# Patient Record
Sex: FEMALE | Race: WHITE | Employment: OTHER | URBAN - NONMETROPOLITAN AREA
[De-identification: names, ages, dates, MRNs, and addresses within clinical notes are randomized per-mention and may not be internally consistent; named-entity substitution may affect disease eponyms.]

---

## 2013-04-12 LAB — LEFT VENTRICULAR EJECTION FRACTION, EXTERNAL: 58

## 2017-01-03 ENCOUNTER — ANTI-COAG VISIT (OUTPATIENT)
Dept: FAMILY MEDICINE CLINIC | Age: 67
End: 2017-01-03

## 2017-01-03 ENCOUNTER — NURSE ONLY (OUTPATIENT)
Dept: FAMILY MEDICINE CLINIC | Age: 67
End: 2017-01-03

## 2017-01-03 DIAGNOSIS — I48.20 CHRONIC ATRIAL FIBRILLATION (HCC): ICD-10-CM

## 2017-01-03 LAB
INTERNATIONAL NORMALIZATION RATIO, POC: 2.6
PROTHROMBIN TIME, POC: NORMAL

## 2017-01-03 PROCEDURE — 85610 PROTHROMBIN TIME: CPT | Performed by: FAMILY MEDICINE

## 2017-01-10 ENCOUNTER — NURSE ONLY (OUTPATIENT)
Dept: FAMILY MEDICINE CLINIC | Age: 67
End: 2017-01-10

## 2017-01-10 ENCOUNTER — ANTI-COAG VISIT (OUTPATIENT)
Dept: FAMILY MEDICINE CLINIC | Age: 67
End: 2017-01-10

## 2017-01-10 DIAGNOSIS — I48.20 CHRONIC ATRIAL FIBRILLATION (HCC): ICD-10-CM

## 2017-01-10 LAB
INTERNATIONAL NORMALIZATION RATIO, POC: 2.7
PROTHROMBIN TIME, POC: NORMAL

## 2017-01-10 PROCEDURE — 85610 PROTHROMBIN TIME: CPT | Performed by: FAMILY MEDICINE

## 2017-01-18 ENCOUNTER — NURSE ONLY (OUTPATIENT)
Dept: FAMILY MEDICINE CLINIC | Age: 67
End: 2017-01-18

## 2017-01-18 DIAGNOSIS — N18.4 CHRONIC KIDNEY DISEASE, STAGE IV (SEVERE) (HCC): Primary | ICD-10-CM

## 2017-01-24 ENCOUNTER — NURSE ONLY (OUTPATIENT)
Dept: FAMILY MEDICINE CLINIC | Age: 67
End: 2017-01-24

## 2017-01-24 ENCOUNTER — ANTI-COAG VISIT (OUTPATIENT)
Dept: FAMILY MEDICINE CLINIC | Age: 67
End: 2017-01-24

## 2017-01-24 DIAGNOSIS — I48.20 CHRONIC ATRIAL FIBRILLATION (HCC): ICD-10-CM

## 2017-01-24 LAB
INTERNATIONAL NORMALIZATION RATIO, POC: 3.2
PROTHROMBIN TIME, POC: NORMAL

## 2017-01-24 PROCEDURE — 85610 PROTHROMBIN TIME: CPT | Performed by: FAMILY MEDICINE

## 2017-02-01 RX ORDER — LEVOTHYROXINE SODIUM 0.1 MG/1
TABLET ORAL
Qty: 90 TABLET | Refills: 3 | Status: SHIPPED | OUTPATIENT
Start: 2017-02-01 | End: 2018-01-30 | Stop reason: SDUPTHER

## 2017-02-01 RX ORDER — SYRGE-NDL,INS 0.3 ML HALF MARK 31 GX5/16"
SYRINGE, EMPTY DISPOSABLE MISCELLANEOUS
Qty: 150 EACH | Refills: 3 | Status: SHIPPED | OUTPATIENT
Start: 2017-02-01 | End: 2017-11-26 | Stop reason: SDUPTHER

## 2017-02-07 ENCOUNTER — ANTI-COAG VISIT (OUTPATIENT)
Dept: FAMILY MEDICINE CLINIC | Age: 67
End: 2017-02-07

## 2017-02-07 ENCOUNTER — NURSE ONLY (OUTPATIENT)
Dept: FAMILY MEDICINE CLINIC | Age: 67
End: 2017-02-07

## 2017-02-07 DIAGNOSIS — I48.20 CHRONIC ATRIAL FIBRILLATION (HCC): ICD-10-CM

## 2017-02-07 LAB
INTERNATIONAL NORMALIZATION RATIO, POC: 4
PROTHROMBIN TIME, POC: NORMAL

## 2017-02-07 PROCEDURE — 85610 PROTHROMBIN TIME: CPT | Performed by: FAMILY MEDICINE

## 2017-02-14 ENCOUNTER — ANTI-COAG VISIT (OUTPATIENT)
Dept: FAMILY MEDICINE CLINIC | Age: 67
End: 2017-02-14

## 2017-02-14 ENCOUNTER — NURSE ONLY (OUTPATIENT)
Dept: FAMILY MEDICINE CLINIC | Age: 67
End: 2017-02-14

## 2017-02-14 DIAGNOSIS — I48.20 CHRONIC ATRIAL FIBRILLATION (HCC): ICD-10-CM

## 2017-02-14 LAB
INTERNATIONAL NORMALIZATION RATIO, POC: 2.5
PROTHROMBIN TIME, POC: NORMAL

## 2017-02-14 PROCEDURE — 85610 PROTHROMBIN TIME: CPT | Performed by: FAMILY MEDICINE

## 2017-02-16 DIAGNOSIS — B02.29 POST HERPETIC NEURALGIA: ICD-10-CM

## 2017-02-21 ENCOUNTER — NURSE ONLY (OUTPATIENT)
Dept: FAMILY MEDICINE CLINIC | Age: 67
End: 2017-02-21

## 2017-02-21 ENCOUNTER — ANTI-COAG VISIT (OUTPATIENT)
Dept: FAMILY MEDICINE CLINIC | Age: 67
End: 2017-02-21

## 2017-02-21 DIAGNOSIS — I48.20 CHRONIC ATRIAL FIBRILLATION (HCC): ICD-10-CM

## 2017-02-21 LAB
INTERNATIONAL NORMALIZATION RATIO, POC: 3.1
PROTHROMBIN TIME, POC: NORMAL

## 2017-02-21 PROCEDURE — 85610 PROTHROMBIN TIME: CPT | Performed by: FAMILY MEDICINE

## 2017-02-28 ENCOUNTER — NURSE ONLY (OUTPATIENT)
Dept: FAMILY MEDICINE CLINIC | Age: 67
End: 2017-02-28

## 2017-02-28 ENCOUNTER — ANTI-COAG VISIT (OUTPATIENT)
Dept: FAMILY MEDICINE CLINIC | Age: 67
End: 2017-02-28

## 2017-02-28 DIAGNOSIS — I48.20 CHRONIC ATRIAL FIBRILLATION (HCC): ICD-10-CM

## 2017-02-28 LAB
INTERNATIONAL NORMALIZATION RATIO, POC: 2.6
PROTHROMBIN TIME, POC: NORMAL

## 2017-02-28 PROCEDURE — 85610 PROTHROMBIN TIME: CPT | Performed by: FAMILY MEDICINE

## 2017-03-13 ENCOUNTER — ANTI-COAG VISIT (OUTPATIENT)
Dept: FAMILY MEDICINE CLINIC | Age: 67
End: 2017-03-13

## 2017-03-13 ENCOUNTER — NURSE ONLY (OUTPATIENT)
Dept: FAMILY MEDICINE CLINIC | Age: 67
End: 2017-03-13

## 2017-03-13 DIAGNOSIS — I48.20 CHRONIC ATRIAL FIBRILLATION (HCC): ICD-10-CM

## 2017-03-13 LAB
INTERNATIONAL NORMALIZATION RATIO, POC: 2.5
PROTHROMBIN TIME, POC: NORMAL

## 2017-03-13 PROCEDURE — 85610 PROTHROMBIN TIME: CPT | Performed by: FAMILY MEDICINE

## 2017-03-19 DIAGNOSIS — I87.2 VENOUS INSUFFICIENCY: ICD-10-CM

## 2017-03-20 ENCOUNTER — HOSPITAL ENCOUNTER (OUTPATIENT)
Dept: CT IMAGING | Age: 67
Discharge: HOME | End: 2017-03-20
Attending: INTERNAL MEDICINE | Admitting: INTERNAL MEDICINE

## 2017-03-20 DIAGNOSIS — Z85.72 HISTORY OF NON-HODGKIN'S LYMPHOMA: ICD-10-CM

## 2017-03-20 DIAGNOSIS — Z85.72 PERSONAL HISTORY OF LYMPHOMA: ICD-10-CM

## 2017-03-20 RX ORDER — FUROSEMIDE 20 MG/1
TABLET ORAL
Qty: 180 TABLET | Refills: 3 | Status: SHIPPED | OUTPATIENT
Start: 2017-03-20 | End: 2018-03-12 | Stop reason: SDUPTHER

## 2017-03-23 RX ORDER — PEN NEEDLE, DIABETIC 32GX 5/32"
NEEDLE, DISPOSABLE MISCELLANEOUS
Qty: 200 PACKAGE | Refills: 11 | Status: SHIPPED | OUTPATIENT
Start: 2017-03-23 | End: 2018-04-20 | Stop reason: SDUPTHER

## 2017-03-24 RX ORDER — DILTIAZEM HYDROCHLORIDE 240 MG/1
240 CAPSULE, EXTENDED RELEASE ORAL DAILY
Qty: 90 CAPSULE | Refills: 3 | Status: SHIPPED | OUTPATIENT
Start: 2017-03-24 | End: 2018-03-27 | Stop reason: SDUPTHER

## 2017-03-24 RX ORDER — PENTOXIFYLLINE 400 MG/1
TABLET, EXTENDED RELEASE ORAL
Qty: 90 TABLET | Refills: 3 | Status: SHIPPED | OUTPATIENT
Start: 2017-03-24 | End: 2018-03-20 | Stop reason: SDUPTHER

## 2017-03-24 RX ORDER — FENOFIBRATE 160 MG/1
TABLET ORAL
Qty: 90 TABLET | Refills: 3 | Status: SHIPPED | OUTPATIENT
Start: 2017-03-24 | End: 2017-04-10 | Stop reason: ALTCHOICE

## 2017-03-28 RX ORDER — DILTIAZEM HYDROCHLORIDE 240 MG/1
CAPSULE, EXTENDED RELEASE ORAL
Qty: 30 CAPSULE | Refills: 0 | OUTPATIENT
Start: 2017-03-28

## 2017-03-29 DIAGNOSIS — B02.29 POST HERPETIC NEURALGIA: ICD-10-CM

## 2017-04-10 ENCOUNTER — OFFICE VISIT (OUTPATIENT)
Dept: FAMILY MEDICINE CLINIC | Age: 67
End: 2017-04-10

## 2017-04-10 ENCOUNTER — ANTI-COAG VISIT (OUTPATIENT)
Dept: FAMILY MEDICINE CLINIC | Age: 67
End: 2017-04-10

## 2017-04-10 VITALS
HEART RATE: 74 BPM | HEIGHT: 62 IN | OXYGEN SATURATION: 98 % | WEIGHT: 226.8 LBS | SYSTOLIC BLOOD PRESSURE: 116 MMHG | BODY MASS INDEX: 41.73 KG/M2 | DIASTOLIC BLOOD PRESSURE: 66 MMHG

## 2017-04-10 DIAGNOSIS — Z86.19 HISTORY OF HERPES ZOSTER: ICD-10-CM

## 2017-04-10 DIAGNOSIS — E11.21 DIABETIC NEPHROPATHY ASSOCIATED WITH TYPE 2 DIABETES MELLITUS (HCC): Primary | ICD-10-CM

## 2017-04-10 DIAGNOSIS — N18.4 CKD STAGE 4 DUE TO TYPE 2 DIABETES MELLITUS (HCC): ICD-10-CM

## 2017-04-10 DIAGNOSIS — E78.2 MIXED HYPERLIPIDEMIA: ICD-10-CM

## 2017-04-10 DIAGNOSIS — N18.4 CHRONIC KIDNEY DISEASE (CKD), STAGE IV (SEVERE) (HCC): ICD-10-CM

## 2017-04-10 DIAGNOSIS — R07.9 CHEST PAIN, UNSPECIFIED TYPE: ICD-10-CM

## 2017-04-10 DIAGNOSIS — E11.22 CKD STAGE 4 DUE TO TYPE 2 DIABETES MELLITUS (HCC): ICD-10-CM

## 2017-04-10 DIAGNOSIS — Z23 NEED FOR 23-POLYVALENT PNEUMOCOCCAL POLYSACCHARIDE VACCINE: ICD-10-CM

## 2017-04-10 DIAGNOSIS — Z23 NEED FOR TDAP VACCINATION: ICD-10-CM

## 2017-04-10 DIAGNOSIS — Z12.11 COLON CANCER SCREENING: ICD-10-CM

## 2017-04-10 DIAGNOSIS — E03.8 HYPOTHYROIDISM DUE TO HASHIMOTO'S THYROIDITIS: ICD-10-CM

## 2017-04-10 DIAGNOSIS — I10 BENIGN ESSENTIAL HTN: ICD-10-CM

## 2017-04-10 DIAGNOSIS — E06.3 HYPOTHYROIDISM DUE TO HASHIMOTO'S THYROIDITIS: ICD-10-CM

## 2017-04-10 DIAGNOSIS — E13.51 PERIPHERAL VASCULAR DISEASE DUE TO SECONDARY DIABETES MELLITUS (HCC): ICD-10-CM

## 2017-04-10 DIAGNOSIS — E13.21 NEPHROPATHY DUE TO SECONDARY DIABETES MELLITUS (HCC): ICD-10-CM

## 2017-04-10 DIAGNOSIS — I48.20 CHRONIC ATRIAL FIBRILLATION (HCC): ICD-10-CM

## 2017-04-10 LAB
INTERNATIONAL NORMALIZATION RATIO, POC: 2.7
PROTHROMBIN TIME, POC: NORMAL

## 2017-04-10 PROCEDURE — 93000 ELECTROCARDIOGRAM COMPLETE: CPT | Performed by: FAMILY MEDICINE

## 2017-04-10 PROCEDURE — 1036F TOBACCO NON-USER: CPT | Performed by: FAMILY MEDICINE

## 2017-04-10 PROCEDURE — G8417 CALC BMI ABV UP PARAM F/U: HCPCS | Performed by: FAMILY MEDICINE

## 2017-04-10 PROCEDURE — 99215 OFFICE O/P EST HI 40 MIN: CPT | Performed by: FAMILY MEDICINE

## 2017-04-10 PROCEDURE — 3014F SCREEN MAMMO DOC REV: CPT | Performed by: FAMILY MEDICINE

## 2017-04-10 PROCEDURE — 3017F COLORECTAL CA SCREEN DOC REV: CPT | Performed by: FAMILY MEDICINE

## 2017-04-10 PROCEDURE — 1123F ACP DISCUSS/DSCN MKR DOCD: CPT | Performed by: FAMILY MEDICINE

## 2017-04-10 PROCEDURE — 4040F PNEUMOC VAC/ADMIN/RCVD: CPT | Performed by: FAMILY MEDICINE

## 2017-04-10 PROCEDURE — G0009 ADMIN PNEUMOCOCCAL VACCINE: HCPCS | Performed by: FAMILY MEDICINE

## 2017-04-10 PROCEDURE — 1090F PRES/ABSN URINE INCON ASSESS: CPT | Performed by: FAMILY MEDICINE

## 2017-04-10 PROCEDURE — 90732 PPSV23 VACC 2 YRS+ SUBQ/IM: CPT | Performed by: FAMILY MEDICINE

## 2017-04-10 PROCEDURE — G8427 DOCREV CUR MEDS BY ELIG CLIN: HCPCS | Performed by: FAMILY MEDICINE

## 2017-04-10 PROCEDURE — 36415 COLL VENOUS BLD VENIPUNCTURE: CPT | Performed by: FAMILY MEDICINE

## 2017-04-10 PROCEDURE — 85610 PROTHROMBIN TIME: CPT | Performed by: FAMILY MEDICINE

## 2017-04-10 PROCEDURE — G8399 PT W/DXA RESULTS DOCUMENT: HCPCS | Performed by: FAMILY MEDICINE

## 2017-04-10 PROCEDURE — 3044F HG A1C LEVEL LT 7.0%: CPT | Performed by: FAMILY MEDICINE

## 2017-04-10 RX ORDER — ATORVASTATIN CALCIUM 40 MG/1
TABLET, FILM COATED ORAL
Qty: 90 TABLET | Refills: 3 | Status: SHIPPED | OUTPATIENT
Start: 2017-04-10 | End: 2018-04-03 | Stop reason: SDUPTHER

## 2017-04-10 ASSESSMENT — PATIENT HEALTH QUESTIONNAIRE - PHQ9
SUM OF ALL RESPONSES TO PHQ QUESTIONS 1-9: 0
2. FEELING DOWN, DEPRESSED OR HOPELESS: 0
1. LITTLE INTEREST OR PLEASURE IN DOING THINGS: 0
SUM OF ALL RESPONSES TO PHQ9 QUESTIONS 1 & 2: 0

## 2017-04-11 LAB
A/G RATIO: 1.6 (ref 1.1–2.2)
ALBUMIN SERPL-MCNC: 4.1 G/DL (ref 3.4–5)
ALP BLD-CCNC: 46 U/L (ref 40–129)
ALT SERPL-CCNC: 11 U/L (ref 10–40)
ANION GAP SERPL CALCULATED.3IONS-SCNC: 18 MMOL/L (ref 3–16)
AST SERPL-CCNC: 22 U/L (ref 15–37)
BILIRUB SERPL-MCNC: 0.6 MG/DL (ref 0–1)
BUN BLDV-MCNC: 42 MG/DL (ref 7–20)
CALCIUM SERPL-MCNC: 9.1 MG/DL (ref 8.3–10.6)
CHLORIDE BLD-SCNC: 99 MMOL/L (ref 99–110)
CHOLESTEROL, TOTAL: 116 MG/DL (ref 0–199)
CO2: 26 MMOL/L (ref 21–32)
CREAT SERPL-MCNC: 2 MG/DL (ref 0.6–1.2)
ESTIMATED AVERAGE GLUCOSE: 162.8 MG/DL
GFR AFRICAN AMERICAN: 30
GFR NON-AFRICAN AMERICAN: 25
GLOBULIN: 2.6 G/DL
GLUCOSE BLD-MCNC: 70 MG/DL (ref 70–99)
HBA1C MFR BLD: 7.3 %
HDLC SERPL-MCNC: 37 MG/DL (ref 40–60)
LDL CHOLESTEROL CALCULATED: 55 MG/DL
POTASSIUM SERPL-SCNC: 4.3 MMOL/L (ref 3.5–5.1)
SODIUM BLD-SCNC: 143 MMOL/L (ref 136–145)
TOTAL PROTEIN: 6.7 G/DL (ref 6.4–8.2)
TRIGL SERPL-MCNC: 122 MG/DL (ref 0–150)
TSH REFLEX: 1.83 UIU/ML (ref 0.27–4.2)
VLDLC SERPL CALC-MCNC: 24 MG/DL

## 2017-04-14 LAB
6-ACETYLMORPHINE: NOT DETECTED
7-AMINOCLONAZEPAM: NOT DETECTED
ALPHA-OH-ALPRAZOLAM: NOT DETECTED
ALPRAZOLAM: NOT DETECTED
AMPHETAMINE: NOT DETECTED
BARBITURATES: NOT DETECTED
BENZOYLECGONINE: NOT DETECTED
BUPRENORPHINE: NOT DETECTED
CARISOPRODOL: NOT DETECTED
CLONAZEPAM: NOT DETECTED
CODEINE: NOT DETECTED
CREATININE URINE: 22.2 MG/DL (ref 20–400)
DIAZEPAM: NOT DETECTED
DRUGS EXPECTED: NORMAL
EER PAIN MGT DRUG PANEL, HIGH RES/EMIT U: NORMAL
ETHYL GLUCURONIDE: NOT DETECTED
FENTANYL: NOT DETECTED
HYDROCODONE: NOT DETECTED
HYDROMORPHONE: NOT DETECTED
LORAZEPAM: NOT DETECTED
MARIJUANA METABOLITE: NOT DETECTED
MDA: NOT DETECTED
MDEA: NOT DETECTED
MDMA URINE: NOT DETECTED
MEPERIDINE: NOT DETECTED
METHADONE: NOT DETECTED
METHAMPHETAMINE: NOT DETECTED
METHYLPHENIDATE: NOT DETECTED
MIDAZOLAM: NOT DETECTED
MORPHINE: NOT DETECTED
NORBUPRENORPHINE, FREE: NOT DETECTED
NORDIAZEPAM: NOT DETECTED
NORFENTANYL: NOT DETECTED
NORHYDROCODONE, URINE: NOT DETECTED
NOROXYCODONE: NOT DETECTED
NOROXYMORPHONE, URINE: NOT DETECTED
OXAZEPAM: NOT DETECTED
OXYCODONE: NOT DETECTED
OXYMORPHONE: NOT DETECTED
PAIN MANAGEMENT DRUG PANEL: NORMAL
PAIN MANAGEMENT DRUG PANEL: NORMAL
PCP: NOT DETECTED
PHENTERMINE: NOT DETECTED
PROPOXYPHENE: NOT DETECTED
TAPENTADOL, URINE: NOT DETECTED
TAPENTADOL-O-SULFATE, URINE: NOT DETECTED
TEMAZEPAM: NOT DETECTED
TRAMADOL: NOT DETECTED
ZOLPIDEM: NOT DETECTED

## 2017-04-27 ENCOUNTER — OFFICE VISIT (OUTPATIENT)
Dept: ORTHOPEDIC SURGERY | Age: 67
End: 2017-04-27

## 2017-04-27 VITALS
HEIGHT: 62 IN | HEART RATE: 82 BPM | BODY MASS INDEX: 41.75 KG/M2 | SYSTOLIC BLOOD PRESSURE: 132 MMHG | WEIGHT: 226.85 LBS | DIASTOLIC BLOOD PRESSURE: 80 MMHG

## 2017-04-27 DIAGNOSIS — M79.642 LEFT HAND PAIN: Primary | ICD-10-CM

## 2017-04-27 DIAGNOSIS — M65.312 TRIGGER FINGER OF LEFT THUMB: ICD-10-CM

## 2017-04-27 DIAGNOSIS — M65.332 TRIGGER FINGER, LEFT MIDDLE FINGER: ICD-10-CM

## 2017-04-27 PROCEDURE — 1090F PRES/ABSN URINE INCON ASSESS: CPT | Performed by: ORTHOPAEDIC SURGERY

## 2017-04-27 PROCEDURE — 4040F PNEUMOC VAC/ADMIN/RCVD: CPT | Performed by: ORTHOPAEDIC SURGERY

## 2017-04-27 PROCEDURE — 1123F ACP DISCUSS/DSCN MKR DOCD: CPT | Performed by: ORTHOPAEDIC SURGERY

## 2017-04-27 PROCEDURE — 3017F COLORECTAL CA SCREEN DOC REV: CPT | Performed by: ORTHOPAEDIC SURGERY

## 2017-04-27 PROCEDURE — G8427 DOCREV CUR MEDS BY ELIG CLIN: HCPCS | Performed by: ORTHOPAEDIC SURGERY

## 2017-04-27 PROCEDURE — G8399 PT W/DXA RESULTS DOCUMENT: HCPCS | Performed by: ORTHOPAEDIC SURGERY

## 2017-04-27 PROCEDURE — 1036F TOBACCO NON-USER: CPT | Performed by: ORTHOPAEDIC SURGERY

## 2017-04-27 PROCEDURE — 3014F SCREEN MAMMO DOC REV: CPT | Performed by: ORTHOPAEDIC SURGERY

## 2017-04-27 PROCEDURE — 20550 NJX 1 TENDON SHEATH/LIGAMENT: CPT | Performed by: ORTHOPAEDIC SURGERY

## 2017-04-27 PROCEDURE — G8417 CALC BMI ABV UP PARAM F/U: HCPCS | Performed by: ORTHOPAEDIC SURGERY

## 2017-04-27 PROCEDURE — 99203 OFFICE O/P NEW LOW 30 MIN: CPT | Performed by: ORTHOPAEDIC SURGERY

## 2017-05-03 RX ORDER — OMEPRAZOLE 40 MG/1
CAPSULE, DELAYED RELEASE ORAL
Qty: 90 CAPSULE | Refills: 3 | Status: SHIPPED | OUTPATIENT
Start: 2017-05-03 | End: 2018-04-24 | Stop reason: SDUPTHER

## 2017-05-09 ENCOUNTER — ANTI-COAG VISIT (OUTPATIENT)
Dept: FAMILY MEDICINE CLINIC | Age: 67
End: 2017-05-09

## 2017-05-09 ENCOUNTER — NURSE ONLY (OUTPATIENT)
Dept: FAMILY MEDICINE CLINIC | Age: 67
End: 2017-05-09

## 2017-05-09 DIAGNOSIS — I48.20 CHRONIC ATRIAL FIBRILLATION (HCC): Primary | ICD-10-CM

## 2017-05-09 LAB
INTERNATIONAL NORMALIZATION RATIO, POC: 1.2
PROTHROMBIN TIME, POC: NORMAL

## 2017-05-09 PROCEDURE — 85610 PROTHROMBIN TIME: CPT | Performed by: FAMILY MEDICINE

## 2017-05-10 ENCOUNTER — NURSE ONLY (OUTPATIENT)
Dept: FAMILY MEDICINE CLINIC | Age: 67
End: 2017-05-10

## 2017-05-10 ENCOUNTER — ANTI-COAG VISIT (OUTPATIENT)
Dept: FAMILY MEDICINE CLINIC | Age: 67
End: 2017-05-10

## 2017-05-10 DIAGNOSIS — I48.20 CHRONIC ATRIAL FIBRILLATION (HCC): ICD-10-CM

## 2017-05-10 LAB
INTERNATIONAL NORMALIZATION RATIO, POC: 1.2
PROTHROMBIN TIME, POC: NORMAL

## 2017-05-10 PROCEDURE — 85610 PROTHROMBIN TIME: CPT | Performed by: FAMILY MEDICINE

## 2017-05-12 ENCOUNTER — NURSE ONLY (OUTPATIENT)
Dept: FAMILY MEDICINE CLINIC | Age: 67
End: 2017-05-12

## 2017-05-12 DIAGNOSIS — I48.20 CHRONIC ATRIAL FIBRILLATION (HCC): ICD-10-CM

## 2017-05-12 LAB
INTERNATIONAL NORMALIZATION RATIO, POC: 2.1
PROTHROMBIN TIME, POC: NORMAL

## 2017-05-12 PROCEDURE — 85610 PROTHROMBIN TIME: CPT | Performed by: FAMILY MEDICINE

## 2017-05-19 ENCOUNTER — NURSE ONLY (OUTPATIENT)
Dept: FAMILY MEDICINE CLINIC | Age: 67
End: 2017-05-19

## 2017-05-19 DIAGNOSIS — I48.20 CHRONIC ATRIAL FIBRILLATION (HCC): ICD-10-CM

## 2017-05-19 DIAGNOSIS — E11.9 INSULIN-REQUIRING OR DEPENDENT TYPE II DIABETES MELLITUS (HCC): ICD-10-CM

## 2017-05-19 DIAGNOSIS — Z79.4 INSULIN-REQUIRING OR DEPENDENT TYPE II DIABETES MELLITUS (HCC): ICD-10-CM

## 2017-05-19 LAB
INTERNATIONAL NORMALIZATION RATIO, POC: 1.3
PROTHROMBIN TIME, POC: NORMAL

## 2017-05-19 PROCEDURE — 85610 PROTHROMBIN TIME: CPT | Performed by: FAMILY MEDICINE

## 2017-05-22 ENCOUNTER — ANTI-COAG VISIT (OUTPATIENT)
Dept: FAMILY MEDICINE CLINIC | Age: 67
End: 2017-05-22

## 2017-05-22 ENCOUNTER — NURSE ONLY (OUTPATIENT)
Dept: FAMILY MEDICINE CLINIC | Age: 67
End: 2017-05-22

## 2017-05-22 DIAGNOSIS — I48.20 CHRONIC ATRIAL FIBRILLATION (HCC): ICD-10-CM

## 2017-05-22 LAB
INTERNATIONAL NORMALIZATION RATIO, POC: 1.7
PROTHROMBIN TIME, POC: NORMAL

## 2017-05-22 PROCEDURE — 85610 PROTHROMBIN TIME: CPT | Performed by: FAMILY MEDICINE

## 2017-05-25 ENCOUNTER — OFFICE VISIT (OUTPATIENT)
Dept: ORTHOPEDIC SURGERY | Age: 67
End: 2017-05-25

## 2017-05-25 VITALS — WEIGHT: 226.85 LBS | BODY MASS INDEX: 41.75 KG/M2 | HEIGHT: 62 IN

## 2017-05-25 DIAGNOSIS — M65.332 TRIGGER FINGER, LEFT MIDDLE FINGER: ICD-10-CM

## 2017-05-25 DIAGNOSIS — M65.312 TRIGGER FINGER OF LEFT THUMB: Primary | ICD-10-CM

## 2017-05-25 PROCEDURE — 1036F TOBACCO NON-USER: CPT | Performed by: ORTHOPAEDIC SURGERY

## 2017-05-25 PROCEDURE — 4040F PNEUMOC VAC/ADMIN/RCVD: CPT | Performed by: ORTHOPAEDIC SURGERY

## 2017-05-25 PROCEDURE — 1123F ACP DISCUSS/DSCN MKR DOCD: CPT | Performed by: ORTHOPAEDIC SURGERY

## 2017-05-25 PROCEDURE — 3017F COLORECTAL CA SCREEN DOC REV: CPT | Performed by: ORTHOPAEDIC SURGERY

## 2017-05-25 PROCEDURE — G8417 CALC BMI ABV UP PARAM F/U: HCPCS | Performed by: ORTHOPAEDIC SURGERY

## 2017-05-25 PROCEDURE — G8399 PT W/DXA RESULTS DOCUMENT: HCPCS | Performed by: ORTHOPAEDIC SURGERY

## 2017-05-25 PROCEDURE — 99212 OFFICE O/P EST SF 10 MIN: CPT | Performed by: ORTHOPAEDIC SURGERY

## 2017-05-25 PROCEDURE — 3014F SCREEN MAMMO DOC REV: CPT | Performed by: ORTHOPAEDIC SURGERY

## 2017-05-25 PROCEDURE — G8427 DOCREV CUR MEDS BY ELIG CLIN: HCPCS | Performed by: ORTHOPAEDIC SURGERY

## 2017-05-25 PROCEDURE — 1090F PRES/ABSN URINE INCON ASSESS: CPT | Performed by: ORTHOPAEDIC SURGERY

## 2017-05-26 ENCOUNTER — ANTI-COAG VISIT (OUTPATIENT)
Dept: FAMILY MEDICINE CLINIC | Age: 67
End: 2017-05-26

## 2017-05-26 ENCOUNTER — NURSE ONLY (OUTPATIENT)
Dept: FAMILY MEDICINE CLINIC | Age: 67
End: 2017-05-26

## 2017-05-26 DIAGNOSIS — I48.20 CHRONIC ATRIAL FIBRILLATION (HCC): ICD-10-CM

## 2017-05-26 LAB
INTERNATIONAL NORMALIZATION RATIO, POC: 2
PROTHROMBIN TIME, POC: NORMAL

## 2017-05-26 PROCEDURE — 85610 PROTHROMBIN TIME: CPT | Performed by: FAMILY MEDICINE

## 2017-06-01 ENCOUNTER — NURSE ONLY (OUTPATIENT)
Dept: FAMILY MEDICINE CLINIC | Age: 67
End: 2017-06-01

## 2017-06-01 DIAGNOSIS — I48.20 CHRONIC ATRIAL FIBRILLATION (HCC): ICD-10-CM

## 2017-06-01 LAB
INTERNATIONAL NORMALIZATION RATIO, POC: 1.5
PROTHROMBIN TIME, POC: NORMAL

## 2017-06-01 PROCEDURE — 85610 PROTHROMBIN TIME: CPT | Performed by: NURSE PRACTITIONER

## 2017-06-02 ENCOUNTER — ANTI-COAG VISIT (OUTPATIENT)
Dept: FAMILY MEDICINE CLINIC | Age: 67
End: 2017-06-02

## 2017-06-02 ENCOUNTER — NURSE ONLY (OUTPATIENT)
Dept: FAMILY MEDICINE CLINIC | Age: 67
End: 2017-06-02

## 2017-06-02 DIAGNOSIS — I48.20 CHRONIC ATRIAL FIBRILLATION (HCC): ICD-10-CM

## 2017-06-02 LAB
INTERNATIONAL NORMALIZATION RATIO, POC: 1.7
PROTHROMBIN TIME, POC: NORMAL

## 2017-06-02 PROCEDURE — 85610 PROTHROMBIN TIME: CPT | Performed by: FAMILY MEDICINE

## 2017-06-05 ENCOUNTER — NURSE ONLY (OUTPATIENT)
Dept: FAMILY MEDICINE CLINIC | Age: 67
End: 2017-06-05

## 2017-06-05 DIAGNOSIS — I48.20 CHRONIC ATRIAL FIBRILLATION (HCC): ICD-10-CM

## 2017-06-05 LAB
INTERNATIONAL NORMALIZATION RATIO, POC: 2.1
PROTHROMBIN TIME, POC: NORMAL

## 2017-06-05 PROCEDURE — 85610 PROTHROMBIN TIME: CPT | Performed by: FAMILY MEDICINE

## 2017-06-07 RX ORDER — ALENDRONATE SODIUM 35 MG/1
TABLET ORAL
Qty: 12 TABLET | Refills: 3 | Status: SHIPPED | OUTPATIENT
Start: 2017-06-07 | End: 2017-10-26

## 2017-06-08 ENCOUNTER — NURSE ONLY (OUTPATIENT)
Dept: FAMILY MEDICINE CLINIC | Age: 67
End: 2017-06-08

## 2017-06-08 DIAGNOSIS — I48.20 CHRONIC ATRIAL FIBRILLATION (HCC): ICD-10-CM

## 2017-06-08 LAB
INTERNATIONAL NORMALIZATION RATIO, POC: 1.6
PROTHROMBIN TIME, POC: NORMAL

## 2017-06-08 PROCEDURE — 85610 PROTHROMBIN TIME: CPT | Performed by: FAMILY MEDICINE

## 2017-06-12 ENCOUNTER — NURSE ONLY (OUTPATIENT)
Dept: FAMILY MEDICINE CLINIC | Age: 67
End: 2017-06-12

## 2017-06-12 ENCOUNTER — ANTI-COAG VISIT (OUTPATIENT)
Dept: FAMILY MEDICINE CLINIC | Age: 67
End: 2017-06-12

## 2017-06-12 DIAGNOSIS — I48.20 CHRONIC ATRIAL FIBRILLATION (HCC): ICD-10-CM

## 2017-06-12 LAB
INTERNATIONAL NORMALIZATION RATIO, POC: 1.9
PROTHROMBIN TIME, POC: NORMAL

## 2017-06-12 PROCEDURE — 85610 PROTHROMBIN TIME: CPT | Performed by: FAMILY MEDICINE

## 2017-06-13 ENCOUNTER — TELEPHONE (OUTPATIENT)
Dept: FAMILY MEDICINE CLINIC | Age: 67
End: 2017-06-13

## 2017-06-13 RX ORDER — WARFARIN SODIUM 5 MG/1
5 TABLET ORAL DAILY
Qty: 30 TABLET | Refills: 3 | Status: SHIPPED | OUTPATIENT
Start: 2017-06-13 | End: 2017-10-07 | Stop reason: SDUPTHER

## 2017-06-19 ENCOUNTER — NURSE ONLY (OUTPATIENT)
Dept: FAMILY MEDICINE CLINIC | Age: 67
End: 2017-06-19

## 2017-06-19 ENCOUNTER — ANTI-COAG VISIT (OUTPATIENT)
Dept: FAMILY MEDICINE CLINIC | Age: 67
End: 2017-06-19

## 2017-06-19 DIAGNOSIS — I48.20 CHRONIC ATRIAL FIBRILLATION (HCC): ICD-10-CM

## 2017-06-19 LAB
INTERNATIONAL NORMALIZATION RATIO, POC: 1.9
PROTHROMBIN TIME, POC: NORMAL

## 2017-06-19 PROCEDURE — 85610 PROTHROMBIN TIME: CPT | Performed by: FAMILY MEDICINE

## 2017-06-26 RX ORDER — LANCETS
EACH MISCELLANEOUS
Qty: 100 EACH | Refills: 5 | Status: SHIPPED | OUTPATIENT
Start: 2017-06-26 | End: 2017-07-03 | Stop reason: SDUPTHER

## 2017-06-28 ENCOUNTER — ANTI-COAG VISIT (OUTPATIENT)
Dept: FAMILY MEDICINE CLINIC | Age: 67
End: 2017-06-28

## 2017-06-28 ENCOUNTER — NURSE ONLY (OUTPATIENT)
Dept: FAMILY MEDICINE CLINIC | Age: 67
End: 2017-06-28

## 2017-06-28 DIAGNOSIS — I48.20 CHRONIC ATRIAL FIBRILLATION (HCC): ICD-10-CM

## 2017-06-28 LAB
INTERNATIONAL NORMALIZATION RATIO, POC: 2
PROTHROMBIN TIME, POC: NORMAL

## 2017-06-28 PROCEDURE — 85610 PROTHROMBIN TIME: CPT | Performed by: FAMILY MEDICINE

## 2017-07-03 RX ORDER — LANCETS
EACH MISCELLANEOUS
Qty: 100 EACH | Refills: 5 | Status: SHIPPED | OUTPATIENT
Start: 2017-07-03 | End: 2017-09-15 | Stop reason: SDUPTHER

## 2017-07-12 ENCOUNTER — NURSE ONLY (OUTPATIENT)
Dept: FAMILY MEDICINE CLINIC | Age: 67
End: 2017-07-12

## 2017-07-12 DIAGNOSIS — I48.20 CHRONIC ATRIAL FIBRILLATION (HCC): ICD-10-CM

## 2017-07-12 LAB
INTERNATIONAL NORMALIZATION RATIO, POC: 1.8
PROTHROMBIN TIME, POC: NORMAL

## 2017-07-12 PROCEDURE — 85610 PROTHROMBIN TIME: CPT | Performed by: FAMILY MEDICINE

## 2017-07-19 ENCOUNTER — ANTI-COAG VISIT (OUTPATIENT)
Dept: FAMILY MEDICINE CLINIC | Age: 67
End: 2017-07-19

## 2017-07-19 ENCOUNTER — NURSE ONLY (OUTPATIENT)
Dept: FAMILY MEDICINE CLINIC | Age: 67
End: 2017-07-19

## 2017-07-19 DIAGNOSIS — I48.20 CHRONIC ATRIAL FIBRILLATION (HCC): ICD-10-CM

## 2017-07-19 LAB
INTERNATIONAL NORMALIZATION RATIO, POC: 2.1
PROTHROMBIN TIME, POC: NORMAL

## 2017-07-19 PROCEDURE — 85610 PROTHROMBIN TIME: CPT | Performed by: FAMILY MEDICINE

## 2017-07-21 ENCOUNTER — OFFICE VISIT (OUTPATIENT)
Dept: FAMILY MEDICINE CLINIC | Age: 67
End: 2017-07-21

## 2017-07-21 VITALS
HEIGHT: 61 IN | OXYGEN SATURATION: 97 % | HEART RATE: 72 BPM | DIASTOLIC BLOOD PRESSURE: 80 MMHG | BODY MASS INDEX: 43.8 KG/M2 | WEIGHT: 232 LBS | SYSTOLIC BLOOD PRESSURE: 138 MMHG

## 2017-07-21 DIAGNOSIS — I87.2 VENOUS INSUFFICIENCY: Primary | ICD-10-CM

## 2017-07-21 DIAGNOSIS — I48.20 CHRONIC ATRIAL FIBRILLATION (HCC): ICD-10-CM

## 2017-07-21 PROCEDURE — G8427 DOCREV CUR MEDS BY ELIG CLIN: HCPCS | Performed by: NURSE PRACTITIONER

## 2017-07-21 PROCEDURE — 99214 OFFICE O/P EST MOD 30 MIN: CPT | Performed by: NURSE PRACTITIONER

## 2017-07-21 PROCEDURE — 1090F PRES/ABSN URINE INCON ASSESS: CPT | Performed by: NURSE PRACTITIONER

## 2017-07-21 PROCEDURE — G8417 CALC BMI ABV UP PARAM F/U: HCPCS | Performed by: NURSE PRACTITIONER

## 2017-07-21 PROCEDURE — 1036F TOBACCO NON-USER: CPT | Performed by: NURSE PRACTITIONER

## 2017-07-21 PROCEDURE — 3014F SCREEN MAMMO DOC REV: CPT | Performed by: NURSE PRACTITIONER

## 2017-07-21 PROCEDURE — 3017F COLORECTAL CA SCREEN DOC REV: CPT | Performed by: NURSE PRACTITIONER

## 2017-07-21 PROCEDURE — 1123F ACP DISCUSS/DSCN MKR DOCD: CPT | Performed by: NURSE PRACTITIONER

## 2017-07-21 PROCEDURE — 4040F PNEUMOC VAC/ADMIN/RCVD: CPT | Performed by: NURSE PRACTITIONER

## 2017-07-21 PROCEDURE — G8399 PT W/DXA RESULTS DOCUMENT: HCPCS | Performed by: NURSE PRACTITIONER

## 2017-07-21 RX ORDER — SULFAMETHOXAZOLE AND TRIMETHOPRIM 400; 80 MG/1; MG/1
1 TABLET ORAL DAILY
Qty: 20 TABLET | Refills: 0 | Status: SHIPPED | OUTPATIENT
Start: 2017-07-21 | End: 2017-07-31

## 2017-07-21 ASSESSMENT — ENCOUNTER SYMPTOMS
COUGH: 0
SHORTNESS OF BREATH: 0
GASTROINTESTINAL NEGATIVE: 1

## 2017-08-02 ENCOUNTER — NURSE ONLY (OUTPATIENT)
Dept: FAMILY MEDICINE CLINIC | Age: 67
End: 2017-08-02

## 2017-08-02 ENCOUNTER — ANTI-COAG VISIT (OUTPATIENT)
Dept: FAMILY MEDICINE CLINIC | Age: 67
End: 2017-08-02

## 2017-08-02 DIAGNOSIS — I48.20 CHRONIC ATRIAL FIBRILLATION (HCC): ICD-10-CM

## 2017-08-02 LAB
INTERNATIONAL NORMALIZATION RATIO, POC: 1.9
PROTHROMBIN TIME, POC: NORMAL

## 2017-08-02 PROCEDURE — 85610 PROTHROMBIN TIME: CPT | Performed by: FAMILY MEDICINE

## 2017-08-09 ENCOUNTER — NURSE ONLY (OUTPATIENT)
Dept: FAMILY MEDICINE CLINIC | Age: 67
End: 2017-08-09

## 2017-08-09 DIAGNOSIS — I48.20 CHRONIC ATRIAL FIBRILLATION (HCC): ICD-10-CM

## 2017-08-09 LAB
INTERNATIONAL NORMALIZATION RATIO, POC: 2.2
PROTHROMBIN TIME, POC: NORMAL

## 2017-08-09 PROCEDURE — 85610 PROTHROMBIN TIME: CPT | Performed by: FAMILY MEDICINE

## 2017-08-10 ENCOUNTER — TELEPHONE (OUTPATIENT)
Dept: FAMILY MEDICINE CLINIC | Age: 67
End: 2017-08-10

## 2017-08-16 ENCOUNTER — NURSE ONLY (OUTPATIENT)
Dept: FAMILY MEDICINE CLINIC | Age: 67
End: 2017-08-16

## 2017-08-16 ENCOUNTER — ANTI-COAG VISIT (OUTPATIENT)
Dept: FAMILY MEDICINE CLINIC | Age: 67
End: 2017-08-16

## 2017-08-16 DIAGNOSIS — I48.20 CHRONIC ATRIAL FIBRILLATION (HCC): ICD-10-CM

## 2017-08-16 LAB
INTERNATIONAL NORMALIZATION RATIO, POC: 2.3
PROTHROMBIN TIME, POC: NORMAL

## 2017-08-16 PROCEDURE — 85610 PROTHROMBIN TIME: CPT | Performed by: FAMILY MEDICINE

## 2017-08-30 ENCOUNTER — NURSE ONLY (OUTPATIENT)
Dept: FAMILY MEDICINE CLINIC | Age: 67
End: 2017-08-30

## 2017-08-30 ENCOUNTER — TELEPHONE (OUTPATIENT)
Dept: FAMILY MEDICINE CLINIC | Age: 67
End: 2017-08-30

## 2017-08-30 ENCOUNTER — ANTI-COAG VISIT (OUTPATIENT)
Dept: FAMILY MEDICINE CLINIC | Age: 67
End: 2017-08-30

## 2017-08-30 DIAGNOSIS — I48.20 CHRONIC ATRIAL FIBRILLATION (HCC): ICD-10-CM

## 2017-08-30 LAB
INTERNATIONAL NORMALIZATION RATIO, POC: 2.1
PROTHROMBIN TIME, POC: NORMAL

## 2017-08-30 PROCEDURE — 85610 PROTHROMBIN TIME: CPT | Performed by: FAMILY MEDICINE

## 2017-09-08 RX ORDER — LANCETS
EACH MISCELLANEOUS
Qty: 100 EACH | Refills: 5 | Status: SHIPPED | OUTPATIENT
Start: 2017-09-08 | End: 2017-09-15 | Stop reason: SDUPTHER

## 2017-09-15 RX ORDER — LANCETS
EACH MISCELLANEOUS
Qty: 100 EACH | Refills: 11 | Status: SHIPPED | OUTPATIENT
Start: 2017-09-15 | End: 2018-06-11 | Stop reason: SDUPTHER

## 2017-09-19 ENCOUNTER — NURSE ONLY (OUTPATIENT)
Dept: FAMILY MEDICINE CLINIC | Age: 67
End: 2017-09-19

## 2017-09-19 ENCOUNTER — ANTI-COAG VISIT (OUTPATIENT)
Dept: FAMILY MEDICINE CLINIC | Age: 67
End: 2017-09-19

## 2017-09-19 DIAGNOSIS — I48.20 CHRONIC ATRIAL FIBRILLATION (HCC): ICD-10-CM

## 2017-09-19 DIAGNOSIS — Z23 NEED FOR INFLUENZA VACCINATION: Primary | ICD-10-CM

## 2017-09-19 LAB
INTERNATIONAL NORMALIZATION RATIO, POC: 2.2
PROTHROMBIN TIME, POC: NORMAL

## 2017-09-19 PROCEDURE — 85610 PROTHROMBIN TIME: CPT | Performed by: FAMILY MEDICINE

## 2017-09-19 PROCEDURE — 90688 IIV4 VACCINE SPLT 0.5 ML IM: CPT | Performed by: FAMILY MEDICINE

## 2017-09-19 PROCEDURE — G0008 ADMIN INFLUENZA VIRUS VAC: HCPCS | Performed by: FAMILY MEDICINE

## 2017-10-09 RX ORDER — WARFARIN SODIUM 5 MG/1
5 TABLET ORAL DAILY
Qty: 30 TABLET | Refills: 1 | Status: SHIPPED | OUTPATIENT
Start: 2017-10-09 | End: 2018-01-31 | Stop reason: SDUPTHER

## 2017-10-12 ENCOUNTER — OFFICE VISIT (OUTPATIENT)
Dept: ORTHOPEDIC SURGERY | Age: 67
End: 2017-10-12

## 2017-10-12 VITALS — BODY MASS INDEX: 43.79 KG/M2 | HEIGHT: 61 IN | WEIGHT: 231.92 LBS

## 2017-10-12 DIAGNOSIS — M65.312 TRIGGER FINGER OF LEFT THUMB: ICD-10-CM

## 2017-10-12 DIAGNOSIS — M65.332 TRIGGER FINGER, LEFT MIDDLE FINGER: Primary | ICD-10-CM

## 2017-10-12 PROCEDURE — 3014F SCREEN MAMMO DOC REV: CPT | Performed by: ORTHOPAEDIC SURGERY

## 2017-10-12 PROCEDURE — 1036F TOBACCO NON-USER: CPT | Performed by: ORTHOPAEDIC SURGERY

## 2017-10-12 PROCEDURE — G8484 FLU IMMUNIZE NO ADMIN: HCPCS | Performed by: ORTHOPAEDIC SURGERY

## 2017-10-12 PROCEDURE — 1090F PRES/ABSN URINE INCON ASSESS: CPT | Performed by: ORTHOPAEDIC SURGERY

## 2017-10-12 PROCEDURE — 4040F PNEUMOC VAC/ADMIN/RCVD: CPT | Performed by: ORTHOPAEDIC SURGERY

## 2017-10-12 PROCEDURE — G8417 CALC BMI ABV UP PARAM F/U: HCPCS | Performed by: ORTHOPAEDIC SURGERY

## 2017-10-12 PROCEDURE — 1123F ACP DISCUSS/DSCN MKR DOCD: CPT | Performed by: ORTHOPAEDIC SURGERY

## 2017-10-12 PROCEDURE — G8427 DOCREV CUR MEDS BY ELIG CLIN: HCPCS | Performed by: ORTHOPAEDIC SURGERY

## 2017-10-12 PROCEDURE — 3017F COLORECTAL CA SCREEN DOC REV: CPT | Performed by: ORTHOPAEDIC SURGERY

## 2017-10-12 PROCEDURE — 99214 OFFICE O/P EST MOD 30 MIN: CPT | Performed by: ORTHOPAEDIC SURGERY

## 2017-10-12 PROCEDURE — G8399 PT W/DXA RESULTS DOCUMENT: HCPCS | Performed by: ORTHOPAEDIC SURGERY

## 2017-10-12 NOTE — PROGRESS NOTES
other concomitant medical issues I think she would be best served with a straight local anesthetic and left long finger trigger release surgery and early intervention with postop hand therapy to improve her range of motion. As the patient has been anticoagulated I have made particular attention to let her know she does not have to be reversed from her anticoagulation. We discussed the risks, benefits, limitations, alternatives, and postop recovery following the proposed procedure. We will begin the process of scheduling surgery if there are no other preoperative medical contraindications. Please note that this transcription was created using voice recognition software. Any errors are unintentional and may be due to voice recognition transcription.

## 2017-10-16 DIAGNOSIS — M65.332 TRIGGER FINGER, LEFT MIDDLE FINGER: Primary | ICD-10-CM

## 2017-10-16 NOTE — ADDENDUM NOTE
Encounter addended by: Jennifer Nelson on: 10/16/2017  8:49 AM<BR>    Actions taken: Letter status changed

## 2017-10-16 NOTE — LETTER
[facility]/  [place]  Surgery Scheduling Form      Patient Name:  Falguni Cassidy  Patient :  1950   Patient SS#:      Patient Phone:  [phone] (home) [phone] (work)   Patient Address:  [address] [place] [address]    PCP:  Carlos Rivers MD    Date:       17             OR Time:    1:45PM              Time Required:     30 MIN    Insurance:  Payor: MEDICARE / Plan: MEDICARE PART A AND B / Product Type: *No Product type* /     Diagnosis:       LEFT LONG TRIGGER FINGER  M65.332    Procedure:      LEFT LONG [place] RELEASE [address]      Surgeon:  Mary Dumont M.D. Allergies:    No Known Allergies    Latex:          NO    Anesthesia:    LOCAL      Classification:                           Outpatient    Equipment/Rep Nofified:              Comments/Special Request:             10/16/2017 8:01 AM                                             [facility]      IN ACCORDANCE WITH OUR FORMULARY SYSTEM, A GENERIC EQUIVALENT DRUG MAY BE DISPENSED AND  ADMINISTERED UNLESS \"D. A. W. \" Jijasona [address]5     Patient Name: Luis Haq  : 1950       Surgical Procedure:     LEFT LONG [place] RELEASE       Date of Surgery:            17                         Admit as Inpatient                  XX  Outpatient     MRSA positive or history:     Height:        5'1    Weight    231LB   Allergies: No Known Allergies                                         Pre-Surgery Testing Orders:    Pre-surgical Anesthesia Order's per Anesthesiologist     Additional Testing:    U/A     URINE C/S    CBC w DIFF     Type and Screen     PT-INR     PTT  Transferrin      Albumin  BMP     Other:                    CXR (medical reason)      Preop Antibiotic Prophylaxsis /  NO ABX PER DR. Land Fairly        Cefazolin IVPB per weight base protocol If allergic to PCN

## 2017-10-16 NOTE — LETTER
[facility]  Surgery Precert & Billing Form:    DEMOGRAPHICS:                                                                                                       Patient Name:  Alexandra Tomlinson  Patient :  1950   Patient SS#:      Patient Phone:  [phone] (home) [phone] (work) Alt. Patient Phone:    Patient Address:  [address] [place] [address]    PCP:  Pato Dumont MD  Insurance: MEDICARE    DIAGNOSIS & PROCEDURE:                                                                                      Diagnosis: LEFT LONG TRIGGER FINGER M65.332  Operation: left    SURGERY  INFORMATION  Date of Surgery:   17  Location:    [address] [place] [address]  Type:    Outpatient  23 hour hold:  No  Surgeon:           Sadia Rashid  10/16/17     BILLING INFORMATION:                                                                                                Physician Procedure                                            CPT Codes                      [place], or Fellow Procedure                                      CPT Codes                      Precert information:

## 2017-10-19 ENCOUNTER — TELEPHONE (OUTPATIENT)
Dept: ORTHOPEDIC SURGERY | Age: 67
End: 2017-10-19

## 2017-10-19 ENCOUNTER — NURSE ONLY (OUTPATIENT)
Dept: FAMILY MEDICINE CLINIC | Age: 67
End: 2017-10-19

## 2017-10-19 DIAGNOSIS — I48.20 CHRONIC ATRIAL FIBRILLATION (HCC): ICD-10-CM

## 2017-10-19 LAB
INTERNATIONAL NORMALIZATION RATIO, POC: 2.2
PROTHROMBIN TIME, POC: NORMAL

## 2017-10-19 PROCEDURE — 85610 PROTHROMBIN TIME: CPT | Performed by: FAMILY MEDICINE

## 2017-10-31 ENCOUNTER — OFFICE VISIT (OUTPATIENT)
Dept: FAMILY MEDICINE CLINIC | Age: 67
End: 2017-10-31

## 2017-10-31 VITALS
HEIGHT: 63 IN | BODY MASS INDEX: 39.94 KG/M2 | DIASTOLIC BLOOD PRESSURE: 74 MMHG | HEART RATE: 65 BPM | SYSTOLIC BLOOD PRESSURE: 122 MMHG | OXYGEN SATURATION: 95 % | WEIGHT: 225.4 LBS

## 2017-10-31 DIAGNOSIS — E11.51 PERIPHERAL VASCULAR DISEASE IN DIABETES MELLITUS (HCC): ICD-10-CM

## 2017-10-31 DIAGNOSIS — E66.9 OBESITY (BMI 30-39.9): ICD-10-CM

## 2017-10-31 DIAGNOSIS — Z79.4 INSULIN-REQUIRING OR DEPENDENT TYPE II DIABETES MELLITUS (HCC): ICD-10-CM

## 2017-10-31 DIAGNOSIS — E11.22 CKD STAGE 4 DUE TO TYPE 2 DIABETES MELLITUS (HCC): ICD-10-CM

## 2017-10-31 DIAGNOSIS — I10 BENIGN ESSENTIAL HTN: Primary | ICD-10-CM

## 2017-10-31 DIAGNOSIS — E66.01 MORBID OBESITY DUE TO EXCESS CALORIES (HCC): ICD-10-CM

## 2017-10-31 DIAGNOSIS — E06.3 HYPOTHYROIDISM DUE TO HASHIMOTO'S THYROIDITIS: ICD-10-CM

## 2017-10-31 DIAGNOSIS — I48.20 CHRONIC ATRIAL FIBRILLATION (HCC): ICD-10-CM

## 2017-10-31 DIAGNOSIS — Z12.11 COLON CANCER SCREENING: ICD-10-CM

## 2017-10-31 DIAGNOSIS — Z11.59 NEED FOR HEPATITIS C SCREENING TEST: ICD-10-CM

## 2017-10-31 DIAGNOSIS — E11.9 INSULIN-REQUIRING OR DEPENDENT TYPE II DIABETES MELLITUS (HCC): ICD-10-CM

## 2017-10-31 DIAGNOSIS — E66.01 MORBID OBESITY WITH BMI OF 40.0-44.9, ADULT (HCC): ICD-10-CM

## 2017-10-31 DIAGNOSIS — E11.41 DIABETIC MONONEUROPATHY ASSOCIATED WITH TYPE 2 DIABETES MELLITUS (HCC): ICD-10-CM

## 2017-10-31 DIAGNOSIS — E03.8 HYPOTHYROIDISM DUE TO HASHIMOTO'S THYROIDITIS: ICD-10-CM

## 2017-10-31 DIAGNOSIS — N18.4 CKD STAGE 4 DUE TO TYPE 2 DIABETES MELLITUS (HCC): ICD-10-CM

## 2017-10-31 LAB — HEPATITIS C ANTIBODY INTERPRETATION: NORMAL

## 2017-10-31 PROCEDURE — 3017F COLORECTAL CA SCREEN DOC REV: CPT | Performed by: FAMILY MEDICINE

## 2017-10-31 PROCEDURE — G8399 PT W/DXA RESULTS DOCUMENT: HCPCS | Performed by: FAMILY MEDICINE

## 2017-10-31 PROCEDURE — G8417 CALC BMI ABV UP PARAM F/U: HCPCS | Performed by: FAMILY MEDICINE

## 2017-10-31 PROCEDURE — 1036F TOBACCO NON-USER: CPT | Performed by: FAMILY MEDICINE

## 2017-10-31 PROCEDURE — 3045F PR MOST RECENT HEMOGLOBIN A1C LEVEL 7.0-9.0%: CPT | Performed by: FAMILY MEDICINE

## 2017-10-31 PROCEDURE — 4040F PNEUMOC VAC/ADMIN/RCVD: CPT | Performed by: FAMILY MEDICINE

## 2017-10-31 PROCEDURE — 1090F PRES/ABSN URINE INCON ASSESS: CPT | Performed by: FAMILY MEDICINE

## 2017-10-31 PROCEDURE — G8484 FLU IMMUNIZE NO ADMIN: HCPCS | Performed by: FAMILY MEDICINE

## 2017-10-31 PROCEDURE — 36415 COLL VENOUS BLD VENIPUNCTURE: CPT | Performed by: FAMILY MEDICINE

## 2017-10-31 PROCEDURE — 3014F SCREEN MAMMO DOC REV: CPT | Performed by: FAMILY MEDICINE

## 2017-10-31 PROCEDURE — 1123F ACP DISCUSS/DSCN MKR DOCD: CPT | Performed by: FAMILY MEDICINE

## 2017-10-31 PROCEDURE — G8427 DOCREV CUR MEDS BY ELIG CLIN: HCPCS | Performed by: FAMILY MEDICINE

## 2017-10-31 PROCEDURE — 99214 OFFICE O/P EST MOD 30 MIN: CPT | Performed by: FAMILY MEDICINE

## 2017-10-31 NOTE — H&P
HISTORY & PHYSICAL FOR LOCAL CASES    History of present illness:  Left long trigger finger    All allergies & meds reviewed  RELEVANT EXAMS:  Airway:  normal    Pulmonary: normal    Cardiovascular: normal, history of A. fib    Abdominal: normal    Specific to procedure: Painful triggering of left long finger    I have reviewed with the patient and/or family the risks, benefits and alternatives to the procedure.   ASA Class:  3    The patient condition is acceptable for planned local anesthesia:

## 2017-10-31 NOTE — OP NOTE
[facility]  Procedure Note  [facility]      Julius Mata  11/1/2017    PREOPERATIVE DIAGNOSIS(ES)   Left Middle Finger trigger digit     POSTOPERATIVE DIAGNOSIS(ES)   Same    PROCEDURE(S)     Left Middle Finger trigger release    [facility]    ANESTHESIA Local    COMPLICATIONS None    INDICATIONS FOR PROCEDURE The patient has clinical evidence of triggering of the affected digit(s) and has failed appropriate conservative management. The patient understands the relevant risks, benefits, limitations, and healing process of the procedure. PROCEDURE The patient was brought to the operating room and anesthetized with local anesthetic. The identified and marked extremity was then prepped and draped in a standard fashion. A well-padded tourniquet was applied to the upper arm. The arm was exsanguinated and the tourniquet elevated to 250 mmHg. A standard transverse incision was made at the A-1 pulley level of the affected digit(s). Dissection was carried down carefully through the skin and subcutaneous tissue. Care was taken to protect the digital neurovascular bundles on both sides of the identified A-1 pulley(s). In each affected digit the pulley was incised longitudinally under direct visualization. Complete proximal and distal release was completed. The flexor tendon structures were withdrawn with a retractor and demonstrated full passive excursion. The tourniquet was released and the wound(s) irrigated with sterile saline. Skin was closed with Nylon suture. A soft, bulky dressing was applied and the patient transported to the recovery area in stable condition with good perfusion to all fingertips and no active triggering.         Kiesha Garcia 134

## 2017-10-31 NOTE — PATIENT INSTRUCTIONS
Last Hgb A1c was 7.3. Patient should call the office immediately with new or ongoing signs or symptoms or worsening, or proceed to the emergency room. If you are on medications which could impair your senses, you are at risk of weakness, falls, dizziness, or drowsiness. You should be careful during activities which could place you at risk of harm, such as climbing, using stairs, operating machinery, or driving vehicles. If you feel you cannot safely do these activities, you should request others to help you, or avoid the activities altogether. If you are drowsy for any other reason, you should use the same precautions as listed above.

## 2017-10-31 NOTE — LETTER
October 31, 2017     [address] [address]      Dear Segundo Paulino:    Thank you for enrolling in [address] Please follow the instructions below to securely access your online medical record. Py[employer]os allows you to send messages to your doctor, view your test results, renew your prescriptions, schedule appointments, and more. How Do I Sign Up? 1. In your Internet browser, go to [email or web address]  2. Click on the Sign Up Now link in the Sign In box. You will see the New Member Sign Up page. 3. Enter your Py[employer] Access Code exactly as it appears below. You will not need to use this code after youve completed the sign-up process. If you do not sign up before the expiration date, you must request a new code. Py[employer] Access Code: Activation Code not generated for patient  Enter your Social Security Number ([id number]) and Date of Birth (mm/dd/yyyy) as indicated and click Submit. You will be taken to the next sign-up page. 4. Create a Pyreos ID. This will be your [employer]os login ID and cannot be changed, so think of one that is secure and easy to remember. 5. Create a Py[employer]os password. You can change your password at any time. 6. Enter your Password Reset Question and Answer. This can be used at a later time if you forget your password. 7. Enter your e-mail address. You will receive e-mail notification when new information is available in [address] 8. Click Sign Up. You can now view your medical record. Additional Information  If you have questions, please contact the physician practice where you receive care. Remember, Py[employer]os is NOT to be used for urgent needs. For medical emergencies, dial 911. For questions regarding your [employer] account call [phone]. If you have a clinical question, please call your doctor's office.

## 2017-10-31 NOTE — PROGRESS NOTES
Yes India Resendez MD   Misc. Devices (ROLLATOR) MISC 1 Units by Does not apply route continuous Yes Denice Narvaez NP   warfarin (COUMADIN) 3 MG tablet Take 3 mg by mouth daily Yes Historical Provider, MD       ALLERGIES:  No Known Allergies    Chief Complaint   Patient presents with    Diabetes     Checks BS 2 x day.  Hypertension     Medication complaint. BP good at home. Watching salt in diet.  Hyperlipidemia     Trying to watch diet. Walking for exercise.  Enrollment for Anticoagulation     Next INR due 11/17/17. Has her blood sugar readings from home and they look pretty good. HPI  Severo Marine continues to see the kidney doctor. She does not need to see the oncologist for a while. Has had recent loss of friend. She discusses why it is so important to watch one sugar. Is here for high blood pressure. Watching salt intake. Blood pressure checked at home - yes. Numbers good if checked? Yes. Denies chest pain. Denies shortness of breath. Taking medications as prescribed. Is here for diabetes. BGs consistently in an acceptable range. No increased thirst or increased urination. No dizziness, shakiness, or cold sweats. Is here for cholesterol. Tolerating medication. Trying to watch low-fat diet. Weight stable. No change in exercise. Here for hypothyroidism. No tremor. No palpitations. No hair loss. No change in skin texture. HX: (> 3 status chronic/inact. Prob) or  Wt Readings from Last 3 Encounters:   10/31/17 225 lb 6.4 oz (102.2 kg)   10/26/17 225 lb (102.1 kg)   10/12/17 231 lb 14.8 oz (105.2 kg)       Occupation Retired              HPI:  (>4 )  LOCATION:  QUALITY:SEVERITY:  DURATION:  TIMING:  CONTEXT:  MOD. FACTORS:  ASSOC. S/S:    Pertinent items are noted in HPI.  (+/- 2-9 systems)  ROS  All other systems reviewed and are negative except as noted above  Additional review of systems may be scanned into the media section of this medical record. on 08/30/2017   Component Date Value    INR 08/30/2017 2.1    Nurse Only on 08/16/2017   Component Date Value    INR 08/16/2017 2.3    Nurse Only on 08/09/2017   Component Date Value    INR 08/09/2017 2.2    Nurse Only on 08/02/2017   Component Date Value    INR 08/02/2017 1.9    Nurse Only on 07/19/2017   Component Date Value    INR 07/19/2017 2.1    Nurse Only on 07/12/2017   Component Date Value    INR 07/12/2017 1.8    Nurse Only on 06/28/2017   Component Date Value    INR 06/28/2017 2.0    Nurse Only on 06/19/2017   Component Date Value    INR 06/19/2017 1.9    There may be more visits with results that are not included. Physical Exam   Constitutional: She is oriented to person, place, and time. She appears well-developed and well-nourished. No distress. HENT:   Head: Normocephalic and atraumatic. Right Ear: External ear normal.   Left Ear: External ear normal.   Nose: Nose normal.   Eyes: Conjunctivae and EOM are normal. Pupils are equal, round, and reactive to light. Right eye exhibits no discharge. Left eye exhibits no discharge. No scleral icterus. Neck: Normal range of motion. Neck supple. No JVD present. No tracheal deviation present. No thyromegaly present. Cardiovascular: Normal rate, regular rhythm, normal heart sounds and intact distal pulses. Exam reveals no gallop and no friction rub. No murmur heard. Pulses:       Dorsalis pedis pulses are 1+ on the right side, and 1+ on the left side. Posterior tibial pulses are 1+ on the right side, and 1+ on the left side. Pulmonary/Chest: Effort normal and breath sounds normal. No stridor. No respiratory distress. She has no wheezes. She has no rales. She exhibits no tenderness. Abdominal: Soft. Bowel sounds are normal. She exhibits no distension and no mass. There is no tenderness. There is no guarding. Musculoskeletal: Normal range of motion. She exhibits edema (2+ bilateral lower extremity, no erythema or warmth).  She exhibits no tenderness. Right elbow: She exhibits normal range of motion. Left elbow: She exhibits normal range of motion. Right knee: She exhibits normal range of motion. Left knee: She exhibits normal range of motion. Right ankle: She exhibits normal range of motion. Left ankle: She exhibits normal range of motion. Lymphadenopathy:        Head (right side): No submental, no submandibular, no tonsillar, no preauricular, no posterior auricular and no occipital adenopathy present. Head (left side): No submental, no submandibular, no tonsillar, no preauricular, no posterior auricular and no occipital adenopathy present. She has no cervical adenopathy. Right: No supraclavicular adenopathy present. Left: No supraclavicular adenopathy present. Neurological: She is alert and oriented to person, place, and time. A sensory deficit (Bilateral lower extremity numbness) is present. She exhibits normal muscle tone. Coordination normal.   Skin: Skin is warm and dry. No rash noted. She is not diaphoretic. No erythema. No pallor. Psychiatric: She has a normal mood and affect. Her behavior is normal. Judgment and thought content normal.   Nursing note and vitals reviewed. 2+ bilateral  LE edema;  ASSESSMENT:    Assessment/Plan:  Willie Miller was seen today for diabetes, hypertension, hyperlipidemia and enrollment for anticoagulation. Diagnoses and all orders for this visit:    Benign essential HTN    Hypothyroidism due to Hashimoto's thyroiditis    Insulin-requiring or dependent type II diabetes mellitus ([facility]  -     HEMOGLOBIN A1C    Need for hepatitis C screening test  -     HEPATITIS C ANTIBODY    Colon cancer screening  -     POCT Fecal Immunochemical Test (FIT)    Chronic atrial fibrillation (HCC)    Peripheral vascular disease in diabetes mellitus ([facility]    CKD stage 4 due to type 2 diabetes mellitus (HCC)    Obesity (BMI 30-39. 9)    Morbid obesity due complete. I agree with the Chief Complaint, ROS, and Past Histories independently gathered by the clinical support staff and the remaining scribed note accurately describes my personal service to the patient.     10/31/2017    8:29 AM

## 2017-11-01 ENCOUNTER — HOSPITAL ENCOUNTER (OUTPATIENT)
Dept: SURGERY | Age: 67
Discharge: HOME | End: 2017-11-01
Attending: ORTHOPAEDIC SURGERY | Admitting: ORTHOPAEDIC SURGERY

## 2017-11-01 VITALS
BODY MASS INDEX: 39.87 KG/M2 | HEART RATE: 73 BPM | OXYGEN SATURATION: 98 % | TEMPERATURE: 96.9 F | WEIGHT: 225 LBS | RESPIRATION RATE: 16 BRPM | SYSTOLIC BLOOD PRESSURE: 104 MMHG | HEIGHT: 63 IN | DIASTOLIC BLOOD PRESSURE: 66 MMHG

## 2017-11-01 LAB
ESTIMATED AVERAGE GLUCOSE: 157.1 MG/DL
HBA1C MFR BLD: 7.1 %

## 2017-11-01 RX ORDER — HYDROCODONE BITARTRATE AND ACETAMINOPHEN 5; 325 MG/1; MG/1
1 TABLET ORAL EVERY 6 HOURS PRN
Qty: 10 TABLET | Refills: 0 | Status: SHIPPED | OUTPATIENT
Start: 2017-11-01 | End: 2017-11-08

## 2017-11-01 ASSESSMENT — PAIN - FUNCTIONAL ASSESSMENT: PAIN_FUNCTIONAL_ASSESSMENT: 0-10

## 2017-11-13 ENCOUNTER — OFFICE VISIT (OUTPATIENT)
Dept: ORTHOPEDIC SURGERY | Age: 67
End: 2017-11-13

## 2017-11-13 DIAGNOSIS — M65.332 TRIGGER FINGER, LEFT MIDDLE FINGER: Primary | ICD-10-CM

## 2017-11-13 PROCEDURE — 99024 POSTOP FOLLOW-UP VISIT: CPT | Performed by: ORTHOPAEDIC SURGERY

## 2017-11-13 NOTE — PROGRESS NOTES
The patient is doing overall quite well after surgery. The wound is healing without signs of infection or dehiscence. There is satisfactory range of motion of the fingers without triggering. Doing well after left long trigger finger release. We will plan for suture removal, apply a simple bandage, and discuss appropriate wound care. Activities may be advanced depending upon comfort. Follow-up and therapy will be depending upon range of symptoms over the next several weeks.

## 2017-11-14 DIAGNOSIS — Z12.11 SCREEN FOR COLON CANCER: Primary | ICD-10-CM

## 2017-11-20 ENCOUNTER — NURSE ONLY (OUTPATIENT)
Dept: FAMILY MEDICINE CLINIC | Age: 67
End: 2017-11-20

## 2017-11-20 DIAGNOSIS — I48.20 CHRONIC ATRIAL FIBRILLATION (HCC): ICD-10-CM

## 2017-11-20 LAB
INTERNATIONAL NORMALIZATION RATIO, POC: 2
PROTHROMBIN TIME, POC: NORMAL

## 2017-11-20 PROCEDURE — 85610 PROTHROMBIN TIME: CPT | Performed by: FAMILY MEDICINE

## 2017-11-28 ENCOUNTER — TELEPHONE (OUTPATIENT)
Dept: FAMILY MEDICINE CLINIC | Age: 67
End: 2017-11-28

## 2017-12-19 ENCOUNTER — NURSE ONLY (OUTPATIENT)
Dept: FAMILY MEDICINE CLINIC | Age: 67
End: 2017-12-19

## 2017-12-19 DIAGNOSIS — I48.20 CHRONIC ATRIAL FIBRILLATION (HCC): ICD-10-CM

## 2017-12-19 LAB
INTERNATIONAL NORMALIZATION RATIO, POC: 1.9
PROTHROMBIN TIME, POC: NORMAL

## 2017-12-19 PROCEDURE — 85610 PROTHROMBIN TIME: CPT | Performed by: FAMILY MEDICINE

## 2017-12-19 RX ORDER — WARFARIN SODIUM 1 MG/1
TABLET ORAL
Qty: 30 TABLET | Refills: 0 | Status: SHIPPED | OUTPATIENT
Start: 2017-12-19 | End: 2018-05-01 | Stop reason: ALTCHOICE

## 2017-12-26 ENCOUNTER — NURSE ONLY (OUTPATIENT)
Dept: FAMILY MEDICINE CLINIC | Age: 67
End: 2017-12-26

## 2017-12-26 DIAGNOSIS — I48.20 CHRONIC ATRIAL FIBRILLATION (HCC): ICD-10-CM

## 2017-12-26 LAB
INTERNATIONAL NORMALIZATION RATIO, POC: 1.6
PROTHROMBIN TIME, POC: NORMAL

## 2017-12-26 PROCEDURE — 85610 PROTHROMBIN TIME: CPT | Performed by: FAMILY MEDICINE

## 2017-12-26 NOTE — PATIENT INSTRUCTIONS
Will recheck INR in one week. Patient is to take 10 mg of coumadin tonight and then continue her current coumadin regimen.

## 2018-01-02 ENCOUNTER — NURSE ONLY (OUTPATIENT)
Dept: FAMILY MEDICINE CLINIC | Age: 68
End: 2018-01-02

## 2018-01-02 ENCOUNTER — ANTI-COAG VISIT (OUTPATIENT)
Dept: FAMILY MEDICINE CLINIC | Age: 68
End: 2018-01-02

## 2018-01-02 DIAGNOSIS — E11.9 INSULIN-REQUIRING OR DEPENDENT TYPE II DIABETES MELLITUS (HCC): ICD-10-CM

## 2018-01-02 DIAGNOSIS — E83.42 HYPOMAGNESEMIA: ICD-10-CM

## 2018-01-02 DIAGNOSIS — E78.5 HYPERLIPIDEMIA, UNSPECIFIED HYPERLIPIDEMIA TYPE: ICD-10-CM

## 2018-01-02 DIAGNOSIS — Z79.4 INSULIN-REQUIRING OR DEPENDENT TYPE II DIABETES MELLITUS (HCC): ICD-10-CM

## 2018-01-02 DIAGNOSIS — E03.9 ACQUIRED HYPOTHYROIDISM: ICD-10-CM

## 2018-01-02 DIAGNOSIS — I48.20 CHRONIC ATRIAL FIBRILLATION (HCC): ICD-10-CM

## 2018-01-02 DIAGNOSIS — I10 BENIGN ESSENTIAL HTN: Primary | ICD-10-CM

## 2018-01-02 LAB
INTERNATIONAL NORMALIZATION RATIO, POC: 2.3
PROTHROMBIN TIME, POC: NORMAL

## 2018-01-02 PROCEDURE — 85610 PROTHROMBIN TIME: CPT | Performed by: FAMILY MEDICINE

## 2018-01-09 ENCOUNTER — NURSE ONLY (OUTPATIENT)
Dept: FAMILY MEDICINE CLINIC | Age: 68
End: 2018-01-09

## 2018-01-09 ENCOUNTER — ANTI-COAG VISIT (OUTPATIENT)
Dept: FAMILY MEDICINE CLINIC | Age: 68
End: 2018-01-09

## 2018-01-09 DIAGNOSIS — I48.20 CHRONIC ATRIAL FIBRILLATION (HCC): ICD-10-CM

## 2018-01-09 LAB
INTERNATIONAL NORMALIZATION RATIO, POC: 1.9
PROTHROMBIN TIME, POC: NORMAL

## 2018-01-09 PROCEDURE — 85610 PROTHROMBIN TIME: CPT | Performed by: FAMILY MEDICINE

## 2018-01-17 ENCOUNTER — NURSE ONLY (OUTPATIENT)
Dept: FAMILY MEDICINE CLINIC | Age: 68
End: 2018-01-17

## 2018-01-23 ENCOUNTER — NURSE ONLY (OUTPATIENT)
Dept: FAMILY MEDICINE CLINIC | Age: 68
End: 2018-01-23

## 2018-01-23 ENCOUNTER — ANTI-COAG VISIT (OUTPATIENT)
Dept: FAMILY MEDICINE CLINIC | Age: 68
End: 2018-01-23

## 2018-01-23 DIAGNOSIS — I48.20 CHRONIC ATRIAL FIBRILLATION (HCC): ICD-10-CM

## 2018-01-23 LAB
INTERNATIONAL NORMALIZATION RATIO, POC: 2
PROTHROMBIN TIME, POC: NORMAL

## 2018-01-23 PROCEDURE — 85610 PROTHROMBIN TIME: CPT | Performed by: FAMILY MEDICINE

## 2018-01-30 RX ORDER — LEVOTHYROXINE SODIUM 0.1 MG/1
TABLET ORAL
Qty: 90 TABLET | Refills: 3 | Status: SHIPPED | OUTPATIENT
Start: 2018-01-30 | End: 2019-01-25 | Stop reason: SDUPTHER

## 2018-01-30 NOTE — TELEPHONE ENCOUNTER
Tika Gipson is requesting refill(s)   Last OV 10-31-17 (pertaining to medication)  LR 2-1-17 (per medication requested)  Next office visit scheduled or attempted Yes   If no, reason:  5-1-18

## 2018-01-31 RX ORDER — WARFARIN SODIUM 5 MG/1
5 TABLET ORAL DAILY
Qty: 30 TABLET | Refills: 11 | Status: SHIPPED | OUTPATIENT
Start: 2018-01-31 | End: 2018-11-16 | Stop reason: SDUPTHER

## 2018-02-13 DIAGNOSIS — B02.29 POST HERPETIC NEURALGIA: ICD-10-CM

## 2018-02-13 DIAGNOSIS — Z86.19 HISTORY OF HERPES ZOSTER: ICD-10-CM

## 2018-02-13 NOTE — TELEPHONE ENCOUNTER
Controlled Substance Contract DAYSI  UDS    OARRS  4/10/17      4/10/17   4/10/17 consistent    Last ov 10/31/17  Next ov 5/1/18

## 2018-02-23 ENCOUNTER — NURSE ONLY (OUTPATIENT)
Dept: FAMILY MEDICINE CLINIC | Age: 68
End: 2018-02-23

## 2018-02-23 ENCOUNTER — ANTI-COAG VISIT (OUTPATIENT)
Dept: FAMILY MEDICINE CLINIC | Age: 68
End: 2018-02-23

## 2018-02-23 DIAGNOSIS — I48.20 CHRONIC ATRIAL FIBRILLATION (HCC): ICD-10-CM

## 2018-02-23 LAB
INTERNATIONAL NORMALIZATION RATIO, POC: 2
PROTHROMBIN TIME, POC: NORMAL

## 2018-02-23 PROCEDURE — 85610 PROTHROMBIN TIME: CPT | Performed by: FAMILY MEDICINE

## 2018-03-12 DIAGNOSIS — I87.2 VENOUS INSUFFICIENCY: ICD-10-CM

## 2018-03-12 RX ORDER — FUROSEMIDE 20 MG/1
TABLET ORAL
Qty: 180 TABLET | Refills: 3 | Status: SHIPPED | OUTPATIENT
Start: 2018-03-12 | End: 2019-03-10 | Stop reason: SDUPTHER

## 2018-03-14 NOTE — TELEPHONE ENCOUNTER
Zaid Andres with [facility] [address] called and stated that we have to send them a new RX with the Diagnosis code on it for the One Touch Ultra

## 2018-03-20 ENCOUNTER — ANTI-COAG VISIT (OUTPATIENT)
Dept: FAMILY MEDICINE CLINIC | Age: 68
End: 2018-03-20

## 2018-03-20 ENCOUNTER — NURSE ONLY (OUTPATIENT)
Dept: FAMILY MEDICINE CLINIC | Age: 68
End: 2018-03-20

## 2018-03-20 DIAGNOSIS — I48.20 CHRONIC ATRIAL FIBRILLATION (HCC): ICD-10-CM

## 2018-03-20 LAB
INTERNATIONAL NORMALIZATION RATIO, POC: 2.1
PROTHROMBIN TIME, POC: NORMAL

## 2018-03-20 PROCEDURE — 85610 PROTHROMBIN TIME: CPT | Performed by: FAMILY MEDICINE

## 2018-03-20 RX ORDER — PENTOXIFYLLINE 400 MG/1
TABLET, EXTENDED RELEASE ORAL
Qty: 90 TABLET | Refills: 3 | Status: SHIPPED | OUTPATIENT
Start: 2018-03-20 | End: 2018-12-21 | Stop reason: SDUPTHER

## 2018-03-27 RX ORDER — DILTIAZEM HYDROCHLORIDE 240 MG/1
240 CAPSULE, COATED, EXTENDED RELEASE ORAL DAILY
Qty: 90 CAPSULE | Refills: 3 | Status: SHIPPED | OUTPATIENT
Start: 2018-03-27 | End: 2019-03-15 | Stop reason: SDUPTHER

## 2018-04-03 RX ORDER — ATORVASTATIN CALCIUM 40 MG/1
TABLET, FILM COATED ORAL
Qty: 90 TABLET | Refills: 0 | Status: SHIPPED | OUTPATIENT
Start: 2018-04-03 | End: 2018-07-03 | Stop reason: SDUPTHER

## 2018-04-11 ENCOUNTER — HOSPITAL ENCOUNTER (OUTPATIENT)
Dept: OTHER | Age: 68
Discharge: HOME | End: 2018-04-11
Attending: FAMILY MEDICINE | Admitting: FAMILY MEDICINE

## 2018-04-11 ENCOUNTER — TELEPHONE (OUTPATIENT)
Dept: FAMILY MEDICINE CLINIC | Age: 68
End: 2018-04-11

## 2018-04-11 DIAGNOSIS — M79.674 PAIN IN TOE OF RIGHT FOOT: Primary | ICD-10-CM

## 2018-04-11 DIAGNOSIS — M79.674 PAIN IN TOE OF RIGHT FOOT: ICD-10-CM

## 2018-04-20 RX ORDER — PEN NEEDLE, DIABETIC 32GX 5/32"
NEEDLE, DISPOSABLE MISCELLANEOUS
Qty: 200 EACH | Refills: 3 | Status: SHIPPED | OUTPATIENT
Start: 2018-04-20 | End: 2019-05-13 | Stop reason: SDUPTHER

## 2018-04-23 ENCOUNTER — NURSE ONLY (OUTPATIENT)
Dept: FAMILY MEDICINE CLINIC | Age: 68
End: 2018-04-23

## 2018-04-23 ENCOUNTER — ANTI-COAG VISIT (OUTPATIENT)
Dept: FAMILY MEDICINE CLINIC | Age: 68
End: 2018-04-23

## 2018-04-23 DIAGNOSIS — I48.20 CHRONIC ATRIAL FIBRILLATION (HCC): ICD-10-CM

## 2018-04-23 LAB
INTERNATIONAL NORMALIZATION RATIO, POC: 1.9
PROTHROMBIN TIME, POC: NORMAL

## 2018-04-23 PROCEDURE — 85610 PROTHROMBIN TIME: CPT | Performed by: FAMILY MEDICINE

## 2018-04-24 RX ORDER — OMEPRAZOLE 40 MG/1
CAPSULE, DELAYED RELEASE ORAL
Qty: 90 CAPSULE | Refills: 3 | Status: SHIPPED | OUTPATIENT
Start: 2018-04-24 | End: 2019-04-15 | Stop reason: SDUPTHER

## 2018-05-01 ENCOUNTER — ANTI-COAG VISIT (OUTPATIENT)
Dept: FAMILY MEDICINE CLINIC | Age: 68
End: 2018-05-01

## 2018-05-01 ENCOUNTER — OFFICE VISIT (OUTPATIENT)
Dept: FAMILY MEDICINE CLINIC | Age: 68
End: 2018-05-01

## 2018-05-01 VITALS
BODY MASS INDEX: 41.33 KG/M2 | SYSTOLIC BLOOD PRESSURE: 144 MMHG | OXYGEN SATURATION: 97 % | DIASTOLIC BLOOD PRESSURE: 88 MMHG | HEART RATE: 75 BPM | WEIGHT: 229.6 LBS

## 2018-05-01 DIAGNOSIS — E11.22 CKD STAGE 4 DUE TO TYPE 2 DIABETES MELLITUS (HCC): ICD-10-CM

## 2018-05-01 DIAGNOSIS — I48.20 CHRONIC ATRIAL FIBRILLATION (HCC): ICD-10-CM

## 2018-05-01 DIAGNOSIS — E11.51 PERIPHERAL VASCULAR DISEASE IN DIABETES MELLITUS (HCC): ICD-10-CM

## 2018-05-01 DIAGNOSIS — Z23 NEED FOR SHINGLES VACCINE: ICD-10-CM

## 2018-05-01 DIAGNOSIS — E66.01 MORBID OBESITY DUE TO EXCESS CALORIES (HCC): ICD-10-CM

## 2018-05-01 DIAGNOSIS — E03.8 HYPOTHYROIDISM DUE TO HASHIMOTO'S THYROIDITIS: ICD-10-CM

## 2018-05-01 DIAGNOSIS — Z79.4 INSULIN-REQUIRING OR DEPENDENT TYPE II DIABETES MELLITUS (HCC): ICD-10-CM

## 2018-05-01 DIAGNOSIS — E11.41 DIABETIC MONONEUROPATHY ASSOCIATED WITH TYPE 2 DIABETES MELLITUS (HCC): ICD-10-CM

## 2018-05-01 DIAGNOSIS — Z79.01 LONG-TERM (CURRENT) USE OF ANTICOAGULANTS: ICD-10-CM

## 2018-05-01 DIAGNOSIS — E78.5 HYPERLIPIDEMIA, UNSPECIFIED HYPERLIPIDEMIA TYPE: ICD-10-CM

## 2018-05-01 DIAGNOSIS — N18.4 CKD STAGE 4 DUE TO TYPE 2 DIABETES MELLITUS (HCC): ICD-10-CM

## 2018-05-01 DIAGNOSIS — E11.9 INSULIN-REQUIRING OR DEPENDENT TYPE II DIABETES MELLITUS (HCC): ICD-10-CM

## 2018-05-01 DIAGNOSIS — I10 BENIGN ESSENTIAL HTN: Primary | ICD-10-CM

## 2018-05-01 DIAGNOSIS — Z12.11 COLON CANCER SCREENING: ICD-10-CM

## 2018-05-01 DIAGNOSIS — E06.3 HYPOTHYROIDISM DUE TO HASHIMOTO'S THYROIDITIS: ICD-10-CM

## 2018-05-01 DIAGNOSIS — E03.9 ACQUIRED HYPOTHYROIDISM: ICD-10-CM

## 2018-05-01 DIAGNOSIS — E83.42 HYPOMAGNESEMIA: ICD-10-CM

## 2018-05-01 DIAGNOSIS — K21.9 GASTROESOPHAGEAL REFLUX DISEASE WITHOUT ESOPHAGITIS: ICD-10-CM

## 2018-05-01 DIAGNOSIS — H93.13 TINNITUS OF BOTH EARS: ICD-10-CM

## 2018-05-01 LAB
A/G RATIO: 1.6 (ref 1.1–2.2)
ALBUMIN SERPL-MCNC: 4.2 G/DL (ref 3.4–5)
ALP BLD-CCNC: 83 U/L (ref 40–129)
ALT SERPL-CCNC: 17 U/L (ref 10–40)
ANION GAP SERPL CALCULATED.3IONS-SCNC: 14 MMOL/L (ref 3–16)
AST SERPL-CCNC: 23 U/L (ref 15–37)
BASOPHILS ABSOLUTE: 0 K/UL (ref 0–0.2)
BASOPHILS RELATIVE PERCENT: 0.7 %
BILIRUB SERPL-MCNC: 0.3 MG/DL (ref 0–1)
BUN BLDV-MCNC: 35 MG/DL (ref 7–20)
CALCIUM SERPL-MCNC: 9.3 MG/DL (ref 8.3–10.6)
CHLORIDE BLD-SCNC: 100 MMOL/L (ref 99–110)
CHOLESTEROL, FASTING: 152 MG/DL (ref 0–199)
CO2: 28 MMOL/L (ref 21–32)
CREAT SERPL-MCNC: 1.3 MG/DL (ref 0.6–1.2)
CREATININE URINE: 34 MG/DL (ref 28–259)
EOSINOPHILS ABSOLUTE: 0.1 K/UL (ref 0–0.6)
EOSINOPHILS RELATIVE PERCENT: 0.9 %
GFR AFRICAN AMERICAN: 49
GFR NON-AFRICAN AMERICAN: 41
GLOBULIN: 2.6 G/DL
GLUCOSE BLD-MCNC: 94 MG/DL (ref 70–99)
HCT VFR BLD CALC: 48.5 % (ref 36–48)
HDLC SERPL-MCNC: 46 MG/DL (ref 40–60)
HEMOGLOBIN: 16.1 G/DL (ref 12–16)
INTERNATIONAL NORMALIZATION RATIO, POC: 2.3
LDL CHOLESTEROL CALCULATED: 83 MG/DL
LYMPHOCYTES ABSOLUTE: 2.6 K/UL (ref 1–5.1)
LYMPHOCYTES RELATIVE PERCENT: 37.6 %
MAGNESIUM: 2.3 MG/DL (ref 1.8–2.4)
MCH RBC QN AUTO: 30.1 PG (ref 26–34)
MCHC RBC AUTO-ENTMCNC: 33.1 G/DL (ref 31–36)
MCV RBC AUTO: 91 FL (ref 80–100)
MICROALBUMIN UR-MCNC: <1.2 MG/DL
MICROALBUMIN/CREAT UR-RTO: NORMAL MG/G (ref 0–30)
MONOCYTES ABSOLUTE: 0.7 K/UL (ref 0–1.3)
MONOCYTES RELATIVE PERCENT: 10.4 %
NEUTROPHILS ABSOLUTE: 3.5 K/UL (ref 1.7–7.7)
NEUTROPHILS RELATIVE PERCENT: 50.4 %
PDW BLD-RTO: 15.5 % (ref 12.4–15.4)
PLATELET # BLD: 195 K/UL (ref 135–450)
PMV BLD AUTO: 9 FL (ref 5–10.5)
POTASSIUM SERPL-SCNC: 4.2 MMOL/L (ref 3.5–5.1)
PROTHROMBIN TIME, POC: NORMAL
RBC # BLD: 5.33 M/UL (ref 4–5.2)
SODIUM BLD-SCNC: 142 MMOL/L (ref 136–145)
T4 FREE: 1.7 NG/DL (ref 0.9–1.8)
TOTAL PROTEIN: 6.8 G/DL (ref 6.4–8.2)
TRIGLYCERIDE, FASTING: 116 MG/DL (ref 0–150)
TSH SERPL DL<=0.05 MIU/L-ACNC: 1.37 UIU/ML (ref 0.27–4.2)
VLDLC SERPL CALC-MCNC: 23 MG/DL
WBC # BLD: 6.9 K/UL (ref 4–11)

## 2018-05-01 PROCEDURE — 1036F TOBACCO NON-USER: CPT | Performed by: FAMILY MEDICINE

## 2018-05-01 PROCEDURE — G8417 CALC BMI ABV UP PARAM F/U: HCPCS | Performed by: FAMILY MEDICINE

## 2018-05-01 PROCEDURE — 1123F ACP DISCUSS/DSCN MKR DOCD: CPT | Performed by: FAMILY MEDICINE

## 2018-05-01 PROCEDURE — 99214 OFFICE O/P EST MOD 30 MIN: CPT | Performed by: FAMILY MEDICINE

## 2018-05-01 PROCEDURE — 1090F PRES/ABSN URINE INCON ASSESS: CPT | Performed by: FAMILY MEDICINE

## 2018-05-01 PROCEDURE — 3046F HEMOGLOBIN A1C LEVEL >9.0%: CPT | Performed by: FAMILY MEDICINE

## 2018-05-01 PROCEDURE — 4040F PNEUMOC VAC/ADMIN/RCVD: CPT | Performed by: FAMILY MEDICINE

## 2018-05-01 PROCEDURE — G8427 DOCREV CUR MEDS BY ELIG CLIN: HCPCS | Performed by: FAMILY MEDICINE

## 2018-05-01 PROCEDURE — 3017F COLORECTAL CA SCREEN DOC REV: CPT | Performed by: FAMILY MEDICINE

## 2018-05-01 PROCEDURE — 85610 PROTHROMBIN TIME: CPT | Performed by: FAMILY MEDICINE

## 2018-05-01 PROCEDURE — G8399 PT W/DXA RESULTS DOCUMENT: HCPCS | Performed by: FAMILY MEDICINE

## 2018-05-01 PROCEDURE — 36415 COLL VENOUS BLD VENIPUNCTURE: CPT | Performed by: FAMILY MEDICINE

## 2018-05-01 PROCEDURE — 2022F DILAT RTA XM EVC RTNOPTHY: CPT | Performed by: FAMILY MEDICINE

## 2018-05-01 ASSESSMENT — PATIENT HEALTH QUESTIONNAIRE - PHQ9
SUM OF ALL RESPONSES TO PHQ9 QUESTIONS 1 & 2: 0
SUM OF ALL RESPONSES TO PHQ QUESTIONS 1-9: 0
2. FEELING DOWN, DEPRESSED OR HOPELESS: 0
1. LITTLE INTEREST OR PLEASURE IN DOING THINGS: 0

## 2018-05-02 ENCOUNTER — TELEPHONE (OUTPATIENT)
Dept: FAMILY MEDICINE CLINIC | Age: 68
End: 2018-05-02

## 2018-05-02 DIAGNOSIS — Z12.11 SCREENING FOR COLON CANCER: Primary | ICD-10-CM

## 2018-05-02 DIAGNOSIS — Z12.11 ENCOUNTER FOR SCREENING COLONOSCOPY: Primary | ICD-10-CM

## 2018-05-02 LAB
ESTIMATED AVERAGE GLUCOSE: 145.6 MG/DL
HBA1C MFR BLD: 6.7 %

## 2018-05-04 LAB
6-ACETYLMORPHINE: NOT DETECTED
7-AMINOCLONAZEPAM: NOT DETECTED
ALPHA-OH-ALPRAZOLAM: NOT DETECTED
ALPRAZOLAM: NOT DETECTED
AMPHETAMINE: NOT DETECTED
BARBITURATES: NOT DETECTED
BENZOYLECGONINE: NOT DETECTED
BUPRENORPHINE: NOT DETECTED
CARISOPRODOL: NOT DETECTED
CLONAZEPAM: NOT DETECTED
CODEINE: NOT DETECTED
CREATININE URINE: 35.6 MG/DL (ref 20–400)
DIAZEPAM: NOT DETECTED
DRUGS EXPECTED: NORMAL
EER PAIN MGT DRUG PANEL, HIGH RES/EMIT U: NORMAL
ETHYL GLUCURONIDE: NOT DETECTED
FENTANYL: NOT DETECTED
HYDROCODONE: NOT DETECTED
HYDROMORPHONE: NOT DETECTED
LORAZEPAM: NOT DETECTED
MARIJUANA METABOLITE: NOT DETECTED
MDA: NOT DETECTED
MDEA: NOT DETECTED
MDMA URINE: NOT DETECTED
MEPERIDINE: NOT DETECTED
METHADONE: NOT DETECTED
METHAMPHETAMINE: NOT DETECTED
METHYLPHENIDATE: NOT DETECTED
MIDAZOLAM: NOT DETECTED
MORPHINE: NOT DETECTED
NORBUPRENORPHINE, FREE: NOT DETECTED
NORDIAZEPAM: NOT DETECTED
NORFENTANYL: NOT DETECTED
NORHYDROCODONE, URINE: NOT DETECTED
NOROXYCODONE: NOT DETECTED
NOROXYMORPHONE, URINE: NOT DETECTED
OXAZEPAM: NOT DETECTED
OXYCODONE: NOT DETECTED
OXYMORPHONE: NOT DETECTED
PAIN MANAGEMENT DRUG PANEL: NORMAL
PAIN MANAGEMENT DRUG PANEL: NORMAL
PCP: NOT DETECTED
PHENTERMINE: NOT DETECTED
PROPOXYPHENE: NOT DETECTED
TAPENTADOL, URINE: NOT DETECTED
TAPENTADOL-O-SULFATE, URINE: NOT DETECTED
TEMAZEPAM: NOT DETECTED
TRAMADOL: NOT DETECTED
ZOLPIDEM: NOT DETECTED

## 2018-05-09 ENCOUNTER — INITIAL CONSULT (OUTPATIENT)
Dept: GASTROENTEROLOGY | Age: 68
End: 2018-05-09

## 2018-05-09 VITALS
HEIGHT: 63 IN | BODY MASS INDEX: 40.36 KG/M2 | WEIGHT: 227.8 LBS | SYSTOLIC BLOOD PRESSURE: 100 MMHG | DIASTOLIC BLOOD PRESSURE: 78 MMHG

## 2018-05-09 DIAGNOSIS — Z86.010 HISTORY OF COLON POLYPS: ICD-10-CM

## 2018-05-09 PROBLEM — Z86.0100 HISTORY OF COLON POLYPS: Status: ACTIVE | Noted: 2018-05-09

## 2018-05-09 PROCEDURE — 99203 OFFICE O/P NEW LOW 30 MIN: CPT | Performed by: INTERNAL MEDICINE

## 2018-05-10 ENCOUNTER — TELEPHONE (OUTPATIENT)
Dept: GASTROENTEROLOGY | Age: 68
End: 2018-05-10

## 2018-05-22 ENCOUNTER — TELEPHONE (OUTPATIENT)
Dept: GASTROENTEROLOGY | Age: 68
End: 2018-05-22

## 2018-05-23 ENCOUNTER — HOSPITAL ENCOUNTER (OUTPATIENT)
Dept: SURGERY | Age: 68
Discharge: HOME | End: 2018-05-23
Attending: INTERNAL MEDICINE | Admitting: INTERNAL MEDICINE

## 2018-05-23 ENCOUNTER — TELEPHONE (OUTPATIENT)
Dept: FAMILY MEDICINE CLINIC | Age: 68
End: 2018-05-23

## 2018-05-23 VITALS
DIASTOLIC BLOOD PRESSURE: 74 MMHG | HEART RATE: 71 BPM | TEMPERATURE: 97 F | RESPIRATION RATE: 11 BRPM | SYSTOLIC BLOOD PRESSURE: 157 MMHG | OXYGEN SATURATION: 97 %

## 2018-05-23 DIAGNOSIS — E11.51 TYPE 2 DIABETES MELLITUS WITH DIABETIC PERIPHERAL ANGIOPATHY WITHOUT GANGRENE (HCC): ICD-10-CM

## 2018-05-23 DIAGNOSIS — K63.5 POLYP OF SIGMOID COLON, UNSPECIFIED TYPE: ICD-10-CM

## 2018-05-23 DIAGNOSIS — K63.5 POLYP OF ASCENDING COLON, UNSPECIFIED TYPE: ICD-10-CM

## 2018-05-23 LAB
GLUCOSE BLD-MCNC: 79 MG/DL (ref 70–99)
GLUCOSE BLD-MCNC: 85 MG/DL (ref 70–99)
PERFORMED ON: NORMAL
PERFORMED ON: NORMAL

## 2018-05-23 PROCEDURE — 45385 COLONOSCOPY W/LESION REMOVAL: CPT | Performed by: INTERNAL MEDICINE

## 2018-05-23 RX ORDER — HYDROMORPHONE HCL 110MG/55ML
0.25 PATIENT CONTROLLED ANALGESIA SYRINGE INTRAVENOUS EVERY 5 MIN PRN
Status: DISCONTINUED | OUTPATIENT
Start: 2018-05-23 | End: 2018-05-24 | Stop reason: HOSPADM

## 2018-05-23 RX ORDER — PROMETHAZINE HYDROCHLORIDE 25 MG/ML
6.25 INJECTION, SOLUTION INTRAMUSCULAR; INTRAVENOUS
Status: ACTIVE | OUTPATIENT
Start: 2018-05-23 | End: 2018-05-23

## 2018-05-23 RX ORDER — MORPHINE SULFATE 10 MG/ML
2 INJECTION, SOLUTION INTRAMUSCULAR; INTRAVENOUS EVERY 5 MIN PRN
Status: DISCONTINUED | OUTPATIENT
Start: 2018-05-23 | End: 2018-05-24 | Stop reason: HOSPADM

## 2018-05-23 RX ORDER — HYDROMORPHONE HCL 110MG/55ML
0.5 PATIENT CONTROLLED ANALGESIA SYRINGE INTRAVENOUS EVERY 5 MIN PRN
Status: DISCONTINUED | OUTPATIENT
Start: 2018-05-23 | End: 2018-05-24 | Stop reason: HOSPADM

## 2018-05-23 RX ORDER — MEPERIDINE HYDROCHLORIDE 25 MG/ML
12.5 INJECTION INTRAMUSCULAR; INTRAVENOUS; SUBCUTANEOUS EVERY 5 MIN PRN
Status: DISCONTINUED | OUTPATIENT
Start: 2018-05-23 | End: 2018-05-24 | Stop reason: HOSPADM

## 2018-05-23 RX ORDER — HYDRALAZINE HYDROCHLORIDE 20 MG/ML
5 INJECTION INTRAMUSCULAR; INTRAVENOUS EVERY 10 MIN PRN
Status: DISCONTINUED | OUTPATIENT
Start: 2018-05-23 | End: 2018-05-24 | Stop reason: HOSPADM

## 2018-05-23 RX ORDER — ONDANSETRON 2 MG/ML
4 INJECTION INTRAMUSCULAR; INTRAVENOUS
Status: ACTIVE | OUTPATIENT
Start: 2018-05-23 | End: 2018-05-23

## 2018-05-23 RX ORDER — LABETALOL HYDROCHLORIDE 5 MG/ML
5 INJECTION, SOLUTION INTRAVENOUS EVERY 10 MIN PRN
Status: DISCONTINUED | OUTPATIENT
Start: 2018-05-23 | End: 2018-05-24 | Stop reason: HOSPADM

## 2018-05-23 RX ORDER — SODIUM CHLORIDE 9 MG/ML
INJECTION, SOLUTION INTRAVENOUS CONTINUOUS
Status: DISCONTINUED | OUTPATIENT
Start: 2018-05-23 | End: 2018-05-24 | Stop reason: HOSPADM

## 2018-05-23 RX ORDER — DIPHENHYDRAMINE HYDROCHLORIDE 50 MG/ML
12.5 INJECTION INTRAMUSCULAR; INTRAVENOUS
Status: ACTIVE | OUTPATIENT
Start: 2018-05-23 | End: 2018-05-23

## 2018-05-23 RX ORDER — OXYCODONE HYDROCHLORIDE AND ACETAMINOPHEN 5; 325 MG/1; MG/1
1 TABLET ORAL PRN
Status: ACTIVE | OUTPATIENT
Start: 2018-05-23 | End: 2018-05-23

## 2018-05-23 RX ORDER — OXYCODONE HYDROCHLORIDE AND ACETAMINOPHEN 5; 325 MG/1; MG/1
2 TABLET ORAL PRN
Status: ACTIVE | OUTPATIENT
Start: 2018-05-23 | End: 2018-05-23

## 2018-05-23 RX ORDER — MORPHINE SULFATE 4 MG/ML
1 INJECTION, SOLUTION INTRAMUSCULAR; INTRAVENOUS EVERY 5 MIN PRN
Status: DISCONTINUED | OUTPATIENT
Start: 2018-05-23 | End: 2018-05-24 | Stop reason: HOSPADM

## 2018-05-23 ASSESSMENT — PAIN - FUNCTIONAL ASSESSMENT: PAIN_FUNCTIONAL_ASSESSMENT: 0-10

## 2018-05-29 ENCOUNTER — NURSE ONLY (OUTPATIENT)
Dept: FAMILY MEDICINE CLINIC | Age: 68
End: 2018-05-29

## 2018-05-29 DIAGNOSIS — I48.20 CHRONIC ATRIAL FIBRILLATION (HCC): ICD-10-CM

## 2018-05-29 LAB
INTERNATIONAL NORMALIZATION RATIO, POC: 1.1
PROTHROMBIN TIME, POC: NORMAL

## 2018-05-29 PROCEDURE — 85610 PROTHROMBIN TIME: CPT | Performed by: FAMILY MEDICINE

## 2018-05-30 ENCOUNTER — NURSE ONLY (OUTPATIENT)
Dept: FAMILY MEDICINE CLINIC | Age: 68
End: 2018-05-30

## 2018-05-30 DIAGNOSIS — I48.20 CHRONIC ATRIAL FIBRILLATION (HCC): ICD-10-CM

## 2018-05-30 LAB
INTERNATIONAL NORMALIZATION RATIO, POC: 1.3
PROTHROMBIN TIME, POC: NORMAL

## 2018-05-30 PROCEDURE — 85610 PROTHROMBIN TIME: CPT | Performed by: FAMILY MEDICINE

## 2018-06-01 ENCOUNTER — NURSE ONLY (OUTPATIENT)
Dept: FAMILY MEDICINE CLINIC | Age: 68
End: 2018-06-01

## 2018-06-01 DIAGNOSIS — I48.20 CHRONIC ATRIAL FIBRILLATION (HCC): ICD-10-CM

## 2018-06-01 LAB
INTERNATIONAL NORMALIZATION RATIO, POC: 2.3
PROTHROMBIN TIME, POC: NORMAL

## 2018-06-01 PROCEDURE — 85610 PROTHROMBIN TIME: CPT | Performed by: FAMILY MEDICINE

## 2018-06-11 ENCOUNTER — NURSE ONLY (OUTPATIENT)
Dept: FAMILY MEDICINE CLINIC | Age: 68
End: 2018-06-11

## 2018-06-11 DIAGNOSIS — I48.20 CHRONIC ATRIAL FIBRILLATION (HCC): ICD-10-CM

## 2018-06-11 LAB
INTERNATIONAL NORMALIZATION RATIO, POC: 2
PROTHROMBIN TIME, POC: NORMAL

## 2018-06-11 PROCEDURE — 85610 PROTHROMBIN TIME: CPT | Performed by: FAMILY MEDICINE

## 2018-06-11 RX ORDER — LANCETS
EACH MISCELLANEOUS
Qty: 100 EACH | Refills: 5 | Status: SHIPPED | OUTPATIENT
Start: 2018-06-11 | End: 2018-06-13 | Stop reason: SDUPTHER

## 2018-06-11 RX ORDER — INSULIN DETEMIR 100 [IU]/ML
INJECTION, SOLUTION SUBCUTANEOUS
Qty: 30 PEN | Refills: 7 | Status: SHIPPED | OUTPATIENT
Start: 2018-06-11 | End: 2019-06-14 | Stop reason: SDUPTHER

## 2018-06-12 ENCOUNTER — TELEPHONE (OUTPATIENT)
Dept: FAMILY MEDICINE CLINIC | Age: 68
End: 2018-06-12

## 2018-06-12 DIAGNOSIS — N18.4 CKD STAGE 4 DUE TO TYPE 2 DIABETES MELLITUS (HCC): Primary | ICD-10-CM

## 2018-06-12 DIAGNOSIS — E11.22 CKD STAGE 4 DUE TO TYPE 2 DIABETES MELLITUS (HCC): Primary | ICD-10-CM

## 2018-06-13 RX ORDER — LANCETS
1 EACH MISCELLANEOUS DAILY
Qty: 100 EACH | Refills: 5 | Status: SHIPPED | OUTPATIENT
Start: 2018-06-13 | End: 2018-08-01 | Stop reason: SDUPTHER

## 2018-06-13 RX ORDER — LANCETS
1 EACH MISCELLANEOUS DAILY
Qty: 100 EACH | Refills: 5 | Status: SHIPPED | OUTPATIENT
Start: 2018-06-13 | End: 2018-06-13 | Stop reason: SDUPTHER

## 2018-06-15 RX ORDER — MAGNESIUM OXIDE 400 MG/1
TABLET ORAL
Qty: 270 TABLET | Refills: 2 | Status: SHIPPED | OUTPATIENT
Start: 2018-06-15 | End: 2019-11-27 | Stop reason: SDUPTHER

## 2018-06-18 ENCOUNTER — NURSE ONLY (OUTPATIENT)
Dept: FAMILY MEDICINE CLINIC | Age: 68
End: 2018-06-18

## 2018-06-18 ENCOUNTER — ANTI-COAG VISIT (OUTPATIENT)
Dept: FAMILY MEDICINE CLINIC | Age: 68
End: 2018-06-18

## 2018-06-18 DIAGNOSIS — I48.20 CHRONIC ATRIAL FIBRILLATION (HCC): ICD-10-CM

## 2018-06-18 LAB
INTERNATIONAL NORMALIZATION RATIO, POC: 2
PROTHROMBIN TIME, POC: NORMAL

## 2018-06-18 PROCEDURE — 85610 PROTHROMBIN TIME: CPT | Performed by: FAMILY MEDICINE

## 2018-06-25 ENCOUNTER — ANTI-COAG VISIT (OUTPATIENT)
Dept: FAMILY MEDICINE CLINIC | Age: 68
End: 2018-06-25

## 2018-06-25 ENCOUNTER — NURSE ONLY (OUTPATIENT)
Dept: FAMILY MEDICINE CLINIC | Age: 68
End: 2018-06-25

## 2018-06-25 DIAGNOSIS — I48.20 CHRONIC ATRIAL FIBRILLATION (HCC): ICD-10-CM

## 2018-06-25 LAB
INTERNATIONAL NORMALIZATION RATIO, POC: 1.6
PROTHROMBIN TIME, POC: NORMAL

## 2018-06-25 PROCEDURE — 85610 PROTHROMBIN TIME: CPT | Performed by: FAMILY MEDICINE

## 2018-07-03 ENCOUNTER — NURSE ONLY (OUTPATIENT)
Dept: FAMILY MEDICINE CLINIC | Age: 68
End: 2018-07-03

## 2018-07-03 DIAGNOSIS — I48.20 CHRONIC ATRIAL FIBRILLATION (HCC): ICD-10-CM

## 2018-07-03 LAB
INTERNATIONAL NORMALIZATION RATIO, POC: 2.2
PROTHROMBIN TIME, POC: NORMAL

## 2018-07-03 PROCEDURE — 85610 PROTHROMBIN TIME: CPT | Performed by: FAMILY MEDICINE

## 2018-07-03 RX ORDER — ATORVASTATIN CALCIUM 40 MG/1
TABLET, FILM COATED ORAL
Qty: 90 TABLET | Refills: 0 | Status: SHIPPED | OUTPATIENT
Start: 2018-07-03 | End: 2018-10-04 | Stop reason: SDUPTHER

## 2018-07-10 ENCOUNTER — NURSE ONLY (OUTPATIENT)
Dept: FAMILY MEDICINE CLINIC | Age: 68
End: 2018-07-10

## 2018-07-10 ENCOUNTER — ANTI-COAG VISIT (OUTPATIENT)
Dept: FAMILY MEDICINE CLINIC | Age: 68
End: 2018-07-10

## 2018-07-10 DIAGNOSIS — I48.20 CHRONIC ATRIAL FIBRILLATION (HCC): ICD-10-CM

## 2018-07-10 LAB
INTERNATIONAL NORMALIZATION RATIO, POC: 2
PROTHROMBIN TIME, POC: NORMAL

## 2018-07-10 PROCEDURE — 85610 PROTHROMBIN TIME: CPT | Performed by: FAMILY MEDICINE

## 2018-07-17 ENCOUNTER — NURSE ONLY (OUTPATIENT)
Dept: FAMILY MEDICINE CLINIC | Age: 68
End: 2018-07-17

## 2018-07-17 ENCOUNTER — ANTI-COAG VISIT (OUTPATIENT)
Dept: FAMILY MEDICINE CLINIC | Age: 68
End: 2018-07-17

## 2018-07-17 DIAGNOSIS — I48.20 CHRONIC ATRIAL FIBRILLATION (HCC): ICD-10-CM

## 2018-07-17 LAB
INTERNATIONAL NORMALIZATION RATIO, POC: 2.2
PROTHROMBIN TIME, POC: NORMAL

## 2018-07-17 PROCEDURE — 85610 PROTHROMBIN TIME: CPT | Performed by: FAMILY MEDICINE

## 2018-07-24 ENCOUNTER — ANTI-COAG VISIT (OUTPATIENT)
Dept: FAMILY MEDICINE CLINIC | Age: 68
End: 2018-07-24

## 2018-07-24 ENCOUNTER — NURSE ONLY (OUTPATIENT)
Dept: FAMILY MEDICINE CLINIC | Age: 68
End: 2018-07-24

## 2018-07-24 DIAGNOSIS — I48.20 CHRONIC ATRIAL FIBRILLATION (HCC): ICD-10-CM

## 2018-07-24 LAB
INTERNATIONAL NORMALIZATION RATIO, POC: 2
PROTHROMBIN TIME, POC: NORMAL

## 2018-07-24 PROCEDURE — 85610 PROTHROMBIN TIME: CPT | Performed by: FAMILY MEDICINE

## 2018-08-01 ENCOUNTER — TELEPHONE (OUTPATIENT)
Dept: FAMILY MEDICINE CLINIC | Age: 68
End: 2018-08-01

## 2018-08-01 RX ORDER — LANCETS
1 EACH MISCELLANEOUS 4 TIMES DAILY
Qty: 100 EACH | Refills: 5 | Status: SHIPPED | OUTPATIENT
Start: 2018-08-01 | End: 2019-05-13 | Stop reason: SDUPTHER

## 2018-08-07 ENCOUNTER — ANTI-COAG VISIT (OUTPATIENT)
Dept: FAMILY MEDICINE CLINIC | Age: 68
End: 2018-08-07

## 2018-08-07 ENCOUNTER — NURSE ONLY (OUTPATIENT)
Dept: FAMILY MEDICINE CLINIC | Age: 68
End: 2018-08-07

## 2018-08-07 DIAGNOSIS — I48.20 CHRONIC ATRIAL FIBRILLATION (HCC): ICD-10-CM

## 2018-08-07 LAB
INTERNATIONAL NORMALIZATION RATIO, POC: 1.8
PROTHROMBIN TIME, POC: ABNORMAL

## 2018-08-07 PROCEDURE — 85610 PROTHROMBIN TIME: CPT | Performed by: FAMILY MEDICINE

## 2018-08-16 ENCOUNTER — NURSE ONLY (OUTPATIENT)
Dept: FAMILY MEDICINE CLINIC | Age: 68
End: 2018-08-16

## 2018-08-16 DIAGNOSIS — I48.20 CHRONIC ATRIAL FIBRILLATION (HCC): ICD-10-CM

## 2018-08-16 LAB
INTERNATIONAL NORMALIZATION RATIO, POC: 2.3
PROTHROMBIN TIME, POC: NORMAL

## 2018-08-16 PROCEDURE — 85610 PROTHROMBIN TIME: CPT | Performed by: FAMILY MEDICINE

## 2018-08-20 ENCOUNTER — TELEPHONE (OUTPATIENT)
Dept: FAMILY MEDICINE CLINIC | Age: 68
End: 2018-08-20

## 2018-08-20 DIAGNOSIS — Z12.39 SCREENING FOR BREAST CANCER: Primary | ICD-10-CM

## 2018-08-20 NOTE — TELEPHONE ENCOUNTER
Pt was wanting to see if she could get an order for a mammo said that it has been awhile since she has had one done

## 2018-08-23 ENCOUNTER — HOSPITAL ENCOUNTER (OUTPATIENT)
Dept: MAMMOGRAPHY | Age: 68
Discharge: HOME | End: 2018-08-23
Payer: MEDICARE

## 2018-08-23 DIAGNOSIS — Z12.31 SCREENING MAMMOGRAM, ENCOUNTER FOR: ICD-10-CM

## 2018-08-23 PROCEDURE — 77067 SCR MAMMO BI INCL CAD: CPT

## 2018-08-24 ENCOUNTER — ANTI-COAG VISIT (OUTPATIENT)
Dept: FAMILY MEDICINE CLINIC | Age: 68
End: 2018-08-24

## 2018-08-24 ENCOUNTER — NURSE ONLY (OUTPATIENT)
Dept: FAMILY MEDICINE CLINIC | Age: 68
End: 2018-08-24

## 2018-08-24 DIAGNOSIS — I48.20 CHRONIC ATRIAL FIBRILLATION (HCC): ICD-10-CM

## 2018-08-24 LAB
INTERNATIONAL NORMALIZATION RATIO, POC: 2.2
PROTHROMBIN TIME, POC: NORMAL

## 2018-08-24 PROCEDURE — 85610 PROTHROMBIN TIME: CPT | Performed by: FAMILY MEDICINE

## 2018-09-11 ENCOUNTER — NURSE ONLY (OUTPATIENT)
Dept: FAMILY MEDICINE CLINIC | Age: 68
End: 2018-09-11

## 2018-09-11 DIAGNOSIS — I48.20 CHRONIC ATRIAL FIBRILLATION (HCC): ICD-10-CM

## 2018-09-11 DIAGNOSIS — Z23 NEED FOR INFLUENZA VACCINATION: Primary | ICD-10-CM

## 2018-09-11 LAB
INTERNATIONAL NORMALIZATION RATIO, POC: 2.3
PROTHROMBIN TIME, POC: NORMAL

## 2018-09-11 PROCEDURE — 85610 PROTHROMBIN TIME: CPT | Performed by: FAMILY MEDICINE

## 2018-09-11 PROCEDURE — 90688 IIV4 VACCINE SPLT 0.5 ML IM: CPT | Performed by: FAMILY MEDICINE

## 2018-09-11 PROCEDURE — G0008 ADMIN INFLUENZA VIRUS VAC: HCPCS | Performed by: FAMILY MEDICINE

## 2018-09-14 RX ORDER — HUMAN INSULIN 100 [IU]/ML
INJECTION, SOLUTION SUBCUTANEOUS
Qty: 30 ML | Refills: 1 | Status: SHIPPED | OUTPATIENT
Start: 2018-09-14 | End: 2019-03-10 | Stop reason: SDUPTHER

## 2018-09-18 ENCOUNTER — HOSPITAL ENCOUNTER (OUTPATIENT)
Dept: CT IMAGING | Age: 68
Discharge: HOME | End: 2018-09-18
Payer: MEDICARE

## 2018-09-18 DIAGNOSIS — C85.90 NON-HODGKIN'S LYMPHOMA, UNSPECIFIED BODY REGION, UNSPECIFIED NON-HODGKIN LYMPHOMA TYPE (HCC): ICD-10-CM

## 2018-09-18 PROCEDURE — 71250 CT THORAX DX C-: CPT

## 2018-09-18 PROCEDURE — 74176 CT ABD & PELVIS W/O CONTRAST: CPT

## 2018-09-21 DIAGNOSIS — B02.29 POST HERPETIC NEURALGIA: ICD-10-CM

## 2018-09-24 ENCOUNTER — NURSE ONLY (OUTPATIENT)
Dept: FAMILY MEDICINE CLINIC | Age: 68
End: 2018-09-24
Payer: MEDICARE

## 2018-09-24 DIAGNOSIS — I48.20 CHRONIC ATRIAL FIBRILLATION (HCC): ICD-10-CM

## 2018-09-24 LAB
INTERNATIONAL NORMALIZATION RATIO, POC: 2.1
PROTHROMBIN TIME, POC: NORMAL

## 2018-09-24 PROCEDURE — 85610 PROTHROMBIN TIME: CPT | Performed by: FAMILY MEDICINE

## 2018-09-24 NOTE — TELEPHONE ENCOUNTER
Controlled Substances Monitoring:     RX Monitoring 9/24/2018   Attestation The Prescription Monitoring Report for this patient was reviewed today. Documentation No signs of potential drug abuse or diversion identified. Chronic Pain Treatment objectives documented - patient is progressing appropriately. ;Functional status reviewed - continues with improved or maintaining ADL's.;Reviewed the patient's functional status and documentation. Medication Contracts Existing medication contract.

## 2018-10-04 RX ORDER — ATORVASTATIN CALCIUM 40 MG/1
TABLET, FILM COATED ORAL
Qty: 90 TABLET | Refills: 0 | Status: SHIPPED | OUTPATIENT
Start: 2018-10-04 | End: 2019-01-11 | Stop reason: SDUPTHER

## 2018-10-08 ENCOUNTER — NURSE ONLY (OUTPATIENT)
Dept: FAMILY MEDICINE CLINIC | Age: 68
End: 2018-10-08
Payer: MEDICARE

## 2018-10-08 ENCOUNTER — TELEPHONE (OUTPATIENT)
Dept: FAMILY MEDICINE CLINIC | Age: 68
End: 2018-10-08

## 2018-10-08 ENCOUNTER — ANTI-COAG VISIT (OUTPATIENT)
Dept: FAMILY MEDICINE CLINIC | Age: 68
End: 2018-10-08

## 2018-10-08 DIAGNOSIS — I48.20 CHRONIC ATRIAL FIBRILLATION (HCC): ICD-10-CM

## 2018-10-08 LAB
INTERNATIONAL NORMALIZATION RATIO, POC: 2.8
PROTHROMBIN TIME, POC: NORMAL

## 2018-10-08 PROCEDURE — 85610 PROTHROMBIN TIME: CPT | Performed by: FAMILY MEDICINE

## 2018-10-09 DIAGNOSIS — I10 BENIGN ESSENTIAL HTN: ICD-10-CM

## 2018-10-09 DIAGNOSIS — E03.8 HYPOTHYROIDISM DUE TO HASHIMOTO'S THYROIDITIS: ICD-10-CM

## 2018-10-09 DIAGNOSIS — I87.2 VENOUS INSUFFICIENCY: ICD-10-CM

## 2018-10-09 DIAGNOSIS — E11.22 CKD STAGE 4 DUE TO TYPE 2 DIABETES MELLITUS (HCC): ICD-10-CM

## 2018-10-09 DIAGNOSIS — N18.4 CKD STAGE 4 DUE TO TYPE 2 DIABETES MELLITUS (HCC): ICD-10-CM

## 2018-10-09 DIAGNOSIS — E11.9 INSULIN-REQUIRING OR DEPENDENT TYPE II DIABETES MELLITUS (HCC): Primary | ICD-10-CM

## 2018-10-09 DIAGNOSIS — Z79.4 INSULIN-REQUIRING OR DEPENDENT TYPE II DIABETES MELLITUS (HCC): Primary | ICD-10-CM

## 2018-10-09 DIAGNOSIS — E78.00 ELEVATED CHOLESTEROL: ICD-10-CM

## 2018-10-09 DIAGNOSIS — E06.3 HYPOTHYROIDISM DUE TO HASHIMOTO'S THYROIDITIS: ICD-10-CM

## 2018-10-09 NOTE — TELEPHONE ENCOUNTER
Pt is aware she will be due for labs prior to visit and will be in one week prior to next appt to have drawn

## 2018-10-19 ENCOUNTER — TELEPHONE (OUTPATIENT)
Dept: FAMILY MEDICINE CLINIC | Age: 68
End: 2018-10-19

## 2018-10-22 ENCOUNTER — ANTI-COAG VISIT (OUTPATIENT)
Dept: FAMILY MEDICINE CLINIC | Age: 68
End: 2018-10-22

## 2018-10-22 ENCOUNTER — NURSE ONLY (OUTPATIENT)
Dept: FAMILY MEDICINE CLINIC | Age: 68
End: 2018-10-22
Payer: MEDICARE

## 2018-10-22 DIAGNOSIS — I48.20 CHRONIC ATRIAL FIBRILLATION (HCC): ICD-10-CM

## 2018-10-22 LAB
INTERNATIONAL NORMALIZATION RATIO, POC: 2.1
PROTHROMBIN TIME, POC: NORMAL

## 2018-10-22 PROCEDURE — 85610 PROTHROMBIN TIME: CPT | Performed by: FAMILY MEDICINE

## 2018-10-26 ENCOUNTER — NURSE ONLY (OUTPATIENT)
Dept: FAMILY MEDICINE CLINIC | Age: 68
End: 2018-10-26
Payer: MEDICARE

## 2018-10-26 DIAGNOSIS — I87.2 VENOUS INSUFFICIENCY: ICD-10-CM

## 2018-10-26 DIAGNOSIS — E03.8 HYPOTHYROIDISM DUE TO HASHIMOTO'S THYROIDITIS: ICD-10-CM

## 2018-10-26 DIAGNOSIS — E78.00 ELEVATED CHOLESTEROL: ICD-10-CM

## 2018-10-26 DIAGNOSIS — I10 BENIGN ESSENTIAL HTN: ICD-10-CM

## 2018-10-26 DIAGNOSIS — E11.9 INSULIN-REQUIRING OR DEPENDENT TYPE II DIABETES MELLITUS (HCC): ICD-10-CM

## 2018-10-26 DIAGNOSIS — Z79.4 INSULIN-REQUIRING OR DEPENDENT TYPE II DIABETES MELLITUS (HCC): ICD-10-CM

## 2018-10-26 DIAGNOSIS — E06.3 HYPOTHYROIDISM DUE TO HASHIMOTO'S THYROIDITIS: ICD-10-CM

## 2018-10-26 DIAGNOSIS — N18.4 CKD STAGE 4 DUE TO TYPE 2 DIABETES MELLITUS (HCC): ICD-10-CM

## 2018-10-26 DIAGNOSIS — E11.22 CKD STAGE 4 DUE TO TYPE 2 DIABETES MELLITUS (HCC): ICD-10-CM

## 2018-10-26 LAB
A/G RATIO: 1.7 (ref 1.1–2.2)
ALBUMIN SERPL-MCNC: 4.3 G/DL (ref 3.4–5)
ALP BLD-CCNC: 79 U/L (ref 40–129)
ALT SERPL-CCNC: 20 U/L (ref 10–40)
ANION GAP SERPL CALCULATED.3IONS-SCNC: 13 MMOL/L (ref 3–16)
AST SERPL-CCNC: 28 U/L (ref 15–37)
BASOPHILS ABSOLUTE: 0.1 K/UL (ref 0–0.2)
BASOPHILS RELATIVE PERCENT: 0.7 %
BILIRUB SERPL-MCNC: 0.4 MG/DL (ref 0–1)
BUN BLDV-MCNC: 30 MG/DL (ref 7–20)
CALCIUM SERPL-MCNC: 9.3 MG/DL (ref 8.3–10.6)
CHLORIDE BLD-SCNC: 100 MMOL/L (ref 99–110)
CHOLESTEROL, TOTAL: 164 MG/DL (ref 0–199)
CO2: 30 MMOL/L (ref 21–32)
CREAT SERPL-MCNC: 1.3 MG/DL (ref 0.6–1.2)
EOSINOPHILS ABSOLUTE: 0.1 K/UL (ref 0–0.6)
EOSINOPHILS RELATIVE PERCENT: 1.2 %
FOLATE: 11.82 NG/ML (ref 4.78–24.2)
GFR AFRICAN AMERICAN: 49
GFR NON-AFRICAN AMERICAN: 41
GLOBULIN: 2.6 G/DL
GLUCOSE BLD-MCNC: 114 MG/DL (ref 70–99)
HCT VFR BLD CALC: 51.5 % (ref 36–48)
HDLC SERPL-MCNC: 39 MG/DL (ref 40–60)
HEMOGLOBIN: 16.6 G/DL (ref 12–16)
LDL CHOLESTEROL CALCULATED: 95 MG/DL
LYMPHOCYTES ABSOLUTE: 2.7 K/UL (ref 1–5.1)
LYMPHOCYTES RELATIVE PERCENT: 37.8 %
MAGNESIUM: 2.3 MG/DL (ref 1.8–2.4)
MCH RBC QN AUTO: 29 PG (ref 26–34)
MCHC RBC AUTO-ENTMCNC: 32.2 G/DL (ref 31–36)
MCV RBC AUTO: 90 FL (ref 80–100)
MONOCYTES ABSOLUTE: 0.8 K/UL (ref 0–1.3)
MONOCYTES RELATIVE PERCENT: 11.1 %
NEUTROPHILS ABSOLUTE: 3.6 K/UL (ref 1.7–7.7)
NEUTROPHILS RELATIVE PERCENT: 49.2 %
PDW BLD-RTO: 16 % (ref 12.4–15.4)
PLATELET # BLD: 198 K/UL (ref 135–450)
PMV BLD AUTO: 8.5 FL (ref 5–10.5)
POTASSIUM SERPL-SCNC: 4.8 MMOL/L (ref 3.5–5.1)
RBC # BLD: 5.73 M/UL (ref 4–5.2)
SODIUM BLD-SCNC: 143 MMOL/L (ref 136–145)
T4 FREE: 1.6 NG/DL (ref 0.9–1.8)
TOTAL PROTEIN: 6.9 G/DL (ref 6.4–8.2)
TRIGL SERPL-MCNC: 151 MG/DL (ref 0–150)
TSH SERPL DL<=0.05 MIU/L-ACNC: 1.82 UIU/ML (ref 0.27–4.2)
VITAMIN B-12: 548 PG/ML (ref 211–911)
VLDLC SERPL CALC-MCNC: 30 MG/DL
WBC # BLD: 7.2 K/UL (ref 4–11)

## 2018-10-26 PROCEDURE — 36415 COLL VENOUS BLD VENIPUNCTURE: CPT | Performed by: FAMILY MEDICINE

## 2018-10-27 LAB
ESTIMATED AVERAGE GLUCOSE: 154.2 MG/DL
HBA1C MFR BLD: 7 %

## 2018-11-02 ENCOUNTER — OFFICE VISIT (OUTPATIENT)
Dept: FAMILY MEDICINE CLINIC | Age: 68
End: 2018-11-02
Payer: MEDICARE

## 2018-11-02 VITALS
HEART RATE: 88 BPM | SYSTOLIC BLOOD PRESSURE: 119 MMHG | WEIGHT: 226 LBS | BODY MASS INDEX: 40.04 KG/M2 | OXYGEN SATURATION: 96 % | DIASTOLIC BLOOD PRESSURE: 74 MMHG | HEIGHT: 63 IN

## 2018-11-02 DIAGNOSIS — I48.20 CHRONIC ATRIAL FIBRILLATION (HCC): ICD-10-CM

## 2018-11-02 DIAGNOSIS — E06.3 HYPOTHYROIDISM DUE TO HASHIMOTO'S THYROIDITIS: ICD-10-CM

## 2018-11-02 DIAGNOSIS — R71.8 ELEVATED RED BLOOD CELL COUNT: ICD-10-CM

## 2018-11-02 DIAGNOSIS — E03.8 HYPOTHYROIDISM DUE TO HASHIMOTO'S THYROIDITIS: ICD-10-CM

## 2018-11-02 DIAGNOSIS — R25.2 LEG CRAMPS: ICD-10-CM

## 2018-11-02 DIAGNOSIS — I10 BENIGN ESSENTIAL HTN: ICD-10-CM

## 2018-11-02 DIAGNOSIS — K21.9 GASTROESOPHAGEAL REFLUX DISEASE WITHOUT ESOPHAGITIS: ICD-10-CM

## 2018-11-02 DIAGNOSIS — E11.41 DIABETIC MONONEUROPATHY ASSOCIATED WITH TYPE 2 DIABETES MELLITUS (HCC): ICD-10-CM

## 2018-11-02 DIAGNOSIS — Z79.4 INSULIN-REQUIRING OR DEPENDENT TYPE II DIABETES MELLITUS (HCC): Primary | ICD-10-CM

## 2018-11-02 DIAGNOSIS — E11.9 INSULIN-REQUIRING OR DEPENDENT TYPE II DIABETES MELLITUS (HCC): Primary | ICD-10-CM

## 2018-11-02 DIAGNOSIS — E11.22 CKD STAGE 4 DUE TO TYPE 2 DIABETES MELLITUS (HCC): ICD-10-CM

## 2018-11-02 DIAGNOSIS — N18.4 CKD STAGE 4 DUE TO TYPE 2 DIABETES MELLITUS (HCC): ICD-10-CM

## 2018-11-02 DIAGNOSIS — Z85.72 PERSONAL HISTORY OF LYMPHOMA: ICD-10-CM

## 2018-11-02 PROCEDURE — 3045F PR MOST RECENT HEMOGLOBIN A1C LEVEL 7.0-9.0%: CPT | Performed by: FAMILY MEDICINE

## 2018-11-02 PROCEDURE — 1101F PT FALLS ASSESS-DOCD LE1/YR: CPT | Performed by: FAMILY MEDICINE

## 2018-11-02 PROCEDURE — G8482 FLU IMMUNIZE ORDER/ADMIN: HCPCS | Performed by: FAMILY MEDICINE

## 2018-11-02 PROCEDURE — 3017F COLORECTAL CA SCREEN DOC REV: CPT | Performed by: FAMILY MEDICINE

## 2018-11-02 PROCEDURE — 4040F PNEUMOC VAC/ADMIN/RCVD: CPT | Performed by: FAMILY MEDICINE

## 2018-11-02 PROCEDURE — G8417 CALC BMI ABV UP PARAM F/U: HCPCS | Performed by: FAMILY MEDICINE

## 2018-11-02 PROCEDURE — 1123F ACP DISCUSS/DSCN MKR DOCD: CPT | Performed by: FAMILY MEDICINE

## 2018-11-02 PROCEDURE — 2022F DILAT RTA XM EVC RTNOPTHY: CPT | Performed by: FAMILY MEDICINE

## 2018-11-02 PROCEDURE — 1090F PRES/ABSN URINE INCON ASSESS: CPT | Performed by: FAMILY MEDICINE

## 2018-11-02 PROCEDURE — G8427 DOCREV CUR MEDS BY ELIG CLIN: HCPCS | Performed by: FAMILY MEDICINE

## 2018-11-02 PROCEDURE — G8399 PT W/DXA RESULTS DOCUMENT: HCPCS | Performed by: FAMILY MEDICINE

## 2018-11-02 PROCEDURE — 99214 OFFICE O/P EST MOD 30 MIN: CPT | Performed by: FAMILY MEDICINE

## 2018-11-02 PROCEDURE — 1036F TOBACCO NON-USER: CPT | Performed by: FAMILY MEDICINE

## 2018-11-02 NOTE — PROGRESS NOTES
Diagnosis Date    Atrial fibrillation ([facility]     Cancer (HCC)     lymphoma    CKD (chronic kidney disease)     Diabetes mellitus ([facility]     DVT (deep venous thrombosis) (HCC)     Hyperlipidemia     Hypertension     Hypotension      Family History   Problem Relation Age of Onset    Cancer Father     Cancer Sister         lung    Cancer Brother         lymphoma    Heart Disease Mother      Social History     Social History    Marital status: Single     Spouse name: N/A    Number of children: N/A    Years of education: N/A     Occupational History    Not on file. Social History Main Topics    Smoking status: Former Smoker     Packs/day: 1.00     Years: 35.00     Quit date: 2001    Smokeless tobacco: Never Used    Alcohol use No    Drug use: No    Sexual activity: Not Currently     Other Topics Concern    Not on file     Social History Narrative    No narrative on file       Prior to Visit Medications    Medication Sig Taking? Authorizing Provider   atorvastatin (LIPITOR) 40 MG tablet TAKE 1 TABLET BY MOUTH EVERY DAY Yes DEMI Lozoya CNP   LYRICA 100 MG capsule TAKE 1 CAPSULE BY MOUTH 3 TIMES DAILY Yes DEMI Lozoya CNP   NOVOLIN R 100 UNIT/ML injection USE AS DIRECTED PER SLIDING SCALE UP TO 26 UNITS DAILY  Patient taking differently: 5 units before breakfast and dinner Yes Dara Donahue MD   ONE TOUCH ULTRASOFT LANCETS MISC 1 each by Does not apply route 4 times daily Yes Dara Donahue MD   magnesium oxide (MAG-OX) 400 MG tablet TAKE 1 TABLET BY MOUTH 3 TIMES DAILY.  Yes DEMI Pan CNP   blood glucose test strips (ONE TOUCH ULTRA TEST) strip USE TO TEST BLOOD SUGAR 4 TIMES A DAY Yes Dara Donahue MD   LEVEMIR FLEXTOUCH 100 UNIT/ML injection pen INJECT 60 UNITS AT BEDTIME OR AS DIRECTED Yes DEMI Pan CNP   omeprazole (PRILOSEC) 40 MG delayed release capsule TAKE 1 CAPSULE BY MOUTH DAILY Yes sounds. Pulmonary/Chest: Effort normal and breath sounds normal. No respiratory distress. Abdominal: Soft. There is no hepatosplenomegaly. There is no tenderness. Musculoskeletal: She exhibits no edema. Skin: Skin is warm and dry. No rash noted. She is not diaphoretic. No erythema. No pallor. Turgor normal   Psychiatric: She has a normal mood and affect. Her behavior is normal. Judgment and thought content normal.   Nursing note and vitals reviewed.       Lab Review   Nurse Only on 10/26/2018   Component Date Value    Sodium 10/26/2018 143     Potassium 10/26/2018 4.8     Chloride 10/26/2018 100     CO2 10/26/2018 30     Anion Gap 10/26/2018 13     Glucose 10/26/2018 114*    BUN 10/26/2018 30*    CREATININE 10/26/2018 1.3*    GFR Non- 10/26/2018 41*    GFR  10/26/2018 49*    Calcium 10/26/2018 9.3     Total Protein 10/26/2018 6.9     Alb 10/26/2018 4.3     Albumin/Globulin Ratio 10/26/2018 1.7     Total Bilirubin 10/26/2018 0.4     Alkaline Phosphatase 10/26/2018 79     ALT 10/26/2018 20     AST 10/26/2018 28     Globulin 10/26/2018 2.6     WBC 10/26/2018 7.2     RBC 10/26/2018 5.73*    Hemoglobin 10/26/2018 16.6*    Hematocrit 10/26/2018 51.5*    MCV 10/26/2018 90.0     MCH 10/26/2018 29.0     MCHC 10/26/2018 32.2     RDW 10/26/2018 16.0*    Platelets 27/25/8107 198     MPV 10/26/2018 8.5     Neutrophils % 10/26/2018 49.2     Lymphocytes % 10/26/2018 37.8     Monocytes % 10/26/2018 11.1     Eosinophils % 10/26/2018 1.2     Basophils % 10/26/2018 0.7     Neutrophils # 10/26/2018 3.6     Lymphocytes # 10/26/2018 2.7     Monocytes # 10/26/2018 0.8     Eosinophils # 10/26/2018 0.1     Basophils # 10/26/2018 0.1     Cholesterol, Total 10/26/2018 164     Triglycerides 10/26/2018 151*    HDL 10/26/2018 39*    LDL Calculated 10/26/2018 95     VLDL Cholesterol Calcula* 10/26/2018 30     TSH 10/26/2018 1.82     T4 Free 10/26/2018 1.6    

## 2018-11-02 NOTE — PATIENT INSTRUCTIONS
habits. · Manage stress. If you have a lot of stress in your life, it can be hard to focus on making healthy changes to your daily habits. · Track your food and activity. You are likely to do better at losing weight if you keep track of what you eat and what you do. Follow-up care is a key part of your treatment and safety. Be sure to make and go to all appointments, and call your doctor if you are having problems. It's also a good idea to know your test results and keep a list of the medicines you take. Where can you learn more? Go to [email or web address] and sign in to your [employer] account. Enter A121 in the ISVS box to learn more about \"Learning About Low-Carbohydrate Diets for Weight Loss. \"     If you do not have an account, please click on the \"Sign Up Now\" link. Current as of: May 12, 2017  Content Version: 11.7  © 8108-6233 [employer]. Care instructions adapted under license by [facility] If you have questions about a medical condition or this instruction, always ask your healthcare professional. [place] [address] any warranty or liability for your use of this information. Patient Education        Learning About Diabetes Food Guidelines  Your Care Instructions    Meal planning is important to manage diabetes. It helps keep your blood sugar at a target level (which you set with your doctor). You don't have to eat special foods. You can eat what your family eats, including sweets once in a while. But you do have to pay attention to how often you eat and how much you eat of certain foods. You may want to work with a dietitian or a certified diabetes educator (CDE) to help you plan meals and snacks. A dietitian or CDE can also help you lose weight if that is one of your goals. What should you know about eating carbs?   Managing the amount of carbohydrate (carbs) you eat is an important part of healthy meals when you have

## 2018-11-12 ENCOUNTER — NURSE ONLY (OUTPATIENT)
Dept: FAMILY MEDICINE CLINIC | Age: 68
End: 2018-11-12
Payer: MEDICARE

## 2018-11-12 ENCOUNTER — ANTI-COAG VISIT (OUTPATIENT)
Dept: FAMILY MEDICINE CLINIC | Age: 68
End: 2018-11-12

## 2018-11-12 ENCOUNTER — TELEPHONE (OUTPATIENT)
Dept: FAMILY MEDICINE CLINIC | Age: 68
End: 2018-11-12

## 2018-11-12 DIAGNOSIS — I48.20 CHRONIC ATRIAL FIBRILLATION (HCC): ICD-10-CM

## 2018-11-12 LAB
INTERNATIONAL NORMALIZATION RATIO, POC: 2.1
PROTHROMBIN TIME, POC: NORMAL

## 2018-11-12 PROCEDURE — 85610 PROTHROMBIN TIME: CPT | Performed by: FAMILY MEDICINE

## 2018-11-14 DIAGNOSIS — B02.29 POST HERPETIC NEURALGIA: ICD-10-CM

## 2018-11-16 RX ORDER — WARFARIN SODIUM 5 MG/1
5 TABLET ORAL DAILY
Qty: 30 TABLET | Refills: 5 | Status: SHIPPED | OUTPATIENT
Start: 2018-11-16 | End: 2019-04-18 | Stop reason: SDUPTHER

## 2018-12-03 ENCOUNTER — TELEPHONE (OUTPATIENT)
Dept: FAMILY MEDICINE CLINIC | Age: 68
End: 2018-12-03

## 2018-12-03 RX ORDER — CEPHALEXIN 250 MG/1
250 CAPSULE ORAL 3 TIMES DAILY
Qty: 15 CAPSULE | Refills: 0 | Status: SHIPPED | OUTPATIENT
Start: 2018-12-03 | End: 2018-12-08

## 2018-12-07 ENCOUNTER — ANTI-COAG VISIT (OUTPATIENT)
Dept: FAMILY MEDICINE CLINIC | Age: 68
End: 2018-12-07

## 2018-12-07 ENCOUNTER — NURSE ONLY (OUTPATIENT)
Dept: FAMILY MEDICINE CLINIC | Age: 68
End: 2018-12-07
Payer: MEDICARE

## 2018-12-07 DIAGNOSIS — I48.20 CHRONIC ATRIAL FIBRILLATION (HCC): ICD-10-CM

## 2018-12-07 LAB
INTERNATIONAL NORMALIZATION RATIO, POC: 2
PROTHROMBIN TIME, POC: NORMAL

## 2018-12-07 PROCEDURE — 85610 PROTHROMBIN TIME: CPT | Performed by: FAMILY MEDICINE

## 2018-12-07 RX ORDER — SYRING-NEEDL,DISP,INSUL,0.3 ML 31GX15/64"
SYRINGE, EMPTY DISPOSABLE MISCELLANEOUS
Qty: 3 EACH | Refills: 3 | Status: SHIPPED | OUTPATIENT
Start: 2018-12-07 | End: 2019-12-11 | Stop reason: SDUPTHER

## 2018-12-21 DIAGNOSIS — B02.29 POST HERPETIC NEURALGIA: ICD-10-CM

## 2018-12-24 RX ORDER — PENTOXIFYLLINE 400 MG/1
TABLET, EXTENDED RELEASE ORAL
Qty: 90 TABLET | Refills: 0 | Status: SHIPPED | OUTPATIENT
Start: 2018-12-24 | End: 2019-06-21 | Stop reason: SDUPTHER

## 2018-12-27 ENCOUNTER — TELEPHONE (OUTPATIENT)
Dept: FAMILY MEDICINE CLINIC | Age: 68
End: 2018-12-27

## 2019-01-08 ENCOUNTER — TELEPHONE (OUTPATIENT)
Dept: FAMILY MEDICINE CLINIC | Age: 69
End: 2019-01-08

## 2019-01-08 ENCOUNTER — NURSE ONLY (OUTPATIENT)
Dept: FAMILY MEDICINE CLINIC | Age: 69
End: 2019-01-08
Payer: MEDICARE

## 2019-01-08 DIAGNOSIS — I48.20 CHRONIC ATRIAL FIBRILLATION (HCC): ICD-10-CM

## 2019-01-08 LAB
INTERNATIONAL NORMALIZATION RATIO, POC: 2.6
PROTHROMBIN TIME, POC: NORMAL

## 2019-01-08 PROCEDURE — 85610 PROTHROMBIN TIME: CPT | Performed by: FAMILY MEDICINE

## 2019-01-11 RX ORDER — ATORVASTATIN CALCIUM 40 MG/1
TABLET, FILM COATED ORAL
Qty: 90 TABLET | Refills: 0 | Status: SHIPPED | OUTPATIENT
Start: 2019-01-11 | End: 2019-04-12 | Stop reason: SDUPTHER

## 2019-01-16 ENCOUNTER — TELEPHONE (OUTPATIENT)
Dept: FAMILY MEDICINE CLINIC | Age: 69
End: 2019-01-16

## 2019-01-16 ENCOUNTER — NURSE ONLY (OUTPATIENT)
Dept: FAMILY MEDICINE CLINIC | Age: 69
End: 2019-01-16
Payer: MEDICARE

## 2019-01-16 DIAGNOSIS — R31.9 HEMATURIA, UNSPECIFIED TYPE: Primary | ICD-10-CM

## 2019-01-16 LAB
BILIRUBIN, POC: NEGATIVE
BLOOD URINE, POC: NORMAL
CLARITY, POC: CLEAR
COLOR, POC: YELLOW
GLUCOSE URINE, POC: NEGATIVE
KETONES, POC: NEGATIVE
LEUKOCYTE EST, POC: NORMAL
NITRITE, POC: NEGATIVE
PH, POC: 6
PROTEIN, POC: NEGATIVE
SPECIFIC GRAVITY, POC: 1.01
UROBILINOGEN, POC: 0.2

## 2019-01-16 PROCEDURE — 81002 URINALYSIS NONAUTO W/O SCOPE: CPT | Performed by: FAMILY MEDICINE

## 2019-01-18 ENCOUNTER — NURSE ONLY (OUTPATIENT)
Dept: FAMILY MEDICINE CLINIC | Age: 69
End: 2019-01-18

## 2019-01-18 DIAGNOSIS — R39.9 UTI SYMPTOMS: Primary | ICD-10-CM

## 2019-01-18 DIAGNOSIS — R31.9 HEMATURIA, UNSPECIFIED TYPE: ICD-10-CM

## 2019-01-20 LAB
ORGANISM: ABNORMAL
URINE CULTURE, ROUTINE: ABNORMAL
URINE CULTURE, ROUTINE: ABNORMAL

## 2019-01-21 DIAGNOSIS — R31.21 ASYMPTOMATIC MICROSCOPIC HEMATURIA: Primary | ICD-10-CM

## 2019-01-25 RX ORDER — LEVOTHYROXINE SODIUM 0.1 MG/1
TABLET ORAL
Qty: 90 TABLET | Refills: 3 | Status: SHIPPED | OUTPATIENT
Start: 2019-01-25 | End: 2020-01-22

## 2019-02-08 DIAGNOSIS — B02.29 POST HERPETIC NEURALGIA: ICD-10-CM

## 2019-02-11 ENCOUNTER — ANTI-COAG VISIT (OUTPATIENT)
Dept: FAMILY MEDICINE CLINIC | Age: 69
End: 2019-02-11

## 2019-02-11 ENCOUNTER — NURSE ONLY (OUTPATIENT)
Dept: FAMILY MEDICINE CLINIC | Age: 69
End: 2019-02-11
Payer: MEDICARE

## 2019-02-11 DIAGNOSIS — I48.20 CHRONIC ATRIAL FIBRILLATION (HCC): ICD-10-CM

## 2019-02-11 LAB
INTERNATIONAL NORMALIZATION RATIO, POC: 2.2
PROTHROMBIN TIME, POC: NORMAL

## 2019-02-11 PROCEDURE — 85610 PROTHROMBIN TIME: CPT | Performed by: FAMILY MEDICINE

## 2019-03-10 DIAGNOSIS — I87.2 VENOUS INSUFFICIENCY: ICD-10-CM

## 2019-03-11 RX ORDER — FUROSEMIDE 20 MG/1
TABLET ORAL
Qty: 180 TABLET | Refills: 3 | Status: SHIPPED | OUTPATIENT
Start: 2019-03-11 | End: 2020-03-31

## 2019-03-11 RX ORDER — HUMAN INSULIN 100 [IU]/ML
INJECTION, SOLUTION SUBCUTANEOUS
Qty: 30 ML | Refills: 1 | Status: SHIPPED | OUTPATIENT
Start: 2019-03-11 | End: 2020-02-11 | Stop reason: SDUPTHER

## 2019-03-12 ENCOUNTER — ANTI-COAG VISIT (OUTPATIENT)
Dept: FAMILY MEDICINE CLINIC | Age: 69
End: 2019-03-12

## 2019-03-12 ENCOUNTER — NURSE ONLY (OUTPATIENT)
Dept: FAMILY MEDICINE CLINIC | Age: 69
End: 2019-03-12
Payer: MEDICARE

## 2019-03-12 DIAGNOSIS — I48.20 CHRONIC ATRIAL FIBRILLATION (HCC): ICD-10-CM

## 2019-03-12 LAB
INTERNATIONAL NORMALIZATION RATIO, POC: 2.1
PROTHROMBIN TIME, POC: NORMAL

## 2019-03-12 PROCEDURE — 85610 PROTHROMBIN TIME: CPT | Performed by: FAMILY MEDICINE

## 2019-04-08 ENCOUNTER — ANTI-COAG VISIT (OUTPATIENT)
Dept: FAMILY MEDICINE CLINIC | Age: 69
End: 2019-04-08

## 2019-04-08 ENCOUNTER — NURSE ONLY (OUTPATIENT)
Dept: FAMILY MEDICINE CLINIC | Age: 69
End: 2019-04-08
Payer: MEDICARE

## 2019-04-08 DIAGNOSIS — I48.20 CHRONIC ATRIAL FIBRILLATION (HCC): ICD-10-CM

## 2019-04-08 DIAGNOSIS — Z79.4 INSULIN-REQUIRING OR DEPENDENT TYPE II DIABETES MELLITUS (HCC): Primary | ICD-10-CM

## 2019-04-08 DIAGNOSIS — E11.9 INSULIN-REQUIRING OR DEPENDENT TYPE II DIABETES MELLITUS (HCC): Primary | ICD-10-CM

## 2019-04-08 LAB
INTERNATIONAL NORMALIZATION RATIO, POC: 2.1
PROTHROMBIN TIME, POC: NORMAL

## 2019-04-08 PROCEDURE — 85610 PROTHROMBIN TIME: CPT | Performed by: FAMILY MEDICINE

## 2019-04-12 RX ORDER — ATORVASTATIN CALCIUM 40 MG/1
TABLET, FILM COATED ORAL
Qty: 90 TABLET | Refills: 1 | Status: SHIPPED | OUTPATIENT
Start: 2019-04-12 | End: 2019-10-01 | Stop reason: SDUPTHER

## 2019-04-15 RX ORDER — OMEPRAZOLE 40 MG/1
CAPSULE, DELAYED RELEASE ORAL
Qty: 90 CAPSULE | Refills: 3 | Status: SHIPPED | OUTPATIENT
Start: 2019-04-15 | End: 2020-04-06

## 2019-04-22 RX ORDER — WARFARIN SODIUM 5 MG/1
TABLET ORAL
Qty: 30 TABLET | Refills: 11 | Status: SHIPPED | OUTPATIENT
Start: 2019-04-22 | End: 2020-03-02

## 2019-04-23 ENCOUNTER — TELEPHONE (OUTPATIENT)
Dept: FAMILY MEDICINE CLINIC | Age: 69
End: 2019-04-23

## 2019-05-02 NOTE — TELEPHONE ENCOUNTER
Dr. Julienne Lesch:    Notes: patient will come fasting    This patient has an upcoming appointment with you for Diabetes, Hyperlipidemia, Hypertension and Hypothyroidism. In planning for that visit I have completed the following pre-visit planning:     Pre-Visit Planning Checklist:  Patient contacted: yes  Verified patient by name and date of birth: yes    Health Maintenance items reviewed:    No pre-visit planning health maintenance topics to review at this time    Labs and procedures pended: Fasting    Labs and procedures discussed with patient: yes  Reminded patient to check with their insurance company about coverage for lab tests and lab location: no    Preliminary Medication Reconciliation: was performed. Reminded patient to arrive early: yes    Please complete the med-reconciliation and sign the appropriate labs as soon as possible.       Cathi Vu MA  Pre-Services Specialist

## 2019-05-07 ENCOUNTER — ANTI-COAG VISIT (OUTPATIENT)
Dept: FAMILY MEDICINE CLINIC | Age: 69
End: 2019-05-07

## 2019-05-07 ENCOUNTER — OFFICE VISIT (OUTPATIENT)
Dept: FAMILY MEDICINE CLINIC | Age: 69
End: 2019-05-07
Payer: MEDICARE

## 2019-05-07 VITALS
BODY MASS INDEX: 41.94 KG/M2 | HEART RATE: 91 BPM | OXYGEN SATURATION: 93 % | SYSTOLIC BLOOD PRESSURE: 144 MMHG | DIASTOLIC BLOOD PRESSURE: 78 MMHG | WEIGHT: 233 LBS

## 2019-05-07 DIAGNOSIS — I48.20 CHRONIC ATRIAL FIBRILLATION (HCC): ICD-10-CM

## 2019-05-07 DIAGNOSIS — E11.9 INSULIN-REQUIRING OR DEPENDENT TYPE II DIABETES MELLITUS (HCC): Primary | ICD-10-CM

## 2019-05-07 DIAGNOSIS — E11.51 PERIPHERAL VASCULAR DISEASE IN DIABETES MELLITUS (HCC): ICD-10-CM

## 2019-05-07 DIAGNOSIS — E06.3 HYPOTHYROIDISM DUE TO HASHIMOTO'S THYROIDITIS: ICD-10-CM

## 2019-05-07 DIAGNOSIS — K21.9 GASTROESOPHAGEAL REFLUX DISEASE WITHOUT ESOPHAGITIS: ICD-10-CM

## 2019-05-07 DIAGNOSIS — N18.4 CKD STAGE 4 DUE TO TYPE 2 DIABETES MELLITUS (HCC): ICD-10-CM

## 2019-05-07 DIAGNOSIS — M72.2 PLANTAR FASCIITIS: ICD-10-CM

## 2019-05-07 DIAGNOSIS — E11.41 DIABETIC MONONEUROPATHY ASSOCIATED WITH TYPE 2 DIABETES MELLITUS (HCC): ICD-10-CM

## 2019-05-07 DIAGNOSIS — Z79.4 INSULIN-REQUIRING OR DEPENDENT TYPE II DIABETES MELLITUS (HCC): Primary | ICD-10-CM

## 2019-05-07 DIAGNOSIS — E66.01 MORBID OBESITY WITH BMI OF 40.0-44.9, ADULT (HCC): ICD-10-CM

## 2019-05-07 DIAGNOSIS — E03.8 HYPOTHYROIDISM DUE TO HASHIMOTO'S THYROIDITIS: ICD-10-CM

## 2019-05-07 DIAGNOSIS — I10 BENIGN ESSENTIAL HTN: ICD-10-CM

## 2019-05-07 DIAGNOSIS — I87.2 VENOUS INSUFFICIENCY: ICD-10-CM

## 2019-05-07 DIAGNOSIS — E11.22 CKD STAGE 4 DUE TO TYPE 2 DIABETES MELLITUS (HCC): ICD-10-CM

## 2019-05-07 LAB
BASOPHILS ABSOLUTE: 0 K/UL (ref 0–0.2)
BASOPHILS RELATIVE PERCENT: 0.6 %
EOSINOPHILS ABSOLUTE: 0.1 K/UL (ref 0–0.6)
EOSINOPHILS RELATIVE PERCENT: 0.8 %
HCT VFR BLD CALC: 50.6 % (ref 36–48)
HEMOGLOBIN: 16.8 G/DL (ref 12–16)
INTERNATIONAL NORMALIZATION RATIO, POC: 2.1
LYMPHOCYTES ABSOLUTE: 2.6 K/UL (ref 1–5.1)
LYMPHOCYTES RELATIVE PERCENT: 31.9 %
MCH RBC QN AUTO: 30.3 PG (ref 26–34)
MCHC RBC AUTO-ENTMCNC: 33.1 G/DL (ref 31–36)
MCV RBC AUTO: 91.4 FL (ref 80–100)
MONOCYTES ABSOLUTE: 0.7 K/UL (ref 0–1.3)
MONOCYTES RELATIVE PERCENT: 8.6 %
NEUTROPHILS ABSOLUTE: 4.7 K/UL (ref 1.7–7.7)
NEUTROPHILS RELATIVE PERCENT: 58.1 %
PDW BLD-RTO: 15.8 % (ref 12.4–15.4)
PLATELET # BLD: 200 K/UL (ref 135–450)
PMV BLD AUTO: 8.5 FL (ref 5–10.5)
PROTHROMBIN TIME, POC: NORMAL
RBC # BLD: 5.53 M/UL (ref 4–5.2)
WBC # BLD: 8 K/UL (ref 4–11)

## 2019-05-07 PROCEDURE — 85610 PROTHROMBIN TIME: CPT | Performed by: FAMILY MEDICINE

## 2019-05-07 PROCEDURE — 83036 HEMOGLOBIN GLYCOSYLATED A1C: CPT | Performed by: FAMILY MEDICINE

## 2019-05-07 PROCEDURE — 36415 COLL VENOUS BLD VENIPUNCTURE: CPT | Performed by: FAMILY MEDICINE

## 2019-05-07 PROCEDURE — 99214 OFFICE O/P EST MOD 30 MIN: CPT | Performed by: FAMILY MEDICINE

## 2019-05-07 ASSESSMENT — PATIENT HEALTH QUESTIONNAIRE - PHQ9
SUM OF ALL RESPONSES TO PHQ QUESTIONS 1-9: 0
2. FEELING DOWN, DEPRESSED OR HOPELESS: 0
SUM OF ALL RESPONSES TO PHQ9 QUESTIONS 1 & 2: 0
SUM OF ALL RESPONSES TO PHQ QUESTIONS 1-9: 0
1. LITTLE INTEREST OR PLEASURE IN DOING THINGS: 0

## 2019-05-07 NOTE — PROGRESS NOTES
Chief Complaint   Patient presents with    Hypertension    Hyperlipidemia    Diabetes    Other     patient has multiple medical complaints   Has left heel pain, she thinks she hurt it when she was at the gym. Discussed plantar fascitis. Advised discussing her SOB with the nephrologist next week, she is updating the lab work today. HPI:  Lazara Mahajan is a 70 y.o. (: 1950) here today for  Treatment Adherence:   Medication compliance:  compliant all of the time  Diet compliance:  noncompliant: eats what she wants  Weight trend: increasing  Current exercise: walks 1-3 time(s) per day(walks dogs)  Barriers: impairment:  physical: some SOB    Diabetes Mellitus Type 2: Current symptoms/problems include none. Home blood sugar records: fasting range: 100, patient tests 4 time(s) per day  Any episodes of hypoglycemia? no  Eye exam current (within one year): yes  Tobacco history: She  reports that she quit smoking about 18 years ago. She has a 35.00 pack-year smoking history. She has never used smokeless tobacco.   Daily Aspirin? No: on Warfarin    Hypertension:  Home blood pressure monitoring: Yes - running a little high. She is adherent to a low sodium diet. Patient denies chest pain. Antihypertensive medication side effects: no medication side effects noted. Use of agents associated with hypertension: none. Hyperlipidemia:  No new myalgias or GI upset on atorvastatin (Lipitor).        Lab Results   Component Value Date    LABA1C 7.0 10/26/2018    LABA1C 6.7 2018    LABA1C 7.1 10/31/2017     Lab Results   Component Value Date    LABMICR 1.60 2019    CREATININE 1.6 (H) 2019     Lab Results   Component Value Date    ALT 16 2019    AST 19 2019     Lab Results   Component Value Date    CHOL 164 10/26/2018    TRIG 151 (H) 10/26/2018    HDL 39 (L) 10/26/2018    [address] 10/26/2018          Patient's medications, allergies, past medical, surgical, social and family histories were reviewed and updated asappropriate on 2019 at 8:26 AM.    ROS:  Review of Systems    All other systems reviewed and are negative except as noted above on 2019 at 8:26 AM. Additional review of systems may be scanned into the media section ofthis medical record. Any responses requiring further intervention were pursued.     Hemoglobin A1C (%)   Date Value   10/26/2018 7.0     Microscopic Examination (no units)   Date Value   10/15/2015 YES     Microalbumin, Random Urine (mg/dL)   Date Value   2019 1.60     LDL Calculated (mg/dL)   Date Value   10/26/2018 95     Past Medical History:   Diagnosis Date    [facility] ([facility]     Cancer ([facility]     lymphoma    CKD (chronic kidney disease)     Diabetes mellitus ([facility]     DVT (deep venous thrombosis) (HCC)     Hyperlipidemia     Hypertension     Hypotension         Family History   Problem Relation Age of Onset    Cancer Father     Cancer Sister         lung    Cancer Brother         lymphoma    Heart Disease Mother      Social History     Socioeconomic History    Marital status: Single     Spouse name: Not on file    Number of children: Not on file    Years of education: Not on file    Highest education level: Not on file   Occupational History    Not on file   Social Needs    Financial resource strain: Not on file    Food insecurity:     Worry: Not on file     Inability: Not on file    Transportation needs:     Medical: Not on file     Non-medical: Not on file   Tobacco Use    Smoking status: Former Smoker     Packs/day: 1.00     Years: 35.00     Pack years: 35.00     Last attempt to quit:      Years since quittin.3    Smokeless tobacco: Never Used   Substance and Sexual Activity    Alcohol use: No     Alcohol/week: 0.0 oz    Drug use: No    Sexual activity: Not Currently   Lifestyle    Physical activity:     Days per week: Not on file     Minutes per session: Not on file    Stress: Not on file Relationships    Social connections:     Talks on phone: Not on file     Gets together: Not on file     Attends Sabianist service: Not on file     Active member of club or organization: Not on file     Attends meetings of clubs or organizations: Not on file     Relationship status: Not on file    Intimate partner violence:     Fear of current or ex partner: Not on file     Emotionally abused: Not on file     Physically abused: Not on file     Forced sexual activity: Not on file   Other Topics Concern    Not on file   Social History Narrative    Not on file       Prior to Visit Medications    Medication Sig Taking?  Authorizing Provider   warfarin (COUMADIN) 5 MG tablet TAKE 1 TABLET BY MOUTH EVERY DAY Yes Leilani Schwartz MD   omeprazole (PRILOSEC) 40 MG delayed release capsule TAKE 1 CAPSULE BY MOUTH DAILY Yes DEMI Bolton CNP   atorvastatin (LIPITOR) 40 MG tablet TAKE 1 TABLET BY MOUTH EVERY DAY Yes DEMI Bolton CNP   blood glucose test strips (ONE TOUCH ULTRA TEST) strip USE TO TEST BLOOD SUGAR 4 TIMES A DAY Yes Leilani Schwartz MD   diltiazem (CARDIZEM CD) 240 MG extended release capsule TAKE 1 CAPSULE BY MOUTH DAILY Yes DEMI Bolton CNP   furosemide (LASIX) 20 MG tablet TAKE 1 TABLET BY MOUTH TWICE A DAY  Patient taking differently: 1 tablet daily Yes Leilani Schwartz MD   NOVOLIN R 100 UNIT/ML injection USE AS DIRECTED PER SLIDING SCALE UP TO 26 UNITS DAILY Yes Leilani Schwartz MD   levothyroxine (SYNTHROID) 100 MCG tablet TAKE 1 TABLET BY MOUTH EVERY DAY Yes Leilani Schwartz MD   pentoxifylline (TRENTAL) 400 MG extended release tablet TAKE 1 TABLET BY MOUTH AT BEDTIME Yes DEMI Bolton CNP   BD VEO INSULIN SYR ULTRAFINE 31G X 15/64\" 0.3 ML MISC USE UP TO FIVE TIMES DAILY Yes Leilani Schwartz MD   ONE TOUCH ULTRASOFT LANCETS MISC 1 each by Does not apply route 4 times daily Yes Emma Katz Katelin Parra MD   magnesium oxide (MAG-OX) 400 MG tablet TAKE 1 TABLET BY MOUTH 3 TIMES DAILY. Yes DEMI Peck CNP   blood glucose test strips (ONE TOUCH ULTRA TEST) strip USE TO TEST BLOOD SUGAR 4 TIMES A DAY Yes Po Garcia MD   LEVEMIR FLEXTOUCH 100 UNIT/ML injection pen INJECT 60 UNITS AT BEDTIME OR AS DIRECTED Yes DEMI Peck CNP   BD PEN NEEDLE ELDER U/F 32G X 4 MM MISC USE DAILY Yes Po Garcia MD   LYRICA 100 MG capsule TAKE 1 CAPSULE BY MOUTH THREE TIMES A DAY  Po Garcia MD     No Known Allergies    OBJECTIVE:  Estimated body mass index is 41.94 kg/m² as calculated from the following:    Height as of 11/2/18: 5' 2.5\" (1.588 m). Weight as of this encounter: 233 lb (105.7 kg). Vitals:    05/07/19 0818   BP: (!) 158/80   Site: Left Upper Arm   Position: Sitting   Cuff Size: Large Adult   Pulse: 91   SpO2: 93%   Weight: 233 lb (105.7 kg)     BP Readings from Last 2 Encounters:   05/07/19 (!) 158/80   11/02/18 119/74     Wt Readings from Last 3 Encounters:   05/07/19 233 lb (105.7 kg)   11/02/18 226 lb (102.5 kg)   05/09/18 227 lb 12.8 oz (103.3 kg)       Physical Exam   Constitutional: She appears well-developed and well-nourished. No distress. HENT:   Head: Normocephalic and atraumatic. Right Ear: External ear normal.   Left Ear: External ear normal.   Nose: Nose normal.   Lips symmetric   Eyes: Pupils are equal, round, and reactive to light. Lids are normal. Right eye exhibits no discharge. Left eye exhibits no discharge. No scleral icterus. Neck: No JVD present. No thyromegaly present. Cardiovascular: Normal rate, regular rhythm and normal heart sounds. Pulmonary/Chest: Effort normal and breath sounds normal. No respiratory distress. Abdominal: Soft. There is no hepatosplenomegaly. There is no tenderness. Musculoskeletal: She exhibits edema (1+ right LE and 2+ left LE).    Left heel tenderness   Skin: Skin is warm and dry. No rash noted. She is not diaphoretic. No erythema. No pallor. Turgor normal   Psychiatric: She has a normal mood and affect. Her behavior is normal. Judgment and thought content normal.   Nursing note and vitals reviewed. Results for POC orders placed in visit on 05/07/19   POCT INR   Result Value Ref Range    INR 2.1     Protime                ASSESSMENT PLAN      Diagnosis Orders   1. Insulin-requiring or dependent type II diabetes mellitus ([facility]  POCT glycosylated hemoglobin (Hb A1C)   2. Chronic atrial fibrillation (HCC)  POCT INR   3. CKD stage 4 due to type 2 diabetes mellitus (HCC)  CBC Auto Differential   4. Benign essential HTN     5. Diabetic mononeuropathy associated with type 2 diabetes mellitus ([facility]     6. Hypothyroidism due to Hashimoto's thyroiditis     7. Venous insufficiency     8. Plantar fasciitis     9. Morbid obesity with BMI of 40.0-44.9, adult ([facility]     10. Peripheral vascular disease in diabetes mellitus ([facility]     11. Gastroesophageal reflux disease without esophagitis     Hemoglobin A1c was 7.1. Very acceptable sugar control. Clinically in sinus rhythm today. The nephrologist cut back her Lasix due to decreasing GFR she's been a little more short of breath. I don't hear any radials. She will discuss this with him in the near future. Blood pressure acceptable. Neuropathy stable. There is no evidence of limb ischemia. Thyroid replacement by symptoms appropriate. Venous insufficiency at baseline. Plantar fasciitis left heel is new, exercises recommended. Reflux controlled. Weight stable. Follow-up 6 months. Patient should call the office immediately with new or ongoing signs or symptoms or worsening, or proceed to the emergency room. No changes in past medical history, past surgical history, social history, or family history were noted during the patient encounter unless specifically listed above.   All updates of past medical history, past surgical history, social history, or family history were reviewed personally by me during the office visit. All problems listed in the assessment are stable unless noted otherwise. Medication profile reviewed personally by me during the office visit. Medication side effects and possible impairments from medications were discussed as applicable. This document was prepared by a combination of typing and transcription through a voice recognition software. Scribe attestation: Jono Hinton RN, am scribing for and in the presence of Nadeem Garcia MD. Electronically signed by Eugenia Medina RN on 5/7/2019 at 8:26 AM      Provider attestation:     I, Dr. Lizy Gurrola, personally performed the services described in this documentation, as scribed by the above signed scribe in my presence, and it is both accurate and complete. I agree with the ROS and Past Histories independently gathered by the clinical support staff and the remaining scribed note accurately describes my personal service to the patient.       5/7/2019    8:56 AM

## 2019-05-07 NOTE — PATIENT INSTRUCTIONS
cold water, can also help reduce swelling. But because heat alone may make pain and swelling worse, end a contrast bath with a soak in cold water. · Wear a night splint if your doctor suggests it. A night splint holds your foot with the toes pointed up and the foot and ankle at a 90-degree angle. This position gives the bottom of your foot a constant, gentle stretch. · Can freeze a small water bottle and roll it over heel   · Do simple exercises such as calf stretches and towel stretches 2 to 3 times each day, especially when you first get up in the morning. These can help the plantar fascia become more flexible. They also make the muscles that support your arch stronger. Hold these stretches for 15 to 30 seconds per stretch. Repeat 2 to 4 times. ? Stand about 1 foot from a wall. Place the palms of both hands against the wall at chest level. Lean forward against the wall, keeping one leg with the knee straight and heel on the ground while bending the knee of the other leg.  ? Sit down on the floor or a mat with your feet stretched in front of you. Roll up a towel lengthwise, and loop it over the ball of your foot. Holding the towel at both ends, gently pull the towel toward you to stretch your foot. · Wear shoes with good arch support. Athletic shoes or shoes with a well-cushioned sole are good choices. · Try heel cups or shoe inserts (orthotics) to help cushion your heel. You can buy these at many shoe stores. · Put on your shoes as soon as you get out of bed. Going barefoot or wearing slippers may make your pain worse. · Reach and stay at a good weight for your height. This puts less strain on your feet. When should you call for help?   Call your doctor now or seek immediate medical care if:    · You have heel pain with fever, redness, or warmth in your heel.     · You cannot put weight on the sore foot.    Watch closely for changes in your health, and be sure to contact your doctor if:    · You have numbness or tingling in your heel.     · Your heel pain lasts more than 2 weeks. Where can you learn more? Go to https://mobME Solutionspepiceweb.[email or web address].com. org and sign in to your [employer] account. Enter F035 in the [employer] box to learn more about \"Plantar Fasciitis: Care Instructions. \"     If you do not have an account, please click on the \"Sign Up Now\" link. Current as of: September 20, 2018  Content Version: 11.9  © 4819-8206 [employer]. Care instructions adapted under license by [facility] ([facility]). If you have questions about a medical condition or this instruction, always ask your healthcare professional. [address] any warranty or liability for your use of this information.

## 2019-05-08 LAB — HBA1C MFR BLD: 7.1 %

## 2019-05-13 DIAGNOSIS — N18.4 CKD STAGE 4 DUE TO TYPE 2 DIABETES MELLITUS (HCC): Primary | ICD-10-CM

## 2019-05-13 DIAGNOSIS — E11.9 INSULIN-REQUIRING OR DEPENDENT TYPE II DIABETES MELLITUS (HCC): ICD-10-CM

## 2019-05-13 DIAGNOSIS — E11.22 CKD STAGE 4 DUE TO TYPE 2 DIABETES MELLITUS (HCC): Primary | ICD-10-CM

## 2019-05-13 DIAGNOSIS — Z79.4 INSULIN-REQUIRING OR DEPENDENT TYPE II DIABETES MELLITUS (HCC): ICD-10-CM

## 2019-05-13 RX ORDER — LANCETS
EACH MISCELLANEOUS
Qty: 100 EACH | Refills: 11 | Status: SHIPPED | OUTPATIENT
Start: 2019-05-13 | End: 2019-07-03

## 2019-05-13 RX ORDER — PEN NEEDLE, DIABETIC 32GX 5/32"
NEEDLE, DISPOSABLE MISCELLANEOUS
Qty: 100 EACH | Refills: 3 | Status: SHIPPED | OUTPATIENT
Start: 2019-05-13 | End: 2020-05-26

## 2019-06-05 ENCOUNTER — NURSE ONLY (OUTPATIENT)
Dept: FAMILY MEDICINE CLINIC | Age: 69
End: 2019-06-05
Payer: MEDICARE

## 2019-06-05 DIAGNOSIS — I48.20 CHRONIC ATRIAL FIBRILLATION (HCC): ICD-10-CM

## 2019-06-05 LAB
INTERNATIONAL NORMALIZATION RATIO, POC: 2.7
PROTHROMBIN TIME, POC: NORMAL

## 2019-06-05 PROCEDURE — 85610 PROTHROMBIN TIME: CPT | Performed by: FAMILY MEDICINE

## 2019-06-14 RX ORDER — INSULIN DETEMIR 100 [IU]/ML
INJECTION, SOLUTION SUBCUTANEOUS
Qty: 9 PEN | Refills: 3 | Status: SHIPPED | OUTPATIENT
Start: 2019-06-14 | End: 2019-11-09 | Stop reason: SDUPTHER

## 2019-07-03 DIAGNOSIS — Z79.4 INSULIN-REQUIRING OR DEPENDENT TYPE II DIABETES MELLITUS (HCC): ICD-10-CM

## 2019-07-03 DIAGNOSIS — E11.22 CKD STAGE 4 DUE TO TYPE 2 DIABETES MELLITUS (HCC): ICD-10-CM

## 2019-07-03 DIAGNOSIS — E11.9 INSULIN-REQUIRING OR DEPENDENT TYPE II DIABETES MELLITUS (HCC): ICD-10-CM

## 2019-07-03 DIAGNOSIS — N18.4 CKD STAGE 4 DUE TO TYPE 2 DIABETES MELLITUS (HCC): ICD-10-CM

## 2019-07-03 RX ORDER — LANCETS
1 EACH MISCELLANEOUS 3 TIMES DAILY
Qty: 100 EACH | Refills: 11 | Status: SHIPPED | OUTPATIENT
Start: 2019-07-03 | End: 2020-05-01

## 2019-07-09 ENCOUNTER — NURSE ONLY (OUTPATIENT)
Dept: FAMILY MEDICINE CLINIC | Age: 69
End: 2019-07-09
Payer: MEDICARE

## 2019-07-09 ENCOUNTER — ANTI-COAG VISIT (OUTPATIENT)
Dept: FAMILY MEDICINE CLINIC | Age: 69
End: 2019-07-09

## 2019-07-09 DIAGNOSIS — I48.20 CHRONIC ATRIAL FIBRILLATION (HCC): ICD-10-CM

## 2019-07-09 LAB
INTERNATIONAL NORMALIZATION RATIO, POC: 2.3
PROTHROMBIN TIME, POC: NORMAL

## 2019-07-09 PROCEDURE — 85610 PROTHROMBIN TIME: CPT | Performed by: FAMILY MEDICINE

## 2019-07-29 DIAGNOSIS — Z79.4 INSULIN-REQUIRING OR DEPENDENT TYPE II DIABETES MELLITUS (HCC): ICD-10-CM

## 2019-07-29 DIAGNOSIS — E11.9 INSULIN-REQUIRING OR DEPENDENT TYPE II DIABETES MELLITUS (HCC): ICD-10-CM

## 2019-08-06 ENCOUNTER — TELEPHONE (OUTPATIENT)
Dept: FAMILY MEDICINE CLINIC | Age: 69
End: 2019-08-06

## 2019-08-06 ENCOUNTER — OFFICE VISIT (OUTPATIENT)
Dept: ORTHOPEDIC SURGERY | Age: 69
End: 2019-08-06
Payer: MEDICARE

## 2019-08-06 VITALS — HEIGHT: 63 IN | BODY MASS INDEX: 41.29 KG/M2 | WEIGHT: 233.03 LBS

## 2019-08-06 DIAGNOSIS — M25.552 LEFT HIP PAIN: Primary | ICD-10-CM

## 2019-08-06 DIAGNOSIS — S32.512A CLOSED FRACTURE OF LEFT SUPERIOR PUBIC RAMUS, INITIAL ENCOUNTER: ICD-10-CM

## 2019-08-06 PROCEDURE — G8399 PT W/DXA RESULTS DOCUMENT: HCPCS | Performed by: PHYSICIAN ASSISTANT

## 2019-08-06 PROCEDURE — 1036F TOBACCO NON-USER: CPT | Performed by: PHYSICIAN ASSISTANT

## 2019-08-06 PROCEDURE — 1090F PRES/ABSN URINE INCON ASSESS: CPT | Performed by: PHYSICIAN ASSISTANT

## 2019-08-06 PROCEDURE — G8417 CALC BMI ABV UP PARAM F/U: HCPCS | Performed by: PHYSICIAN ASSISTANT

## 2019-08-06 PROCEDURE — 1123F ACP DISCUSS/DSCN MKR DOCD: CPT | Performed by: PHYSICIAN ASSISTANT

## 2019-08-06 PROCEDURE — 4040F PNEUMOC VAC/ADMIN/RCVD: CPT | Performed by: PHYSICIAN ASSISTANT

## 2019-08-06 PROCEDURE — 99214 OFFICE O/P EST MOD 30 MIN: CPT | Performed by: PHYSICIAN ASSISTANT

## 2019-08-06 PROCEDURE — 3017F COLORECTAL CA SCREEN DOC REV: CPT | Performed by: PHYSICIAN ASSISTANT

## 2019-08-06 PROCEDURE — G8427 DOCREV CUR MEDS BY ELIG CLIN: HCPCS | Performed by: PHYSICIAN ASSISTANT

## 2019-08-06 NOTE — PROGRESS NOTES
Surgical History:   Procedure Laterality Date    COLONOSCOPY      COLONOSCOPY  05/23/2018    colon polyps,diverticulosis    LYMPH NODE BIOPSY      OTHER SURGICAL HISTORY      vocal cord bx     Current Medications:     Current Outpatient Medications:     blood glucose test strips (ONE TOUCH ULTRA TEST) strip, USE TO TEST BLOOD SUGAR 3 TIMES A DAY, Disp: 150 strip, Rfl: 5    ONE TOUCH ULTRASOFT LANCETS MISC, 1 each by Other route 3 times daily, Disp: 100 each, Rfl: 11    pentoxifylline (TRENTAL) 400 MG extended release tablet, TAKE 1 TABLET BY MOUTH AT BEDTIME, Disp: 90 tablet, Rfl: 1    LEVEMIR FLEXTOUCH 100 UNIT/ML injection pen, INJECT 60 UNITS AT BEDTIME OR AS DIRECTED, Disp: 9 pen, Rfl: 3    BD PEN NEEDLE ELDER U/F 32G X 4 MM MISC, USE DAILY, Disp: 100 each, Rfl: 3    warfarin (COUMADIN) 5 MG tablet, TAKE 1 TABLET BY MOUTH EVERY DAY, Disp: 30 tablet, Rfl: 11    omeprazole (PRILOSEC) 40 MG delayed release capsule, TAKE 1 CAPSULE BY MOUTH DAILY, Disp: 90 capsule, Rfl: 3    atorvastatin (LIPITOR) 40 MG tablet, TAKE 1 TABLET BY MOUTH EVERY DAY, Disp: 90 tablet, Rfl: 1    LYRICA 100 MG capsule, TAKE 1 CAPSULE BY MOUTH THREE TIMES A DAY, Disp: 90 capsule, Rfl: 5    diltiazem (CARDIZEM CD) 240 MG extended release capsule, TAKE 1 CAPSULE BY MOUTH DAILY, Disp: 90 capsule, Rfl: 1    furosemide (LASIX) 20 MG tablet, TAKE 1 TABLET BY MOUTH TWICE A DAY (Patient taking differently: 1 tablet daily), Disp: 180 tablet, Rfl: 3    NOVOLIN R 100 UNIT/ML injection, USE AS DIRECTED PER SLIDING SCALE UP TO 26 UNITS DAILY, Disp: 30 mL, Rfl: 1    levothyroxine (SYNTHROID) 100 MCG tablet, TAKE 1 TABLET BY MOUTH EVERY DAY, Disp: 90 tablet, Rfl: 3    BD VEO INSULIN SYR ULTRAFINE 31G X 15/64\" 0.3 ML MISC, USE UP TO FIVE TIMES DAILY, Disp: 3 each, Rfl: 3    magnesium oxide (MAG-OX) 400 MG tablet, TAKE 1 TABLET BY MOUTH 3 TIMES DAILY. , Disp: 270 tablet, Rfl: 2  Allergies:  Patient has no known allergies.   Social History:

## 2019-08-07 ENCOUNTER — NURSE ONLY (OUTPATIENT)
Dept: FAMILY MEDICINE CLINIC | Age: 69
End: 2019-08-07
Payer: MEDICARE

## 2019-08-07 ENCOUNTER — ANTI-COAG VISIT (OUTPATIENT)
Dept: FAMILY MEDICINE CLINIC | Age: 69
End: 2019-08-07

## 2019-08-07 DIAGNOSIS — I48.20 CHRONIC ATRIAL FIBRILLATION (HCC): ICD-10-CM

## 2019-08-07 LAB
INTERNATIONAL NORMALIZATION RATIO, POC: 2.6
PROTHROMBIN TIME, POC: NORMAL

## 2019-08-07 PROCEDURE — 85610 PROTHROMBIN TIME: CPT | Performed by: FAMILY MEDICINE

## 2019-08-14 ENCOUNTER — HOSPITAL ENCOUNTER (OUTPATIENT)
Dept: PHYSICAL THERAPY | Age: 69
Setting detail: THERAPIES SERIES
Discharge: HOME | End: 2019-08-14
Payer: MEDICARE

## 2019-08-14 PROCEDURE — 97110 THERAPEUTIC EXERCISES: CPT

## 2019-08-14 PROCEDURE — 97161 PT EVAL LOW COMPLEX 20 MIN: CPT

## 2019-08-14 NOTE — PLAN OF CARE
[address],  [facility], [place], [facility] [address], [address], [place], [address] [facility] [address]  Phone: [phone], [phone]                                                       Physical Therapy Certification    Dear Referring Practitioner: Diallo Cleveland,    We had the pleasure of evaluating the following patient for physical therapy services at [address] [place] [address]. A summary of our findings can be found in the initial assessment below. This includes our plan of care. If you have any questions or concerns regarding these findings, please do not hesitate to contact me at the office phone number checked above. Thank you for the referral.       Physician Signature:_______________________________Date:__________________  By signing above (or electronic signature), therapists plan is approved by physician    Patient: Berenice Romero   : 1950   MRN: [id number]  Referring Physician: Referring Practitioner: Diallo Cleveland      Evaluation Date: 2019      Medical Diagnosis Information:  Diagnosis: s/p superior pubic ramus fx 19   Treatment Diagnosis: S32.519D, M25.552, M25.652                                         Insurance information: PT Insurance Information: Medicare     Precautions/ Contra-indications/Relevant Medical History: WBAT; h/o lymphoma and blood clot in LLE (patient wants to be as active as possible)  Latex Allergy:  [x]NO      []YES  Preferred Language for Healthcare:   [x]English       []other:    SUBJECTIVE: Patient stated complaint: Patient reports she started having pain around  when she stepped over some boxes with her R leg (stance on her LLE). She pushed through the pain and continued to walk on it and finally went to the doctor on 19. She had a fracture of the superior pubic ramus and was advised to use a walker. She has been walking at least a mile daily, pushing through pain.  She is using a rollator for longer walks, but not using an AD at all around the house. Functional Disability Index: LEFS: 68%    Pain Scale: 5/10  Easing factors: sitting on the R side  Provocative factors: Sitting, lying down, standing, walking    Type: [x]Constant   []Intermittent  []Radiating []Localized []other:     Numbness/Tingling: numbness on LLE due to h/o blood clot / wound    Occupation/School: retired    Living Status/Prior Level of Function: Independent with ADLs and IADLs,     OBJECTIVE:     ROM LEFT RIGHT   HIP Flex 90 deg 105 deg   HIP Abd 5 deg 30 deg   HIP Ext     HIP IR     HIP ER     Knee ext     Knee Flex     Ankle PF     Ankle DF     Ankle In     Ankle Ev     Strength  LEFT RIGHT   HIP Flexors 1/5 (pain) 5/5   HIP Abductors 1/5 (pain) 5/5   HIP Ext     Hip ER     Knee EXT (quad) 3+/5 5/5   Knee Flex (HS) 3+/5 5/5   Ankle DF     Ankle PF     Ankle Inv     Ankle EV          Balance (up to 10 sec) LEFT RIGHT   Feet Together     Off Set Stance     Tandem Stance     Single Limb          Circumference             Reflexes/Sensation:    [x]Dermatomes/Myotomes intact    [x]Reflexes equal and normal bilaterally   []Other:    Joint mobility:    [x]Normal    []Hypo   []Hyper    Palpation: Tenderness over pubic bone (patient reported due to sensitive area)    Functional Mobility/Transfers: Independent, but has to use UE to move LLE due to pain; unable to walk longer distances    Posture: n/a    Bandages/Dressings/Incisions: n/a    Gait: (include devices/WB status) WBAT - patient not using AD upon eval, but STRONGLY recommended using AD at all times and not to push through pain  -standard rolling walker recommended, but rollator okay if patient has not pain    Orthopedic Special Tests: n/a                       [x] Patient history, allergies, meds reviewed. Medical chart reviewed. See intake form. Review Of Systems (ROS):  [x]Performed Review of systems (Integumentary, CardioPulmonary, Neurological) by intake and observation.  Intake form has been decrease in pain to facilitate improvement in movement, function, and ADLs as indicated by Functional Deficits. 3. Patient will tolerate sitting x 10 minutes without increased pain to progress toward improving sitting tolerance. Long Term Goals: To be achieved in: 8 weeks  1. Disability index score of 10% or less for the LEFS to assist with reaching prior level of function. 2. Patient will demonstrate increased AROM to equal the opposite side bilaterally to allow for proper joint functioning as indicated by patients Functional Deficits. 3. Patient will demonstrate an increase in strength to 5/5 to allow for proper functional mobility as indicated by patients Functional Deficits. 4. Patient will return to all transfers, work activities, and functional activities without increased symptoms or restriction. 5. Patient will have 0/10 pain with ADL's.  6. Patient stated goal: Patient will return to walking program (>3 miles per day) and be able to tolerate sitting x at least 60 minute without pain or restriction.      Electronically signed by:  Heather Colbert PT

## 2019-08-14 NOTE — FLOWSHEET NOTE
to strengthening, flexibility, endurance, ROM for improvements in LE, proximal hip, and core control with self care, mobility, lifting, ambulation. [x] (27656) Provided verbal/tactile cueing for activities related to improving balance, coordination, kinesthetic sense, posture, motor skill, proprioception  to assist with LE, proximal hip, and core control in self care, mobility, lifting, ambulation and eccentric single leg control. Home Exercise Program:    [x] (59286) Reviewed/Progressed HEP activities related to strengthening, flexibility, endurance, ROM of core, proximal hip and LE for functional self-care, mobility, lifting and ambulation/stair navigation   [x] (27327)Reviewed/Progressed HEP activities related to improving balance, coordination, kinesthetic sense, posture, motor skill, proprioception of core, proximal hip and LE for self care, mobility, lifting, and ambulation/stair navigation      Manual Treatments:  PROM / STM / Oscillations-Mobs:  G-I, II, III, IV (PA's, Inf., Post.)  [x] (96489) Provided manual therapy to mobilize LE, proximal hip and/or LS spine soft tissue/joints for the purpose of modulating pain, promoting relaxation,  increasing ROM, reducing/eliminating soft tissue swelling/inflammation/restriction, improving soft tissue extensibility and allowing for proper ROM for normal function with self care, mobility, lifting and ambulation. Modalities: n/a      Charges:  Timed Code Treatment Minutes: 30   Total Treatment Minutes:    Start Time (BWC)  Stop TIme (BWC)  Procedures (BWC) 60    n/a  n/a  n/a     [x] EVAL (LOW) 84568 (typically 20 minutes face-to-face)  [] EVAL (MOD) 93463 (typically 30 minutes face-to-face)  [] EVAL (HIGH) 30600 (typically 45 minutes face-to-face)  [] RE-EVAL     [x] PS(94537) x  2   [] Ionto  [] NMR (36301) x      [] ES (unattended)  [] Manual (42959) x       [] Mech Traction  [] TA: x (52975)         [] Other:      GOALS:  Short Term Goals:  To be achieved in: 2 weeks  1. Independent in HEP and progression per patient tolerance, in order to prevent re-injury. 2. Patient will have a decrease in pain to facilitate improvement in movement, function, and ADLs as indicated by Functional Deficits. 3. Patient will tolerate sitting x 10 minutes without increased pain to progress toward improving sitting tolerance.     Long Term Goals: To be achieved in: 8 weeks  1. Disability index score of 10% or less for the LEFS to assist with reaching prior level of function. 2. Patient will demonstrate increased AROM to equal the opposite side bilaterally to allow for proper joint functioning as indicated by patients Functional Deficits. 3. Patient will demonstrate an increase in strength to 5/5 to allow for proper functional mobility as indicated by patients Functional Deficits. 4. Patient will return to all transfers, work activities, and functional activities without increased symptoms or restriction. 5. Patient will have 0/10 pain with ADL's.  6. Patient stated goal: Patient will return to walking program (>3 miles per day) and be able to tolerate sitting x at least 60 minute without pain or restriction. (cut and paste from eval)    Goals that are underlined signify the goal has been accomplished. Progression Towards Functional goals:  [] Patient is progressing as expected towards functional goals listed. [] Progression is slowed due to complexities listed. [] Progression has been slowed due to co-morbidities.   [x] Plan just implemented, too soon to assess goals progression  [] Other:     ASSESSMENT:  See eval    Treatment/Activity Tolerance:   [x] Patient tolerated treatment well [] Patient limited by fatique  [] Patient limited by pain  [] Patient limited by other medical complications  [] Other:     Prognosis: [] Good [] Fair  [] Poor    Patient Requires Follow-up: [x] Yes  [] No    PLAN: See eval  [] Continue per plan of care [] Alter current plan (see comments)  [x] Plan of care initiated [] Hold pending MD visit [] Discharge    Electronically signed by: Dufm Brunner, PT

## 2019-08-20 ENCOUNTER — HOSPITAL ENCOUNTER (OUTPATIENT)
Dept: PHYSICAL THERAPY | Age: 69
Setting detail: THERAPIES SERIES
Discharge: HOME | End: 2019-08-20
Payer: MEDICARE

## 2019-08-20 PROCEDURE — 97140 MANUAL THERAPY 1/> REGIONS: CPT | Performed by: PHYSICAL THERAPY ASSISTANT

## 2019-08-20 PROCEDURE — 97110 THERAPEUTIC EXERCISES: CPT | Performed by: PHYSICAL THERAPY ASSISTANT

## 2019-08-20 PROCEDURE — 97112 NEUROMUSCULAR REEDUCATION: CPT | Performed by: PHYSICAL THERAPY ASSISTANT

## 2019-08-20 NOTE — FLOWSHEET NOTE
Yes  [] No    PLAN: See eval  [x] Continue per plan of care [] Alter current plan (see comments)  [] Plan of care initiated [] Hold pending MD visit [] Discharge    Electronically signed by: James Washington PTA

## 2019-08-22 ENCOUNTER — HOSPITAL ENCOUNTER (OUTPATIENT)
Dept: PHYSICAL THERAPY | Age: 69
Setting detail: THERAPIES SERIES
Discharge: HOME | End: 2019-08-22
Payer: MEDICARE

## 2019-08-22 ENCOUNTER — TELEPHONE (OUTPATIENT)
Dept: FAMILY MEDICINE CLINIC | Age: 69
End: 2019-08-22

## 2019-08-22 PROCEDURE — 97112 NEUROMUSCULAR REEDUCATION: CPT | Performed by: PHYSICAL THERAPY ASSISTANT

## 2019-08-22 PROCEDURE — 97140 MANUAL THERAPY 1/> REGIONS: CPT | Performed by: PHYSICAL THERAPY ASSISTANT

## 2019-08-22 PROCEDURE — 97110 THERAPEUTIC EXERCISES: CPT | Performed by: PHYSICAL THERAPY ASSISTANT

## 2019-08-22 NOTE — FLOWSHEET NOTE
[address] [place] [address] [facility]    Physical Therapy Daily Treatment Note  Date:  2019    Patient Name:  Kimberly Franks    :  [age]  MRN: [id number]  Medical/Treatment Diagnosis Information:  Diagnosis: s/p superior pubic ramus fx 19  Treatment Diagnosis: S32.519D, M25.552, L83.476    Insurance/Certification information:   PT Insurance Information: Medicare  Physician Information:    Dr. Moises Helm of care signed (Y/N):     Date of Patient follow up with Physician:     G-Code (if applicable):      Date G-Code Applied:  2019       Progress Note: [x]  Yes  []  No      Latex Allergy:  [x]NO      []YES  Preferred Language for Healthcare:   [x]English       []other:    Visit # Insurance Allowable   3 Med Nec     Pain level:  5/10     SUBJECTIVE:  Was sore but overall felt better after last visit.    OBJECTIVE: See eval  Observation:   Test measurements:    Patient educated on following: WBAT - NOT pushing through pain, especially with walking; importance of walker (FWW if possible) at all times    RESTRICTIONS/PRECAUTIONS: WBAT; h/o lymphoma and blood clot in LLE (patient wants to be as active as possible)    Exercises/Interventions:   Therapeutic Ex Wt/Sets/Reps/Hold Notes   Bike     Eliptical                                    Sitting LAQ / ABD/ HS Curl/ ADD / Glut squeezes  X20 R, L / x20 5\"  Greem/ x20 R, L Green/ x20 5\" hold / 5\" x20          Standing HS Curl / Hip ABD / Merian Abbott  2x10 / 2x10 / x20    HR  x20              Supine trunk rotation x20                             Manual     Gr I-II mobs for tissue reactivity STM 15' ITBand, glut    PROM-all planes     GR III-IV mobs for arthrokinematics     Lumbar/long axis distraction     Thoracic PA mobs     PNF for strengthening     PNF for agonist/antagonist inhibition          Pt education/reminders 10' See above notes; HEP     Therapeutic Exercise and NMR EXR  [x] (97312) Provided verbal/tactile cueing

## 2019-08-23 ENCOUNTER — OFFICE VISIT (OUTPATIENT)
Dept: FAMILY MEDICINE CLINIC | Age: 69
End: 2019-08-23
Payer: MEDICARE

## 2019-08-23 VITALS
WEIGHT: 235 LBS | SYSTOLIC BLOOD PRESSURE: 136 MMHG | HEIGHT: 62 IN | OXYGEN SATURATION: 97 % | HEART RATE: 90 BPM | BODY MASS INDEX: 43.24 KG/M2 | TEMPERATURE: 98.1 F | DIASTOLIC BLOOD PRESSURE: 83 MMHG

## 2019-08-23 DIAGNOSIS — H65.193 ACUTE MEE (MIDDLE EAR EFFUSION), BILATERAL: ICD-10-CM

## 2019-08-23 DIAGNOSIS — R11.0 NAUSEA: ICD-10-CM

## 2019-08-23 DIAGNOSIS — R42 VERTIGO: Primary | ICD-10-CM

## 2019-08-23 PROCEDURE — 1123F ACP DISCUSS/DSCN MKR DOCD: CPT | Performed by: NURSE PRACTITIONER

## 2019-08-23 PROCEDURE — 1036F TOBACCO NON-USER: CPT | Performed by: NURSE PRACTITIONER

## 2019-08-23 PROCEDURE — G8417 CALC BMI ABV UP PARAM F/U: HCPCS | Performed by: NURSE PRACTITIONER

## 2019-08-23 PROCEDURE — 99214 OFFICE O/P EST MOD 30 MIN: CPT | Performed by: NURSE PRACTITIONER

## 2019-08-23 PROCEDURE — G8427 DOCREV CUR MEDS BY ELIG CLIN: HCPCS | Performed by: NURSE PRACTITIONER

## 2019-08-23 PROCEDURE — 4040F PNEUMOC VAC/ADMIN/RCVD: CPT | Performed by: NURSE PRACTITIONER

## 2019-08-23 PROCEDURE — G8399 PT W/DXA RESULTS DOCUMENT: HCPCS | Performed by: NURSE PRACTITIONER

## 2019-08-23 PROCEDURE — 3017F COLORECTAL CA SCREEN DOC REV: CPT | Performed by: NURSE PRACTITIONER

## 2019-08-23 PROCEDURE — 1090F PRES/ABSN URINE INCON ASSESS: CPT | Performed by: NURSE PRACTITIONER

## 2019-08-23 RX ORDER — ONDANSETRON 4 MG/1
4 TABLET, ORALLY DISINTEGRATING ORAL 3 TIMES DAILY PRN
Qty: 30 TABLET | Refills: 0 | Status: SHIPPED | OUTPATIENT
Start: 2019-08-23 | End: 2020-02-11 | Stop reason: ALTCHOICE

## 2019-08-23 RX ORDER — FLUTICASONE PROPIONATE 50 MCG
1 SPRAY, SUSPENSION (ML) NASAL DAILY
Qty: 1 BOTTLE | Refills: 3 | Status: SHIPPED | OUTPATIENT
Start: 2019-08-23 | End: 2020-06-15

## 2019-08-23 RX ORDER — MECLIZINE HCL 12.5 MG/1
12.5 TABLET ORAL 3 TIMES DAILY PRN
Qty: 30 TABLET | Refills: 0 | Status: SHIPPED | OUTPATIENT
Start: 2019-08-23 | End: 2019-09-02

## 2019-08-23 ASSESSMENT — ENCOUNTER SYMPTOMS
BACK PAIN: 0
EYE PAIN: 0
CONSTIPATION: 0
CHANGE IN BOWEL HABIT: 0
ALLERGIC/IMMUNOLOGIC NEGATIVE: 1
CHEST TIGHTNESS: 0
SORE THROAT: 0
VISUAL CHANGE: 0
ABDOMINAL PAIN: 0
RHINORRHEA: 0
RECTAL PAIN: 0
ANAL BLEEDING: 0
ABDOMINAL DISTENTION: 0
EYE DISCHARGE: 0
STRIDOR: 0
TROUBLE SWALLOWING: 0
EYE REDNESS: 0
CHOKING: 0
WHEEZING: 0
BLOOD IN STOOL: 0
COUGH: 1
COLOR CHANGE: 0
VOMITING: 0
SWOLLEN GLANDS: 0
NAUSEA: 1
EYE ITCHING: 0
PHOTOPHOBIA: 0
SINUS PRESSURE: 0
SHORTNESS OF BREATH: 0
APNEA: 0
DIARRHEA: 0

## 2019-08-23 NOTE — PROGRESS NOTES
[facility]  [facility] MEDICINE  [address] [place] [address]  Dept: [phone]  Dept Fax: [phone]  Loc: [phone]    Marisa Ruiz is a 70 y.o. female who presents today for her medical conditions/complaints as noted below. Marisa Ruiz is c/o of Dizziness (pt feels she has dizzy spells and if she moves her head to fast she gets very dizzy and feels nauseated.)        HPI:     Chief Complaint   Patient presents with    Dizziness     pt feels she has dizzy spells and if she moves her head to fast she gets very dizzy and feels nauseated. Dizziness   This is a new problem. Episode onset: 3 days. The problem occurs 2 to 4 times per day. The problem has been gradually worsening. Associated symptoms include coughing (Chronic - every morning ) and nausea (With dizzy episodes ). Pertinent negatives include no abdominal pain, anorexia, arthralgias, change in bowel habit, chest pain, chills, congestion, diaphoresis, fatigue, fever, headaches, joint swelling, myalgias, neck pain, numbness, rash, sore throat, swollen glands, urinary symptoms, vertigo, visual change, vomiting or weakness. Associated symptoms comments: Started generic Lyrica about one week ago and is worried this may be causing it  . Nothing aggravates the symptoms. She has tried nothing for the symptoms. The treatment provided no relief.      Past Medical History:   Diagnosis Date    Atrial fibrillation ([facility]     Cancer (HCC)     lymphoma    CKD (chronic kidney disease)     Diabetes mellitus ([address]     DVT (deep venous thrombosis) (HCC)     Hyperlipidemia     Hypertension     Hypotension       Past Surgical History:   Procedure Laterality Date    COLONOSCOPY      COLONOSCOPY  05/23/2018    colon polyps,diverticulosis    LYMPH NODE BIOPSY      OTHER SURGICAL HISTORY      vocal cord bx       Family History   Problem Relation Age of Onset    Cancer Father     Cancer Sister         lung    Cancer guarding. No hernia. Musculoskeletal: Normal range of motion. She exhibits no edema, tenderness or deformity. Lymphadenopathy:     She has no cervical adenopathy. Neurological: She is alert and oriented to person, place, and time. She has normal reflexes. She is not disoriented. She displays normal reflexes. No cranial nerve deficit or sensory deficit. She exhibits normal muscle tone. Coordination normal. GCS eye subscore is 4. GCS verbal subscore is 5. GCS motor subscore is 6. Skin: Skin is warm and dry. Capillary refill takes less than 2 seconds. No rash noted. She is not diaphoretic. No erythema. No pallor. Psychiatric: She has a normal mood and affect. Her behavior is normal. Judgment and thought content normal.   Nursing note and vitals reviewed. Nurse Only on 08/07/2019   Component Date Value Ref Range Status    INR 08/07/2019 2.6   Final           Assessment & Plan: The following diagnoses and conditions are stable with no further orders unless indicated:  1. Vertigo    2. Acute ZACKARY (middle ear effusion), bilateral    3. Nausea        Lilli Juarez was seen today for dizziness. Bilateral nystagmus noted. She has dizziness from turning her head from right to left. She admits that she was told in the past that she does have benign positional vertigo. She states she did see ENT in the past for this and was unable to perform the exercises that they made her feel nauseous. Recommend her taking Zofran every 8 hours while awake for the next 72 hours to prevent nausea performing the exercises. She may take the meclizine half hour prior to doing exercises up to 3 times daily. Low suspicion for her dizziness being related to using a generic Lyrica. She verbalizes understanding. She's follow-up in 10 days. Diagnoses and all orders for this visit:    Vertigo  -     meclizine (ANTIVERT) 12.5 MG tablet;  Take 1 tablet by mouth 3 times daily as needed for Dizziness or Nausea    Acute ZACKARY (middle ear differently: 1 tablet daily Yes Bhavana Jones MD   NOVOLIN R 100 UNIT/ML injection USE AS DIRECTED PER SLIDING SCALE UP TO 26 UNITS DAILY Yes Bhavana Jones MD   levothyroxine (SYNTHROID) 100 MCG tablet TAKE 1 TABLET BY MOUTH EVERY DAY Yes Bhavana Jones MD   BD VEO INSULIN SYR ULTRAFINE 31G X 15/64\" 0.3 ML MISC USE UP TO Becca Limerick Yes Bhavana Jones MD   magnesium oxide (MAG-OX) 400 MG tablet TAKE 1 TABLET BY MOUTH 3 TIMES DAILY. Yes DEMI Sommers - CNP        Orders Placed This Encounter   Medications    ondansetron (ZOFRAN-ODT) 4 MG disintegrating tablet     Sig: Take 1 tablet by mouth 3 times daily as needed for Nausea or Vomiting     Dispense:  30 tablet     Refill:  0    meclizine (ANTIVERT) 12.5 MG tablet     Sig: Take 1 tablet by mouth 3 times daily as needed for Dizziness or Nausea     Dispense:  30 tablet     Refill:  0    fluticasone (FLONASE) 50 MCG/ACT nasal spray     Si spray by Nasal route daily     Dispense:  1 Bottle     Refill:  3         Return in about 10 days (around 2019) for Vertigo follow up . Patient should call the office immediately with new or ongoing signs or symptoms or worsening, or proceedto the emergency room. No changes in past medical history, past surgical history, social history, or family history were noted during the patient encounter unless specifically listed above. All updates of past medicalhistory, past surgical history, social history, or family history were reviewed personally by me during the office visit. All problems listed in the assessment are stable unless noted otherwise. Medication profilereviewed personally by me during the office visit. Medication side effects and possible impairments from medications were discussed as applicable.     Call if pattern of symptoms change or persists for an extended time.     This document was prepared by a combination of typing and transcription

## 2019-09-10 RX ORDER — DILTIAZEM HYDROCHLORIDE 240 MG/1
240 CAPSULE, COATED, EXTENDED RELEASE ORAL DAILY
Qty: 90 CAPSULE | Refills: 1 | Status: SHIPPED | OUTPATIENT
Start: 2019-09-10 | End: 2020-02-19

## 2019-09-17 ENCOUNTER — NURSE ONLY (OUTPATIENT)
Dept: FAMILY MEDICINE CLINIC | Age: 69
End: 2019-09-17
Payer: MEDICARE

## 2019-09-17 ENCOUNTER — ANTI-COAG VISIT (OUTPATIENT)
Dept: FAMILY MEDICINE CLINIC | Age: 69
End: 2019-09-17

## 2019-09-17 DIAGNOSIS — I48.20 CHRONIC ATRIAL FIBRILLATION (HCC): ICD-10-CM

## 2019-09-17 DIAGNOSIS — Z23 NEED FOR INFLUENZA VACCINATION: Primary | ICD-10-CM

## 2019-09-17 LAB
INTERNATIONAL NORMALIZATION RATIO, POC: 2.6
PROTHROMBIN TIME, POC: NORMAL

## 2019-09-17 PROCEDURE — 90686 IIV4 VACC NO PRSV 0.5 ML IM: CPT | Performed by: FAMILY MEDICINE

## 2019-09-17 PROCEDURE — G0008 ADMIN INFLUENZA VIRUS VAC: HCPCS | Performed by: FAMILY MEDICINE

## 2019-09-17 PROCEDURE — 85610 PROTHROMBIN TIME: CPT | Performed by: FAMILY MEDICINE

## 2019-09-23 ENCOUNTER — NURSE ONLY (OUTPATIENT)
Dept: FAMILY MEDICINE CLINIC | Age: 69
End: 2019-09-23

## 2019-10-01 RX ORDER — ATORVASTATIN CALCIUM 40 MG/1
TABLET, FILM COATED ORAL
Qty: 90 TABLET | Refills: 1 | Status: SHIPPED | OUTPATIENT
Start: 2019-10-01 | End: 2020-03-24

## 2019-10-14 DIAGNOSIS — B02.29 POST HERPETIC NEURALGIA: ICD-10-CM

## 2019-10-14 RX ORDER — PREGABALIN 100 MG/1
CAPSULE ORAL
Qty: 90 CAPSULE | Refills: 2 | Status: SHIPPED | OUTPATIENT
Start: 2019-10-14 | End: 2020-03-25

## 2019-10-21 ENCOUNTER — NURSE ONLY (OUTPATIENT)
Dept: FAMILY MEDICINE CLINIC | Age: 69
End: 2019-10-21
Payer: MEDICARE

## 2019-10-21 ENCOUNTER — ANTI-COAG VISIT (OUTPATIENT)
Dept: FAMILY MEDICINE CLINIC | Age: 69
End: 2019-10-21

## 2019-10-21 DIAGNOSIS — I48.20 CHRONIC ATRIAL FIBRILLATION (HCC): ICD-10-CM

## 2019-10-21 LAB
INTERNATIONAL NORMALIZATION RATIO, POC: 2.3
PROTHROMBIN TIME, POC: NORMAL

## 2019-10-21 PROCEDURE — 85610 PROTHROMBIN TIME: CPT | Performed by: FAMILY MEDICINE

## 2019-10-25 ENCOUNTER — TELEPHONE (OUTPATIENT)
Dept: FAMILY MEDICINE CLINIC | Age: 69
End: 2019-10-25

## 2019-10-25 DIAGNOSIS — E06.3 HYPOTHYROIDISM DUE TO HASHIMOTO'S THYROIDITIS: ICD-10-CM

## 2019-10-25 DIAGNOSIS — E03.8 HYPOTHYROIDISM DUE TO HASHIMOTO'S THYROIDITIS: ICD-10-CM

## 2019-10-25 DIAGNOSIS — E11.9 TYPE 2 DIABETES MELLITUS WITHOUT COMPLICATION, WITHOUT LONG-TERM CURRENT USE OF INSULIN (HCC): ICD-10-CM

## 2019-10-30 ENCOUNTER — HOSPITAL ENCOUNTER (OUTPATIENT)
Dept: MAMMOGRAPHY | Age: 69
Discharge: HOME | End: 2019-10-30
Payer: MEDICARE

## 2019-10-30 DIAGNOSIS — Z12.31 SCREENING MAMMOGRAM, ENCOUNTER FOR: ICD-10-CM

## 2019-10-30 PROCEDURE — 77067 SCR MAMMO BI INCL CAD: CPT

## 2019-11-05 ENCOUNTER — NURSE ONLY (OUTPATIENT)
Dept: FAMILY MEDICINE CLINIC | Age: 69
End: 2019-11-05
Payer: MEDICARE

## 2019-11-05 DIAGNOSIS — Z79.4 INSULIN-REQUIRING OR DEPENDENT TYPE II DIABETES MELLITUS (HCC): ICD-10-CM

## 2019-11-05 DIAGNOSIS — I10 BENIGN ESSENTIAL HTN: Primary | ICD-10-CM

## 2019-11-05 DIAGNOSIS — E11.9 TYPE 2 DIABETES MELLITUS WITHOUT COMPLICATION, WITHOUT LONG-TERM CURRENT USE OF INSULIN (HCC): ICD-10-CM

## 2019-11-05 DIAGNOSIS — E11.9 INSULIN-REQUIRING OR DEPENDENT TYPE II DIABETES MELLITUS (HCC): ICD-10-CM

## 2019-11-05 DIAGNOSIS — E03.8 HYPOTHYROIDISM DUE TO HASHIMOTO'S THYROIDITIS: ICD-10-CM

## 2019-11-05 DIAGNOSIS — E06.3 HYPOTHYROIDISM DUE TO HASHIMOTO'S THYROIDITIS: ICD-10-CM

## 2019-11-05 LAB
A/G RATIO: 1.5 (ref 1.1–2.2)
ALBUMIN SERPL-MCNC: 4.1 G/DL (ref 3.4–5)
ALP BLD-CCNC: 78 U/L (ref 40–129)
ALT SERPL-CCNC: 18 U/L (ref 10–40)
ANION GAP SERPL CALCULATED.3IONS-SCNC: 16 MMOL/L (ref 3–16)
AST SERPL-CCNC: 21 U/L (ref 15–37)
BASOPHILS ABSOLUTE: 0.1 K/UL (ref 0–0.2)
BASOPHILS RELATIVE PERCENT: 1 %
BILIRUB SERPL-MCNC: 0.5 MG/DL (ref 0–1)
BUN BLDV-MCNC: 34 MG/DL (ref 7–20)
CALCIUM SERPL-MCNC: 9.4 MG/DL (ref 8.3–10.6)
CHLORIDE BLD-SCNC: 99 MMOL/L (ref 99–110)
CHOLESTEROL, FASTING: 189 MG/DL (ref 0–199)
CO2: 26 MMOL/L (ref 21–32)
CREAT SERPL-MCNC: 1.5 MG/DL (ref 0.6–1.2)
EOSINOPHILS ABSOLUTE: 0.1 K/UL (ref 0–0.6)
EOSINOPHILS RELATIVE PERCENT: 1.1 %
GFR AFRICAN AMERICAN: 42
GFR NON-AFRICAN AMERICAN: 34
GLOBULIN: 2.7 G/DL
GLUCOSE BLD-MCNC: 155 MG/DL (ref 70–99)
HCT VFR BLD CALC: 51.6 % (ref 36–48)
HDLC SERPL-MCNC: 48 MG/DL (ref 40–60)
HEMOGLOBIN: 16.8 G/DL (ref 12–16)
LDL CHOLESTEROL CALCULATED: 99 MG/DL
LYMPHOCYTES ABSOLUTE: 2.5 K/UL (ref 1–5.1)
LYMPHOCYTES RELATIVE PERCENT: 33.8 %
MCH RBC QN AUTO: 30 PG (ref 26–34)
MCHC RBC AUTO-ENTMCNC: 32.6 G/DL (ref 31–36)
MCV RBC AUTO: 92.1 FL (ref 80–100)
MONOCYTES ABSOLUTE: 0.6 K/UL (ref 0–1.3)
MONOCYTES RELATIVE PERCENT: 8.8 %
NEUTROPHILS ABSOLUTE: 4.1 K/UL (ref 1.7–7.7)
NEUTROPHILS RELATIVE PERCENT: 55.3 %
PDW BLD-RTO: 15.2 % (ref 12.4–15.4)
PLATELET # BLD: 211 K/UL (ref 135–450)
PMV BLD AUTO: 8.8 FL (ref 5–10.5)
POTASSIUM SERPL-SCNC: 4.6 MMOL/L (ref 3.5–5.1)
RBC # BLD: 5.6 M/UL (ref 4–5.2)
SODIUM BLD-SCNC: 141 MMOL/L (ref 136–145)
T4 FREE: 1.5 NG/DL (ref 0.9–1.8)
TOTAL PROTEIN: 6.8 G/DL (ref 6.4–8.2)
TRIGLYCERIDE, FASTING: 208 MG/DL (ref 0–150)
TSH SERPL DL<=0.05 MIU/L-ACNC: 2.04 UIU/ML (ref 0.27–4.2)
VLDLC SERPL CALC-MCNC: 42 MG/DL
WBC # BLD: 7.3 K/UL (ref 4–11)

## 2019-11-05 PROCEDURE — 36415 COLL VENOUS BLD VENIPUNCTURE: CPT | Performed by: FAMILY MEDICINE

## 2019-11-06 LAB
ESTIMATED AVERAGE GLUCOSE: 188.6 MG/DL
HBA1C MFR BLD: 8.2 %

## 2019-11-08 ENCOUNTER — OFFICE VISIT (OUTPATIENT)
Dept: FAMILY MEDICINE CLINIC | Age: 69
End: 2019-11-08
Payer: MEDICARE

## 2019-11-08 VITALS
HEART RATE: 58 BPM | HEIGHT: 62 IN | SYSTOLIC BLOOD PRESSURE: 122 MMHG | WEIGHT: 238 LBS | BODY MASS INDEX: 43.79 KG/M2 | OXYGEN SATURATION: 96 % | DIASTOLIC BLOOD PRESSURE: 86 MMHG

## 2019-11-08 DIAGNOSIS — E03.8 HYPOTHYROIDISM DUE TO HASHIMOTO'S THYROIDITIS: ICD-10-CM

## 2019-11-08 DIAGNOSIS — E11.9 INSULIN-REQUIRING OR DEPENDENT TYPE II DIABETES MELLITUS (HCC): ICD-10-CM

## 2019-11-08 DIAGNOSIS — Z79.4 INSULIN-REQUIRING OR DEPENDENT TYPE II DIABETES MELLITUS (HCC): ICD-10-CM

## 2019-11-08 DIAGNOSIS — K21.9 GASTROESOPHAGEAL REFLUX DISEASE WITHOUT ESOPHAGITIS: ICD-10-CM

## 2019-11-08 DIAGNOSIS — E06.3 HYPOTHYROIDISM DUE TO HASHIMOTO'S THYROIDITIS: ICD-10-CM

## 2019-11-08 DIAGNOSIS — S46.811A TRAPEZIUS STRAIN, RIGHT, INITIAL ENCOUNTER: ICD-10-CM

## 2019-11-08 DIAGNOSIS — E11.41 DIABETIC MONONEUROPATHY ASSOCIATED WITH TYPE 2 DIABETES MELLITUS (HCC): Primary | ICD-10-CM

## 2019-11-08 DIAGNOSIS — I10 BENIGN ESSENTIAL HTN: ICD-10-CM

## 2019-11-08 DIAGNOSIS — N18.4 CKD STAGE 4 DUE TO TYPE 2 DIABETES MELLITUS (HCC): ICD-10-CM

## 2019-11-08 DIAGNOSIS — E11.22 CKD STAGE 4 DUE TO TYPE 2 DIABETES MELLITUS (HCC): ICD-10-CM

## 2019-11-08 PROCEDURE — 1090F PRES/ABSN URINE INCON ASSESS: CPT | Performed by: FAMILY MEDICINE

## 2019-11-08 PROCEDURE — 1036F TOBACCO NON-USER: CPT | Performed by: FAMILY MEDICINE

## 2019-11-08 PROCEDURE — 2022F DILAT RTA XM EVC RTNOPTHY: CPT | Performed by: FAMILY MEDICINE

## 2019-11-08 PROCEDURE — G8417 CALC BMI ABV UP PARAM F/U: HCPCS | Performed by: FAMILY MEDICINE

## 2019-11-08 PROCEDURE — G8399 PT W/DXA RESULTS DOCUMENT: HCPCS | Performed by: FAMILY MEDICINE

## 2019-11-08 PROCEDURE — 99214 OFFICE O/P EST MOD 30 MIN: CPT | Performed by: FAMILY MEDICINE

## 2019-11-08 PROCEDURE — 4040F PNEUMOC VAC/ADMIN/RCVD: CPT | Performed by: FAMILY MEDICINE

## 2019-11-08 PROCEDURE — G8482 FLU IMMUNIZE ORDER/ADMIN: HCPCS | Performed by: FAMILY MEDICINE

## 2019-11-08 PROCEDURE — G8427 DOCREV CUR MEDS BY ELIG CLIN: HCPCS | Performed by: FAMILY MEDICINE

## 2019-11-08 PROCEDURE — 3017F COLORECTAL CA SCREEN DOC REV: CPT | Performed by: FAMILY MEDICINE

## 2019-11-08 PROCEDURE — 1123F ACP DISCUSS/DSCN MKR DOCD: CPT | Performed by: FAMILY MEDICINE

## 2019-11-11 RX ORDER — INSULIN DETEMIR 100 [IU]/ML
INJECTION, SOLUTION SUBCUTANEOUS
Qty: 54 PEN | Refills: 1 | Status: SHIPPED | OUTPATIENT
Start: 2019-11-11 | End: 2019-12-03

## 2019-11-12 ENCOUNTER — OFFICE VISIT (OUTPATIENT)
Dept: FAMILY MEDICINE CLINIC | Age: 69
End: 2019-11-12
Payer: MEDICARE

## 2019-11-12 VITALS
BODY MASS INDEX: 42.17 KG/M2 | HEIGHT: 63 IN | OXYGEN SATURATION: 98 % | WEIGHT: 238 LBS | SYSTOLIC BLOOD PRESSURE: 130 MMHG | RESPIRATION RATE: 14 BRPM | DIASTOLIC BLOOD PRESSURE: 70 MMHG | HEART RATE: 78 BPM

## 2019-11-12 DIAGNOSIS — Z00.00 ROUTINE GENERAL MEDICAL EXAMINATION AT A HEALTH CARE FACILITY: Primary | ICD-10-CM

## 2019-11-12 PROCEDURE — G0439 PPPS, SUBSEQ VISIT: HCPCS | Performed by: FAMILY MEDICINE

## 2019-11-12 PROCEDURE — 4040F PNEUMOC VAC/ADMIN/RCVD: CPT | Performed by: FAMILY MEDICINE

## 2019-11-12 PROCEDURE — G8482 FLU IMMUNIZE ORDER/ADMIN: HCPCS | Performed by: FAMILY MEDICINE

## 2019-11-12 PROCEDURE — 3017F COLORECTAL CA SCREEN DOC REV: CPT | Performed by: FAMILY MEDICINE

## 2019-11-12 PROCEDURE — 1123F ACP DISCUSS/DSCN MKR DOCD: CPT | Performed by: FAMILY MEDICINE

## 2019-11-12 ASSESSMENT — PATIENT HEALTH QUESTIONNAIRE - PHQ9
SUM OF ALL RESPONSES TO PHQ QUESTIONS 1-9: 0
SUM OF ALL RESPONSES TO PHQ QUESTIONS 1-9: 0

## 2019-11-12 ASSESSMENT — LIFESTYLE VARIABLES: HOW OFTEN DO YOU HAVE A DRINK CONTAINING ALCOHOL: 0

## 2019-11-13 LAB
6-ACETYLMORPHINE: NOT DETECTED
7-AMINOCLONAZEPAM: NOT DETECTED
ALPHA-OH-ALPRAZOLAM: NOT DETECTED
ALPRAZOLAM: NOT DETECTED
AMPHETAMINE: NOT DETECTED
BARBITURATES: NOT DETECTED
BENZOYLECGONINE: NOT DETECTED
BUPRENORPHINE: NOT DETECTED
CARISOPRODOL: NOT DETECTED
CLONAZEPAM: NOT DETECTED
CODEINE: NOT DETECTED
CREATININE URINE: <20 MG/DL (ref 20–400)
DIAZEPAM: NOT DETECTED
DRUGS EXPECTED: ABNORMAL
EER PAIN MGT DRUG PANEL, HIGH RES/EMIT U: ABNORMAL
ETHYL GLUCURONIDE: NOT DETECTED
FENTANYL: NOT DETECTED
HYDROCODONE: NOT DETECTED
HYDROMORPHONE: NOT DETECTED
LORAZEPAM: NOT DETECTED
MARIJUANA METABOLITE: NOT DETECTED
MDA: NOT DETECTED
MDEA: NOT DETECTED
MDMA URINE: NOT DETECTED
MEPERIDINE: NOT DETECTED
METHADONE: NOT DETECTED
METHAMPHETAMINE: NOT DETECTED
METHYLPHENIDATE: NOT DETECTED
MIDAZOLAM: NOT DETECTED
MORPHINE: NOT DETECTED
NORBUPRENORPHINE, FREE: NOT DETECTED
NORDIAZEPAM: NOT DETECTED
NORFENTANYL: NOT DETECTED
NORHYDROCODONE, URINE: NOT DETECTED
NOROXYCODONE: NOT DETECTED
NOROXYMORPHONE, URINE: NOT DETECTED
OXAZEPAM: NOT DETECTED
OXYCODONE: NOT DETECTED
OXYMORPHONE: NOT DETECTED
PAIN MANAGEMENT DRUG PANEL: ABNORMAL
PAIN MANAGEMENT DRUG PANEL: ABNORMAL
PCP: NOT DETECTED
PHENTERMINE: NOT DETECTED
PROPOXYPHENE: NOT DETECTED
TAPENTADOL, URINE: NOT DETECTED
TAPENTADOL-O-SULFATE, URINE: NOT DETECTED
TEMAZEPAM: NOT DETECTED
TRAMADOL: NOT DETECTED
ZOLPIDEM: NOT DETECTED

## 2019-11-20 ENCOUNTER — ANTI-COAG VISIT (OUTPATIENT)
Dept: FAMILY MEDICINE CLINIC | Age: 69
End: 2019-11-20

## 2019-11-20 ENCOUNTER — NURSE ONLY (OUTPATIENT)
Dept: FAMILY MEDICINE CLINIC | Age: 69
End: 2019-11-20
Payer: MEDICARE

## 2019-11-20 DIAGNOSIS — I48.20 CHRONIC ATRIAL FIBRILLATION (HCC): ICD-10-CM

## 2019-11-20 LAB
INTERNATIONAL NORMALIZATION RATIO, POC: 2.5
PROTHROMBIN TIME, POC: NORMAL

## 2019-11-20 PROCEDURE — 85610 PROTHROMBIN TIME: CPT | Performed by: FAMILY MEDICINE

## 2019-12-02 ENCOUNTER — OFFICE VISIT (OUTPATIENT)
Dept: FAMILY MEDICINE CLINIC | Age: 69
End: 2019-12-02
Payer: MEDICARE

## 2019-12-02 VITALS
OXYGEN SATURATION: 95 % | HEIGHT: 63 IN | WEIGHT: 231 LBS | SYSTOLIC BLOOD PRESSURE: 121 MMHG | HEART RATE: 77 BPM | BODY MASS INDEX: 40.93 KG/M2 | DIASTOLIC BLOOD PRESSURE: 84 MMHG

## 2019-12-02 DIAGNOSIS — M79.672 LEFT FOOT PAIN: ICD-10-CM

## 2019-12-02 DIAGNOSIS — R22.42 LOCALIZED SWELLING OF LEFT FOOT: ICD-10-CM

## 2019-12-02 DIAGNOSIS — M10.072 ACUTE IDIOPATHIC GOUT OF LEFT FOOT: Primary | ICD-10-CM

## 2019-12-02 PROCEDURE — 4040F PNEUMOC VAC/ADMIN/RCVD: CPT | Performed by: NURSE PRACTITIONER

## 2019-12-02 PROCEDURE — G8399 PT W/DXA RESULTS DOCUMENT: HCPCS | Performed by: NURSE PRACTITIONER

## 2019-12-02 PROCEDURE — 36415 COLL VENOUS BLD VENIPUNCTURE: CPT | Performed by: NURSE PRACTITIONER

## 2019-12-02 PROCEDURE — G8482 FLU IMMUNIZE ORDER/ADMIN: HCPCS | Performed by: NURSE PRACTITIONER

## 2019-12-02 PROCEDURE — G8427 DOCREV CUR MEDS BY ELIG CLIN: HCPCS | Performed by: NURSE PRACTITIONER

## 2019-12-02 PROCEDURE — 3017F COLORECTAL CA SCREEN DOC REV: CPT | Performed by: NURSE PRACTITIONER

## 2019-12-02 PROCEDURE — 99214 OFFICE O/P EST MOD 30 MIN: CPT | Performed by: NURSE PRACTITIONER

## 2019-12-02 PROCEDURE — G8417 CALC BMI ABV UP PARAM F/U: HCPCS | Performed by: NURSE PRACTITIONER

## 2019-12-02 PROCEDURE — 1036F TOBACCO NON-USER: CPT | Performed by: NURSE PRACTITIONER

## 2019-12-02 PROCEDURE — 1090F PRES/ABSN URINE INCON ASSESS: CPT | Performed by: NURSE PRACTITIONER

## 2019-12-02 PROCEDURE — 1123F ACP DISCUSS/DSCN MKR DOCD: CPT | Performed by: NURSE PRACTITIONER

## 2019-12-02 RX ORDER — PREDNISONE 20 MG/1
40 TABLET ORAL DAILY
Qty: 10 TABLET | Refills: 0 | Status: SHIPPED | OUTPATIENT
Start: 2019-12-02 | End: 2019-12-07

## 2019-12-02 ASSESSMENT — ENCOUNTER SYMPTOMS
RESPIRATORY NEGATIVE: 1
BACK PAIN: 0

## 2019-12-03 ENCOUNTER — TELEPHONE (OUTPATIENT)
Dept: FAMILY MEDICINE CLINIC | Age: 69
End: 2019-12-03

## 2019-12-03 PROBLEM — M10.9 ACUTE GOUT INVOLVING TOE OF LEFT FOOT: Status: ACTIVE | Noted: 2019-12-03

## 2019-12-03 LAB — URIC ACID, SERUM: 9.6 MG/DL (ref 2.6–6)

## 2019-12-11 RX ORDER — SYRING-NEEDL,DISP,INSUL,0.3 ML 31GX15/64"
SYRINGE, EMPTY DISPOSABLE MISCELLANEOUS
Qty: 100 EACH | Refills: 11 | Status: SHIPPED | OUTPATIENT
Start: 2019-12-11 | End: 2020-12-21

## 2019-12-16 ENCOUNTER — NURSE ONLY (OUTPATIENT)
Dept: FAMILY MEDICINE CLINIC | Age: 69
End: 2019-12-16
Payer: MEDICARE

## 2019-12-16 ENCOUNTER — ANTI-COAG VISIT (OUTPATIENT)
Dept: FAMILY MEDICINE CLINIC | Age: 69
End: 2019-12-16

## 2019-12-16 DIAGNOSIS — I48.20 CHRONIC ATRIAL FIBRILLATION (HCC): ICD-10-CM

## 2019-12-16 LAB
INTERNATIONAL NORMALIZATION RATIO, POC: 2.5
PROTHROMBIN TIME, POC: NORMAL

## 2019-12-16 PROCEDURE — 85610 PROTHROMBIN TIME: CPT | Performed by: FAMILY MEDICINE

## 2019-12-16 RX ORDER — PENTOXIFYLLINE 400 MG/1
TABLET, EXTENDED RELEASE ORAL
Qty: 90 TABLET | Refills: 0 | Status: SHIPPED | OUTPATIENT
Start: 2019-12-16 | End: 2020-03-09

## 2019-12-17 ENCOUNTER — TELEPHONE (OUTPATIENT)
Dept: FAMILY MEDICINE CLINIC | Age: 69
End: 2019-12-17

## 2019-12-17 DIAGNOSIS — E11.9 INSULIN-REQUIRING OR DEPENDENT TYPE II DIABETES MELLITUS (HCC): ICD-10-CM

## 2019-12-17 DIAGNOSIS — E11.22 CKD STAGE 4 DUE TO TYPE 2 DIABETES MELLITUS (HCC): ICD-10-CM

## 2019-12-17 DIAGNOSIS — Z79.4 INSULIN-REQUIRING OR DEPENDENT TYPE II DIABETES MELLITUS (HCC): ICD-10-CM

## 2019-12-17 DIAGNOSIS — N18.4 CKD STAGE 4 DUE TO TYPE 2 DIABETES MELLITUS (HCC): ICD-10-CM

## 2019-12-30 ENCOUNTER — TELEPHONE (OUTPATIENT)
Dept: FAMILY MEDICINE CLINIC | Age: 69
End: 2019-12-30

## 2019-12-31 ENCOUNTER — TELEPHONE (OUTPATIENT)
Dept: FAMILY MEDICINE CLINIC | Age: 69
End: 2019-12-31

## 2019-12-31 DIAGNOSIS — Z79.4 INSULIN-REQUIRING OR DEPENDENT TYPE II DIABETES MELLITUS (HCC): ICD-10-CM

## 2019-12-31 DIAGNOSIS — E11.9 INSULIN-REQUIRING OR DEPENDENT TYPE II DIABETES MELLITUS (HCC): ICD-10-CM

## 2019-12-31 DIAGNOSIS — N18.4 CKD STAGE 4 DUE TO TYPE 2 DIABETES MELLITUS (HCC): ICD-10-CM

## 2019-12-31 DIAGNOSIS — E11.22 CKD STAGE 4 DUE TO TYPE 2 DIABETES MELLITUS (HCC): ICD-10-CM

## 2020-01-16 ENCOUNTER — ANTI-COAG VISIT (OUTPATIENT)
Dept: FAMILY MEDICINE CLINIC | Age: 70
End: 2020-01-16

## 2020-01-16 ENCOUNTER — NURSE ONLY (OUTPATIENT)
Dept: FAMILY MEDICINE CLINIC | Age: 70
End: 2020-01-16
Payer: MEDICARE

## 2020-01-16 LAB
INTERNATIONAL NORMALIZATION RATIO, POC: 2.3
PROTHROMBIN TIME, POC: NORMAL

## 2020-01-16 PROCEDURE — 85610 PROTHROMBIN TIME: CPT | Performed by: FAMILY MEDICINE

## 2020-01-16 NOTE — PROGRESS NOTES
INR stable. No change in Warfarin. Recheck in one month like she has been.   May be able to get INR checked at her follow up with Dr. Lam Vazquez

## 2020-01-22 RX ORDER — LEVOTHYROXINE SODIUM 0.1 MG/1
TABLET ORAL
Qty: 90 TABLET | Refills: 3 | Status: SHIPPED | OUTPATIENT
Start: 2020-01-22 | End: 2020-10-28

## 2020-02-11 ENCOUNTER — ANTI-COAG VISIT (OUTPATIENT)
Dept: FAMILY MEDICINE CLINIC | Age: 70
End: 2020-02-11

## 2020-02-11 ENCOUNTER — OFFICE VISIT (OUTPATIENT)
Dept: FAMILY MEDICINE CLINIC | Age: 70
End: 2020-02-11
Payer: MEDICARE

## 2020-02-11 VITALS
OXYGEN SATURATION: 96 % | HEIGHT: 63 IN | SYSTOLIC BLOOD PRESSURE: 122 MMHG | HEART RATE: 62 BPM | WEIGHT: 230 LBS | DIASTOLIC BLOOD PRESSURE: 72 MMHG | BODY MASS INDEX: 40.75 KG/M2

## 2020-02-11 LAB
HBA1C MFR BLD: 6.4 %
INTERNATIONAL NORMALIZATION RATIO, POC: 2.2
PROTHROMBIN TIME, POC: NORMAL

## 2020-02-11 PROCEDURE — G8399 PT W/DXA RESULTS DOCUMENT: HCPCS | Performed by: FAMILY MEDICINE

## 2020-02-11 PROCEDURE — 99214 OFFICE O/P EST MOD 30 MIN: CPT | Performed by: FAMILY MEDICINE

## 2020-02-11 PROCEDURE — 1090F PRES/ABSN URINE INCON ASSESS: CPT | Performed by: FAMILY MEDICINE

## 2020-02-11 PROCEDURE — 83036 HEMOGLOBIN GLYCOSYLATED A1C: CPT | Performed by: FAMILY MEDICINE

## 2020-02-11 PROCEDURE — 1036F TOBACCO NON-USER: CPT | Performed by: FAMILY MEDICINE

## 2020-02-11 PROCEDURE — 1123F ACP DISCUSS/DSCN MKR DOCD: CPT | Performed by: FAMILY MEDICINE

## 2020-02-11 PROCEDURE — 4040F PNEUMOC VAC/ADMIN/RCVD: CPT | Performed by: FAMILY MEDICINE

## 2020-02-11 PROCEDURE — 2022F DILAT RTA XM EVC RTNOPTHY: CPT | Performed by: FAMILY MEDICINE

## 2020-02-11 PROCEDURE — 3044F HG A1C LEVEL LT 7.0%: CPT | Performed by: FAMILY MEDICINE

## 2020-02-11 PROCEDURE — G8417 CALC BMI ABV UP PARAM F/U: HCPCS | Performed by: FAMILY MEDICINE

## 2020-02-11 PROCEDURE — G8427 DOCREV CUR MEDS BY ELIG CLIN: HCPCS | Performed by: FAMILY MEDICINE

## 2020-02-11 PROCEDURE — 3017F COLORECTAL CA SCREEN DOC REV: CPT | Performed by: FAMILY MEDICINE

## 2020-02-11 PROCEDURE — G8482 FLU IMMUNIZE ORDER/ADMIN: HCPCS | Performed by: FAMILY MEDICINE

## 2020-02-11 PROCEDURE — 85610 PROTHROMBIN TIME: CPT | Performed by: FAMILY MEDICINE

## 2020-02-11 ASSESSMENT — PATIENT HEALTH QUESTIONNAIRE - PHQ9
SUM OF ALL RESPONSES TO PHQ9 QUESTIONS 1 & 2: 0
2. FEELING DOWN, DEPRESSED OR HOPELESS: 0
1. LITTLE INTEREST OR PLEASURE IN DOING THINGS: 0
SUM OF ALL RESPONSES TO PHQ QUESTIONS 1-9: 0
SUM OF ALL RESPONSES TO PHQ QUESTIONS 1-9: 0

## 2020-02-11 NOTE — PROGRESS NOTES
2019    AST 21 2019     Lab Results   Component Value Date    CHOL 164 10/26/2018    TRIG 151 (H) 10/26/2018    HDL 48 2019    LDLCALC 99 2019          Patient's medications, allergies, past medical, surgical, social and family histories were reviewed and updated asappropriate on 2020 at 8:52 AM.    ROS:  Review of Systems    All other systems reviewed and are negative except as noted above on 2020 at 8:52 AM. Additional review of systems may be scanned into the media section ofthis medical record. Any responses requiring further intervention were pursued.     Hemoglobin A1C (%)   Date Value   2020 6.4     Microscopic Examination (no units)   Date Value   10/15/2015 YES     Microalbumin, Random Urine (mg/dL)   Date Value   2019 1.60     LDL Calculated (mg/dL)   Date Value   2019 99     Past Medical History:   Diagnosis Date    Atrial fibrillation (HCC)     Cancer (HCC)     lymphoma    CKD (chronic kidney disease)     Diabetes mellitus ([facility] [facility]     DVT (deep venous thrombosis) ([facility] [facility]     Gout 2019    Hyperlipidemia     Hypertension     Hypotension         Family History   Problem Relation Age of Onset    Cancer Father     Cancer Sister         lung    Cancer Brother         lymphoma    Heart Disease Mother      Social History     Socioeconomic History    Marital status: Single     Spouse name: Not on file    Number of children: Not on file    Years of education: Not on file    Highest education level: Not on file   Occupational History    Not on file   Social Needs    Financial resource strain: Not on file    Food insecurity:     Worry: Not on file     Inability: Not on file    Transportation needs:     Medical: Not on file     Non-medical: Not on file   Tobacco Use    Smoking status: Former Smoker     Packs/day: 1.00     Years: 35.00     Pack years: 35.00     Last attempt to quit:      Years since quittin.1    Smokeless tobacco: eye: No discharge. Left eye: No discharge. Pupils: Pupils are equal, round, and reactive to light. Neck:      Thyroid: No thyromegaly. Vascular: No JVD. Cardiovascular:      Rate and Rhythm: Normal rate and regular rhythm. Heart sounds: Normal heart sounds. Pulmonary:      Effort: Pulmonary effort is normal. No respiratory distress. Breath sounds: Normal breath sounds. Abdominal:      Palpations: Abdomen is soft. There is no hepatomegaly or splenomegaly. Tenderness: There is no abdominal tenderness. Skin:     General: Skin is warm and dry. Coloration: Skin is not pale. Findings: No erythema or rash. Comments: Turgor normal   Psychiatric:         Behavior: Behavior normal.         Thought Content: Thought content normal.         Judgment: Judgment normal.       Results for POC orders placed in visit on 02/11/20   POCT glycosylated hemoglobin (Hb A1C)   Result Value Ref Range    Hemoglobin A1C 6.4 %   POCT INR   Result Value Ref Range    INR 2.2     Protime                ASSESSMENT PLAN      Diagnosis Orders   1. Insulin-requiring or dependent type II diabetes mellitus ([address]  POCT glycosylated hemoglobin (Hb A1C)    insulin regular (NOVOLIN R) 100 UNIT/ML injection   2. Chronic atrial fibrillation  POCT INR   3. Long term current use of anticoagulant therapy     4. Benign essential HTN     5. Hypomagnesemia     6. Venous insufficiency     7. CKD stage 4 due to type 2 diabetes mellitus ([address]     8. Morbid obesity due to excess calories ([address]     9. Hypothyroidism due to Hashimoto's thyroiditis     Sugar control continues to improve dramatically. She has lost weight. No low sugar readings. Continue current treatment. Clinically in sinus rhythm. INR acceptable. Blood pressure acceptable. She has been told reduce her magnesium by cardiology down to 1 a day. Leg swelling at baseline. Continue to monitor her kidney function.   Congratulated her on the weight

## 2020-02-19 RX ORDER — DILTIAZEM HYDROCHLORIDE 240 MG/1
CAPSULE, EXTENDED RELEASE ORAL
Qty: 90 CAPSULE | Refills: 1 | Status: SHIPPED | OUTPATIENT
Start: 2020-02-19 | End: 2020-08-17

## 2020-03-02 RX ORDER — WARFARIN SODIUM 5 MG/1
TABLET ORAL
Qty: 90 TABLET | Refills: 3 | Status: SHIPPED | OUTPATIENT
Start: 2020-03-02 | End: 2020-06-16 | Stop reason: SDUPTHER

## 2020-03-09 RX ORDER — PENTOXIFYLLINE 400 MG/1
TABLET, EXTENDED RELEASE ORAL
Qty: 90 TABLET | Refills: 1 | Status: SHIPPED | OUTPATIENT
Start: 2020-03-09 | End: 2020-08-25

## 2020-03-12 ENCOUNTER — NURSE ONLY (OUTPATIENT)
Dept: FAMILY MEDICINE CLINIC | Age: 70
End: 2020-03-12
Payer: MEDICARE

## 2020-03-12 ENCOUNTER — ANTI-COAG VISIT (OUTPATIENT)
Dept: FAMILY MEDICINE CLINIC | Age: 70
End: 2020-03-12

## 2020-03-12 LAB
INTERNATIONAL NORMALIZATION RATIO, POC: 2.6
PROTHROMBIN TIME, POC: NORMAL

## 2020-03-12 PROCEDURE — 85610 PROTHROMBIN TIME: CPT | Performed by: FAMILY MEDICINE

## 2020-03-24 RX ORDER — ATORVASTATIN CALCIUM 40 MG/1
TABLET, FILM COATED ORAL
Qty: 90 TABLET | Refills: 1 | Status: SHIPPED | OUTPATIENT
Start: 2020-03-24 | End: 2020-08-25

## 2020-03-25 RX ORDER — PREGABALIN 100 MG/1
CAPSULE ORAL
Qty: 90 CAPSULE | Refills: 5 | Status: SHIPPED | OUTPATIENT
Start: 2020-03-25 | End: 2020-10-15

## 2020-03-31 RX ORDER — FUROSEMIDE 20 MG/1
TABLET ORAL
Qty: 180 TABLET | Refills: 3 | Status: SHIPPED | OUTPATIENT
Start: 2020-03-31 | End: 2021-04-30

## 2020-04-06 RX ORDER — OMEPRAZOLE 40 MG/1
CAPSULE, DELAYED RELEASE ORAL
Qty: 90 CAPSULE | Refills: 3 | Status: SHIPPED | OUTPATIENT
Start: 2020-04-06 | End: 2021-02-03

## 2020-04-08 ENCOUNTER — TELEPHONE (OUTPATIENT)
Dept: FAMILY MEDICINE CLINIC | Age: 70
End: 2020-04-08

## 2020-04-17 ENCOUNTER — ANTI-COAG VISIT (OUTPATIENT)
Dept: FAMILY MEDICINE CLINIC | Age: 70
End: 2020-04-17

## 2020-04-17 ENCOUNTER — NURSE ONLY (OUTPATIENT)
Dept: FAMILY MEDICINE CLINIC | Age: 70
End: 2020-04-17
Payer: MEDICARE

## 2020-04-17 LAB
INTERNATIONAL NORMALIZATION RATIO, POC: 2.8
PROTHROMBIN TIME, POC: NORMAL

## 2020-04-17 PROCEDURE — 85610 PROTHROMBIN TIME: CPT | Performed by: FAMILY MEDICINE

## 2020-04-22 ENCOUNTER — TELEPHONE (OUTPATIENT)
Dept: FAMILY MEDICINE CLINIC | Age: 70
End: 2020-04-22

## 2020-04-22 NOTE — TELEPHONE ENCOUNTER
Pt has my chart but doesn't know how to use it  Pt said she is having dysuria   Pt said she is having urinary pressure and also frequency   Pt said the sx started yesterday   Pt just been flushing out with water no otc meds

## 2020-04-23 RX ORDER — CEPHALEXIN 250 MG/1
250 CAPSULE ORAL 3 TIMES DAILY
Qty: 15 CAPSULE | Refills: 0 | Status: SHIPPED | OUTPATIENT
Start: 2020-04-23 | End: 2020-04-28

## 2020-05-01 RX ORDER — LANCETS
EACH MISCELLANEOUS
Qty: 100 EACH | Refills: 5 | Status: SHIPPED | OUTPATIENT
Start: 2020-05-01 | End: 2020-05-04 | Stop reason: SDUPTHER

## 2020-05-04 RX ORDER — LANCETS
EACH MISCELLANEOUS
Qty: 100 EACH | Refills: 11 | Status: SHIPPED | OUTPATIENT
Start: 2020-05-04 | End: 2021-02-22

## 2020-05-14 ENCOUNTER — NURSE ONLY (OUTPATIENT)
Dept: FAMILY MEDICINE CLINIC | Age: 70
End: 2020-05-14
Payer: MEDICARE

## 2020-05-14 LAB
INTERNATIONAL NORMALIZATION RATIO, POC: 2.9
PROTHROMBIN TIME, POC: NORMAL

## 2020-05-14 PROCEDURE — 85610 PROTHROMBIN TIME: CPT | Performed by: FAMILY MEDICINE

## 2020-05-15 ENCOUNTER — TELEPHONE (OUTPATIENT)
Dept: FAMILY MEDICINE CLINIC | Age: 70
End: 2020-05-15

## 2020-05-26 RX ORDER — PEN NEEDLE, DIABETIC 32GX 5/32"
NEEDLE, DISPOSABLE MISCELLANEOUS
Qty: 100 EACH | Refills: 3 | Status: SHIPPED | OUTPATIENT
Start: 2020-05-26 | End: 2021-06-01

## 2020-06-11 ENCOUNTER — NURSE ONLY (OUTPATIENT)
Dept: FAMILY MEDICINE CLINIC | Age: 70
End: 2020-06-11
Payer: MEDICARE

## 2020-06-11 ENCOUNTER — TELEPHONE (OUTPATIENT)
Dept: FAMILY MEDICINE CLINIC | Age: 70
End: 2020-06-11

## 2020-06-11 LAB
INTERNATIONAL NORMALIZATION RATIO, POC: 2.9
PROTHROMBIN TIME, POC: NORMAL

## 2020-06-11 PROCEDURE — 85610 PROTHROMBIN TIME: CPT | Performed by: NURSE PRACTITIONER

## 2020-06-12 NOTE — TELEPHONE ENCOUNTER
Rx printed and signed by MD.  Will fax to [facility] in [facility] at [phone]. Pt notified.

## 2020-06-15 RX ORDER — FLUTICASONE PROPIONATE 50 MCG
SPRAY, SUSPENSION (ML) NASAL
Qty: 1 BOTTLE | Refills: 3 | Status: SHIPPED | OUTPATIENT
Start: 2020-06-15 | End: 2021-12-13 | Stop reason: SDUPTHER

## 2020-06-16 ENCOUNTER — VIRTUAL VISIT (OUTPATIENT)
Dept: FAMILY MEDICINE CLINIC | Age: 70
End: 2020-06-16
Payer: MEDICARE

## 2020-06-16 PROCEDURE — 3017F COLORECTAL CA SCREEN DOC REV: CPT | Performed by: FAMILY MEDICINE

## 2020-06-16 PROCEDURE — 2022F DILAT RTA XM EVC RTNOPTHY: CPT | Performed by: FAMILY MEDICINE

## 2020-06-16 PROCEDURE — G8399 PT W/DXA RESULTS DOCUMENT: HCPCS | Performed by: FAMILY MEDICINE

## 2020-06-16 PROCEDURE — 3044F HG A1C LEVEL LT 7.0%: CPT | Performed by: FAMILY MEDICINE

## 2020-06-16 PROCEDURE — 4040F PNEUMOC VAC/ADMIN/RCVD: CPT | Performed by: FAMILY MEDICINE

## 2020-06-16 PROCEDURE — 1123F ACP DISCUSS/DSCN MKR DOCD: CPT | Performed by: FAMILY MEDICINE

## 2020-06-16 PROCEDURE — 1036F TOBACCO NON-USER: CPT | Performed by: FAMILY MEDICINE

## 2020-06-16 PROCEDURE — 99214 OFFICE O/P EST MOD 30 MIN: CPT | Performed by: FAMILY MEDICINE

## 2020-06-16 PROCEDURE — G8427 DOCREV CUR MEDS BY ELIG CLIN: HCPCS | Performed by: FAMILY MEDICINE

## 2020-06-16 PROCEDURE — 1090F PRES/ABSN URINE INCON ASSESS: CPT | Performed by: FAMILY MEDICINE

## 2020-06-16 PROCEDURE — G8417 CALC BMI ABV UP PARAM F/U: HCPCS | Performed by: FAMILY MEDICINE

## 2020-06-16 RX ORDER — WARFARIN SODIUM 5 MG/1
TABLET ORAL
Qty: 100 TABLET | Refills: 11 | Status: SHIPPED | OUTPATIENT
Start: 2020-06-16 | End: 2021-02-03

## 2020-06-22 RX ORDER — COLCHICINE 0.6 MG/1
TABLET ORAL
Qty: 10 TABLET | Refills: 0 | Status: SHIPPED | OUTPATIENT
Start: 2020-06-22 | End: 2021-05-10 | Stop reason: SDUPTHER

## 2020-06-22 RX ORDER — ONDANSETRON 4 MG/1
4 TABLET, FILM COATED ORAL EVERY 6 HOURS PRN
Qty: 20 TABLET | Refills: 0 | Status: SHIPPED | OUTPATIENT
Start: 2020-06-22 | End: 2022-03-08

## 2020-06-22 NOTE — TELEPHONE ENCOUNTER
[facility] received a call from:    Name of Caller: Chaz Jones    Relationship to patient:self    Organization name: na    Best contact number: [phone]    Reason for call: pt is having a gout flair and would like something called in to her pharmacy

## 2020-07-10 ENCOUNTER — NURSE ONLY (OUTPATIENT)
Dept: FAMILY MEDICINE CLINIC | Age: 70
End: 2020-07-10
Payer: MEDICARE

## 2020-07-10 ENCOUNTER — ANTI-COAG VISIT (OUTPATIENT)
Dept: FAMILY MEDICINE CLINIC | Age: 70
End: 2020-07-10

## 2020-07-10 LAB
INTERNATIONAL NORMALIZATION RATIO, POC: 2.4
PROTHROMBIN TIME, POC: NORMAL

## 2020-07-10 PROCEDURE — 85610 PROTHROMBIN TIME: CPT | Performed by: FAMILY MEDICINE

## 2020-07-31 ENCOUNTER — NURSE ONLY (OUTPATIENT)
Dept: FAMILY MEDICINE CLINIC | Age: 70
End: 2020-07-31
Payer: MEDICARE

## 2020-07-31 PROCEDURE — 36415 COLL VENOUS BLD VENIPUNCTURE: CPT | Performed by: FAMILY MEDICINE

## 2020-08-01 LAB
ESTIMATED AVERAGE GLUCOSE: 162.8 MG/DL
HBA1C MFR BLD: 7.3 %

## 2020-08-03 RX ORDER — INSULIN DETEMIR 100 [IU]/ML
50 INJECTION, SOLUTION SUBCUTANEOUS NIGHTLY
Qty: 54 PEN | Refills: 1
Start: 2020-08-03 | End: 2020-11-11

## 2020-08-11 ENCOUNTER — NURSE ONLY (OUTPATIENT)
Dept: FAMILY MEDICINE CLINIC | Age: 70
End: 2020-08-11
Payer: MEDICARE

## 2020-08-11 LAB
INTERNATIONAL NORMALIZATION RATIO, POC: 2.5
PROTHROMBIN TIME, POC: NORMAL

## 2020-08-11 PROCEDURE — 85610 PROTHROMBIN TIME: CPT | Performed by: NURSE PRACTITIONER

## 2020-08-17 RX ORDER — DILTIAZEM HYDROCHLORIDE 240 MG/1
CAPSULE, EXTENDED RELEASE ORAL
Qty: 90 CAPSULE | Refills: 3 | Status: SHIPPED | OUTPATIENT
Start: 2020-08-17 | End: 2021-08-16

## 2020-08-20 ENCOUNTER — TELEPHONE (OUTPATIENT)
Dept: FAMILY MEDICINE CLINIC | Age: 70
End: 2020-08-20

## 2020-08-20 NOTE — TELEPHONE ENCOUNTER
Pt's roommates daughter was at their hose and was feeling ill, was tested for the covid and it came back positive. Was wanting to see if she could get an order to go to [facility] to get tested for it.

## 2020-08-25 RX ORDER — BLOOD SUGAR DIAGNOSTIC
STRIP MISCELLANEOUS
Qty: 150 STRIP | Refills: 11 | Status: SHIPPED | OUTPATIENT
Start: 2020-08-25 | End: 2020-10-15

## 2020-08-25 RX ORDER — PENTOXIFYLLINE 400 MG/1
TABLET, EXTENDED RELEASE ORAL
Qty: 90 TABLET | Refills: 3 | Status: SHIPPED | OUTPATIENT
Start: 2020-08-25 | End: 2021-08-19

## 2020-08-25 RX ORDER — ATORVASTATIN CALCIUM 40 MG/1
TABLET, FILM COATED ORAL
Qty: 90 TABLET | Refills: 3 | Status: SHIPPED | OUTPATIENT
Start: 2020-08-25 | End: 2021-08-19

## 2020-09-11 ENCOUNTER — NURSE ONLY (OUTPATIENT)
Dept: FAMILY MEDICINE CLINIC | Age: 70
End: 2020-09-11
Payer: MEDICARE

## 2020-09-11 ENCOUNTER — ANTI-COAG VISIT (OUTPATIENT)
Dept: FAMILY MEDICINE CLINIC | Age: 70
End: 2020-09-11

## 2020-09-11 LAB
INTERNATIONAL NORMALIZATION RATIO, POC: 2.5
PROTHROMBIN TIME, POC: NORMAL

## 2020-09-11 PROCEDURE — 85610 PROTHROMBIN TIME: CPT | Performed by: FAMILY MEDICINE

## 2020-09-22 ENCOUNTER — OFFICE VISIT (OUTPATIENT)
Dept: FAMILY MEDICINE CLINIC | Age: 70
End: 2020-09-22
Payer: MEDICARE

## 2020-09-22 VITALS
BODY MASS INDEX: 41.64 KG/M2 | OXYGEN SATURATION: 98 % | WEIGHT: 235 LBS | DIASTOLIC BLOOD PRESSURE: 74 MMHG | SYSTOLIC BLOOD PRESSURE: 126 MMHG | HEIGHT: 63 IN | HEART RATE: 73 BPM | TEMPERATURE: 97.2 F

## 2020-09-22 PROBLEM — M71.349 SYNOVIAL CYST OF HAND: Status: ACTIVE | Noted: 2020-09-22

## 2020-09-22 PROBLEM — D69.6 THROMBOCYTOPENIA (HCC): Status: ACTIVE | Noted: 2020-09-22

## 2020-09-22 PROCEDURE — G8427 DOCREV CUR MEDS BY ELIG CLIN: HCPCS | Performed by: FAMILY MEDICINE

## 2020-09-22 PROCEDURE — 2022F DILAT RTA XM EVC RTNOPTHY: CPT | Performed by: FAMILY MEDICINE

## 2020-09-22 PROCEDURE — 3017F COLORECTAL CA SCREEN DOC REV: CPT | Performed by: FAMILY MEDICINE

## 2020-09-22 PROCEDURE — 3051F HG A1C>EQUAL 7.0%<8.0%: CPT | Performed by: FAMILY MEDICINE

## 2020-09-22 PROCEDURE — 1036F TOBACCO NON-USER: CPT | Performed by: FAMILY MEDICINE

## 2020-09-22 PROCEDURE — 1090F PRES/ABSN URINE INCON ASSESS: CPT | Performed by: FAMILY MEDICINE

## 2020-09-22 PROCEDURE — 1123F ACP DISCUSS/DSCN MKR DOCD: CPT | Performed by: FAMILY MEDICINE

## 2020-09-22 PROCEDURE — 99214 OFFICE O/P EST MOD 30 MIN: CPT | Performed by: FAMILY MEDICINE

## 2020-09-22 PROCEDURE — G8399 PT W/DXA RESULTS DOCUMENT: HCPCS | Performed by: FAMILY MEDICINE

## 2020-09-22 PROCEDURE — 4040F PNEUMOC VAC/ADMIN/RCVD: CPT | Performed by: FAMILY MEDICINE

## 2020-09-22 PROCEDURE — G8417 CALC BMI ABV UP PARAM F/U: HCPCS | Performed by: FAMILY MEDICINE

## 2020-09-22 NOTE — LETTER
CONTROLLED SUBSTANCE MEDICATION AGREEMENT     Patient Name: Meryle Sat  Patient YOB: 1950   I understand, that controlled substance medications may be used to help better manage my symptoms and to improve my ability to function at home, work and in social settings. However, I also understand that these medications do have risks, which have been discussed with me, including possible development of physical or psychological dependence. I understand that successful treatment requires mutual trust and honesty between me and my provider. I understand and agree that following this Medication Agreement is necessary in continuing my provider-patient relationship and the success of my treatment plan. Explanation from my Provider: Benefits and Goals of Controlled Substance Medications: There are two potential goals for your treatment: (1) decreased pain and suffering (2) improved daily life functions. There are many possible treatments for your chronic condition(s). Alternatives such as physical therapy, yoga, massage, home daily exercise, meditation, relaxation techniques, injections, chiropractic manipulations, surgery, cognitive therapy, hypnosis and many medications that are not habit-forming may be used. Use of controlled substance medications may be helpful, but they are unlikely to resolve all symptoms or restore all function. Explanation from my Provider: Risks of Controlled Substance Medications:  Opioid pain medications: These medications can lead to problems such as addiction/dependence, sedation, lightheadedness/dizziness, memory issues, falls, constipation, nausea, or vomiting. They may also impair the ability to drive or operate machinery. Additionally, these medications may lower testosterone levels, leading to loss of bone strength, stamina and sex drive.   They may cause problems with breathing, sleep apnea and reduced coughing, which is especially dangerous for patients with lung disease. Overdose or dangerous interactions with alcohol and other medications may occur, leading to death. Hyperalgesia may develop, which means patients receiving opioids for the treatment of pain may become more sensitive to certain painful stimuli, and in some cases, experience pain from ordinarily non-painful stimuli. Women between the ages of 14-53 who could become pregnant should carefully weigh the risks and benefits of opioids with their physicians, as these medications increase the risk of pregnancy complications, including miscarriage,  delivery and stillbirth. It is also possible for babies to be born addicted to opioids. Opioid dependence withdrawal symptoms may include; feelings of uneasiness, increased pain, irritability, belly pain, diarrhea, sweats and goose-flesh. Benzodiazepines and non-benzodiazepine sleep medications: These medications can lead to problems such as addiction/dependence, sedation, fatigue, lightheadedness, dizziness, incoordination, falls, depression, hallucinations, and impaired judgment, memory and concentration. The ability to drive and operate machinery may also be affected. Abnormal sleep-related behaviors have been reported, including sleepwalking, driving, making telephone calls, eating, or having sex while not fully awake. These medications can suppress breathing and worsen sleep apnea, particularly when combined with alcohol or other sedating medications, potentially leading to death. Dependence withdrawal symptoms may include tremors, anxiety, hallucinations and seizures.   Stimulants:  Common adverse effects include addiction/dependence, increased blood  pressure and heart rate, decreased appetite, nausea, involuntary weight loss, insomnia,                                                                                                                     Initials:_______ irritability, and headaches. These risks may increase when these medications are combined with other stimulants, such as caffeine pills or energy drinks, certain weight loss supplements and oral decongestants. Dependence withdrawal symptoms may include depressed mood, loss of interest, suicidal thoughts, anxiety, fatigue, appetite changes and agitation. Testosterone replacement therapy:  Potential side effects include increased risk of stroke and heart attack, blood clots, increased blood pressure, increased cholesterol, enlarged prostate, sleep apnea, irritability/aggression and other mood disorders, and decreased fertility. I agree and understand that I and my prescriber have the following rights and responsibilities regarding my treatment plan:     1. MY RIGHTS:  To be informed of my treatment and medication plan. To be an active participant in my health and wellbeing. 2. MY RESPONSIBILITY AND UNDERSTANDING FOR USE OF MEDICATIONS  ? I will take medications at the dose and frequency as directed. For my safety, I will not increase or change how I take my medications without the recommendation of my healthcare provider. ? I will actively participate in any program recommended by my provider which may improve function, including social, physical, psychological programs. ? I will not take my medications with alcohol or other drugs not prescribed to me. I understand that drinking alcohol with my medications increases the chances of side effects, including reduced breathing rate and could lead to personal injury when operating machinery. ? I understand that if I have a history of substance use disorders, including alcohol or other illicit drugs, that I may be at increased risk of addiction to my medications. ? I agree to notify my provider immediately if I should become pregnant so that my treatment plan can be adjusted.   ? I agree and understand that I shall only receive controlled substance medications from the prescriber that signed this agreement unless there is written agreement among other prescribers of controlled substances outlining the responsibility of the medications being prescribed. ? I understand that the if the controlled medication is not helping to achieve goals, the dosage may be tapered and no longer prescribed. 3. MY RESPONSIBILITY FOR COMMUNICATION / PRESCRIPTION RENEWALS  ? I agree that all controlled substance medications that I take will be prescribed only by my provider. If another healthcare provider prescribes me medication in an emergency, I will notify my provider within seventy-two (72) hours. ? I will arrange for refills at the prescribed interval ONLY during regular office hours. I will not ask for refills earlier than agreed, after-hours, on holidays or weekends. Refills may take up to 72 hours for processing and prescriptions to reach the pharmacy. ? I will inform my other health care providers that I am taking these medications and of the existence of this [id number]. In the event of an emergency, I will provide the same information to the emergency department prescribers. ? I will keep my provider updated on the pharmacy I am using for controlled medication prescription filling. Initials:_______  4. MY RESPONSIBILITY FOR PROTECTING MEDICATIONS  ? I will protect my prescriptions and medications. I understand that lost or misplaced prescriptions will not be replaced. ? I will keep medications only for my own use and will not share them with others. I will keep all medications away from children. ? I agree that if my medications are adjusted or discontinued, I will properly dispose of any remaining medications. I understand that I will be required to dispose of any remaining controlled medications as, directed by my prescriber, prior to being provided with any prescriptions for other controlled medications.   Medication drop box locations can be found at: [employer]    5. MY RESPONSIBILITY WITH ILLEGAL DRUGS   ? I will not use illegal or street drugs or another person's prescription medications not prescribed to me.   ? If there are identified addiction type symptoms, then referral to a program may be provided by my provider and I agree to follow through with this recommendation. 6. MY RESPONSIBILITY FOR COOPERATION WITH INVESTIGATIONS  ? I understand that my provider will comply with any applicable law and may discuss my use and/or possible misuse/abuse of controlled substances and alcohol, as appropriate, with any health care provider involved in my care, pharmacist, or legal authority. ? I authorize my provider and pharmacy to cooperate fully with law enforcement agencies (as permitted by law) in the investigation of any possible misuse, sale, or other diversion of my controlled substances. ? I agree to waive any applicable privilege or right of privacy or confidentiality with respect to these authorizations. 7. PROVIDERS RIGHT TO MONITOR FOR SAFETY: PRESCRIPTION MONITORING / DRUG TESTING  ? I consent to drug/toxicology screening and will submit to a drug screen upon my providers request to assure I am only taking the prescribed drugs for my safety monitoring. I understand that a drug screen is a laboratory test in which a sample of my urine, blood or saliva is checked to see what drugs I have been taking. This may entail an observed urine specimen, which means that a nurse or other health care provider may watch me provide urine, and I will cooperate if I am asked to provide an observed specimen. ? I understand that my provider will check a copy of my State Prescription Monitoring Program () Report in order to safely prescribe medications. ? Pill Counts: I consent to pill counts when requested.   I may be asked to bring all my prescribed controlled substance medications, in their original bottles, to all of my scheduled appointments. In addition, my provider may ask me to come to the practice at any time for a random pill count. 8. TERMINATION OF THIS AGREEMENT  For my safety, my prescriber has the right to stop prescribing controlled substance medications and may end this agreement. Initials:_______  ? Conditions that may result in termination of this agreement:  a. I do not show any improvement in pain, or my activity has not improved. b. I develop rapid tolerance or loss of improvement, as described in my treatment plan.  c. I develop significant side effects from the medication. d. My behavior is not consistent with the responsibilities outlined above, thereby causing safety concerns to continue prescribing controlled substance medications. e. I fail to follow the terms of this agreement. f. Other:____________________________       UNDERSTANDING THIS MEDICATION AGREEMENT:    I have read the above and have had all my questions answered. For chronic disease management, I know that my symptoms can be managed with many types of treatments. A chronic medication trial may be part of my treatment, but I must be an active participant in my care. Medication therapy is only one part of my symptom management plan. In some cases, there may be limited scientific evidence to support the chronic use of certain medications to improve symptoms and daily function. Furthermore, in certain circumstances, there may be scientific information that suggests that the use of chronic controlled substances may worsen my symptoms and increase my risk of unintentional death directly related to this medication therapy. I know that if my provider feels my risk from controlled medications is greater than my benefit, I will have my controlled substance medication(s) compassionately lowered or removed altogether. I further agree to allow this office to contact my HIPAA contact if there are concerns about my safety and use of the controlled medications. I have agreed to use the prescribed controlled substance medications to me as instructed by my provider and as stated in this Medication Agreement. My initial on each page and my signature below shows that I have read each page and I have had the opportunity to ask questions with answers provided by my provider.     Patient Name (Printed): _____________________________________  Patient Signature:  ______________________   Date: _____________    Prescriber Name (Printed): ___________________________________  Prescriber Signature: _____________________  Date: _____________

## 2020-09-22 NOTE — PROGRESS NOTES
Chief Complaint   Patient presents with    Hypertension     no chest pains or SOB    Hyperlipidemia     does not watch what she eats    Diabetes     checks 2-3 times daily. around 110 fasting. eats what she wants    Numbness     right lower leg numbness and tingling. wears support stockings    Other     patient has multiple medical complaints       HPI:  Ward Young is a 69 y.o. (: 1950) here today for  Patient is here for her 3-month follow-up for controlled substance monitoring with the Lyrica. States she still gets numbness and tingling in 2 of her toes in the right foot. She does see a foot specialist.  I asked her to discuss this with the foot specialist as well. Denies chest pain or trouble breathing. Not following any particular diet. She did see the oncologist who mentioned to her about her fatty liver and the best thing to do would be to do a keto diet. Sister is concerned about her renal function by said renal function I will be fine for that diet. I told him that we would have to evaluate her renal function. She had no symptoms of elevated sugar but her last hemoglobin A1c did go up to 7.3. Denies dry mouth or more frequent urination. She has an area on her right second finger at the distal joint which opens up and drains. Can get very red and sore. Patient's medications, allergies, past medical, surgical, social and family histories were reviewed and updated asappropriate on 2020 at 8:51 AM.    ROS:  Review of Systems    All other systems reviewed and are negative except as noted above on 2020 at 8:51 AM. Additional review of systems may be scanned into the media section ofthis medical record. Any responses requiring further intervention were pursued.     Hemoglobin A1C (%)   Date Value   2020 7.3     Microscopic Examination (no units)   Date Value   10/15/2015 YES     Microalbumin, Random Urine (mg/dL)   Date Value   2019 1.60     LDL Calculated (mg/dL)   Date Value   2019 99     Past Medical History:   Diagnosis Date    Atrial fibrillation (HCC)     Cancer (HCC)     lymphoma    CKD (chronic kidney disease)     [facility]     D[facility]deep venous thrombosis) ([facility]     Gout 2019    Hyperlipidemia     Hypertension     Hypotension         Family History   Problem Relation Age of Onset    Cancer Father     Cancer Sister         lung    Cancer Brother         lymphoma    Heart Disease Mother      Social History     Socioeconomic History    Marital status: Single     Spouse name: Not on file    Number of children: Not on file    Years of education: Not on file    Highest education level: Not on file   Occupational History    Not on file   Social Needs    Financial resource strain: Not on file    Food insecurity     Worry: Not on file     Inability: Not on file    Transportation needs     Medical: Not on file     Non-medical: Not on file   Tobacco Use    Smoking status: Former Smoker     Packs/day: 1.00     Years: 35.00     Pack years: 35.00     Last attempt to quit:      Years since quittin.7    Smokeless tobacco: Never Used   Substance and Sexual Activity    Alcohol use: No     Alcohol/week: 0.0 standard drinks    Drug use: No    Sexual activity: Not Currently   Lifestyle    Physical activity     Days per week: Not on file     Minutes per session: Not on file    Stress: Not on file   Relationships    Social connections     Talks on phone: Not on file     Gets together: Not on file     Attends Evangelical service: Not on file     Active member of club or organization: Not on file     Attends meetings of clubs or organizations: Not on file     Relationship status: Not on file    Intimate partner violence     Fear of current or ex partner: Not on file     Emotionally abused: Not on file     Physically abused: Not on file     Forced sexual activity: Not on file   Other Topics Concern    Not on file   Social Juaquin Rosales MD   BD VEO INSULIN SYRINGE U/F 31G X 15/64\" 0.3 ML MISC USE UP TO FIVE TIMES DAILY Yes Ivania Mackey MD   magnesium oxide (MAG-OX) 400 MG tablet TAKE 1 TABLET BY MOUTH THREE TIMES A DAY  Patient taking differently: Take 400 mg by mouth daily  Yes Judy Elizabeth APRN - CNP   pregabalin (LYRICA) 100 MG capsule TAKE 1 CAPSULE BY MOUTH THREE TIMES A DAY  Patient taking differently: 2 times daily. Ivania Mackey MD     No Known Allergies    OBJECTIVE:  Estimated body mass index is 41.63 kg/m² as calculated from the following:    Height as of this encounter: 5' 3\" (1.6 m). Weight as of this encounter: 235 lb (106.6 kg). Vitals:    09/22/20 0832   BP: 126/74   Site: Left Upper Arm   Position: Sitting   Cuff Size: Large Adult   Pulse: 73   Temp: 97.2 °F (36.2 °C)   TempSrc: Temporal   SpO2: 98%   Weight: 235 lb (106.6 kg)   Height: 5' 3\" (1.6 m)     BP Readings from Last 2 Encounters:   09/22/20 126/74   02/11/20 122/72     Wt Readings from Last 3 Encounters:   09/22/20 235 lb (106.6 kg)   02/11/20 230 lb (104.3 kg)   12/02/19 231 lb (104.8 kg)       Physical Exam  Vitals signs and nursing note reviewed. Constitutional:       General: She is not in acute distress. Appearance: She is well-developed. She is not diaphoretic. HENT:      Head: Normocephalic and atraumatic. Right Ear: External ear normal.      Left Ear: External ear normal.      Nose: Nose normal.   Eyes:      General: Lids are normal. No scleral icterus. Right eye: No discharge. Left eye: No discharge. Pupils: Pupils are equal, round, and reactive to light. Neck:      Thyroid: No thyromegaly. Vascular: No JVD. Cardiovascular:      Rate and Rhythm: Normal rate and regular rhythm. Heart sounds: Normal heart sounds. Pulmonary:      Effort: Pulmonary effort is normal. No respiratory distress. Breath sounds: Normal breath sounds.    Abdominal:      Palpations: Abdomen is soft. There is no hepatomegaly or splenomegaly. Tenderness: There is no abdominal tenderness. Musculoskeletal:        Hands:    Skin:     General: Skin is warm and dry. Coloration: Skin is not pale. Findings: No erythema or rash. Comments: Turgor normal   Psychiatric:         Behavior: Behavior normal.         Thought Content: Thought content normal.         Judgment: Judgment normal.              ASSESSMENT PLAN      Diagnosis Orders   1. Insulin-requiring or dependent type II diabetes mellitus ([address]  HEMOGLOBIN A1C   2. Peripheral vascular disease in diabetes mellitus ([address]     3. Thrombocytopenia ([address]     4. Chronic atrial fibrillation  CBC Auto Differential   5. Benign essential HTN  CBC Auto Differential   6. Gastroesophageal reflux disease without esophagitis     7. Venous insufficiency     8. CKD stage 4 due to type 2 diabetes mellitus ([address]     9. Morbid obesity due to excess calories ([address]     10. Hypothyroidism due to Hashimoto's thyroiditis     11. Synovial cyst of hand  Sharyle Irish, MD, Hand Surgery (Hand, Wrist, Elbow), [facility]   12. Primary insomnia     We will update her hemoglobin A1c with her INR and will also do a CBC in November labs. For now leave her diabetic medicine the same. No signs of limb ischemia. Platelet count per hematologist has dropped. We will check a CBC as well since they are not seeing her back for a year. Clinically appear to be in sinus rhythm today. Blood pressure acceptable. Reflux controlled. Lower extremity swelling at baseline. Continue to monitor chronic kidney disease especially if she starts the keto diet. Thyroid replacement by symptoms appears appropriate. Refer to hand surgeon regarding the synovial cyst right second finger PIP. She also said having trouble sleeping wondered about using melatonin recommended they start with 5 or 6 mg at bedtime.   She is using the Lyrica appropriately and getting relief but will discuss the ongoing symptoms with the foot specialist.  Virtual visit 3 months return office in 6 months. Her medication contract was updated today. Drug screen is already up-to-date. Patient should call the office immediately with new or ongoing signs or symptoms or worsening, or proceed to the emergency room. No changes in past medical history, past surgical history, social history, or family history were noted during the patient encounter unless specifically listed above. All updates of past medical history, past surgical history, social history, or family history were reviewed personally by me during the office visit. All problems listed in the assessment are stable unless noted otherwise. Medication profile reviewed personally by me during the visit. Medication side effects and possible impairments from medications were discussed as applicable. This document was prepared by a combination of typing and transcription through a voice recognition software.

## 2020-10-01 ENCOUNTER — OFFICE VISIT (OUTPATIENT)
Dept: ORTHOPEDIC SURGERY | Age: 70
End: 2020-10-01
Payer: MEDICARE

## 2020-10-01 VITALS — BODY MASS INDEX: 41.64 KG/M2 | HEIGHT: 63 IN | RESPIRATION RATE: 17 BRPM | WEIGHT: 235 LBS

## 2020-10-01 PROBLEM — M19.042 ARTHRITIS OF FINGER OF BOTH HANDS: Status: ACTIVE | Noted: 2020-10-01

## 2020-10-01 PROBLEM — M67.441 MUCOUS CYST OF DIGIT OF RIGHT HAND: Status: ACTIVE | Noted: 2020-10-01

## 2020-10-01 PROBLEM — M19.041 ARTHRITIS OF FINGER OF BOTH HANDS: Status: ACTIVE | Noted: 2020-10-01

## 2020-10-01 PROCEDURE — 1090F PRES/ABSN URINE INCON ASSESS: CPT | Performed by: ORTHOPAEDIC SURGERY

## 2020-10-01 PROCEDURE — 1036F TOBACCO NON-USER: CPT | Performed by: ORTHOPAEDIC SURGERY

## 2020-10-01 PROCEDURE — 4040F PNEUMOC VAC/ADMIN/RCVD: CPT | Performed by: ORTHOPAEDIC SURGERY

## 2020-10-01 PROCEDURE — G8484 FLU IMMUNIZE NO ADMIN: HCPCS | Performed by: ORTHOPAEDIC SURGERY

## 2020-10-01 PROCEDURE — G8428 CUR MEDS NOT DOCUMENT: HCPCS | Performed by: ORTHOPAEDIC SURGERY

## 2020-10-01 PROCEDURE — 99214 OFFICE O/P EST MOD 30 MIN: CPT | Performed by: ORTHOPAEDIC SURGERY

## 2020-10-01 PROCEDURE — G8399 PT W/DXA RESULTS DOCUMENT: HCPCS | Performed by: ORTHOPAEDIC SURGERY

## 2020-10-01 PROCEDURE — 3017F COLORECTAL CA SCREEN DOC REV: CPT | Performed by: ORTHOPAEDIC SURGERY

## 2020-10-01 PROCEDURE — 1123F ACP DISCUSS/DSCN MKR DOCD: CPT | Performed by: ORTHOPAEDIC SURGERY

## 2020-10-01 PROCEDURE — G8417 CALC BMI ABV UP PARAM F/U: HCPCS | Performed by: ORTHOPAEDIC SURGERY

## 2020-10-12 ENCOUNTER — ANTI-COAG VISIT (OUTPATIENT)
Dept: FAMILY MEDICINE CLINIC | Age: 70
End: 2020-10-12

## 2020-10-12 ENCOUNTER — NURSE ONLY (OUTPATIENT)
Dept: FAMILY MEDICINE CLINIC | Age: 70
End: 2020-10-12
Payer: MEDICARE

## 2020-10-12 LAB
INTERNATIONAL NORMALIZATION RATIO, POC: 2.4
PROTHROMBIN TIME, POC: NORMAL

## 2020-10-12 PROCEDURE — 90686 IIV4 VACC NO PRSV 0.5 ML IM: CPT | Performed by: FAMILY MEDICINE

## 2020-10-12 PROCEDURE — G0008 ADMIN INFLUENZA VIRUS VAC: HCPCS | Performed by: FAMILY MEDICINE

## 2020-10-12 PROCEDURE — 85610 PROTHROMBIN TIME: CPT | Performed by: FAMILY MEDICINE

## 2020-10-15 RX ORDER — BLOOD SUGAR DIAGNOSTIC
STRIP MISCELLANEOUS
Qty: 300 STRIP | Refills: 3 | Status: SHIPPED | OUTPATIENT
Start: 2020-10-15 | End: 2021-11-19

## 2020-10-15 RX ORDER — PREGABALIN 100 MG/1
CAPSULE ORAL
Qty: 90 CAPSULE | Refills: 2 | Status: SHIPPED | OUTPATIENT
Start: 2020-10-15 | End: 2021-03-16

## 2020-10-23 ENCOUNTER — TELEPHONE (OUTPATIENT)
Dept: ORTHOPEDIC SURGERY | Age: 70
End: 2020-10-23

## 2020-10-26 ENCOUNTER — OFFICE VISIT (OUTPATIENT)
Dept: FAMILY MEDICINE CLINIC | Age: 70
End: 2020-10-26
Payer: MEDICARE

## 2020-10-26 VITALS
BODY MASS INDEX: 40.88 KG/M2 | HEART RATE: 73 BPM | WEIGHT: 230.8 LBS | SYSTOLIC BLOOD PRESSURE: 138 MMHG | OXYGEN SATURATION: 96 % | DIASTOLIC BLOOD PRESSURE: 78 MMHG | TEMPERATURE: 98.5 F

## 2020-10-26 LAB
BASOPHILS ABSOLUTE: 0 K/UL (ref 0–0.2)
BASOPHILS RELATIVE PERCENT: 0.4 %
EOSINOPHILS ABSOLUTE: 0.1 K/UL (ref 0–0.6)
EOSINOPHILS RELATIVE PERCENT: 0.9 %
HCT VFR BLD CALC: 51.5 % (ref 36–48)
HEMOGLOBIN: 17 G/DL (ref 12–16)
LYMPHOCYTES ABSOLUTE: 2.5 K/UL (ref 1–5.1)
LYMPHOCYTES RELATIVE PERCENT: 29.6 %
MCH RBC QN AUTO: 30 PG (ref 26–34)
MCHC RBC AUTO-ENTMCNC: 33.1 G/DL (ref 31–36)
MCV RBC AUTO: 90.6 FL (ref 80–100)
MONOCYTES ABSOLUTE: 0.7 K/UL (ref 0–1.3)
MONOCYTES RELATIVE PERCENT: 8.5 %
NEUTROPHILS ABSOLUTE: 5.2 K/UL (ref 1.7–7.7)
NEUTROPHILS RELATIVE PERCENT: 60.6 %
PDW BLD-RTO: 15.2 % (ref 12.4–15.4)
PLATELET # BLD: 209 K/UL (ref 135–450)
PMV BLD AUTO: 8.7 FL (ref 5–10.5)
RBC # BLD: 5.68 M/UL (ref 4–5.2)
WBC # BLD: 8.5 K/UL (ref 4–11)

## 2020-10-26 PROCEDURE — G8482 FLU IMMUNIZE ORDER/ADMIN: HCPCS | Performed by: FAMILY MEDICINE

## 2020-10-26 PROCEDURE — 2022F DILAT RTA XM EVC RTNOPTHY: CPT | Performed by: FAMILY MEDICINE

## 2020-10-26 PROCEDURE — 36415 COLL VENOUS BLD VENIPUNCTURE: CPT | Performed by: FAMILY MEDICINE

## 2020-10-26 PROCEDURE — G8417 CALC BMI ABV UP PARAM F/U: HCPCS | Performed by: FAMILY MEDICINE

## 2020-10-26 PROCEDURE — 4040F PNEUMOC VAC/ADMIN/RCVD: CPT | Performed by: FAMILY MEDICINE

## 2020-10-26 PROCEDURE — 1123F ACP DISCUSS/DSCN MKR DOCD: CPT | Performed by: FAMILY MEDICINE

## 2020-10-26 PROCEDURE — G8427 DOCREV CUR MEDS BY ELIG CLIN: HCPCS | Performed by: FAMILY MEDICINE

## 2020-10-26 PROCEDURE — 93000 ELECTROCARDIOGRAM COMPLETE: CPT | Performed by: FAMILY MEDICINE

## 2020-10-26 PROCEDURE — 99215 OFFICE O/P EST HI 40 MIN: CPT | Performed by: FAMILY MEDICINE

## 2020-10-26 PROCEDURE — 1090F PRES/ABSN URINE INCON ASSESS: CPT | Performed by: FAMILY MEDICINE

## 2020-10-26 PROCEDURE — 3051F HG A1C>EQUAL 7.0%<8.0%: CPT | Performed by: FAMILY MEDICINE

## 2020-10-26 PROCEDURE — 3017F COLORECTAL CA SCREEN DOC REV: CPT | Performed by: FAMILY MEDICINE

## 2020-10-26 PROCEDURE — 1036F TOBACCO NON-USER: CPT | Performed by: FAMILY MEDICINE

## 2020-10-26 PROCEDURE — G8399 PT W/DXA RESULTS DOCUMENT: HCPCS | Performed by: FAMILY MEDICINE

## 2020-10-26 NOTE — PROGRESS NOTES
current use of anticoagulant therapy    CKD stage 4 due to type 2 diabetes mellitus (HCC)    Chronic atrial fibrillation (HCC)    Acute diverticulitis    Medial meniscus tear    Morbid obesity due to excess calories (HCC)    Hypothyroidism due to Hashimoto's thyroiditis    History of herpes zoster    Trigger finger of left thumb    Trigger finger, left middle finger    Diabetic mononeuropathy associated with type 2 diabetes mellitus (HCC)    History of colon polyps    Sigmoid polyp    Polyp of ascending colon    Leg cramps    Plantar fasciitis    Acute gout involving toe of left foot    Synovial cyst of hand    Thrombocytopenia (HCC)    Arthritis of finger of both hands    Mucous cyst of digit of right hand       Past Medical History:   Diagnosis Date    Atrial fibrillation (HCC)     [facility] ([facility]     lymphoma    CKD (chronic kidney disease)     Diabetes mellitus ([facility]     [facility]     Gout 12/02/2019    Hyperlipidemia     Hypertension     Hypotension      Past Surgical History:   Procedure Laterality Date    COLONOSCOPY      COLONOSCOPY  05/23/2018    colon polyps,diverticulosis    LYMPH NODE BIOPSY      OTHER SURGICAL HISTORY      vocal cord bx     Family History   Problem Relation Age of Onset    Cancer Father     Cancer Sister         lung    Cancer Brother         lymphoma    Heart Disease Mother      Social History     Socioeconomic History    Marital status: Single     Spouse name: Not on file    Number of children: Not on file    Years of education: Not on file    Highest education level: Not on file   Occupational History    Not on file   Social Needs    Financial resource strain: Not on file    Food insecurity     Worry: Not on file     Inability: Not on file    Transportation needs     Medical: Not on file     Non-medical: Not on file   Tobacco Use    Smoking status: Former Smoker     Packs/day: 1.00     Years: 35.00     Pack years: 35.00     Last attempt to quit:      Years since quittin.8    Smokeless tobacco: Never Used   Substance and Sexual Activity    Alcohol use: No     Alcohol/week: 0.0 standard drinks    Drug use: No    Sexual activity: Not Currently   Lifestyle    Physical activity     Days per week: Not on file     Minutes per session: Not on file    Stress: Not on file   Relationships    Social connections     Talks on phone: Not on file     Gets together: Not on file     Attends Sikhism service: Not on file     Active member of club or organization: Not on file     Attends meetings of clubs or organizations: Not on file     Relationship status: Not on file    Intimate partner violence     Fear of current or ex partner: Not on file     Emotionally abused: Not on file     Physically abused: Not on file     Forced sexual activity: Not on file   Other Topics Concern    Not on file   Social History Narrative    Not on file       Review of Systems  A comprehensive review of systems was negative except for what was noted in the HPI. Physical Exam   Constitutional: She is oriented to person, place, and time. She appears well-developed and well-nourished. No distress. HENT:   Head: Normocephalic and atraumatic. Mouth/Throat: Uvula is midline, oropharynx is clear and moist and mucous membranes are normal.   Eyes: Conjunctivae and EOM are normal. Pupils are equal, round, and reactive to light. Neck: Trachea normal and normal range of motion. Neck supple. No JVD present. Carotid bruit is not present. No mass and no thyromegaly present. Cardiovascular: Normal rate, irregularly irregular rhythm, normal heart sounds and intact distal pulses. Exam reveals no gallop and no friction rub. No murmur heard. Pulmonary/Chest: Effort normal and breath sounds normal. No respiratory distress. She has no wheezes. She has no rales. Abdominal: Soft.  Normal aorta and bowel sounds are normal. She exhibits no distension and no mass. There is no hepatosplenomegaly. No tenderness. Musculoskeletal: She exhibits no edema and no tenderness. Neurological: She is alert and oriented to person, place, and time. She has normal strength. No cranial nerve deficit or sensory deficit. Coordination and gait normal.   Skin: Skin is warm and dry. No rash noted. No erythema. Psychiatric: She has a normal mood and affect. Her behavior is normal.   Lymph: neg anterior/cervical, occipital, supraclavicular nodes  Normal range of motion elbows, knees, ankles    EKG Interpretation: Atrial fibrillation with controlled ventricular response, unchanged from previous tracings    Lab Review: await BMP and CBC results        Assessment:       69 y.o. patient with planned surgery as above. Known risk factors for perioperative complications: Diabetes mellitus, Hypertension,A fib, hx DVT     RCRI (Revised Cardiac Risk Index - [place])  - 1 point each for:     History of CVA/TIA     ___       CHF                           ___   Ischemic cardiac disease      ___   Renal insufficiency (Cr > 2.0 mg/dL)      ___  [facility] Diabetes requiring insulin          ___   Orthopedic, supra-inguinal vascular, intra-thoracic, intra-abdominal surgical site ___       0-1 = low risk  ? 2 = elevated risk    Current medications which may produce withdrawal symptoms if withheld perioperatively: lyrica 100mg 3x a day. Plan:     1. Preoperative workup as follows: ECG, labs   2. Change in medication regimen before surgery: take your diltiazem in the AM of surgery, stop the coumadin after dose on Thursday 10/29/20 resume once your surgeon states that you can resume the medication.    3. Prophylaxis for cardiac events with perioperative beta-blockers: Not indicated  ACC/AHA indications for pre-operative beta-blocker use:    · Vascular surgery with history of postitive stress test  · Intermediate or high risk surgery with history of CAD   · Intermediate or high risk surgery with multiple clinical predictors of CAD- 2 of the following: history of compensated or prior heart failure, history of cerebrovascular disease, DM, or renal insufficiency    Routine administration of higher-dose, long-acting metoprolol in beta-blocker-naïve patients on the day of surgery, and in the absence of dose titration is associated with an overall increase in mortality. Beta-blockers should be started days to weeks prior to surgery and titrated to pulse < 70.  4. Deep vein thrombosis prophylaxis: regimen to be chosen by surgical team  5. No contraindications to planned surgery        Thank you for the referral,      THAI Rooney M.D., [facility]    10/26/2020    4:15 PM    Scribe attestation: Erica Garcia RN, am scribing for and in the presence of Be Snowden MD. Electronically signed by Rocael Fried RN on 10/26/2020 at 4:15 PM    (Copy of consult was returned to the requesting provider on the day of completion.)

## 2020-10-27 ENCOUNTER — OFFICE VISIT (OUTPATIENT)
Dept: PRIMARY CARE CLINIC | Age: 70
End: 2020-10-27
Payer: MEDICARE

## 2020-10-27 DIAGNOSIS — Z01.818 PREOP TESTING: Primary | ICD-10-CM

## 2020-10-27 LAB
ANION GAP SERPL CALCULATED.3IONS-SCNC: 15 MMOL/L (ref 3–16)
BUN BLDV-MCNC: 37 MG/DL (ref 7–20)
CALCIUM SERPL-MCNC: 9.9 MG/DL (ref 8.3–10.6)
CHLORIDE BLD-SCNC: 102 MMOL/L (ref 99–110)
CO2: 25 MMOL/L (ref 21–32)
CREAT SERPL-MCNC: 1.2 MG/DL (ref 0.6–1.2)
GFR AFRICAN AMERICAN: 54
GFR NON-AFRICAN AMERICAN: 44
GLUCOSE BLD-MCNC: 103 MG/DL (ref 70–99)
POTASSIUM SERPL-SCNC: 4.5 MMOL/L (ref 3.5–5.1)
SARS-COV-2, PCR: NOT DETECTED
SODIUM BLD-SCNC: 142 MMOL/L (ref 136–145)

## 2020-10-27 PROCEDURE — G8428 CUR MEDS NOT DOCUMENT: HCPCS | Performed by: NURSE PRACTITIONER

## 2020-10-27 PROCEDURE — G8417 CALC BMI ABV UP PARAM F/U: HCPCS | Performed by: NURSE PRACTITIONER

## 2020-10-27 PROCEDURE — 99211 OFF/OP EST MAY X REQ PHY/QHP: CPT | Performed by: NURSE PRACTITIONER

## 2020-10-27 NOTE — PROGRESS NOTES
Nunu Side received a viral test for COVID-19. They were educated on isolation and quarantine as appropriate. For any symptoms, they were directed to seek care from their PCP, given contact information to establish with a doctor, directed to an urgent care or the emergency room.

## 2020-10-28 RX ORDER — LEVOTHYROXINE SODIUM 0.1 MG/1
TABLET ORAL
Qty: 90 TABLET | Refills: 3 | Status: SHIPPED | OUTPATIENT
Start: 2020-10-28 | End: 2021-10-12

## 2020-10-28 NOTE — RESULT ENCOUNTER NOTE
Your Covid-19 test resulted not detected/negative. What happens if I have a negative test?  Remember to wash your hands often, avoid touching your face, stay 6 feet from people you do not live with, and wear a cloth facemask when you go out in public. A negative COVID-19 test at one point in time does not mean you will stay negative. You could become ill with COVID-19 and/or test positive at any time. If you are a close contact of a confirmed or suspected case, continue to stay home and away from others until 14 days after your last exposure. If you do not have symptoms and were not in close contact with a confirmed or suspected case, you can stop isolating. If you currently have symptoms of COVID-19 and were not in close contact with a confirmed or suspected case, you should keep monitoring symptoms and talk to your doctor or other healthcare provider about staying home and if you need to get tested again. If you develop symptoms of COVID-19, stay at home and away from others and talk to your doctor or other healthcare provider about getting tested again. For additional information, visit coronavirus. [place].gov. For answers to your COVID-19 questions, call 7-771-9-ASK-[facility] ([phone]).

## 2020-10-30 NOTE — PROGRESS NOTES
Danette Median    Age 69 y.o.    female    1950    [facility] [id number]    11/2/2020  Arrival Time_____________  OR Time____________[address]     Procedure(s):  EXCISION RIGHT RING FINGER MUCOUS CYST AND BONE SPUR                      Monitor Anesthesia Care    Surgeon(s):  Callie Martinez, MD       Phone [phone] ([place]) [phone] (work)    [address] [place] [address]  Cell Work  _________________________________________________________________  _________________________________________________________________  _________________________________________________________________  _________________________________________________________________  _________________________________________________________________      PCP _____________________________ Phone_________________       H&P__________________Bringing    Chart            Epic  DOS     Called_______  EKG__________________Bringing    Chart            Epic  DOS     Called_______  LAB__________________ Bringing    Chart            Epic  DOS     Called_______  Cardiac Clearance_______Bringing    Chart            Epic      DOS       Called_______    Cardiologist________________________ Phone___________________________      ? Voodoo concerns / Waiver on Chart            PAT Communications_____________  ? Pre-op Instructions Given [place]          ______________________________  ? Directions to Surgery Center                          ______________________________  ? Transportation Home_______________      _______________________________  ?  Crutches/Walker__________________        _______________________________      ________Pre-op Orders   _______Transcribed    _______Wt.  ________Pharmacy          _______SCD  ______VTE     ______Beta Blocker  ________Consent             ________TED Enrique Gear

## 2020-11-01 NOTE — H&P
I have reviewed the H&P and examined the patient and find no relevant changes. I have reviewed with the patient and/or family the risks, benefits, and alternatives to the procedure(s). Past Medical History:   Diagnosis Date    [facility]rial [facility]     [facility]     [facility]    CKD (chronic kidney disease)     [facility]     [facility]     Gout 12/02/2019    Hyperlipidemia     Hypertension     Hypotension      Past Surgical History:   Procedure Laterality Date    COLONOSCOPY      COLONOSCOPY  05/23/2018    colon polyps,diverticulosis    LYMPH NODE BIOPSY      OTHER SURGICAL HISTORY      vocal cord bx     No current facility-administered medications on file prior to encounter.       Current Outpatient Medications on File Prior to Encounter   Medication Sig Dispense Refill    levothyroxine (SYNTHROID) 100 MCG tablet TAKE 1 TABLET BY MOUTH EVERY DAY 90 tablet 3    ONETOUCH ULTRA strip USE TO TEST BLOOD SUGAR 3 TIMES A  strip 3    pregabalin (LYRICA) 100 MG capsule TAKE 1 CAPSULE BY MOUTH THREE TIMES A DAY 90 capsule 2    pentoxifylline (TRENTAL) 400 MG extended release tablet TAKE 1 TABLET BY MOUTH EVERY DAY 90 tablet 3    atorvastatin (LIPITOR) 40 MG tablet TAKE 1 TABLET BY MOUTH EVERY DAY 90 tablet 3    DILT- MG extended release capsule TAKE 1 CAPSULE BY MOUTH EVERY DAY 90 capsule 3    insulin detemir (LEVEMIR FLEXTOUCH) 100 UNIT/ML injection pen Inject 50 Units into the skin nightly 54 pen 1    colchicine (COLCRYS) 0.6 MG tablet 1 p.o. twice daily until symptoms resolve (Patient not taking: Reported on 10/26/2020) 10 tablet 0    ondansetron (ZOFRAN) 4 MG tablet Take 1 tablet by mouth every 6 hours as needed for Nausea or Vomiting 20 tablet 0    warfarin (COUMADIN) 5 MG tablet TAKE 1 tab daily except 10 mg on Monday 100 tablet 11    fluticasone (FLONASE) 50 MCG/ACT nasal spray SPRAY 1 SPRAY INTO EACH NOSTRIL EVERY DAY 1 Bottle 3    Misc. Devices (ROLLATOR) MISC 1 Rollator chair 1 each 0    BD PEN NEEDLE ELDER U/F 32G X 4 MM MISC USE DAILY 100 each 3    ONE TOUCH ULTRASOFT LANCETS MISC USE TO TEST 3 TIMES A  each 11    omeprazole (PRILOSEC) 40 MG delayed release capsule TAKE 1 CAPSULE BY MOUTH EVERY DAY 90 capsule 3    furosemide (LASIX) 20 MG tablet TAKE 1 TABLET BY MOUTH TWICE A DAY (Patient taking differently: daily ) 180 tablet 3    insulin regular (NOVOLIN R) 100 UNIT/ML injection USE AS DIRECTED PER SLIDING SCALE UP TO 26 UNITS DAILY 10 mL 5    BD VEO INSULIN SYRINGE U/F 31G X 15/64\" 0.3 ML MISC USE UP TO FIVE TIMES DAILY 100 each 11    magnesium oxide (MAG-OX) 400 MG tablet TAKE 1 TABLET BY MOUTH THREE TIMES A DAY (Patient taking differently: Take 400 mg by mouth daily ) 270 tablet 0       The patient was counseled at length about the risks of sunil Covid-19 during their perioperative period and any recovery window from their procedure. The patient was made aware that sunil Covid-19  may worsen their prognosis for recovering from their procedure  and lend to a higher morbidity and/or mortality risk. All material risks, benefits, and reasonable alternatives including postponing the procedure were discussed. The patient does wish to proceed with the procedure at this time.           Kiesha Garcia 134

## 2020-11-01 NOTE — OP NOTE
[facility]  Procedure Note  [facility]      Vertie Miracle  11/02/2020    Pre-operative Diagnosis:Right Ring Finger Mucous Ganglion cyst and Bone Spur    Post-operative Diagnosis: Same    Procedure:  1. Excision Right Ring Finger  bone spur     2. Excision Right Ring Finger Mucous Ganglion Cyst     Surgeon: Christy Watson    Anesthesia:  Local/MAC    Complications:  None    EBL: less than 10cc      INDICATIONS FOR PROCEDURE: The patient has clinical evidence of dorsal mucous ganglion cyst with bone spur and has failed appropriate conservative management. The patient understands the relevant risks, benefits, limitations, chance of recurrence and healing process of the procedure. PROCEDURE The patient was brought to the operating room and anesthetized with [address] anesthesia. The identified and marked extremity was then prepped and draped in a standard fashion. The finger or arm was exsanguinated with a tourniquet. A standard incision was made longitudinally over the dorsal prominence. Dissection carried down through skin, subcutaneous tissue, and tendinous structures were protected carefully. Dissection was performed around the ganglion. The cyst and its stalk was then excised carefully, with attention paid to protect the tendon and nail matrix at all times. The cyst was then sent for formal pathology. A dorsal bone spur was then identified and carefully excised with a combination of #[address] [place] blade and small rongeurs. Improved, smoothed DIP joint contour was achieved. No further cyst material or other abnormalities were seen. The tourniquet was released and hemostasis achieved with bipolar electrocautery. The wound was irrigated with sterile saline. Skin was closed with nylon suture. A soft, bulky dressing and splint was applied and the patient transported to the recovery area in stable condition with good perfusion to all fingertips. [address]

## 2020-11-02 ENCOUNTER — HOSPITAL ENCOUNTER (OUTPATIENT)
Age: 70
Setting detail: OUTPATIENT SURGERY
Discharge: HOME | End: 2020-11-02
Attending: ORTHOPAEDIC SURGERY | Admitting: ORTHOPAEDIC SURGERY
Payer: MEDICARE

## 2020-11-02 ENCOUNTER — ANESTHESIA EVENT (OUTPATIENT)
Dept: OPERATING ROOM | Age: 70
End: 2020-11-02
Payer: MEDICARE

## 2020-11-02 ENCOUNTER — ANESTHESIA (OUTPATIENT)
Dept: OPERATING ROOM | Age: 70
End: 2020-11-02
Payer: MEDICARE

## 2020-11-02 VITALS
HEART RATE: 67 BPM | RESPIRATION RATE: 16 BRPM | SYSTOLIC BLOOD PRESSURE: 122 MMHG | BODY MASS INDEX: 40.75 KG/M2 | DIASTOLIC BLOOD PRESSURE: 66 MMHG | HEIGHT: 63 IN | OXYGEN SATURATION: 96 % | TEMPERATURE: 97.3 F | WEIGHT: 230 LBS

## 2020-11-02 VITALS — DIASTOLIC BLOOD PRESSURE: 69 MMHG | OXYGEN SATURATION: 94 % | SYSTOLIC BLOOD PRESSURE: 123 MMHG

## 2020-11-02 LAB
GLUCOSE BLD-MCNC: 105 MG/DL (ref 70–99)
GLUCOSE BLD-MCNC: 127 MG/DL (ref 70–99)
PERFORMED ON: ABNORMAL
PERFORMED ON: ABNORMAL

## 2020-11-02 PROCEDURE — 2580000003 HC RX 258: Performed by: ANESTHESIOLOGY

## 2020-11-02 PROCEDURE — 6360000002 HC RX W HCPCS: Performed by: ORTHOPAEDIC SURGERY

## 2020-11-02 PROCEDURE — 3600000002 HC SURGERY LEVEL 2 BASE: Performed by: ORTHOPAEDIC SURGERY

## 2020-11-02 PROCEDURE — 7100000000 HC PACU RECOVERY - FIRST 15 MIN: Performed by: ORTHOPAEDIC SURGERY

## 2020-11-02 PROCEDURE — 7100000001 HC PACU RECOVERY - ADDTL 15 MIN: Performed by: ORTHOPAEDIC SURGERY

## 2020-11-02 PROCEDURE — 2500000003 HC RX 250 WO HCPCS: Performed by: NURSE ANESTHETIST, CERTIFIED REGISTERED

## 2020-11-02 PROCEDURE — 6360000002 HC RX W HCPCS: Performed by: NURSE ANESTHETIST, CERTIFIED REGISTERED

## 2020-11-02 PROCEDURE — 3600000012 HC SURGERY LEVEL 2 ADDTL 15MIN: Performed by: ORTHOPAEDIC SURGERY

## 2020-11-02 PROCEDURE — 7100000010 HC PHASE II RECOVERY - FIRST 15 MIN: Performed by: ORTHOPAEDIC SURGERY

## 2020-11-02 PROCEDURE — 7100000011 HC PHASE II RECOVERY - ADDTL 15 MIN: Performed by: ORTHOPAEDIC SURGERY

## 2020-11-02 PROCEDURE — 3700000001 HC ADD 15 MINUTES (ANESTHESIA): Performed by: ORTHOPAEDIC SURGERY

## 2020-11-02 PROCEDURE — 2580000003 HC RX 258: Performed by: ORTHOPAEDIC SURGERY

## 2020-11-02 PROCEDURE — 2500000003 HC RX 250 WO HCPCS: Performed by: ORTHOPAEDIC SURGERY

## 2020-11-02 PROCEDURE — 88304 TISSUE EXAM BY PATHOLOGIST: CPT

## 2020-11-02 PROCEDURE — 3700000000 HC ANESTHESIA ATTENDED CARE: Performed by: ORTHOPAEDIC SURGERY

## 2020-11-02 PROCEDURE — 2709999900 HC NON-CHARGEABLE SUPPLY: Performed by: ORTHOPAEDIC SURGERY

## 2020-11-02 RX ORDER — HYDRALAZINE HYDROCHLORIDE 20 MG/ML
5 INJECTION INTRAMUSCULAR; INTRAVENOUS EVERY 10 MIN PRN
Status: DISCONTINUED | OUTPATIENT
Start: 2020-11-02 | End: 2020-11-02 | Stop reason: HOSPADM

## 2020-11-02 RX ORDER — GLYCOPYRROLATE 1 MG/5 ML
SYRINGE (ML) INTRAVENOUS PRN
Status: DISCONTINUED | OUTPATIENT
Start: 2020-11-02 | End: 2020-11-02 | Stop reason: SDUPTHER

## 2020-11-02 RX ORDER — MAGNESIUM HYDROXIDE 1200 MG/15ML
LIQUID ORAL CONTINUOUS PRN
Status: COMPLETED | OUTPATIENT
Start: 2020-11-02 | End: 2020-11-02

## 2020-11-02 RX ORDER — PROMETHAZINE HYDROCHLORIDE 25 MG/ML
6.25 INJECTION, SOLUTION INTRAMUSCULAR; INTRAVENOUS
Status: DISCONTINUED | OUTPATIENT
Start: 2020-11-02 | End: 2020-11-02 | Stop reason: HOSPADM

## 2020-11-02 RX ORDER — OXYCODONE HYDROCHLORIDE AND ACETAMINOPHEN 5; 325 MG/1; MG/1
2 TABLET ORAL PRN
Status: DISCONTINUED | OUTPATIENT
Start: 2020-11-02 | End: 2020-11-02 | Stop reason: HOSPADM

## 2020-11-02 RX ORDER — BUPIVACAINE HYDROCHLORIDE 5 MG/ML
INJECTION, SOLUTION EPIDURAL; INTRACAUDAL PRN
Status: DISCONTINUED | OUTPATIENT
Start: 2020-11-02 | End: 2020-11-02 | Stop reason: ALTCHOICE

## 2020-11-02 RX ORDER — SODIUM CHLORIDE, SODIUM LACTATE, POTASSIUM CHLORIDE, CALCIUM CHLORIDE 600; 310; 30; 20 MG/100ML; MG/100ML; MG/100ML; MG/100ML
INJECTION, SOLUTION INTRAVENOUS CONTINUOUS
Status: DISCONTINUED | OUTPATIENT
Start: 2020-11-02 | End: 2020-11-02 | Stop reason: HOSPADM

## 2020-11-02 RX ORDER — LABETALOL HYDROCHLORIDE 5 MG/ML
5 INJECTION, SOLUTION INTRAVENOUS EVERY 10 MIN PRN
Status: DISCONTINUED | OUTPATIENT
Start: 2020-11-02 | End: 2020-11-02 | Stop reason: HOSPADM

## 2020-11-02 RX ORDER — MEPERIDINE HYDROCHLORIDE 50 MG/ML
12.5 INJECTION INTRAMUSCULAR; INTRAVENOUS; SUBCUTANEOUS EVERY 5 MIN PRN
Status: DISCONTINUED | OUTPATIENT
Start: 2020-11-02 | End: 2020-11-02 | Stop reason: HOSPADM

## 2020-11-02 RX ORDER — MORPHINE SULFATE 2 MG/ML
2 INJECTION, SOLUTION INTRAMUSCULAR; INTRAVENOUS EVERY 5 MIN PRN
Status: DISCONTINUED | OUTPATIENT
Start: 2020-11-02 | End: 2020-11-02 | Stop reason: HOSPADM

## 2020-11-02 RX ORDER — FENTANYL CITRATE 50 UG/ML
INJECTION, SOLUTION INTRAMUSCULAR; INTRAVENOUS PRN
Status: DISCONTINUED | OUTPATIENT
Start: 2020-11-02 | End: 2020-11-02 | Stop reason: SDUPTHER

## 2020-11-02 RX ORDER — DIPHENHYDRAMINE HYDROCHLORIDE 50 MG/ML
12.5 INJECTION INTRAMUSCULAR; INTRAVENOUS
Status: DISCONTINUED | OUTPATIENT
Start: 2020-11-02 | End: 2020-11-02 | Stop reason: HOSPADM

## 2020-11-02 RX ORDER — MORPHINE SULFATE 2 MG/ML
1 INJECTION, SOLUTION INTRAMUSCULAR; INTRAVENOUS EVERY 5 MIN PRN
Status: DISCONTINUED | OUTPATIENT
Start: 2020-11-02 | End: 2020-11-02 | Stop reason: HOSPADM

## 2020-11-02 RX ORDER — HYDROCODONE BITARTRATE AND ACETAMINOPHEN 5; 325 MG/1; MG/1
1 TABLET ORAL EVERY 6 HOURS PRN
Qty: 10 TABLET | Refills: 0 | Status: SHIPPED | OUTPATIENT
Start: 2020-11-02 | End: 2020-11-09

## 2020-11-02 RX ORDER — PROPOFOL 10 MG/ML
INJECTION, EMULSION INTRAVENOUS PRN
Status: DISCONTINUED | OUTPATIENT
Start: 2020-11-02 | End: 2020-11-02 | Stop reason: SDUPTHER

## 2020-11-02 RX ORDER — ONDANSETRON 2 MG/ML
4 INJECTION INTRAMUSCULAR; INTRAVENOUS PRN
Status: DISCONTINUED | OUTPATIENT
Start: 2020-11-02 | End: 2020-11-02 | Stop reason: HOSPADM

## 2020-11-02 RX ORDER — OXYCODONE HYDROCHLORIDE AND ACETAMINOPHEN 5; 325 MG/1; MG/1
1 TABLET ORAL PRN
Status: DISCONTINUED | OUTPATIENT
Start: 2020-11-02 | End: 2020-11-02 | Stop reason: HOSPADM

## 2020-11-02 RX ORDER — LIDOCAINE HYDROCHLORIDE 20 MG/ML
INJECTION, SOLUTION INFILTRATION; PERINEURAL PRN
Status: DISCONTINUED | OUTPATIENT
Start: 2020-11-02 | End: 2020-11-02 | Stop reason: SDUPTHER

## 2020-11-02 RX ADMIN — Medication 0.1 MG: at 07:48

## 2020-11-02 RX ADMIN — LIDOCAINE HYDROCHLORIDE 60 MG: 20 INJECTION, SOLUTION INFILTRATION; PERINEURAL at 07:51

## 2020-11-02 RX ADMIN — CEFAZOLIN SODIUM 2 G: 10 INJECTION, POWDER, FOR SOLUTION INTRAVENOUS at 07:48

## 2020-11-02 RX ADMIN — SODIUM CHLORIDE, POTASSIUM CHLORIDE, SODIUM LACTATE AND CALCIUM CHLORIDE: 600; 310; 30; 20 INJECTION, SOLUTION INTRAVENOUS at 07:48

## 2020-11-02 RX ADMIN — PROPOFOL 175 MG: 10 INJECTION, EMULSION INTRAVENOUS at 07:51

## 2020-11-02 RX ADMIN — FENTANYL CITRATE 25 MCG: 50 INJECTION INTRAMUSCULAR; INTRAVENOUS at 07:47

## 2020-11-02 RX ADMIN — SODIUM CHLORIDE, POTASSIUM CHLORIDE, SODIUM LACTATE AND CALCIUM CHLORIDE: 600; 310; 30; 20 INJECTION, SOLUTION INTRAVENOUS at 07:12

## 2020-11-02 ASSESSMENT — PULMONARY FUNCTION TESTS
PIF_VALUE: 0
PIF_VALUE: 1
PIF_VALUE: 0

## 2020-11-02 ASSESSMENT — PAIN - FUNCTIONAL ASSESSMENT: PAIN_FUNCTIONAL_ASSESSMENT: 0-10

## 2020-11-02 ASSESSMENT — PAIN SCALES - GENERAL: PAINLEVEL_OUTOF10: 0

## 2020-11-02 NOTE — PROGRESS NOTES
POCT      Blood Glucose: 105      (Normal Range 70-99)    PT:      (Normal Range 9.8 - 13)    INR:      (Normal Range 0.86-1.14)

## 2020-11-02 NOTE — ANESTHESIA POSTPROCEDURE EVALUATION
Date/Time                  NA                       142                 10/26/2020 05:03 PM        K                        4.5                 10/26/2020 05:03 PM        CL                       102                 10/26/2020 05:03 PM        CO2                      25                  10/26/2020 05:03 PM        BUN                      37 (H)              10/26/2020 05:03 PM        CREATININE               1.2                 10/26/2020 05:03 PM        GLUCOSE                  103 (H)             10/26/2020 05:03 PM        GLUCOSE                  123 (H)             03/27/2017 02:22 PM   COAGS  Lab Results       Component                Value               Date/Time                  PROTIME                  20.1 (H)            12/29/2015 10:01 AM        PROTIME                  30                  04/09/2013                 INR                      2.4                 10/12/2020 09:10 AM        INR                      1. 74 (H)            12/29/2015 10:01 AM        APTT                     46.5 (H)            10/15/2015 12:45 PM     Intake & Output:  @47GXZK@    Nausea & Vomiting:  No    Level of Consciousness:  Awake    Pain Assessment:  Adequate analgesia    Anesthesia Complications:  No apparent anesthetic complications    SUMMARY      Vital signs stable  OK to discharge from Stage I post anesthesia care.   Care transferred from Anesthesiology department on discharge from perioperative area

## 2020-11-02 NOTE — ANESTHESIA PRE PROCEDURE
0.6 MG tablet 1 p.o. twice daily until symptoms resolve  Patient not taking: Reported on 10/26/2020 6/22/20   Matthew Polk MD   ondansetron [facility]) 4 MG tablet Take 1 tablet by mouth every 6 hours as needed for Nausea or Vomiting 6/22/20   Matthew Polk MD   warfarin (COUMADIN) 5 MG tablet TAKE 1 tab daily except 10 mg on Monday 6/16/20   Matthew Polk MD   fluticasone [facility]) 50 MCG/ACT nasal spray SPRAY 1 SPRAY INTO EACH NOSTRIL EVERY DAY 6/15/20   Matthew Polk MD   Misc.  Devices (ROLLATOR) MISC 1 Rollator chair 6/12/20   Matthew Polk MD   BD PEN NEEDLE ELDER U/F 32G X 4 MM MISC USE DAILY 5/26/20   Quilla Bulla, APRN - CNP   ONE TOUCH ULTRASOFT LANCETS MISC USE TO TEST 3 TIMES A DAY 5/4/20   Matthew Polk MD   BD VEO INSULIN SYRINGE U/F 31G X 15/64\" 0.3 ML MISC USE UP TO FIVE TIMES DAILY 12/11/19   Matthew Polk MD       Current medications:    Current Facility-Administered Medications   Medication Dose Route Frequency Provider Last Rate Last Dose    ceFAZolin (ANCEF) 2 g in dextrose 5 % 100 mL IVPB  2 g Intravenous On Call to [address] [place] [address] Street, MD        lactated ringers infusion   Intravenous Continuous Les MD Erika 100 mL/hr at 11/02/20 0712         Allergies:  No Known Allergies    Problem List:    Patient Active Problem List   Diagnosis Code    Benign essential HTN I10    Personal history of lymphoma Z85.72    DVT (deep venous thrombosis) (HCC) I82.409    Hypotension I95.9    Hematochezia K92.1    Stasis dermatitis I87.2    GERD (gastroesophageal reflux disease) K21.9    Fracture of left ankle S82.892A    Insomnia G47.00    Insulin-requiring or dependent type II diabetes mellitus ([facility] E11.9, Z79.4    Peripheral vascular disease in diabetes mellitus ([facility] E11.51    Hypomagnesemia E83.42    Venous insufficiency I87.2    Long term current use of anticoagulant therapy Z79.01    CKD stage 4 due to type 2 diabetes mellitus ([facility] E11.22, N18.4    Chronic atrial fibrillation (HCC) I48.20    Acute diverticulitis K57.92    Medial meniscus tear S83.249A    Morbid obesity due to excess calories (HCC) E66.01    Hypothyroidism due to Hashimoto's thyroiditis E03.8, E06.3    History of herpes zoster Z86.19    Trigger finger of left thumb M65.312    Trigger finger, left middle finger M65.332    Diabetic mononeuropathy associated with type 2 diabetes mellitus (HCC) E11.41    History of colon polyps Z86.010    Sigmoid polyp K63.5    Polyp of ascending colon K63.5    Leg cramps R25.2    Plantar fasciitis M72.2    Acute gout involving toe of left foot M10.9    Synovial cyst of hand M71.349    Thrombocytopenia (HCC) D69.6    Arthritis of finger of both hands M19.041, M19.042    Mucous cyst of digit of right hand M67.441       Past Medical History:        Diagnosis Date    Atrial fibrillation (HCC)     Cancer (HCC)     lymphoma    CKD (chronic kidney disease)     Diabetes mellitus ([facility]     DVT (deep venous thrombosis) ([facility]     Gout 2019    Hyperlipidemia     Hypertension     Hypotension        Past Surgical History:        Procedure Laterality Date    COLONOSCOPY      COLONOSCOPY  2018    colon polyps,diverticulosis    LYMPH NODE BIOPSY      OTHER SURGICAL HISTORY      vocal cord bx       Social History:    Social History     Tobacco Use    Smoking status: Former Smoker     Packs/day: 1.00     Years: 35.00     Pack years: 35.00     Last attempt to quit:      Years since quittin.8    Smokeless tobacco: Never Used   Substance Use Topics    Alcohol use: No     Alcohol/week: 0.0 standard drinks                                Counseling given: Not Answered      Vital Signs (Current):   Vitals:    10/30/20 1309 20 0644   BP:  (!) 166/76   Pulse:  74   Resp:  16   Temp:  97.1 °F (36.2 °C)   TempSrc:  Temporal   SpO2:  98%   Weight: 230 lb (104.3 kg) 230 lb (104.3 kg)   Height: 5' 3\" (1.6 m) 5' 3\" (1.6 m)                                              BP Readings from Last 3 Encounters:   11/02/20 (!) 166/76   10/26/20 138/78   09/22/20 126/74       NPO Status: Time of last liquid consumption: 1630                        Time of last solid consumption: 0430                        Date of last liquid consumption: 11/01/20                        Date of last solid food consumption: 11/01/20    BMI:   Wt Readings from Last 3 Encounters:   11/02/20 230 lb (104.3 kg)   10/26/20 230 lb 12.8 oz (104.7 kg)   10/01/20 235 lb (106.6 kg)     Body mass index is 40.74 kg/m².     CBC:   Lab Results   Component Value Date    WBC 8.5 10/26/2020    RBC 5.68 10/26/2020    RBC 5.13 03/27/2017    HGB 17.0 10/26/2020    HCT 51.5 10/26/2020    MCV 90.6 10/26/2020    RDW 15.2 10/26/2020     10/26/2020       CMP:   Lab Results   Component Value Date     10/26/2020    K 4.5 10/26/2020     10/26/2020    CO2 25 10/26/2020    BUN 37 10/26/2020    CREATININE 1.2 10/26/2020    GFRAA 54 10/26/2020    GFRAA 39 03/23/2013    AGRATIO 1.5 11/05/2019    LABGLOM 44 10/26/2020    GLUCOSE 103 10/26/2020    GLUCOSE 123 03/27/2017    PROT 6.8 11/05/2019    PROT 6.6 03/27/2017    CALCIUM 9.9 10/26/2020    BILITOT 0.5 11/05/2019    ALKPHOS 78 11/05/2019    AST 21 11/05/2019    ALT 18 11/05/2019       POC Tests:   Recent Labs     11/02/20  0659   POCGLU 105*       Coags:   Lab Results   Component Value Date    PROTIME 20.1 12/29/2015    PROTIME 30 04/09/2013    INR 2.4 10/12/2020    INR 1.74 12/29/2015    APTT 46.5 10/15/2015       HCG (If Applicable): No results found for: PREGTESTUR, PREGSERUM, HCG, HCGQUANT     ABGs: No results found for: PHART, PO2ART, UEB7YGY, FRN0ELU, BEART, X8REAFON     Type & Screen (If Applicable):  No results found for: LABABO, LABRH    Drug/Infectious Status (If Applicable):  No results found for: HIV, HEPCAB    COVID-19 Screening (If Applicable):   Lab Results   Component Value Date    COVID19 Not Detected 10/27/2020         Anesthesia Evaluation  Patient summary reviewed no history of anesthetic complications:   Airway: Mallampati: II  TM distance: >3 FB   Neck ROM: full  Mouth opening: > = 3 FB Dental: normal exam         Pulmonary:Negative Pulmonary ROS and normal exam  breath sounds clear to auscultation                             Cardiovascular:  Exercise tolerance: good (>4 METS),   (+) hypertension:,         Rhythm: regular  Rate: normal                    Neuro/Psych:   Negative Neuro/Psych ROS              GI/Hepatic/Renal:   (+) GERD: well controlled,           Endo/Other:    (+) Diabetes, hypothyroidism::., .                 Abdominal:           Vascular: negative vascular ROS. Pre-Operative Diagnosis: Digital mucinous cyst of finger of right hand [M67.441]    69 y.o.   BMI:  Body mass index is 40.74 kg/m².      Vitals:    10/30/20 1309 20 0644   BP:  (!) 166/76   Pulse:  74   Resp:  16   Temp:  97.1 °F (36.2 °C)   TempSrc:  Temporal   SpO2:  98%   Weight: 230 lb (104.3 kg) 230 lb (104.3 kg)   Height: 5' 3\" (1.6 m) 5' 3\" (1.6 m)       No Known Allergies    Social History     Tobacco Use    Smoking status: Former Smoker     Packs/day: 1.00     Years: 35.00     Pack years: 35.00     Last attempt to quit:      Years since quittin.8    Smokeless tobacco: Never Used   Substance Use Topics    Alcohol use: No     Alcohol/week: 0.0 standard drinks       LABS:    CBC  Lab Results   Component Value Date/Time    WBC 8.5 10/26/2020 05:03 PM    HGB 17.0 (H) 10/26/2020 05:03 PM    HCT 51.5 (H) 10/26/2020 05:03 PM     10/26/2020 05:03 PM     RENAL  Lab Results   Component Value Date/Time     10/26/2020 05:03 PM    K 4.5 10/26/2020 05:03 PM     10/26/2020 05:03 PM    CO2 25 10/26/2020 05:03 PM    BUN 37 (H) 10/26/2020 05:03 PM    CREATININE 1.2 10/26/2020 05:03 PM    GLUCOSE 103 (H) 10/26/2020 05:03 PM    GLUCOSE 123

## 2020-11-11 RX ORDER — INSULIN DETEMIR 100 [IU]/ML
INJECTION, SOLUTION SUBCUTANEOUS
Qty: 5 PEN | Refills: 5 | Status: SHIPPED | OUTPATIENT
Start: 2020-11-11 | End: 2021-02-03

## 2020-11-11 NOTE — TELEPHONE ENCOUNTER
Last OV 10/26/20  Future Appointments   Date Time Provider Johana Lópezi   11/12/2020  9:00 AM SCHEDULE, [facility]   11/13/2020  8:30 AM Umair Alvarez MD [facility]   1/13/2021  8:20 AM Elyse Tucker MD [facility]

## 2020-11-12 ENCOUNTER — NURSE ONLY (OUTPATIENT)
Dept: FAMILY MEDICINE CLINIC | Age: 70
End: 2020-11-12
Payer: MEDICARE

## 2020-11-12 LAB
INTERNATIONAL NORMALIZATION RATIO, POC: 2.2
PROTHROMBIN TIME, POC: NORMAL

## 2020-11-12 PROCEDURE — 85610 PROTHROMBIN TIME: CPT | Performed by: FAMILY MEDICINE

## 2020-11-13 ENCOUNTER — OFFICE VISIT (OUTPATIENT)
Dept: ORTHOPEDIC SURGERY | Age: 70
End: 2020-11-13

## 2020-11-13 VITALS — HEIGHT: 63 IN | WEIGHT: 230 LBS | BODY MASS INDEX: 40.75 KG/M2 | RESPIRATION RATE: 16 BRPM

## 2020-11-13 PROCEDURE — 99024 POSTOP FOLLOW-UP VISIT: CPT | Performed by: ORTHOPAEDIC SURGERY

## 2020-11-13 NOTE — PROGRESS NOTES
Chief Complaint:  Post-Op Check (RIGHT RING FINGER MUCOUS CYST)      History of Present of Illness: The patient returns and reports overall doing well after excision of right ring finger mucous cyst and bone spur. I shared with her the results from the pathology report confirming benign mucous cyst and bone spur. Review of Systems  Pertinent items are noted in HPI  Denies fever, chills, confusion, bowel/bladder active change. Review of systems reviewed from Patient History Form dated on 10/1/2020 and available in the patient's chart under the Media tab. Examination: The wound is healing nicely without signs of infection. She demonstrates baseline range of motion with good integrity of the terminal extensor tendon. Radiology:     X-rays obtained and reviewed in office:  Views    Location    Impression      No orders of the defined types were placed in this encounter. Impression:  Encounter Diagnosis   Name Primary?  Mucous cyst of digit of right hand Yes         Treatment Plan: Today we will go ahead and remove sutures, apply a simple wound care and Steri-Strip, and we talked about advancement of activities and wound care moving forward. She has a good understanding of the relative chance of recurrence moving forward. I will see her back on an as-needed basis. Please note that this transcription was created using voice recognition software. Any errors are unintentional and may be due to voice recognition transcription.

## 2020-12-10 ENCOUNTER — NURSE ONLY (OUTPATIENT)
Dept: FAMILY MEDICINE CLINIC | Age: 70
End: 2020-12-10
Payer: MEDICARE

## 2020-12-10 LAB
INTERNATIONAL NORMALIZATION RATIO, POC: 2.7
PROTHROMBIN TIME, POC: NORMAL

## 2020-12-10 PROCEDURE — 85610 PROTHROMBIN TIME: CPT | Performed by: FAMILY MEDICINE

## 2020-12-21 RX ORDER — SYRING-NEEDL,DISP,INSUL,0.3 ML 31GX15/64"
SYRINGE, EMPTY DISPOSABLE MISCELLANEOUS
Qty: 90 EACH | Refills: 11 | Status: SHIPPED | OUTPATIENT
Start: 2020-12-21 | End: 2021-12-27

## 2021-01-11 ENCOUNTER — ANTI-COAG VISIT (OUTPATIENT)
Dept: FAMILY MEDICINE CLINIC | Age: 71
End: 2021-01-11

## 2021-01-11 ENCOUNTER — NURSE ONLY (OUTPATIENT)
Dept: FAMILY MEDICINE CLINIC | Age: 71
End: 2021-01-11
Payer: MEDICARE

## 2021-01-11 DIAGNOSIS — I48.20 CHRONIC ATRIAL FIBRILLATION (HCC): ICD-10-CM

## 2021-01-11 LAB
INTERNATIONAL NORMALIZATION RATIO, POC: 2.3
PROTHROMBIN TIME, POC: NORMAL

## 2021-01-11 PROCEDURE — 85610 PROTHROMBIN TIME: CPT | Performed by: FAMILY MEDICINE

## 2021-01-13 ENCOUNTER — VIRTUAL VISIT (OUTPATIENT)
Dept: FAMILY MEDICINE CLINIC | Age: 71
End: 2021-01-13
Payer: MEDICARE

## 2021-01-13 DIAGNOSIS — E11.9 INSULIN-REQUIRING OR DEPENDENT TYPE II DIABETES MELLITUS (HCC): Primary | ICD-10-CM

## 2021-01-13 DIAGNOSIS — E66.01 MORBID OBESITY DUE TO EXCESS CALORIES (HCC): ICD-10-CM

## 2021-01-13 DIAGNOSIS — Z79.4 INSULIN-REQUIRING OR DEPENDENT TYPE II DIABETES MELLITUS (HCC): Primary | ICD-10-CM

## 2021-01-13 DIAGNOSIS — E11.51 PERIPHERAL VASCULAR DISEASE IN DIABETES MELLITUS (HCC): ICD-10-CM

## 2021-01-13 DIAGNOSIS — C83.53 LYMPHOBLASTIC DIFFUSE LYMPHOMA OF INTRA-ABDOMINAL LYMPH NODES (HCC): ICD-10-CM

## 2021-01-13 DIAGNOSIS — E11.22 CKD STAGE 4 DUE TO TYPE 2 DIABETES MELLITUS (HCC): ICD-10-CM

## 2021-01-13 DIAGNOSIS — I50.42 CHRONIC COMBINED SYSTOLIC AND DIASTOLIC CONGESTIVE HEART FAILURE (HCC): ICD-10-CM

## 2021-01-13 DIAGNOSIS — N18.4 CKD STAGE 4 DUE TO TYPE 2 DIABETES MELLITUS (HCC): ICD-10-CM

## 2021-01-13 DIAGNOSIS — I48.20 CHRONIC ATRIAL FIBRILLATION (HCC): ICD-10-CM

## 2021-01-13 PROBLEM — D69.6 THROMBOCYTOPENIA (HCC): Status: RESOLVED | Noted: 2020-09-22 | Resolved: 2021-01-13

## 2021-01-13 PROCEDURE — 99441 PR PHYS/QHP TELEPHONE EVALUATION 5-10 MIN: CPT | Performed by: FAMILY MEDICINE

## 2021-01-13 ASSESSMENT — PATIENT HEALTH QUESTIONNAIRE - PHQ9
1. LITTLE INTEREST OR PLEASURE IN DOING THINGS: 0
SUM OF ALL RESPONSES TO PHQ QUESTIONS 1-9: 0
SUM OF ALL RESPONSES TO PHQ QUESTIONS 1-9: 0
2. FEELING DOWN, DEPRESSED OR HOPELESS: 0
SUM OF ALL RESPONSES TO PHQ9 QUESTIONS 1 & 2: 0
SUM OF ALL RESPONSES TO PHQ QUESTIONS 1-9: 0

## 2021-01-13 NOTE — PROGRESS NOTES
Melissa Sanchez is a 69 y.o. female evaluated via telephone on 1/13/2021. Diabetes: patients sugars have been running in a good range 120's-130's. Patient is slight concerned however due to her sugars being lower in the evening usually in the 120's but higher when she checks in the Am after waking up in the 130's. Patient had occasional low sugars. She used to be on 60 units of Levemir at bedtime, currently uses 50 which would cut back because of low readings. covid 19 vaccines were discussed with patient. Patient will be receiving the vaccine once available. Hypothyroid: denies any symptoms of her hypothyroid, continues to take her synthroid daily on an empty stomach in the AM.     ASSESSMENT PLAN      Diagnosis Orders   1. Insulin-requiring or dependent type II diabetes mellitus ([address]     2. 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 Chronic combined systolic and diastolic congestive heart failure ([address]     4. Peripheral vascular disease in diabetes mellitus ([address]     5. CKD stage 4 due to type 2 diabetes mellitus ([address]     6. Chronic atrial fibrillation (HCC)     7. Morbid obesity due to excess calories ([address]     We will leave patient's insulin the same for now basal bolus. Last A1c 7.3 in July, recheck with INR next month. Follow-up in office 3 months. Her lymphoma is stable. No evidence of active heart failure. No new limb ischemia symptoms. Continue to monitor BMP. Her A. fib has been rate controlled in the past.  She did try to cut back on carbs and her sugar control is better although she did not lose weight. Patient should call the office immediately with new or ongoing signs or symptoms or worsening, or proceed to the emergency room. No changes in past medical history, past surgical history, social history, or family history were noted during the patient encounter unless specifically listed above. All updates of past medical history, past surgical history, social history, or family history were reviewed personally by me during the office visit. All problems listed in the assessment are stable unless noted otherwise. Medication profile reviewed personally by me during the visit. Medication side effects and possible impairments from medications were discussed as applicable. This document was prepared by a combination of typing and transcription through a voice recognition software. Consent:  She and/or health care decision maker is aware that that she may receive a bill for this telephone service, depending on her insurance coverage, and has provided verbal consent to proceed: Yes      Documentation:  I communicated with the patient and/or health care decision maker about see above. Details of this discussion including any medical advice provided: See above      I affirm this is a Patient Initiated Episode with an Established Patient who has not had a related appointment within my department in the past 7 days or scheduled within the next 24 hours.     Total Time: minutes: 5-10 minutes    Note: not billable if this call serves to triage the patient into an appointment for the relevant concern      Parker Dumont

## 2021-02-03 RX ORDER — INSULIN DETEMIR 100 [IU]/ML
INJECTION, SOLUTION SUBCUTANEOUS
Qty: 5 PEN | Refills: 1 | Status: SHIPPED | OUTPATIENT
Start: 2021-02-03 | End: 2021-07-27

## 2021-02-03 RX ORDER — WARFARIN SODIUM 5 MG/1
TABLET ORAL
Qty: 90 TABLET | Refills: 3 | Status: SHIPPED | OUTPATIENT
Start: 2021-02-03 | End: 2021-07-26

## 2021-02-03 RX ORDER — OMEPRAZOLE 40 MG/1
CAPSULE, DELAYED RELEASE ORAL
Qty: 90 CAPSULE | Refills: 3 | Status: SHIPPED | OUTPATIENT
Start: 2021-02-03 | End: 2022-02-09

## 2021-02-08 ENCOUNTER — NURSE ONLY (OUTPATIENT)
Dept: FAMILY MEDICINE CLINIC | Age: 71
End: 2021-02-08
Payer: MEDICARE

## 2021-02-08 ENCOUNTER — ANTI-COAG VISIT (OUTPATIENT)
Dept: FAMILY MEDICINE CLINIC | Age: 71
End: 2021-02-08

## 2021-02-08 DIAGNOSIS — Z79.4 INSULIN-REQUIRING OR DEPENDENT TYPE II DIABETES MELLITUS (HCC): ICD-10-CM

## 2021-02-08 DIAGNOSIS — I48.20 CHRONIC ATRIAL FIBRILLATION (HCC): ICD-10-CM

## 2021-02-08 DIAGNOSIS — E11.9 INSULIN-REQUIRING OR DEPENDENT TYPE II DIABETES MELLITUS (HCC): ICD-10-CM

## 2021-02-08 LAB
ALBUMIN SERPL-MCNC: 4.3 G/DL (ref 3.4–5)
ANION GAP SERPL CALCULATED.3IONS-SCNC: 13 MMOL/L (ref 3–16)
BASOPHILS ABSOLUTE: 0 K/UL (ref 0–0.2)
BASOPHILS RELATIVE PERCENT: 0.5 %
BUN BLDV-MCNC: 29 MG/DL (ref 7–20)
CALCIUM SERPL-MCNC: 9.6 MG/DL (ref 8.3–10.6)
CHLORIDE BLD-SCNC: 99 MMOL/L (ref 99–110)
CO2: 28 MMOL/L (ref 21–32)
CREAT SERPL-MCNC: 1.1 MG/DL (ref 0.6–1.2)
EOSINOPHILS ABSOLUTE: 0.1 K/UL (ref 0–0.6)
EOSINOPHILS RELATIVE PERCENT: 0.8 %
GFR AFRICAN AMERICAN: 59
GFR NON-AFRICAN AMERICAN: 49
GLUCOSE BLD-MCNC: 128 MG/DL (ref 70–99)
HCT VFR BLD CALC: 51.1 % (ref 36–48)
HEMOGLOBIN: 16.4 G/DL (ref 12–16)
INTERNATIONAL NORMALIZATION RATIO, POC: 2.3
LYMPHOCYTES ABSOLUTE: 2.6 K/UL (ref 1–5.1)
LYMPHOCYTES RELATIVE PERCENT: 30 %
MCH RBC QN AUTO: 29.4 PG (ref 26–34)
MCHC RBC AUTO-ENTMCNC: 32.1 G/DL (ref 31–36)
MCV RBC AUTO: 91.3 FL (ref 80–100)
MONOCYTES ABSOLUTE: 0.9 K/UL (ref 0–1.3)
MONOCYTES RELATIVE PERCENT: 10.4 %
NEUTROPHILS ABSOLUTE: 5 K/UL (ref 1.7–7.7)
NEUTROPHILS RELATIVE PERCENT: 58.3 %
PARATHYROID HORMONE INTACT: 59.3 PG/ML (ref 14–72)
PDW BLD-RTO: 15.6 % (ref 12.4–15.4)
PHOSPHORUS: 3.8 MG/DL (ref 2.5–4.9)
PLATELET # BLD: 208 K/UL (ref 135–450)
PMV BLD AUTO: 8.7 FL (ref 5–10.5)
POTASSIUM SERPL-SCNC: 4.9 MMOL/L (ref 3.5–5.1)
PROTHROMBIN TIME, POC: NORMAL
RBC # BLD: 5.6 M/UL (ref 4–5.2)
SODIUM BLD-SCNC: 140 MMOL/L (ref 136–145)
WBC # BLD: 8.5 K/UL (ref 4–11)

## 2021-02-08 PROCEDURE — 36415 COLL VENOUS BLD VENIPUNCTURE: CPT | Performed by: FAMILY MEDICINE

## 2021-02-08 PROCEDURE — 85610 PROTHROMBIN TIME: CPT | Performed by: FAMILY MEDICINE

## 2021-02-09 LAB
ESTIMATED AVERAGE GLUCOSE: 165.7 MG/DL
HBA1C MFR BLD: 7.4 %

## 2021-02-10 ENCOUNTER — TELEPHONE (OUTPATIENT)
Dept: FAMILY MEDICINE CLINIC | Age: 71
End: 2021-02-10

## 2021-02-22 DIAGNOSIS — N18.4 CKD STAGE 4 DUE TO TYPE 2 DIABETES MELLITUS (HCC): ICD-10-CM

## 2021-02-22 DIAGNOSIS — E11.9 INSULIN-REQUIRING OR DEPENDENT TYPE II DIABETES MELLITUS (HCC): ICD-10-CM

## 2021-02-22 DIAGNOSIS — Z79.4 INSULIN-REQUIRING OR DEPENDENT TYPE II DIABETES MELLITUS (HCC): ICD-10-CM

## 2021-02-22 DIAGNOSIS — E11.22 CKD STAGE 4 DUE TO TYPE 2 DIABETES MELLITUS (HCC): ICD-10-CM

## 2021-02-22 RX ORDER — LANCETS
EACH MISCELLANEOUS
Qty: 100 EACH | Refills: 5 | Status: SHIPPED | OUTPATIENT
Start: 2021-02-22 | End: 2021-04-07 | Stop reason: SDUPTHER

## 2021-02-26 ENCOUNTER — VIRTUAL VISIT (OUTPATIENT)
Dept: FAMILY MEDICINE CLINIC | Age: 71
End: 2021-02-26
Payer: MEDICARE

## 2021-02-26 VITALS — TEMPERATURE: 97 F | BODY MASS INDEX: 40.75 KG/M2 | WEIGHT: 230 LBS | HEIGHT: 63 IN

## 2021-02-26 DIAGNOSIS — Z00.00 ROUTINE GENERAL MEDICAL EXAMINATION AT A HEALTH CARE FACILITY: Primary | ICD-10-CM

## 2021-02-26 PROCEDURE — 4040F PNEUMOC VAC/ADMIN/RCVD: CPT | Performed by: FAMILY MEDICINE

## 2021-02-26 PROCEDURE — 1123F ACP DISCUSS/DSCN MKR DOCD: CPT | Performed by: FAMILY MEDICINE

## 2021-02-26 PROCEDURE — G0439 PPPS, SUBSEQ VISIT: HCPCS | Performed by: FAMILY MEDICINE

## 2021-02-26 PROCEDURE — 3017F COLORECTAL CA SCREEN DOC REV: CPT | Performed by: FAMILY MEDICINE

## 2021-02-26 ASSESSMENT — LIFESTYLE VARIABLES: HOW OFTEN DO YOU HAVE A DRINK CONTAINING ALCOHOL: 0

## 2021-02-26 ASSESSMENT — PATIENT HEALTH QUESTIONNAIRE - PHQ9
2. FEELING DOWN, DEPRESSED OR HOPELESS: 0
SUM OF ALL RESPONSES TO PHQ QUESTIONS 1-9: 0
SUM OF ALL RESPONSES TO PHQ9 QUESTIONS 1 & 2: 0
1. LITTLE INTEREST OR PLEASURE IN DOING THINGS: 0

## 2021-02-26 NOTE — PATIENT INSTRUCTIONS
Personalized Preventive Plan for [place] - 2/26/2021  Medicare offers a range of preventive health benefits. Some of the tests and screenings are paid in full while other may be subject to a deductible, co-insurance, and/or copay. Some of these benefits include a comprehensive review of your medical history including lifestyle, illnesses that may run in your family, and various assessments and screenings as appropriate. After reviewing your medical record and screening and assessments performed today your provider may have ordered immunizations, labs, imaging, and/or referrals for you. A list of these orders (if applicable) as well as your Preventive Care list are included within your After Visit Summary for your review. Other Preventive Recommendations:    · A preventive eye exam performed by an eye specialist is recommended every 1-2 years to screen for glaucoma; cataracts, macular degeneration, and other eye disorders. · A preventive dental visit is recommended every 6 months. · Try to get at least 150 minutes of exercise per week or 10,000 steps per day on a pedometer . · Order or download the FREE \"Exercise & Physical Activity: Your Everyday Guide\" from [employer]. Call 0-198.433.3075 or search [employer] online. · You need 5187-9436 mg of calcium and 7922-4510 IU of vitamin D per day. It is possible to meet your calcium requirement with diet alone, but a vitamin D supplement is usually necessary to meet this goal.  · When exposed to the sun, use a sunscreen that protects against both UVA and UVB radiation with an SPF of 30 or greater. Reapply every 2 to 3 hours or after sweating, drying off with a towel, or swimming. · Always wear a seat belt when traveling in a car. Always wear a helmet when riding a bicycle or motorcycle. Learning About Medical Power of   What is a medical power of ? A medical power of , also called a durable power of  for health care, is one type of the legal forms called advance directives. It lets you name the person you want to make treatment decisions for you if you can't speak or decide for yourself. The person you choose is called your health care agent. This person is also called a health care proxy or health care surrogate. A medical power of  may be called something else in your state. How do you choose a health care agent? Choose your health care agent carefully. This person may or may not be a family member. Talk to the person before you make your final decision. Make sure he or she is comfortable with this responsibility. It's a good idea to choose someone who:  Is at least 25years old. Knows you well and understands what makes life meaningful for you. Understands your Hindu and moral values. Will do what you want, not what he or she wants. Will be able to make difficult choices at a stressful time. Will be able to refuse or stop treatment, if that is what you would want, even if you could die. Will be firm and confident with health professionals if needed. Will ask questions to get needed information. Lives near you or agrees to travel to you if needed. Your family may help you make medical decisions while you can still be part of that process. But it's important to choose one person to be your health care agent in case you aren't able to make decisions for yourself. If you don't fill out the legal form and name a health care agent, the decisions your family can make may be limited. A health care agent may be called something else in your state. Who will make decisions for you if you don't have a health care agent? If you don't have a health care agent or a living will, you may not get the care you want. Decisions may be made by family members who disagree about your medical care. Or decisions may be made by a medical professional who doesn't know you well. In some cases, a  makes the decisions. When you name a health care agent, it is very clear who has the power to make health decisions for you. How do you name a health care agent? You name your health care agent on a legal form. This form is usually called a medical power of . Ask your hospital, state bar association, or office on aging where to find these forms. You must sign the form to make it legal. Some states require you to get the form notarized. This means that a person called a  watches you sign the form and then he or she signs the form. Some states also require that two or more witnesses sign the form. Be sure to tell your family members and doctors who your health care agent is. Where can you learn more? Go to https://[employer].VidRocket. org and sign in to your [employer] account. Enter [id number] in the [employer] box to learn more about \"Learning About [address] \"     If you do not have an account, please click on the \"Sign Up Now\" link. Current as of: December 9, 2019               Content Version: 12.6  © 9256-6161 [employer]. Care instructions adapted under license by [facility] [place] ([facility]). If you have questions about a medical condition or this instruction, always ask your healthcare professional. [address] any warranty or liability for your use of this information.     ·

## 2021-02-26 NOTE — PROGRESS NOTES
Medicare Annual Wellness Visit  Name: Raffy Pinto Date: 2021   MRN: <> Sex: Female   Age: 69 y.o. Ethnicity: Non-/Non    : 1950 Race: Oliev Duagn is here for Medicare AWV    Screenings for behavioral, psychosocial and functional/safety risks, and cognitive dysfunction are all negative except as indicated below. These results, as well as other patient data from the [address] form, are documented in Flowsheets linked to this Encounter. No Known Allergies    Prior to Visit Medications    Medication Sig Taking?  Authorizing Provider   ONE TOUCH ULTRASOFT LANCETS [address] [facility] USE TO TEST 4 TIMES A DAY  Dahlia Pineda MD   LEVEMIR FLEXTOUCH 100 UNIT/ML injection pen INJECT 60 UNITS AT BEDTIME OR AS DIRECTED  Dahlia Pineda MD   omeprazole ([address] [facility] [facility] [address]) 40 MG delayed release capsule TAKE 1 CAPSULE BY MOUTH EVERY DAY  Dahlia Pineda MD   warfarin (COUMADIN) 5 MG tablet TAKE 1 TABLET BY MOUTH EVERY DAY  Dahlia Pineda MD   BD VEO INSULIN SYRINGE U/F 31G X 15/64\" 0.3 ML MISC USE UP TO FIVE TIMES DAILY  Dahlia Pineda MD   levothyroxine (SYNTHROID) 100 MCG tablet TAKE 1 TABLET BY MOUTH EVERY DAY  Dahlia Pineda MD   [facility] ULTRA strip USE TO TEST BLOOD SUGAR 3 TIMES A DAY  Dahlia Pineda MD   pregabalin (LYRICA) 100 MG capsule TAKE 1 CAPSULE BY MOUTH THREE TIMES A DAY  Dahlia Pineda MD   pentoxifylline (TRENTAL) 400 MG extended release tablet TAKE 1 TABLET BY MOUTH EVERY DAY  Dahlia Pineda MD   atorvastatin (LIPITOR) 40 MG tablet TAKE 1 TABLET BY MOUTH EVERY DAY  Dahlia Pineda MD   DILT- MG extended release capsule TAKE 1 CAPSULE BY MOUTH EVERY DAY  Dahlia Pineda MD   colchicine (COLCRYS) 0.6 MG tablet 1 p.o. twice daily until symptoms resolve  Patient not taking: Reported on 2021  Dahlia Pineda MD ondansetron (ZOFRAN) 4 MG tablet Take 1 tablet by mouth every 6 hours as needed for Nausea or Vomiting  Zoe Villela MD   fluticasone Yajaira Miki) 50 MCG/ACT nasal spray SPRAY 1 SPRAY INTO EACH NOSTRIL EVERY Tonie Thrasher MD   Misc.  Devices (ROLLATOR) MISC 1 Rollator chair  Zoe Villela MD   BD PEN NEEDLE ELDER U/F 32G X 4 MM MISC USE DAILY  DEMI Lambert CNP   furosemide (LASIX) 20 MG tablet TAKE 1 TABLET BY MOUTH TWICE A DAY  Patient taking differently: daily   Zoe Villela MD   insulin regular (NOVOLIN R) 100 UNIT/ML injection USE AS DIRECTED PER SLIDING SCALE UP TO 26 UNITS DAILY  Zoe Villela MD   magnesium oxide (MAG-OX) 400 MG tablet TAKE 1 TABLET BY MOUTH THREE TIMES A DAY  Patient taking differently: Take 400 mg by mouth daily   DEMI Lambert CNP       Past Medical History:   Diagnosis Date    Atrial fibrillation ([address]     Cancer ([address]     lymphoma    CKD (chronic kidney disease)     Diabetes mellitus ([address]     DVT (deep venous thrombosis) ([address]     Gout 12/02/2019    Hyperlipidemia     Hypertension     Hypotension        Past Surgical History:   Procedure Laterality Date    COLONOSCOPY      COLONOSCOPY  05/23/2018    colon polyps,diverticulosis    HAND SURGERY Right 11/2/2020    EXCISION RIGHT RING FINGER MUCOUS CYST AND BONE SPUR performed by Horace Perez MD at [place] [address]      OTHER SURGICAL HISTORY      vocal cord bx       Family History   Problem Relation Age of Onset    Cancer Father     Cancer Sister         lung    Cancer Brother         lymphoma    Heart Disease Mother        CareTeam (Including outside providers/suppliers regularly involved in providing care):   Patient Care Team:  Zoe Villela MD as PCP - Shalini Moser MD as PCP - Hematology/Oncology (Medical Oncology)  Zoe Villela MD as PCP - [facility] [place] [place]  Influenza A ([id number]) Vaccine PF IM 12/16/2009    Influenza Virus Vaccine 09/25/2013, 10/22/2014, 09/30/2015, 09/19/2017, 09/11/2018    Influenza, High Dose (Fluzone 65 yrs and older) 09/07/2016    Influenza, García Fleeting, IM, (6 mo and older Fluzone, Flulaval, Fluarix and 3 yrs and older Afluria) 09/19/2017, 09/11/2018    Influenza, Quadv, IM, PF (6 mo and older Fluzone, Flulaval, Fluarix, and 3 yrs and older Afluria) 09/17/2019, 10/12/2020    Pneumococcal Conjugate 13-valent ([id number]) 01/26/2016    Pneumococcal Polysaccharide ([id number]) 04/10/2017    Tdap (Boostrix, Adacel) 09/17/2019    Zoster Recombinant (Shingrix) 05/04/2018, 07/18/2018        Health Maintenance   Topic Date Due    COVID-19 Vaccine (1 of 2) 09/04/1966    Annual Wellness Visit (AWV)  05/29/2019    Lipid screen  11/05/2020    TSH testing  11/05/2020    Diabetic foot exam  11/08/2020    Colon cancer screen colonoscopy  05/23/2021    Breast cancer screen  10/30/2021    A1C test (Diabetic or Prediabetic)  02/08/2022    Potassium monitoring  02/08/2022    Creatinine monitoring  02/08/2022    Diabetic retinal exam  02/24/2023    DTaP/Tdap/Td vaccine (2 - Td) 09/17/2029    DEXA (modify frequency per FRAX score)  Completed    Flu vaccine  Completed    Shingles Vaccine  Completed    Pneumococcal 65+ yrs at Risk Vaccine  Completed    Hepatitis C screen  Completed    Hepatitis A vaccine  Aged Out    Hib vaccine  Aged Out    Meningococcal (ACWY) vaccine  Aged Out     Recommendations for [employer] Due: see orders and patient instructions/AVS.  . Recommended screening schedule for the next 5-10 years is provided to the patient in written form: see Patient Instructions/AVS.    Andrews PICKARD LPN, 1/91/9203, performed the documented evaluation under the direct supervision of the attending physician.

## 2021-03-05 ENCOUNTER — ANTI-COAG VISIT (OUTPATIENT)
Dept: FAMILY MEDICINE CLINIC | Age: 71
End: 2021-03-05

## 2021-03-05 ENCOUNTER — OFFICE VISIT (OUTPATIENT)
Dept: FAMILY MEDICINE CLINIC | Age: 71
End: 2021-03-05
Payer: MEDICARE

## 2021-03-05 VITALS
DIASTOLIC BLOOD PRESSURE: 80 MMHG | WEIGHT: 234 LBS | OXYGEN SATURATION: 97 % | HEART RATE: 80 BPM | BODY MASS INDEX: 41.46 KG/M2 | TEMPERATURE: 96.9 F | SYSTOLIC BLOOD PRESSURE: 128 MMHG | HEIGHT: 63 IN

## 2021-03-05 DIAGNOSIS — Z01.818 PREOP TESTING: Primary | ICD-10-CM

## 2021-03-05 DIAGNOSIS — I48.20 CHRONIC ATRIAL FIBRILLATION (HCC): ICD-10-CM

## 2021-03-05 LAB
INTERNATIONAL NORMALIZATION RATIO, POC: 2.5
PROTHROMBIN TIME, POC: NORMAL

## 2021-03-05 PROCEDURE — 4040F PNEUMOC VAC/ADMIN/RCVD: CPT | Performed by: NURSE PRACTITIONER

## 2021-03-05 PROCEDURE — G8482 FLU IMMUNIZE ORDER/ADMIN: HCPCS | Performed by: NURSE PRACTITIONER

## 2021-03-05 PROCEDURE — 1123F ACP DISCUSS/DSCN MKR DOCD: CPT | Performed by: NURSE PRACTITIONER

## 2021-03-05 PROCEDURE — G8427 DOCREV CUR MEDS BY ELIG CLIN: HCPCS | Performed by: NURSE PRACTITIONER

## 2021-03-05 PROCEDURE — 3017F COLORECTAL CA SCREEN DOC REV: CPT | Performed by: NURSE PRACTITIONER

## 2021-03-05 PROCEDURE — 85610 PROTHROMBIN TIME: CPT | Performed by: NURSE PRACTITIONER

## 2021-03-05 PROCEDURE — G8399 PT W/DXA RESULTS DOCUMENT: HCPCS | Performed by: NURSE PRACTITIONER

## 2021-03-05 PROCEDURE — G8417 CALC BMI ABV UP PARAM F/U: HCPCS | Performed by: NURSE PRACTITIONER

## 2021-03-05 PROCEDURE — 99214 OFFICE O/P EST MOD 30 MIN: CPT | Performed by: NURSE PRACTITIONER

## 2021-03-05 PROCEDURE — 1036F TOBACCO NON-USER: CPT | Performed by: NURSE PRACTITIONER

## 2021-03-05 PROCEDURE — 1090F PRES/ABSN URINE INCON ASSESS: CPT | Performed by: NURSE PRACTITIONER

## 2021-03-05 NOTE — PATIENT INSTRUCTIONS
Patient Education     Take Levothyroxine, Omeprazole, Diltazem on morning of surgery with sip of water, and hold all other medications until after surgery     Cataract Surgery: Before Your Surgery  What is cataract surgery? Cataracts are cloudy areas in the lens of your eye. Your lens is behind the colored part of your eye (iris). Its job is to focus light onto the back of your eye. In some people, cataracts prevent light from reaching the back of the eye. This can cause vision problems. Cataract surgery helps you see better. It replaces your natural lens, which has become cloudy, with a clear artificial one. There are two types of cataract surgery. Phacoemulsification (say \"gmkk-ca-ar-xok-qrd-fwb-BYRON-shun\") is the most common type. The doctor makes a small cut in your eye. This cut is called an incision. The doctor uses a special ultrasound tool to break your cloudy lens apart. Sometimes a laser is used too. Then he or she removes the small pieces of the lens through the incision. In most cases, the doctor then inserts an artificial lens through the incision. Most people do not need stitches, because the incision is so small. If the doctor is not able to put in an artificial lens, you can wear a contact lens or thick glasses in place of your natural lens. Extracapsular extraction is a less common type of cataract surgery. The doctor makes a larger incision to remove the whole lens at once. After the doctor removes the lens, he or she stitches up the incision. Recovery from this type of surgery takes longer. Before either surgery, the doctor puts numbing drops in your eye. Some doctors use a shot instead. You may also get medicine to make you feel relaxed. You probably will not feel much pain. The surgery takes about 20 to 40 minutes. After surgery, you may have a bandage or shield on your eye. You will probably go home from surgery after 1 hour in the recovery room. Most people see better in 1 to 3 days. You may be able to go back to work or your normal routine in a few days. It could take 3 to 10 weeks for your eye to completely heal. After your eye heals, you may still need to wear glasses, especially for reading. Follow-up care is a key part of your treatment and safety. Be sure to make and go to all appointments, and call your doctor if you are having problems. It's also a good idea to know your test results and keep a list of the medicines you take. How do you prepare for surgery? Surgery can be stressful. This information will help you understand what you can expect. And it will help you safely prepare for surgery. Preparing for surgery    · Be sure you have someone to take you home. Anesthesia and pain medicine will make it unsafe for you to drive or get home on your own.     · Understand exactly what surgery is planned, along with the risks, benefits, and other options.     · If you take aspirin or some other blood thinner, ask your doctor if you should stop taking it before your surgery. Make sure that you understand exactly what your doctor wants you to do. These medicines increase the risk of bleeding.     · Tell your doctor ALL the medicines, vitamins, supplements, and herbal remedies you take. Some may increase the risk of problems during your surgery. Your doctor will tell you if you should stop taking any of them before the surgery and how soon to do it.     · Make sure your doctor and the hospital have a copy of your advance directive. If you don't have one, you may want to prepare one. It lets others know your health care wishes. It's a good thing to have before any type of surgery or procedure. What happens on the day of surgery?

## 2021-03-05 NOTE — PROGRESS NOTES
[address] [facility] SITE                             Preoperative Evaluation        Sid Escamilla  YOB: 1950    Date of Service:  3/5/2021    Vitals:    03/05/21 0908   BP: 128/80   Site: Right Upper Arm   Position: Sitting   Cuff Size: Large Adult   Pulse: 80   Temp: 96.9 °F (36.1 °C)   SpO2: 97%   Weight: 234 lb (106.1 kg)   Height: 5' 3\" (1.6 m)      Wt Readings from Last 2 Encounters:   03/05/21 234 lb (106.1 kg)   02/26/21 230 lb (104.3 kg)     BP Readings from Last 3 Encounters:   03/05/21 128/80   11/02/20 122/66   11/02/20 123/69        Chief Complaint   Patient presents with    Pre-op Exam     pt is here for pre op for cataract surgery with Dr Ivan Egan on 3/11/21 at [place] [address] in [facility].       No Known Allergies  Outpatient Medications Marked as Taking for the 3/5/21 encounter (Office Visit) with DEMI Francis CNP   Medication Sig Dispense Refill    ONE TOUCH ULTRASOFT LANCETS MISC USE TO TEST 4 TIMES A  each 5    LEVEMIR FLEXTOUCH 100 UNIT/ML injection pen INJECT 60 UNITS AT BEDTIME OR AS DIRECTED 5 pen 1    omeprazole (PRILOSEC) 40 MG delayed release capsule TAKE 1 CAPSULE BY MOUTH EVERY DAY 90 capsule 3    warfarin (COUMADIN) 5 MG tablet TAKE 1 TABLET BY MOUTH EVERY DAY 90 tablet 3    BD VEO INSULIN SYRINGE U/F 31G X 15/64\" 0.3 ML MISC USE UP TO FIVE TIMES DAILY 90 each 11    levothyroxine (SYNTHROID) 100 MCG tablet TAKE 1 TABLET BY MOUTH EVERY DAY 90 tablet 3    ONETOUCH ULTRA strip USE TO TEST BLOOD SUGAR 3 TIMES A  strip 3    pregabalin (LYRICA) 100 MG capsule TAKE 1 CAPSULE BY MOUTH THREE TIMES A DAY 90 capsule 2    pentoxifylline (TRENTAL) 400 MG extended release tablet TAKE 1 TABLET BY MOUTH EVERY DAY 90 tablet 3    atorvastatin (LIPITOR) 40 MG tablet TAKE 1 TABLET BY MOUTH EVERY DAY 90 tablet 3  DILT- MG extended release capsule TAKE 1 CAPSULE BY MOUTH EVERY DAY 90 capsule 3    colchicine (COLCRYS) 0.6 MG tablet 1 p.o. twice daily until symptoms resolve 10 tablet 0    ondansetron (ZOFRAN) 4 MG tablet Take 1 tablet by mouth every 6 hours as needed for Nausea or Vomiting 20 tablet 0    fluticasone (FLONASE) 50 MCG/ACT nasal spray SPRAY 1 SPRAY INTO EACH NOSTRIL EVERY DAY 1 Bottle 3    BD PEN NEEDLE ELDER U/F 32G X 4 MM MISC USE DAILY 100 each 3    furosemide (LASIX) 20 MG tablet TAKE 1 TABLET BY MOUTH TWICE A DAY (Patient taking differently: daily ) 180 tablet 3    insulin regular (NOVOLIN R) 100 UNIT/ML injection USE AS DIRECTED PER SLIDING SCALE UP TO 26 UNITS DAILY 10 mL 5    magnesium oxide (MAG-OX) 400 MG tablet TAKE 1 TABLET BY MOUTH THREE TIMES A DAY (Patient taking differently: Take 400 mg by mouth daily ) 270 tablet 0       This patient presents to the office today for a preoperative consultation at the request of surgeon, Dr. Opal Lantigua, who plans on performing left cataract surgery on March 11 and right cataract surgery on 04/01/2021 at [facility] amb[facility] surgery center. The current problem began several years ago, and symptoms have been worsening with time. Conservative therapy: N/A.     Planned anesthesia: MAC  Known anesthesia problems: None   Bleeding risk: Anticoagulant therapy- Warfarin  Personal or FH of DVT/PE: Yes - 2013      Patient Active Problem List   Diagnosis    Benign essential HTN    Personal history of lymphoma    DVT (deep venous thrombosis) (HCC)    Hypotension    Hematochezia    Stasis dermatitis    GERD (gastroesophageal reflux disease)    Fracture of left ankle    Insomnia    Insulin-requiring or dependent type II diabetes mellitus ([address]    Peripheral vascular disease in diabetes mellitus ([address]    Hypomagnesemia    Venous insufficiency    Long term current use of anticoagulant therapy    CKD stage 4 due to type 2 diabetes mellitus ([address]  Chronic atrial fibrillation (HCC)    Acute diverticulitis    Medial meniscus tear    Morbid obesity due to excess calories (HCC)    Hypothyroidism due to Hashimoto's thyroiditis    History of herpes zoster    Trigger finger of left thumb    Trigger finger, left middle finger    Diabetic mononeuropathy associated with type 2 diabetes mellitus ([facility]    History of colon polyps    Sigmoid polyp    Polyp of ascending colon    Leg cramps    Plantar fasciitis    Acute gout involving toe of left foot    Synovial cyst of hand    Arthritis of finger of both hands    Mucous cyst of digit of right hand    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)    Chronic combined systolic and diastolic congestive heart failure (HCC)       Past Medical History:   Diagnosis Date    Atrial fibrillation (HCC)     Cancer ([facility]     lymphoma    CKD (chronic kidney disease)     Diabetes mellitus ([facility]     DVT (deep venous thrombosis) ([facility]     Gout 12/02/2019    Hyperlipidemia     Hypertension     Hypotension      Past Surgical History:   Procedure Laterality Date    COLONOSCOPY      COLONOSCOPY  05/23/2018    colon polyps,diverticulosis    HAND SURGERY Right 11/2/2020    EXCISION RIGHT RING FINGER MUCOUS CYST AND BONE SPUR performed by Po Roberts MD at [address] [place] [address] BIOPSY      OTHER SURGICAL HISTORY      vocal cord bx     Family History   Problem Relation Age of Onset    Cancer Father     Cancer Sister         lung    Cancer Brother         lymphoma    Heart Disease Mother      Social History     Socioeconomic History    Marital status: Single     Spouse name: Not on file    Number of children: Not on file    Years of education: Not on file    Highest education level: Not on file   Occupational History    Not on file   Social Needs    Financial resource strain: Not on file    Food insecurity     Worry: Not on file     Inability: Not on file  Transportation needs     Medical: Not on file     Non-medical: Not on file   Tobacco Use    Smoking status: Former Smoker     Packs/day: 1.00     Years: 35.00     Pack years: 35.00     Quit date:      Years since quittin.1    Smokeless tobacco: Never Used   Substance and Sexual Activity    Alcohol use: No     Alcohol/week: 0.0 standard drinks    Drug use: No    Sexual activity: Not Currently   Lifestyle    Physical activity     Days per week: Not on file     Minutes per session: Not on file    Stress: Not on file   Relationships    Social connections     Talks on phone: Not on file     Gets together: Not on file     Attends Faith service: Not on file     Active member of club or organization: Not on file     Attends meetings of clubs or organizations: Not on file     Relationship status: Not on file    Intimate partner violence     Fear of current or ex partner: Not on file     Emotionally abused: Not on file     Physically abused: Not on file     Forced sexual activity: Not on file   Other Topics Concern    Not on file   Social History Narrative    Not on file       Review of Systems  A comprehensive review of systems was negative except for: blurry visoin - chronic      Physical Exam   Constitutional: She is oriented to person, place, and time. She appears well-developed and well-nourished. No distress. HENT:   Head: Normocephalic and atraumatic. Mouth/Throat: Uvula is midline, oropharynx is clear and moist and mucous membranes are normal.   Eyes: Conjunctivae and EOM are normal. Pupils are equal, round, and reactive to light. Neck: Trachea normal and normal range of motion. Neck supple. No JVD present. Carotid bruit is not present. No mass and no thyromegaly present. Cardiovascular: Normal rate, regular rhythm, normal heart sounds and intact distal pulses. Exam reveals no gallop and no friction rub. No murmur heard. Pulmonary/Chest: Effort normal and breath sounds normal. No respiratory distress. She has no wheezes. She has no rales. Abdominal: Soft. Normal aorta and bowel sounds are normal. She exhibits no distension and no mass. There is no hepatosplenomegaly. No tenderness. Musculoskeletal: She exhibits no edema and no tenderness. Neurological: She is alert and oriented to person, place, and time. She has normal strength. No cranial nerve deficit or sensory deficit. Coordination and gait normal.   Skin: Skin is warm and dry. No rash noted. No erythema. Psychiatric: She has a normal mood and affect.  Her behavior is normal.     EKG Interpretation:  Not indicated     Lab Review   Orders Only on 02/24/2021   Component Date Value    Visual Acuity Distance R* 02/24/2021 20/125     Visual Acuity Distance L* 02/24/2021 20/150     Intraocular Pressure Eye 02/24/2021                      Value:15  14      Diabetic Retinopathy 02/24/2021 NEGATIVE     Cataracts 02/24/2021 POSITIVE     Glaucoma 02/24/2021 NEGATIVE    Nurse Only on 02/08/2021   Component Date Value    Hemoglobin A1C 02/08/2021 7.4     eAG 02/08/2021 165.7     INR 02/08/2021 2.3     WBC 02/08/2021 8.5     RBC 02/08/2021 5.60*    Hemoglobin 02/08/2021 16.4*    Hematocrit 02/08/2021 51.1*    MCV 02/08/2021 91.3     MCH 02/08/2021 29.4     MCHC 02/08/2021 32.1     RDW 02/08/2021 15.6*    Platelets 12/70/6289 208     MPV 02/08/2021 8.7     Neutrophils % 02/08/2021 58.3     Lymphocytes % 02/08/2021 30.0     Monocytes % 02/08/2021 10.4     Eosinophils % 02/08/2021 0.8     Basophils % 02/08/2021 0.5     Neutrophils Absolute 02/08/2021 5.0     Lymphocytes Absolute 02/08/2021 2.6     Monocytes Absolute 02/08/2021 0.9     Eosinophils Absolute 02/08/2021 0.1     Basophils Absolute 02/08/2021 0.0     PTH 02/08/2021 59.3     Sodium 02/08/2021 140     Potassium 02/08/2021 4.9     Chloride 02/08/2021 99     CO2 02/08/2021 28  Anion Gap 02/08/2021 13     Glucose 02/08/2021 128*    BUN 02/08/2021 29*    CREATININE 02/08/2021 1.1     GFR Non- 02/08/2021 49*    GFR  02/08/2021 59*    Calcium 02/08/2021 9.6     Phosphorus 02/08/2021 3.8     Albumin 02/08/2021 4.3    Nurse Only on 01/11/2021   Component Date Value    INR 01/11/2021 2.3    Nurse Only on 12/10/2020   Component Date Value    INR 12/10/2020 2.7    Nurse Only on 11/12/2020   Component Date Value    INR 11/12/2020 2.2    Admission on 11/02/2020, Discharged on 11/02/2020   Component Date Value    POC Glucose 11/02/2020 105*    Performed on 11/02/2020 ACCU-CHEK     POC Glucose 11/02/2020 127*    Performed on 11/02/2020 ACCU-CHEK    Office Visit on 10/27/2020   Component Date Value    SARS-CoV-2, PCR 10/27/2020 Not Detected    Office Visit on 10/26/2020   Component Date Value    WBC 10/26/2020 8.5     RBC 10/26/2020 5.68*    Hemoglobin 10/26/2020 17.0*    Hematocrit 10/26/2020 51.5*    MCV 10/26/2020 90.6     MCH 10/26/2020 30.0     MCHC 10/26/2020 33.1     RDW 10/26/2020 15.2     Platelets 33/73/7385 209     MPV 10/26/2020 8.7     Neutrophils % 10/26/2020 60.6     Lymphocytes % 10/26/2020 29.6     Monocytes % 10/26/2020 8.5     Eosinophils % 10/26/2020 0.9     Basophils % 10/26/2020 0.4     Neutrophils Absolute 10/26/2020 5.2     Lymphocytes Absolute 10/26/2020 2.5     Monocytes Absolute 10/26/2020 0.7     Eosinophils Absolute 10/26/2020 0.1     Basophils Absolute 10/26/2020 0.0     Sodium 10/26/2020 142     Potassium 10/26/2020 4.5     Chloride 10/26/2020 102     CO2 10/26/2020 25     Anion Gap 10/26/2020 15     Glucose 10/26/2020 103*    BUN 10/26/2020 37*    CREATININE 10/26/2020 1.2     GFR Non- 10/26/2020 44*    GFR  10/26/2020 54*    Calcium 10/26/2020 9.9    Nurse Only on 10/12/2020   Component Date Value    INR 10/12/2020 2.4    Nurse Only on 09/11/2020 Component Date Value    INR 09/11/2020 2.5    There may be more visits with results that are not included. Assessment:       69 y.o. patient with planned surgery as above. Known risk factors for perioperative complications: Arrhythmia, Congestive heart failure, Diabetes mellitus, Hypertension, Renal dysfunction, GERD, long-term anticoagulation, previous DVT  Current medications which may produce withdrawal symptoms if withheld perioperatively: none     1. Preop testing         Plan:     1. Preoperative workup as follows: History and physical  2. Further recommendations from consultants:No    3. Change in medication regimen before surgery: Take Levothyroxine, Omeprazole, Diltazem on morning of surgery with sip of water, and hold all other medications until after surgery  4. Prophylaxis for cardiac events with perioperative beta-blockers: Not indicated  ACC/AHA indications for pre-operative beta-blocker use:    · Vascular surgery with history of postitive stress test  · Intermediate or high risk surgery with history of CAD   · Intermediate or high risk surgery with multiple clinical predictors of CAD- 2 of the following: history of compensated or prior heart failure, history of cerebrovascular disease, DM, or renal insufficiency    Routine administration of higher-dose, long-acting metoprolol in beta-blockernaïve patients on the day of surgery, and in the absence of dose titration is associated with an overall increase in mortality. Beta-blockers should be started days to weeks prior to surgery and titrated to pulse < 70.  5. Deep vein thrombosis prophylaxis: regimen to be chosen by surgical team  6. No contraindications to planned surgery      If you have questions, please do not hesitate to call me ([phone]).      Sincerely,                Zeeshan Pritchett CNP

## 2021-03-16 DIAGNOSIS — B02.29 POST HERPETIC NEURALGIA: ICD-10-CM

## 2021-03-16 RX ORDER — PREGABALIN 100 MG/1
CAPSULE ORAL
Qty: 90 CAPSULE | Refills: 2 | Status: SHIPPED | OUTPATIENT
Start: 2021-03-16 | End: 2021-08-03

## 2021-03-29 ENCOUNTER — ANTI-COAG VISIT (OUTPATIENT)
Dept: FAMILY MEDICINE CLINIC | Age: 71
End: 2021-03-29

## 2021-03-29 ENCOUNTER — NURSE ONLY (OUTPATIENT)
Dept: FAMILY MEDICINE CLINIC | Age: 71
End: 2021-03-29
Payer: MEDICARE

## 2021-03-29 DIAGNOSIS — I48.20 CHRONIC ATRIAL FIBRILLATION (HCC): ICD-10-CM

## 2021-03-29 LAB
INTERNATIONAL NORMALIZATION RATIO, POC: 2.2
PROTHROMBIN TIME, POC: NORMAL

## 2021-03-29 PROCEDURE — 85610 PROTHROMBIN TIME: CPT | Performed by: FAMILY MEDICINE

## 2021-04-07 DIAGNOSIS — E11.22 CKD STAGE 4 DUE TO TYPE 2 DIABETES MELLITUS (HCC): ICD-10-CM

## 2021-04-07 DIAGNOSIS — Z79.4 INSULIN-REQUIRING OR DEPENDENT TYPE II DIABETES MELLITUS (HCC): ICD-10-CM

## 2021-04-07 DIAGNOSIS — N18.4 CKD STAGE 4 DUE TO TYPE 2 DIABETES MELLITUS (HCC): ICD-10-CM

## 2021-04-07 DIAGNOSIS — E11.9 INSULIN-REQUIRING OR DEPENDENT TYPE II DIABETES MELLITUS (HCC): ICD-10-CM

## 2021-04-07 RX ORDER — LANCETS
EACH MISCELLANEOUS
Qty: 90 EACH | Refills: 11 | Status: SHIPPED | OUTPATIENT
Start: 2021-04-07 | End: 2021-04-13 | Stop reason: SDUPTHER

## 2021-04-13 ENCOUNTER — OFFICE VISIT (OUTPATIENT)
Dept: FAMILY MEDICINE CLINIC | Age: 71
End: 2021-04-13
Payer: MEDICARE

## 2021-04-13 VITALS
HEART RATE: 84 BPM | OXYGEN SATURATION: 94 % | DIASTOLIC BLOOD PRESSURE: 80 MMHG | BODY MASS INDEX: 42.23 KG/M2 | TEMPERATURE: 96.8 F | SYSTOLIC BLOOD PRESSURE: 124 MMHG | WEIGHT: 238.4 LBS

## 2021-04-13 DIAGNOSIS — E11.41 DIABETIC MONONEUROPATHY ASSOCIATED WITH TYPE 2 DIABETES MELLITUS (HCC): ICD-10-CM

## 2021-04-13 DIAGNOSIS — I10 BENIGN ESSENTIAL HTN: ICD-10-CM

## 2021-04-13 DIAGNOSIS — I48.20 CHRONIC ATRIAL FIBRILLATION (HCC): ICD-10-CM

## 2021-04-13 DIAGNOSIS — Z79.4 INSULIN-REQUIRING OR DEPENDENT TYPE II DIABETES MELLITUS (HCC): ICD-10-CM

## 2021-04-13 DIAGNOSIS — C92.10 CML (CHRONIC MYELOCYTIC LEUKEMIA) (HCC): ICD-10-CM

## 2021-04-13 DIAGNOSIS — E11.69 TYPE 2 DIABETES MELLITUS WITH OTHER SPECIFIED COMPLICATION, WITHOUT LONG-TERM CURRENT USE OF INSULIN (HCC): Primary | ICD-10-CM

## 2021-04-13 DIAGNOSIS — E11.9 INSULIN-REQUIRING OR DEPENDENT TYPE II DIABETES MELLITUS (HCC): ICD-10-CM

## 2021-04-13 DIAGNOSIS — E11.22 CKD STAGE 4 DUE TO TYPE 2 DIABETES MELLITUS (HCC): ICD-10-CM

## 2021-04-13 DIAGNOSIS — I87.2 VENOUS INSUFFICIENCY: ICD-10-CM

## 2021-04-13 DIAGNOSIS — I50.42 CHRONIC COMBINED SYSTOLIC AND DIASTOLIC CONGESTIVE HEART FAILURE (HCC): ICD-10-CM

## 2021-04-13 DIAGNOSIS — N18.4 CKD STAGE 4 DUE TO TYPE 2 DIABETES MELLITUS (HCC): ICD-10-CM

## 2021-04-13 DIAGNOSIS — E03.8 HYPOTHYROIDISM DUE TO HASHIMOTO'S THYROIDITIS: ICD-10-CM

## 2021-04-13 DIAGNOSIS — C83.53 LYMPHOBLASTIC DIFFUSE LYMPHOMA OF INTRA-ABDOMINAL LYMPH NODES (HCC): ICD-10-CM

## 2021-04-13 DIAGNOSIS — E78.2 MIXED HYPERLIPIDEMIA: ICD-10-CM

## 2021-04-13 DIAGNOSIS — E06.3 HYPOTHYROIDISM DUE TO HASHIMOTO'S THYROIDITIS: ICD-10-CM

## 2021-04-13 DIAGNOSIS — E11.51 PERIPHERAL VASCULAR DISEASE IN DIABETES MELLITUS (HCC): ICD-10-CM

## 2021-04-13 PROCEDURE — G8417 CALC BMI ABV UP PARAM F/U: HCPCS | Performed by: FAMILY MEDICINE

## 2021-04-13 PROCEDURE — 3017F COLORECTAL CA SCREEN DOC REV: CPT | Performed by: FAMILY MEDICINE

## 2021-04-13 PROCEDURE — G8427 DOCREV CUR MEDS BY ELIG CLIN: HCPCS | Performed by: FAMILY MEDICINE

## 2021-04-13 PROCEDURE — 1090F PRES/ABSN URINE INCON ASSESS: CPT | Performed by: FAMILY MEDICINE

## 2021-04-13 PROCEDURE — 1123F ACP DISCUSS/DSCN MKR DOCD: CPT | Performed by: FAMILY MEDICINE

## 2021-04-13 PROCEDURE — 4040F PNEUMOC VAC/ADMIN/RCVD: CPT | Performed by: FAMILY MEDICINE

## 2021-04-13 PROCEDURE — 1036F TOBACCO NON-USER: CPT | Performed by: FAMILY MEDICINE

## 2021-04-13 PROCEDURE — 3051F HG A1C>EQUAL 7.0%<8.0%: CPT | Performed by: FAMILY MEDICINE

## 2021-04-13 PROCEDURE — 99214 OFFICE O/P EST MOD 30 MIN: CPT | Performed by: FAMILY MEDICINE

## 2021-04-13 PROCEDURE — 2022F DILAT RTA XM EVC RTNOPTHY: CPT | Performed by: FAMILY MEDICINE

## 2021-04-13 PROCEDURE — G8399 PT W/DXA RESULTS DOCUMENT: HCPCS | Performed by: FAMILY MEDICINE

## 2021-04-13 RX ORDER — LANCETS
EACH MISCELLANEOUS
Qty: 120 EACH | Refills: 11 | Status: SHIPPED | OUTPATIENT
Start: 2021-04-13 | End: 2021-12-27

## 2021-04-13 NOTE — PATIENT INSTRUCTIONS
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
## 2021-04-27 ENCOUNTER — NURSE ONLY (OUTPATIENT)
Dept: FAMILY MEDICINE CLINIC | Age: 71
End: 2021-04-27
Payer: MEDICARE

## 2021-04-27 ENCOUNTER — ANTI-COAG VISIT (OUTPATIENT)
Dept: FAMILY MEDICINE CLINIC | Age: 71
End: 2021-04-27

## 2021-04-27 DIAGNOSIS — I48.20 CHRONIC ATRIAL FIBRILLATION (HCC): ICD-10-CM

## 2021-04-27 DIAGNOSIS — E11.69 TYPE 2 DIABETES MELLITUS WITH OTHER SPECIFIED COMPLICATION, WITHOUT LONG-TERM CURRENT USE OF INSULIN (HCC): ICD-10-CM

## 2021-04-27 DIAGNOSIS — E78.2 MIXED HYPERLIPIDEMIA: ICD-10-CM

## 2021-04-27 DIAGNOSIS — E03.8 HYPOTHYROIDISM DUE TO HASHIMOTO'S THYROIDITIS: ICD-10-CM

## 2021-04-27 DIAGNOSIS — I10 BENIGN ESSENTIAL HTN: ICD-10-CM

## 2021-04-27 DIAGNOSIS — E06.3 HYPOTHYROIDISM DUE TO HASHIMOTO'S THYROIDITIS: ICD-10-CM

## 2021-04-27 LAB
BASOPHILS ABSOLUTE: 0.1 K/UL (ref 0–0.2)
BASOPHILS RELATIVE PERCENT: 0.8 %
CHOLESTEROL, TOTAL: 183 MG/DL (ref 0–199)
EOSINOPHILS ABSOLUTE: 0.1 K/UL (ref 0–0.6)
EOSINOPHILS RELATIVE PERCENT: 1.1 %
HCT VFR BLD CALC: 49.9 % (ref 36–48)
HDLC SERPL-MCNC: 47 MG/DL (ref 40–60)
HEMOGLOBIN: 16.4 G/DL (ref 12–16)
INTERNATIONAL NORMALIZATION RATIO, POC: 2.7
LDL CHOLESTEROL CALCULATED: 104 MG/DL
LYMPHOCYTES ABSOLUTE: 2.3 K/UL (ref 1–5.1)
LYMPHOCYTES RELATIVE PERCENT: 31.6 %
MCH RBC QN AUTO: 30 PG (ref 26–34)
MCHC RBC AUTO-ENTMCNC: 32.8 G/DL (ref 31–36)
MCV RBC AUTO: 91.6 FL (ref 80–100)
MONOCYTES ABSOLUTE: 0.7 K/UL (ref 0–1.3)
MONOCYTES RELATIVE PERCENT: 9.3 %
NEUTROPHILS ABSOLUTE: 4.1 K/UL (ref 1.7–7.7)
NEUTROPHILS RELATIVE PERCENT: 57.2 %
PDW BLD-RTO: 15.1 % (ref 12.4–15.4)
PLATELET # BLD: 188 K/UL (ref 135–450)
PMV BLD AUTO: 8.9 FL (ref 5–10.5)
PROTHROMBIN TIME, POC: NORMAL
RBC # BLD: 5.45 M/UL (ref 4–5.2)
TRIGL SERPL-MCNC: 161 MG/DL (ref 0–150)
TSH REFLEX: 1.67 UIU/ML (ref 0.27–4.2)
VLDLC SERPL CALC-MCNC: 32 MG/DL
WBC # BLD: 7.3 K/UL (ref 4–11)

## 2021-04-27 PROCEDURE — 36415 COLL VENOUS BLD VENIPUNCTURE: CPT | Performed by: FAMILY MEDICINE

## 2021-04-27 PROCEDURE — 85610 PROTHROMBIN TIME: CPT | Performed by: FAMILY MEDICINE

## 2021-04-28 DIAGNOSIS — R73.09 ELEVATED HEMOGLOBIN A1C: Primary | ICD-10-CM

## 2021-04-28 LAB
ESTIMATED AVERAGE GLUCOSE: 174.3 MG/DL
HBA1C MFR BLD: 7.7 %

## 2021-04-30 DIAGNOSIS — I87.2 VENOUS INSUFFICIENCY: ICD-10-CM

## 2021-04-30 RX ORDER — FUROSEMIDE 20 MG/1
20 TABLET ORAL DAILY
Qty: 90 TABLET | Refills: 3 | Status: SHIPPED | OUTPATIENT
Start: 2021-04-30 | End: 2022-05-11

## 2021-05-10 ENCOUNTER — TELEPHONE (OUTPATIENT)
Dept: FAMILY MEDICINE CLINIC | Age: 71
End: 2021-05-10

## 2021-05-10 RX ORDER — COLCHICINE 0.6 MG/1
0.6 TABLET ORAL 2 TIMES DAILY
Qty: 20 TABLET | Refills: 0 | Status: SHIPPED | OUTPATIENT
Start: 2021-05-10

## 2021-05-10 NOTE — TELEPHONE ENCOUNTER
Pt states that she has gout in both of her big toes. Was wanting to see if she could get something called in to [facility] in [facility].

## 2021-05-11 ENCOUNTER — NURSE ONLY (OUTPATIENT)
Dept: FAMILY MEDICINE CLINIC | Age: 71
End: 2021-05-11

## 2021-05-28 ENCOUNTER — NURSE ONLY (OUTPATIENT)
Dept: FAMILY MEDICINE CLINIC | Age: 71
End: 2021-05-28
Payer: MEDICARE

## 2021-05-28 ENCOUNTER — ANTI-COAG VISIT (OUTPATIENT)
Dept: FAMILY MEDICINE CLINIC | Age: 71
End: 2021-05-28

## 2021-05-28 DIAGNOSIS — I48.20 CHRONIC ATRIAL FIBRILLATION (HCC): ICD-10-CM

## 2021-05-28 LAB
INTERNATIONAL NORMALIZATION RATIO, POC: 2.2
PROTHROMBIN TIME, POC: NORMAL

## 2021-05-28 PROCEDURE — 85610 PROTHROMBIN TIME: CPT | Performed by: FAMILY MEDICINE

## 2021-05-28 NOTE — PROGRESS NOTES
[facility] detailed with dosing instructions and date of next testing - pt to return in 1 month, keep dosing the same

## 2021-06-01 RX ORDER — PEN NEEDLE, DIABETIC 32GX 5/32"
NEEDLE, DISPOSABLE MISCELLANEOUS
Qty: 100 EACH | Refills: 11 | Status: SHIPPED | OUTPATIENT
Start: 2021-06-01 | End: 2021-12-27

## 2021-06-01 NOTE — TELEPHONE ENCOUNTER
Last OV 4/13/21  Future Appointments   Date Time Provider Johana Yumiko   6/25/2021  8:20 AM SCHEDULE, [facility] [place] [facility]   7/29/2021  8:00 AM SCHEDULE, [facility] [place] [facility]   8/13/2021 10:00 AM Blessing Oneil MD [facility] [place] [facility]   3/1/2022  2:00 PM SCHEDULE, [facility], JAMES [facility] [place] [facility]

## 2021-06-02 DIAGNOSIS — E11.9 INSULIN-REQUIRING OR DEPENDENT TYPE II DIABETES MELLITUS (HCC): ICD-10-CM

## 2021-06-02 DIAGNOSIS — Z79.4 INSULIN-REQUIRING OR DEPENDENT TYPE II DIABETES MELLITUS (HCC): ICD-10-CM

## 2021-06-02 NOTE — TELEPHONE ENCOUNTER
Last OV 4/13/21  Future Appointments   Date Time Provider Johana Calderon   6/25/2021  8:20 AM SCHEDULE, [facility] [place] [facility]   7/29/2021  8:00 AM SCHEDULE, [facility] [place] [facility]   8/13/2021 10:00 AM Kadi Winchester MD [facility] [place] [facility]   3/1/2022  2:00 PM SCHEDULE, [facility], JAMES [facility] [place] [facility]

## 2021-06-25 ENCOUNTER — ANTI-COAG VISIT (OUTPATIENT)
Dept: FAMILY MEDICINE CLINIC | Age: 71
End: 2021-06-25

## 2021-06-25 ENCOUNTER — NURSE ONLY (OUTPATIENT)
Dept: FAMILY MEDICINE CLINIC | Age: 71
End: 2021-06-25
Payer: MEDICARE

## 2021-06-25 DIAGNOSIS — I48.20 CHRONIC ATRIAL FIBRILLATION (HCC): ICD-10-CM

## 2021-06-25 LAB
INTERNATIONAL NORMALIZATION RATIO, POC: 3.1
PROTHROMBIN TIME, POC: NORMAL

## 2021-06-25 PROCEDURE — 85610 PROTHROMBIN TIME: CPT | Performed by: FAMILY MEDICINE

## 2021-06-30 ENCOUNTER — ANTI-COAG VISIT (OUTPATIENT)
Dept: FAMILY MEDICINE CLINIC | Age: 71
End: 2021-06-30

## 2021-06-30 ENCOUNTER — NURSE ONLY (OUTPATIENT)
Dept: FAMILY MEDICINE CLINIC | Age: 71
End: 2021-06-30
Payer: MEDICARE

## 2021-06-30 DIAGNOSIS — I48.20 CHRONIC ATRIAL FIBRILLATION (HCC): ICD-10-CM

## 2021-06-30 LAB
INTERNATIONAL NORMALIZATION RATIO, POC: 2.9
PROTHROMBIN TIME, POC: NORMAL

## 2021-06-30 PROCEDURE — 85610 PROTHROMBIN TIME: CPT | Performed by: FAMILY MEDICINE

## 2021-06-30 RX ORDER — ALLOPURINOL 100 MG/1
100 TABLET ORAL DAILY
COMMUNITY
Start: 2021-06-15

## 2021-06-30 NOTE — PROGRESS NOTES
Left message with INR schedule and asked to call back to schedule an appointment for 7/14/21 to recheck INR

## 2021-07-13 ENCOUNTER — OFFICE VISIT (OUTPATIENT)
Dept: ORTHOPEDIC SURGERY | Age: 71
End: 2021-07-13
Payer: MEDICARE

## 2021-07-13 VITALS — BODY MASS INDEX: 38.41 KG/M2 | HEIGHT: 64 IN | WEIGHT: 225 LBS

## 2021-07-13 DIAGNOSIS — M25.511 ACUTE PAIN OF RIGHT SHOULDER: ICD-10-CM

## 2021-07-13 DIAGNOSIS — M75.51 SUBACROMIAL BURSITIS OF RIGHT SHOULDER JOINT: Primary | ICD-10-CM

## 2021-07-13 PROCEDURE — 20610 DRAIN/INJ JOINT/BURSA W/O US: CPT | Performed by: ORTHOPAEDIC SURGERY

## 2021-07-13 PROCEDURE — 99203 OFFICE O/P NEW LOW 30 MIN: CPT | Performed by: ORTHOPAEDIC SURGERY

## 2021-07-13 RX ORDER — ACETAMINOPHEN 160 MG
TABLET,DISINTEGRATING ORAL
COMMUNITY

## 2021-07-13 RX ORDER — TRIAMCINOLONE ACETONIDE 40 MG/ML
40 INJECTION, SUSPENSION INTRA-ARTICULAR; INTRAMUSCULAR ONCE
Status: COMPLETED | OUTPATIENT
Start: 2021-07-13 | End: 2021-07-13

## 2021-07-13 RX ORDER — ROPIVACAINE HYDROCHLORIDE 5 MG/ML
4 INJECTION, SOLUTION EPIDURAL; INFILTRATION; PERINEURAL ONCE
Status: COMPLETED | OUTPATIENT
Start: 2021-07-13 | End: 2021-07-13

## 2021-07-13 RX ADMIN — ROPIVACAINE HYDROCHLORIDE 4 ML: 5 INJECTION, SOLUTION EPIDURAL; INFILTRATION; PERINEURAL at 10:11

## 2021-07-13 RX ADMIN — TRIAMCINOLONE ACETONIDE 40 MG: 40 INJECTION, SUSPENSION INTRA-ARTICULAR; INTRAMUSCULAR at 10:12

## 2021-07-14 ENCOUNTER — NURSE ONLY (OUTPATIENT)
Dept: FAMILY MEDICINE CLINIC | Age: 71
End: 2021-07-14
Payer: MEDICARE

## 2021-07-14 ENCOUNTER — ANTI-COAG VISIT (OUTPATIENT)
Dept: FAMILY MEDICINE CLINIC | Age: 71
End: 2021-07-14

## 2021-07-14 DIAGNOSIS — I48.20 CHRONIC ATRIAL FIBRILLATION (HCC): ICD-10-CM

## 2021-07-14 LAB
INTERNATIONAL NORMALIZATION RATIO, POC: 2.7
PROTHROMBIN TIME, POC: NORMAL

## 2021-07-14 PROCEDURE — 85610 PROTHROMBIN TIME: CPT | Performed by: FAMILY MEDICINE

## 2021-07-14 NOTE — PROGRESS NOTES
(VITAMIN D3) 50 MCG (2000 UT) CAPS, Take by mouth, Disp: , Rfl:     diclofenac sodium (VOLTAREN) 1 % GEL, Apply topically 2 times daily, Disp: 4 g, Rfl: 0    allopurinol (ZYLOPRIM) 100 MG tablet, , Disp: , Rfl:     insulin regular (NOVOLIN R) 100 UNIT/ML injection, USE AS DIRECTED PER SLIDING SCALE UP TO 26 UNITS DAILY, Disp: 20 mL, Rfl: 8    UNIFINE PENTIPS 32G X 4 MM MISC, USE DAILY AS DIRECTED, Disp: 100 each, Rfl: 11    colchicine (COLCRYS) 0.6 MG tablet, Take 1 tablet by mouth 2 times daily, Disp: 20 tablet, Rfl: 0    furosemide (LASIX) 20 MG tablet, Take 1 tablet by mouth daily, Disp: 90 tablet, Rfl: 3    ONE TOUCH ULTRASOFT LANCETS MISC, USE TO TEST 3 TIMES A DAY and as needed, Disp: 120 each, Rfl: 11    pregabalin (LYRICA) 100 MG capsule, TAKE 1 CAPSULE BY MOUTH THREE TIMES A DAY, Disp: 90 capsule, Rfl: 2    LEVEMIR FLEXTOUCH 100 UNIT/ML injection pen, INJECT 60 UNITS AT BEDTIME OR AS DIRECTED (Patient taking differently: Using 50 units), Disp: 5 pen, Rfl: 1    omeprazole (PRILOSEC) 40 MG delayed release capsule, TAKE 1 CAPSULE BY MOUTH EVERY DAY, Disp: 90 capsule, Rfl: 3    warfarin (COUMADIN) 5 MG tablet, TAKE 1 TABLET BY MOUTH EVERY DAY, Disp: 90 tablet, Rfl: 3    BD VEO INSULIN SYRINGE U/F 31G X 15/64\" 0.3 ML MISC, USE UP TO FIVE TIMES DAILY, Disp: 90 each, Rfl: 11    levothyroxine (SYNTHROID) 100 MCG tablet, TAKE 1 TABLET BY MOUTH EVERY DAY, Disp: 90 tablet, Rfl: 3    ONETOUCH ULTRA strip, USE TO TEST BLOOD SUGAR 3 TIMES A DAY, Disp: 300 strip, Rfl: 3    pentoxifylline (TRENTAL) 400 MG extended release tablet, TAKE 1 TABLET BY MOUTH EVERY DAY, Disp: 90 tablet, Rfl: 3    atorvastatin (LIPITOR) 40 MG tablet, TAKE 1 TABLET BY MOUTH EVERY DAY, Disp: 90 tablet, Rfl: 3    DILT- MG extended release capsule, TAKE 1 CAPSULE BY MOUTH EVERY DAY, Disp: 90 capsule, Rfl: 3    ondansetron (ZOFRAN) 4 MG tablet, Take 1 tablet by mouth every 6 hours as needed for Nausea or Vomiting, Disp: 20 tablet, Rfl: 0    fluticasone (FLONASE) 50 MCG/ACT nasal spray, SPRAY 1 SPRAY INTO EACH NOSTRIL EVERY DAY, Disp: 1 Bottle, Rfl: 3    magnesium oxide (MAG-OX) 400 MG tablet, TAKE 1 TABLET BY MOUTH THREE TIMES A DAY (Patient taking differently: Take 400 mg by mouth daily ), Disp: 270 tablet, Rfl: 0     Social history: Denies IV drug use. Social History     Socioeconomic History    Marital status: Single     Spouse name: Not on file    Number of children: Not on file    Years of education: Not on file    Highest education level: Not on file   Occupational History    Not on file   Tobacco Use    Smoking status: Former Smoker     Packs/day: 1.00     Years: 35.00     Pack years: 35.00     Quit date:      Years since quittin.5    Smokeless tobacco: Never Used   Vaping Use    Vaping Use: Never used   Substance and Sexual Activity    Alcohol use: No     Alcohol/week: 0.0 standard drinks    Drug use: No    Sexual activity: Not Currently   Other Topics Concern    Not on file   Social History Narrative    Not on file     Social Determinants of Health     Financial Resource Strain:     Difficulty of Paying Living Expenses:    Food Insecurity:     Worried About [address] in the Last Year:     [address] [employer] in the Last Year:    Transportation Needs:     Lack of Transportation (Medical):  Lack of Transportation (Non-Medical):    Physical Activity:     Days of Exercise per Week:     Minutes of Exercise per Session:    Stress:     Feeling of Stress :    Social Connections:     Frequency of Communication with Friends and Family:     Frequency of Social Gatherings with Friends and Family:     Attends Orthodoxy Services:     Active Member of Clubs or Organizations:     Attends Club or Organization Meetings:     Marital Status:    Intimate Partner Violence:     Fear of Current or Ex-Partner:     Emotionally Abused:     Physically Abused:     Sexually Abused: Tobacco use. Social History     Tobacco Use   Smoking Status Former Smoker    Packs/day: 1.00    Years: 35.00    Pack years: 35.00    Quit date:     Years since quittin.5   Smokeless Tobacco Never Used     Employment: Noncontributory    Workers compensation claim: Noncontributory    Review of systems: Patient denies any fevers chills chest pain shortness of breath nausea vomiting significant weight loss any change in voiding or bowel movements. Patient denies any significant numbness or tingling at baseline as it relates to this presenting symptom/chief complaint. The patient denies any significant problems with skin or any significant allergies. Physical examination:  Body mass index is 38.62 kg/m². AAOx3, NCAT  EOMI  MMM  RR  Unlabored breathing, no wheezing  Skin intact BUE and BLE, warm and moist  Bilateral upper extremity examination specific to subjective symptoms    Exam Right Shoulder                                                Active Range of Motion (FF/Abd/ER/IR)         160/160/50/T12 limited secondary to pain  Passive Range of Motion (FF/Abd/ER/IR)      170/170  Positive   Neer,    positive Douglass,      5/5   empty Can albeit with some mild discomfort,          5/5 ER arm at the side,       5/5   belly Press,      5/5 bear Hug,       equivocal O'Briens,      trace   TTP at Biceps Tendon Sheath,     negative Speed, trace Yergeson, trace   TTP AC Joint, negative cross arm adduction,               Skin intact throughout  5/5 D B T G IO EPL  SILT Ax, R, U, M  +2 radial pulse           Diagnostic imaging:  MY READ:  Radiographs 4 view right shoulder 2021: Negative fracture. Positive joint space narrowing acromioclavicular joint. No gross glenohumeral joint arthrosis. Pertinent lab work: None     Diagnosis Orders   1.  Subacromial bursitis of right shoulder joint  OSR PT - [facility]     - TN DRAIN/INJECT LARGE JOINT/BURSA    triamcinolone acetonide (KENALOG-40) injection 40 mg    ropivacaine (NAROPIN) 0.5% injection 4 mL    diclofenac sodium (VOLTAREN) 1 % GEL   2. Acute pain of right shoulder  XR SHOULDER RIGHT (MIN 2 VIEWS)       Assessment and plan: 69 y.o. female with current subjective symptoms and physical exam findings with diagnostic imaging correlating to right shoulder subacromial bursitis/rotator cuff tendinitis. -Greater than 50% of the time (16/30 minutes) was spent coordinating and discussing the clinical findings and diagnostic imaging results as they pertain to the patient's presenting subjective symptoms.  -I reviewed with the patient the imaging findings as well as clinical exam and  how it correlates to subjective symptoms.  -I had a pleasant discussion with the patient today. I reviewed with her that her clinical examination does show rotator cuff tendinitis/subacromial bursitis. She also has very mild bicipital tenosynovitis. I reviewed with her consideration for continued conservative care treatment options. I reviewed with her starting over-the-counter Voltaren topical gel per bottle as needed discomfort. She wishes to pursue this as she is unable to take oral anti-inflammatories. -I offered formal physical therapy and she will start that for periscapular reconditioning in clinic.  -I offered a corticosteroid injection in the right shoulder subacromial space. Understand the risks and benefits of this she wished to proceed. --The patient was educated to the risks (infection and skin blanching to name a few) and benefits of the injection and understanding these risks and benefits, the patient wished to proceed and a verbal consent was obtained.   If the patient verbalized the presence of diabetes or rheumatologic condition on chronic immunosuppresseants as a comorbidity upon direct questioning, an additional discussion was had detailing the potential increased risk of infection and potential increase in FSBG and to monitor FSBG and adjust medications as needed.  -After sterile prep of the right shoulder, a 5 cc corticosteroid injection (4cc ropivicaine and 1cc kenolog 40) was administered into the right shoulder subacromial space. The patient tolerated the procedure well and started to have immediate improvement in pain/symptoms. Improved range of motion of 170/170 afterwards  -All questions answered to the patient's satisfaction and the patient expressed understanding and agreement with the above listed treatment plan  -Follow up 8 to 12 weeks after having completed formal physical therapy, allowing adequate time for corticosteroid injection is diagnostic and therapeutic and anti-inflammatory topical gel use. Should she continue to have anterior related shoulder pain, then consideration for a ultrasound-guided biceps tendon sheath injection as a referral over to radiology to be placed by radiology  -Thank you for the clinical consultation and allowing me to participate in the patient's care. Electronically signed by Romel Pritchett MD on 7/13/21 at 11:20 PM EDT    Disclaimer: This note was dictated with voice recognition software. Though review and correction are routinely performed, please contact the office/medical records for any errors requiring correction.

## 2021-07-16 ENCOUNTER — HOSPITAL ENCOUNTER (OUTPATIENT)
Dept: PHYSICAL THERAPY | Age: 71
Setting detail: THERAPIES SERIES
Discharge: HOME | End: 2021-07-16
Payer: MEDICARE

## 2021-07-16 PROCEDURE — 97110 THERAPEUTIC EXERCISES: CPT

## 2021-07-16 PROCEDURE — 97112 NEUROMUSCULAR REEDUCATION: CPT

## 2021-07-16 PROCEDURE — 97161 PT EVAL LOW COMPLEX 20 MIN: CPT

## 2021-07-16 NOTE — PLAN OF CARE
[address] 48, [address] [address] Edwin Hines  Phone: [phone], [phone]                                                    Physical Therapy Certification    Dear Referring Practitioner: Edyta Chiu,    We had the pleasure of evaluating the following patient for physical therapy services at [address] [place] [address]. A summary of our findings can be found in the initial assessment below. This includes our plan of care. If you have any questions or concerns regarding these findings, please do not hesitate to contact me at the office phone number checked above. Thank you for the referral.       Physician Signature:_______________________________Date:__________________  By signing above (or electronic signature), therapists plan is approved by physician      Patient: Julisa Pedraza \"Edda\"  : 1950   MRN: [id number]  Referring Physician: Referring Practitioner: Edyta Chiu      Evaluation Date: 2021      Medical Diagnosis Information:  Diagnosis: R shoulder subacromial bursitis   Treatment Diagnosis: F14.323, M25.611                                         Insurance information: PT Insurance Information: Medicare    Precautions/ Contra-indications/Relevant Medical History: h/o lymphoma     C-SSRS Triggered by Intake questionnaire (Past 2 wk assessment):   [x] No, Questionnaire did not trigger screening.   [] Yes, Patient intake triggered further evaluation      [] C-SSRS Screening completed  [] PCP notified via Plan of Care  [] Emergency services notified     Latex Allergy:  [x]NO      []YES   Preferred Language for Healthcare:   [x]English       []other:     SUBJECTIVE: Patient stated complaint: Patient reports insidious onset of R shoulder pain 2-3 weeks ago. She thinks she might have slept on it wrong.  Now all movements of the shoulder hurt, but minimal pain with rest. She got a cortisone injection on Tuesday, but so far she hasn't noticed any improvement. Patient is right-handed. Functional Disability Index: Quick Dash/Modified Oswestry: 52% (Total Number Sum: 34)    Pain Scale: 5/10  Easing factors: rest  Provocative factors: reaching out to side, to computer mouse, behind back, behind head     Type: [x]Constant   []Intermittent  []Radiating []Localized []other:     Numbness/Tingling: denies    Functional Limitations/Impairments: [x]Lifting/reaching [x]Grooming [x]Carrying    [x]ADL's [x]Driving [x]Sports/Recreations   []Other:    Occupation/School: disabled, takes care of home/dog, play on the computer, walks one mile per day    Living Status/Prior Level of Function: Independent with ADLs and IADLs,     OBJECTIVE:     CERV ROM     Cervical Flexion     Cervical Extension     Cervical SB     Cervical rotation          ROM Left Right   Shoulder Flex 150 deg 60 deg   Shoulder Abd 150 deg 75 deg   Shoulder ER T3 Back of head   Shoulder IR T10 L1   Elbow Flex     Elbow Ext     Wrist Flex     Wrist Ext     Strength  Left Right   Shoulder Flex 5/5 NT   Shoulder Scap 5/5 NT   Shoulder ER 5/5 3/5   Shoulder IR 5/5 3+/5   Elbow Flex     Elbow Ext     Wrist Flex     Wrist Ext     [place]        Reflexes/Sensation (myotomes/dermatomes):    [x]Normal    []Abnormal:      Joint mobility:    [x]Normal    []Hypo   []Hyper    Palpation: significant tenderness over R upper trap, rhomboids, scapular area, anterior shoulder, and supraspinatus insertion    Functional Mobility/Transfers: independent, but difficulty using RUE    Posture: moderate forward head, rounded shoulders, mild dowager's hump    Bandages/Dressings/Incisions: n/a    Gait (include devices/WB status): n/a    Orthopedic Special Tests: unable to achieve test positions for multiple tests due to pain    [x] Patient history, allergies, meds reviewed. Medical chart reviewed. See intake form.      Review Of Systems (ROS):  [x]Performed Review of systems (Integumentary, CardioPulmonary, Neurological) by intake and observation. Intake form has been scanned into medical record. Patient has been instructed to contact their primary care physician regarding ROS issues if not already being addressed at this time. Co-morbidities/Complexities (which will affect course of rehabilitation):   [x]None           Arthritic conditions   []Rheumatoid arthritis (M05.9)  []Osteoarthritis (M19.91)   Cardiovascular conditions   [x]Hypertension (I10)  []Hyperlipidemia (E78.5)  []Angina pectoris (I20)  []Atherosclerosis (I70)   Musculoskeletal conditions   []Disc pathology   []Congenital spine pathologies   []Prior surgical intervention  []Osteoporosis (M81.8)  []Osteopenia (M85.8)   Endocrine conditions   []Hypothyroid (E03.9)  []Hyperthyroid Gastrointestinal conditions   []Constipation (A57.66)   Metabolic conditions   []Morbid obesity (E66.01)  [x]Diabetes type 1(E10.65) or 2 (E11.65)   []Neuropathy (G60.9)     Pulmonary conditions   []Asthma (J45)  []Coughing   []COPD (J44.9)   Psychological Disorders  []Anxiety (F41.9)  []Depression (F32.9)   []Other:   [x]Other:   H/o lymphoma, in remission     Barriers to/and or personal factors that will affect rehab potential:              []Age  []Sex              []Motivation/Lack of Motivation                        [x]Co-Morbidities              []Cognitive Function, education/learning barriers              []Environmental, home barriers              []profession/work barriers  []past PT/medical experience  []other:  Justification:     Falls Risk Assessment (30 days):   [x] Falls Risk assessed and no intervention required.   [] Falls Risk assessed and Patient requires intervention due to being higher risk   TUG score (>12s at risk):     [] Falls education provided, including       G-Codes:       ASSESSMENT:   Functional Impairments   []Noted spinal or UE joint hypomobility   []Noted spinal or UE joint hypermobility   [x]Decreased UE functional ROM   [x]Decreased UE functional strength   []Abnormal reflexes/sensation/myotomal/dermatomal deficits   [x]Decreased RC/scapular/core strength and neuromuscular control   []other:      Functional Activity Limitations (from functional questionnaire and intake)   [x]Reduced ability to tolerate prolonged functional positions   [x]Reduced ability or difficulty with changes of positions or transfers between positions   [x]Reduced ability to maintain good posture and demonstrate good body mechanics with sitting, bending, and lifting   [x] Reduced ability or tolerance with driving and/or computer work   [x]Reduced ability to sleep   [x]Reduced ability to perform lifting, reaching, carrying tasks   [x]Reduced ability to tolerate impact through UE   [x]Reduced ability to reach behind back   [x]Reduced ability to  or hold objects   [x]Reduced ability to throw or toss an object   []other:    Participation Restrictions   [x]Reduced participation in self care activities   [x]Reduced participation in home management activities   [x]Reduced participation in work activities   [x]Reduced participation in social activities. [x]Reduced participation in sport/recreation activities. Classification:   []Signs/symptoms consistent with post-surgical status including decreased ROM, strength and function. []Signs/symptoms consistent with joint sprain/strain   []Signs/symptoms consistent with shoulder impingement   [x]Signs/symptoms consistent with shoulder/elbow/wrist tendinopathy   []Signs/symptoms consistent with Rotator cuff tear   []Signs/symptoms consistent with labral tear   []Signs/symptoms consistent with postural dysfunction    []Signs/symptoms consistent with Glenohumeral IR Deficit - <45 degrees   []Signs/symptoms consistent with facet dysfunction of cervical/thoracic spine    []Signs/symptoms consistent with pathology which may benefit from Dry needling     []other:      Tolerance of evaluation/treatment: []Excellent   [x]Good    []Fair   []Poor    Physical Therapy Evaluation Complexity Justification   [x] A history of present problem with:  [] no personal factors and/or comorbidities that impact the plan of care;  []1-2 personal factors and/or comorbidities that impact the plan of care  [x]3 personal factors and/or comorbidities that impact the plan of care  [x] An examination of body systems using standardized tests and measures addressing any of the following: body structures and functions (impairments), activity limitations, and/or participation restrictions;:  [] a total of 1-2 or more elements   [] a total of 3 or more elements   [x] a total of 4 or more elements   [x] A clinical presentation with:  [x] stable and/or uncomplicated characteristics   [] evolving clinical presentation with changing characteristics  [] unstable and unpredictable characteristics;   [x] Clinical decision making of [x] low, [] moderate, [] high complexity using standardized patient assessment instrument and/or measurable assessment of functional outcome. [x] EVAL (LOW) 84049 (typically 20 minutes face-to-face)  [] EVAL (MOD) 69553 (typically 30 minutes face-to-face)  [] EVAL (HIGH) 42074 (typically 45 minutes face-to-face)  [] RE-EVAL     PLAN:  Frequency/Duration:  1-2 days per week for 8 weeks:  INTERVENTIONS:  [x]  Therapeutic exercise including: strength training, ROM, for upper extremity and core   [x]  NMR activation and proprioception for UE and Core   [x]  Manual therapy as indicated for UE and spine to include: Dry Needling/IASTM, STM, PROM, Gr I-IV mobilizations, manipulation. [x] Modalities as needed that may include: thermal agents, E-stim, Biofeedback, US, iontophoresis as indicated  [x] Patient education on joint protection, postural re-education, activity modification, progression of HEP.     HEP instruction: Refer to [address] [place] [address] access code and exercises on the 1st visit treatment note    GOALS:    Short Term

## 2021-07-16 NOTE — FLOWSHEET NOTE
[facility], [address] [place] [address] Teresa Smith 83, [address]  Phone: [phone], [phone]    Physical Therapy Treatment Note/ Progress Report:     Date:  2021    Patient Name:  Danette Merrill    :  1950  MRN: [id number]  Restrictions/Precautions:    Medical/Treatment Diagnosis Information:  · Diagnosis: R shoulder subacromial bursitis  · Treatment Diagnosis: M25.511, N10.004  Insurance/Certification information:  PT Insurance Information: Medicare  Physician Information:  Referring Practitioner: Darci Dates  Has the plan of care been signed (Y/N):        []  Yes  [x]  No     Date of Patient follow up with Physician: unknown    Is this a Progress Report:     []  Yes  [x]  No      If Yes:  Date Range for reporting period:  Initial Eval: 2021  Beginnin2021 --- Endin21    Progress report will be due (10 Rx or 30 days whichever is less): 59     Recertification will be due (POC Duration  / 90 days whichever is less): 9/10/21      Visit # Insurance Allowable Auth Required   In Person 1 Med nec []  Yes     []  No    Tele Health 0  []  Yes     []  No    Total 1         Functional Scale: Quick Dash: 52% (Total Number Sum: 34)   Date assessed: 2021     Latex Allergy:  [x]NO      []YES  Preferred Language for Healthcare:   [x]English       []other:    Pain level:  5/10     SUBJECTIVE:  See eval    OBJECTIVE: See eval   Observation:    Test measurements:      RESTRICTIONS/PRECAUTIONS: h/o lymphoma     Exercises/Interventions:   Therapeutic Ex (64573)  Therapeutic Activity (18688)  NMR re-education (98211) Sets/Reps Notes/CUES   P[place]ley          Chin tucks     scap retract          Attempt supine cane press Too much pain         Table slides flex 10x    Table slides abd 5x         ER stretch doorway R only 3x15\"         Levator stretch 3x30\" ea                                                           Manual Intervention (71250)     PROM 5' Tumri access code: [email or web address]                    Patient Education         Therapeutic Exercise and NMR EXR  [x] (23616) Provided verbal/tactile cueing for activities related to strengthening, flexibility, endurance, ROM  for improvements in scapular, scapulothoracic and UE control with self care, reaching, carrying, lifting, house/yardwork, driving/computer work. [x] (17519) Provided verbal/tactile cueing for activities related to improving balance, coordination, kinesthetic sense, posture, motor skill, proprioception  to assist with  scapular, scapulothoracic and UE control with self care, reaching, carrying, lifting, house/yardwork, driving/computer work. Therapeutic Activities:    [x] (77737 or 28338) Provided verbal/tactile cueing for activities related to improving balance, coordination, kinesthetic sense, posture, motor skill, proprioception and motor activation to allow for proper function of scapular, scapulothoracic and UE control with self care, carrying, lifting, driving/computer work.      Home Exercise Program:    [x] (81901) Reviewed/Progressed HEP activities related to strengthening, flexibility, endurance, ROM of scapular, scapulothoracic and UE control with self care, reaching, carrying, lifting, house/yardwork, driving/computer work  [x] (63189) Reviewed/Progressed HEP activities related to improving balance, coordination, kinesthetic sense, posture, motor skill, proprioception of scapular, scapulothoracic and UE control with self care, reaching, carrying, lifting, house/yardwork, driving/computer work      Manual Treatments:  PROM / STM / Oscillations-Mobs:  G-I, II, III, IV (PA's, Inf., Post.)  [x] (32418) Provided manual therapy to mobilize soft tissue/joints of cervical/CT, scapular GHJ and UE for the purpose of modulating pain, promoting relaxation,  increasing ROM, reducing/eliminating soft tissue swelling/inflammation/restriction, improving soft tissue extensibility and allowing for proper ROM for normal function with self care, reaching, carrying, lifting, house/yardwork, driving/computer work    Modalities:      Charges:  Timed Code Treatment Minutes: 30   Total Treatment Minutes:  55   BWC:  TE TIME:  NMR TIME:  MANUAL TIME:  UNTIMED MINUTES:  Medicare Total:   n/a  n/a  n/a  n/a  $130      [x] EVAL (LOW) 15650 (typically 20 minutes face-to-face)  [] EVAL (MOD) 08982 (typically 30 minutes face-to-face)  [] EVAL (HIGH) 26777 (typically 45 minutes face-to-face)  [] RE-EVAL     [x] BT(93025) x 1    [] IONTO  [x] NMR (99346) x 1    [] VASO  [] Manual (85331) x     [] Other:  [] TA x      [] Mech Traction (00051)  [] ES(attended) (77647)     [] ES (un) (63024):    ASSESSMENT:  See eval    GOALS:   Short Term Goals: To be achieved in: 2 weeks  1. Independent in HEP and progression per patient tolerance, in order to prevent re-injury. []? Progressing: []? Met: []? Not Met: []? Adjusted       2. Patient will have a decrease in pain to facilitate improvement in movement, function, and ADLs as indicated by Functional Deficits. []? Progressing: []? Met: []? Not Met: []? Adjusted          Long Term Goals: To be achieved in: 8 weeks  1. Disability index score of 20% or less for the QuickDash/Oswestry to assist with reaching prior level of function. []? Progressing: []? Met: []? Not Met: []? Adjusted      2. Patient will demonstrate increased AROM to equal the opposite side bilaterally to allow for proper joint functioning as indicated by patients Functional Deficits. []? Progressing: []? Met: []? Not Met: []? Adjusted       3. Patient will demonstrate an increase in strength to 5/5 to allow for proper functional mobility as indicated by patients Functional Deficits. []? Progressing: []? Met: []? Not Met: []? Adjusted       4. Patient will return to all transfers, work activities, and functional activities without increased symptoms or restriction. []? Progressing: []? Met: []?  Not Met: []? Adjusted       5. Patient will have 0/10 pain with ADL's.  []? Progressing: []? Met: []? Not Met: []? Adjusted       6. Patient stated goal: Patient wants to be able to reach overhead to cabinets, behind back to wash back, and out to side to get on computer without pain or restriction. []? Progressing: []? Met: []? Not Met: []? Adjusted                 (Cut Vertell Rash from eval)    Overall Progression Towards Functional goals/ Treatment Progress Update:  [] Patient is progressing as expected towards functional goals listed. [] Progression is slowed due to complexities/Impairments listed. [] Progression has been slowed due to co-morbidities. [x] Plan just implemented, too soon to assess goals progression <30days   [] Goals require adjustment due to lack of progress  [] Patient is not progressing as expected and requires additional follow up with physician  [] Other    Prognosis for POC: [x] Good [] Fair  [] Poor    Patient requires continued skilled intervention: [x] Yes  [] No    Treatment/Activity Tolerance:  [x] Patient able to complete treatment  [] Patient limited by fatigue  [] Patient limited by pain    [] Patient limited by other medical complications  [] Other:     Return to Play: (if applicable)   []  Stage 1: Intro to Strength   []  Stage 2: Return to Run and Strength   []  Stage 3: Return to Jump and Strength   []  Stage 4: Dynamic Strength and Agility   []  Stage 5: Sport Specific Training     []  Ready to Return to Play, Meets All Above Stages   []  Not Ready for Return to Sports   Comments:                         PLAN: See eval  [] Continue per plan of care [] Alter current plan (see comments above)  [x] Plan of care initiated [] Hold pending MD visit [] Discharge    Electronically signed by:  Alicia Loyola PT    Note: If patient does not return for scheduled/ recommended follow up visits, this note will serve as a discharge from care along with most recent update on progress.

## 2021-07-26 ENCOUNTER — HOSPITAL ENCOUNTER (OUTPATIENT)
Dept: PHYSICAL THERAPY | Age: 71
Setting detail: THERAPIES SERIES
Discharge: HOME | End: 2021-07-26
Payer: MEDICARE

## 2021-07-26 PROCEDURE — 97140 MANUAL THERAPY 1/> REGIONS: CPT | Performed by: PHYSICAL THERAPY ASSISTANT

## 2021-07-26 PROCEDURE — 97110 THERAPEUTIC EXERCISES: CPT | Performed by: PHYSICAL THERAPY ASSISTANT

## 2021-07-26 PROCEDURE — 97112 NEUROMUSCULAR REEDUCATION: CPT | Performed by: PHYSICAL THERAPY ASSISTANT

## 2021-07-26 RX ORDER — WARFARIN SODIUM 5 MG/1
TABLET ORAL
Qty: 102 TABLET | Refills: 11 | Status: SHIPPED | OUTPATIENT
Start: 2021-07-26 | End: 2022-08-24

## 2021-07-26 NOTE — TELEPHONE ENCOUNTER
Last OV 4/13/21  Future Appointments   Date Time Provider Johana Calderon   7/26/2021  2:30 PM Samara Sukhjinder, [place] [address] [facility] [place] [facility]   7/28/2021  2:30 PM Samara Sukhjinder, PTA [address] [facility] [place] [facility]   7/29/2021  8:00 AM SCHEDULE, [facility] [place] [facility] [place] [facility]   8/4/2021  2:30 PM Rinda Cruise, PT [facility]   8/6/2021  2:15 PM [facility]   8/11/2021  8:20 AM SCHEDULE, [facility] [place] [facility]   8/11/2021  2:30 PM Samara Sukhjinder, [facility] [place] [facility]   8/13/2021 10:00 AM Matthew Polk MD [facility] [place] [facility]   8/13/2021  2:15 PM Ana Maria Beltran, PT [facility] [place] [facility]   3/1/2022  2:00 PM SCHEDULE, [facility], LPN [facility] [place] [facility]

## 2021-07-26 NOTE — FLOWSHEET NOTE
[facility],  [place] [address] [address], [address]  Phone: [phone], [phone]    Physical Therapy Treatment Note/ Progress Report:     Date:  2021    Patient Name:  Dalia De La Cruz \"Ashwini\"    :  1950  MRN: [id number]  Restrictions/Precautions:    Medical/Treatment Diagnosis Information:  · Diagnosis: R shoulder subacromial bursitis  · Treatment Diagnosis: M25.511, I46.151  Insurance/Certification information:  PT Insurance Information: Medicare  Physician Information:  Referring Practitioner: Himanshu Riley  Has the plan of care been signed (Y/N):        []  Yes  [x]  No     Date of Patient follow up with Physician: unknown    Is this a Progress Report:     []  Yes  [x]  No      If Yes:  Date Range for reporting period:  Initial Eval: 2021  Beginnin2021 --- Endin21    Progress report will be due (10 Rx or 30 days whichever is less):      Recertification will be due (POC Duration  / 90 days whichever is less): 9/10/21      Visit # Insurance Allowable Auth Required   In Person 2 Med nec []  Yes     []  No    Tele Health 0  []  Yes     []  No    Total 2         Functional Scale: Quick Dash: 52% (Total Number Sum: 34)   Date assessed: 2021     Latex Allergy:  [x]NO      []YES  Preferred Language for Healthcare:   [x]English       []other:    Pain level:  5/10     SUBJECTIVE:  Did OK after 1st visit. Able to do the HEP at home. Reaching out to the side or overhead is still what makes the shoulder worse.       OBJECTIVE: See eval   Observation:    Test measurements:      RESTRICTIONS/PRECAUTIONS: h/o lymphoma     Exercises/Interventions:   Therapeutic Ex (88244)  Therapeutic Activity ()  NMR re-education (89212) Sets/Reps Notes/CUES   Pulley 5'         Shoulder shrugs / squeezes  X20 / x20     T-Band Row / Extension  Red x15 / yellow x15  Added to HEP        Chin tucks     scap retract          Attempt supine cane press Too much pain Table slides flex 10x 10\"    Table slides abd 10x 10\"          ER stretch doorway R only 3x15\"         Levator stretch 3x30\" ea         Supine press with cane  X20     Supine cane flexion  10x10\"              Postural Education  10' Computer placement, proper chairs, proper postures. Manual Intervention (39151)     PROM 5'                             [address] access code: [email or web address]                    Patient Education         Therapeutic Exercise and NMR EXR  [x] (57990) Provided verbal/tactile cueing for activities related to strengthening, flexibility, endurance, ROM  for improvements in scapular, scapulothoracic and UE control with self care, reaching, carrying, lifting, house/yardwork, driving/computer work. [x] (80777) Provided verbal/tactile cueing for activities related to improving balance, coordination, kinesthetic sense, posture, motor skill, proprioception  to assist with  scapular, scapulothoracic and UE control with self care, reaching, carrying, lifting, house/yardwork, driving/computer work. Therapeutic Activities:    [x] (43199 or 39427) Provided verbal/tactile cueing for activities related to improving balance, coordination, kinesthetic sense, posture, motor skill, proprioception and motor activation to allow for proper function of scapular, scapulothoracic and UE control with self care, carrying, lifting, driving/computer work.      Home Exercise Program:    [x] (48404) Reviewed/Progressed HEP activities related to strengthening, flexibility, endurance, ROM of scapular, scapulothoracic and UE control with self care, reaching, carrying, lifting, house/yardwork, driving/computer work  [x] (36155) Reviewed/Progressed HEP activities related to improving balance, coordination, kinesthetic sense, posture, motor skill, proprioception of scapular, scapulothoracic and UE control with self care, reaching, carrying, lifting, house/yardwork, driving/computer work Manual Treatments:  PROM / STM / Oscillations-Mobs:  G-I, II, III, IV (PA's, Inf., Post.)  [x] (78608) Provided manual therapy to mobilize soft tissue/joints of cervical/CT, scapular GHJ and UE for the purpose of modulating pain, promoting relaxation,  increasing ROM, reducing/eliminating soft tissue swelling/inflammation/restriction, improving soft tissue extensibility and allowing for proper ROM for normal function with self care, reaching, carrying, lifting, house/yardwork, driving/computer work    Modalities:      Charges:  Timed Code Treatment Minutes: 45   Total Treatment Minutes:  45   BWC:  TE TIME:  NMR TIME:  MANUAL TIME:  UNTIMED MINUTES:  Medicare Total:   n/a  n/a  n/a  n/a  $130      [] EVAL (LOW) 10136 (typically 20 minutes face-to-face)  [] EVAL (MOD) 47651 (typically 30 minutes face-to-face)  [] EVAL (HIGH) 35889 (typically 45 minutes face-to-face)  [] RE-EVAL     [x] OI(20773) x 1    [] IONTO  [x] NMR (32095) x 1    [] VASO  [x] Manual (20679) x  1   [] Other:  [] TA x      [] Mech Traction (21723)  [] ES(attended) (67831)     [] ES (un) (93936):    ASSESSMENT:  See eval    GOALS:   Short Term Goals: To be achieved in: 2 weeks  1. Independent in HEP and progression per patient tolerance, in order to prevent re-injury. [x]? Progressing: []? Met: []? Not Met: []? Adjusted       2. Patient will have a decrease in pain to facilitate improvement in movement, function, and ADLs as indicated by Functional Deficits. [x]? Progressing: []? Met: []? Not Met: []? Adjusted          Long Term Goals: To be achieved in: 8 weeks  1. Disability index score of 20% or less for the QuickDash/Oswestry to assist with reaching prior level of function. []? Progressing: []? Met: []? Not Met: []? Adjusted      2. Patient will demonstrate increased AROM to equal the opposite side bilaterally to allow for proper joint functioning as indicated by patients Functional Deficits. []? Progressing: []? Met: []?  Not Met: PLAN: See eval  [x] Continue per plan of care [] Alter current plan (see comments above)  [] Plan of care initiated [] Hold pending MD visit [] Discharge    Electronically signed by:  Jerome Zimmer PTA    Note: If patient does not return for scheduled/ recommended follow up visits, this note will serve as a discharge from care along with most recent update on progress.

## 2021-07-27 RX ORDER — INSULIN DETEMIR 100 [IU]/ML
INJECTION, SOLUTION SUBCUTANEOUS
Qty: 5 PEN | Refills: 1 | Status: SHIPPED | OUTPATIENT
Start: 2021-07-27 | End: 2021-09-14

## 2021-07-27 NOTE — TELEPHONE ENCOUNTER
Last OV 4/13/21  Future Appointments   Date Time Provider Johana Lópezi   7/28/2021  2:30 PM Gueroclayton Patel, [place] [facility] [place] [facility]   7/29/2021  8:00 AM SCHEDULE, [facility] [place] [facility] [place] [facility] [place]   8/4/2021  2:30 PM Grover Hernadez, PT [facility] [place] [facility]   8/6/2021  2:15 PM Grover Hernadez, PT [facility] [place] [facility]   8/11/2021  8:20 AM SCHEDULE, [facility] [place] [place] - [facility]   8/11/2021  2:30 PM Guero Patel, PTA [facility] [place] [facility]   8/13/2021 10:00 AM Bola Peterson MD [facility] [place] [facility]   8/13/2021  2:15 PM Ana Maria Beltran, PT [facility] [place] [facility]   3/1/2022  2:00 PM SCHEDULE, [facility], LPN [facility] [place] [facility]

## 2021-07-28 ENCOUNTER — HOSPITAL ENCOUNTER (OUTPATIENT)
Dept: PHYSICAL THERAPY | Age: 71
Setting detail: THERAPIES SERIES
Discharge: HOME | End: 2021-07-28
Payer: MEDICARE

## 2021-07-28 PROCEDURE — 97112 NEUROMUSCULAR REEDUCATION: CPT | Performed by: PHYSICAL THERAPY ASSISTANT

## 2021-07-28 PROCEDURE — 97110 THERAPEUTIC EXERCISES: CPT | Performed by: PHYSICAL THERAPY ASSISTANT

## 2021-07-28 NOTE — FLOWSHEET NOTE
[facility], [address] [place] [address] [address], [address]  Phone: [phone], [phone]    Physical Therapy Treatment Note/ Progress Report:     Date:  2021    Patient Name:  Ana Maria Bravo \"Ashwini\"    :  1950  MRN: [id number]  Restrictions/Precautions:    Medical/Treatment Diagnosis Information:  · Diagnosis: R shoulder subacromial bursitis  · Treatment Diagnosis: M25.511, G73.806  Insurance/Certification information:  PT Insurance Information: Medicare  Physician Information:  Referring Practitioner: Gregory Barahona  Has the plan of care been signed (Y/N):        []  Yes  [x]  No     Date of Patient follow up with Physician: unknown    Is this a Progress Report:     []  Yes  [x]  No      If Yes:  Date Range for reporting period:  Initial Eval: 2021  Beginnin2021 --- Endin21    Progress report will be due (10 Rx or 30 days whichever is less):      Recertification will be due (POC Duration  / 90 days whichever is less): 9/10/21      Visit # Insurance Allowable Auth Required   In Person 3 Med nec []  Yes     []  No    Tele Health 0  []  Yes     []  No    Total 3         Functional Scale: Quick Dash: 52% (Total Number Sum: 34)   Date assessed: 2021     Latex Allergy:  [x]NO      []YES  Preferred Language for Healthcare:   [x]English       []other:    Pain level:  5/10     SUBJECTIVE:  Did OK with the new exercises but still not much change noted.       OBJECTIVE: See eval   Observation:    Test measurements:      RESTRICTIONS/PRECAUTIONS: h/o lymphoma     Exercises/Interventions:   Therapeutic Ex (04658)  Therapeutic Activity (63073)  NMR re-education (09929) Sets/Reps Notes/CUES   Pulley 5'         Shoulder shrugs  X30     T-Band Row / Extension  Red x20 / red x20  Added to HEP   B ER t-band  Yellow x20          Ball on trampoline  X20 4 way    Wall Push Up  painful         Chin tucks     scap retract          Attempt supine cane press Too much pain Table slides flex 10x 10\"    Table slides abd 10x 10\"          ER stretch doorway R only 10x10\"         Levator stretch 3x30\" ea         Supine press with cane  X20     Supine cane flexion  10x10\"              Postural Education  10' Computer placement, proper chairs, proper postures. Manual Intervention (42687)     PROM 5'                             [address] access code: [email or web address]                    Patient Education         Therapeutic Exercise and NMR EXR  [x] (14422) Provided verbal/tactile cueing for activities related to strengthening, flexibility, endurance, ROM  for improvements in scapular, scapulothoracic and UE control with self care, reaching, carrying, lifting, house/yardwork, driving/computer work. [x] (89112) Provided verbal/tactile cueing for activities related to improving balance, coordination, kinesthetic sense, posture, motor skill, proprioception  to assist with  scapular, scapulothoracic and UE control with self care, reaching, carrying, lifting, house/yardwork, driving/computer work. Therapeutic Activities:    [x] (16296 or 10375) Provided verbal/tactile cueing for activities related to improving balance, coordination, kinesthetic sense, posture, motor skill, proprioception and motor activation to allow for proper function of scapular, scapulothoracic and UE control with self care, carrying, lifting, driving/computer work.      Home Exercise Program:    [x] (92164) Reviewed/Progressed HEP activities related to strengthening, flexibility, endurance, ROM of scapular, scapulothoracic and UE control with self care, reaching, carrying, lifting, house/yardwork, driving/computer work  [x] (98679) Reviewed/Progressed HEP activities related to improving balance, coordination, kinesthetic sense, posture, motor skill, proprioception of scapular, scapulothoracic and UE control with self care, reaching, carrying, lifting, house/yardwork, driving/computer work Manual Treatments:  PROM / STM / Oscillations-Mobs:  G-I, II, III, IV (PA's, Inf., Post.)  [x] (44302) Provided manual therapy to mobilize soft tissue/joints of cervical/CT, scapular GHJ and UE for the purpose of modulating pain, promoting relaxation,  increasing ROM, reducing/eliminating soft tissue swelling/inflammation/restriction, improving soft tissue extensibility and allowing for proper ROM for normal function with self care, reaching, carrying, lifting, house/yardwork, driving/computer work    Modalities:      Charges:  Timed Code Treatment Minutes: 45   Total Treatment Minutes:  45   BWC:  TE TIME:  NMR TIME:  MANUAL TIME:  UNTIMED MINUTES:  Medicare Total:   n/a  n/a  n/a  n/a  $330 (#3)      [] EVAL (LOW) 69013 (typically 20 minutes face-to-face)  [] EVAL (MOD) 21315 (typically 30 minutes face-to-face)  [] EVAL (HIGH) 84965 (typically 45 minutes face-to-face)  [] RE-EVAL     [x] EH(64201) x2    [] IONTO  [x] NMR (02198) x 1    [] VASO  [] Manual (22924) x    [] Other:  [] TA x      [] Mech Traction (69957)  [] ES(attended) (81866)     [] ES (un) (52742):    ASSESSMENT:  See eval    GOALS:   Short Term Goals: To be achieved in: 2 weeks  1. Independent in HEP and progression per patient tolerance, in order to prevent re-injury. [x]? Progressing: []? Met: []? Not Met: []? Adjusted       2. Patient will have a decrease in pain to facilitate improvement in movement, function, and ADLs as indicated by Functional Deficits. [x]? Progressing: []? Met: []? Not Met: []? Adjusted          Long Term Goals: To be achieved in: 8 weeks  1. Disability index score of 20% or less for the QuickDash/Oswestry to assist with reaching prior level of function. []? Progressing: []? Met: []? Not Met: []? Adjusted      2. Patient will demonstrate increased AROM to equal the opposite side bilaterally to allow for proper joint functioning as indicated by patients Functional Deficits. []? Progressing: []? Met: []?  Not Met: []? Adjusted       3. Patient will demonstrate an increase in strength to 5/5 to allow for proper functional mobility as indicated by patients Functional Deficits. []? Progressing: []? Met: []? Not Met: []? Adjusted       4. Patient will return to all transfers, work activities, and functional activities without increased symptoms or restriction. []? Progressing: []? Met: []? Not Met: []? Adjusted       5. Patient will have 0/10 pain with ADL's.  []? Progressing: []? Met: []? Not Met: []? Adjusted       6. Patient stated goal: Patient wants to be able to reach overhead to cabinets, behind back to wash back, and out to side to get on computer without pain or restriction. []? Progressing: []? Met: []? Not Met: []? Adjusted                 (Cut Madina Tom from eval)    Overall Progression Towards Functional goals/ Treatment Progress Update:  [] Patient is progressing as expected towards functional goals listed. [x] Progression is slowed due to complexities/Impairments listed. [] Progression has been slowed due to co-morbidities.   [] Plan just implemented, too soon to assess goals progression <30days   [] Goals require adjustment due to lack of progress  [] Patient is not progressing as expected and requires additional follow up with physician  [] Other    Prognosis for POC: [x] Good [] Fair  [] Poor    Patient requires continued skilled intervention: [x] Yes  [] No    Treatment/Activity Tolerance:  [x] Patient able to complete treatment  [] Patient limited by fatigue  [] Patient limited by pain    [] Patient limited by other medical complications  [] Other:     Return to Play: (if applicable)   []  Stage 1: Intro to Strength   []  Stage 2: Return to Run and Strength   []  Stage 3: Return to Jump and Strength   []  Stage 4: Dynamic Strength and Agility   []  Stage 5: Sport Specific Training     []  Ready to Return to Play, Meets All Above Stages   []  Not Ready for Return to Sports   Comments: PLAN: See eval  [x] Continue per plan of care [] Alter current plan (see comments above)  [] Plan of care initiated [] Hold pending MD visit [] Discharge    Electronically signed by:  Celestina Mendez PTA    Note: If patient does not return for scheduled/ recommended follow up visits, this note will serve as a discharge from care along with most recent update on progress.

## 2021-07-29 ENCOUNTER — NURSE ONLY (OUTPATIENT)
Dept: FAMILY MEDICINE CLINIC | Age: 71
End: 2021-07-29
Payer: MEDICARE

## 2021-07-29 DIAGNOSIS — R73.09 ELEVATED HEMOGLOBIN A1C: ICD-10-CM

## 2021-07-29 PROCEDURE — 36415 COLL VENOUS BLD VENIPUNCTURE: CPT | Performed by: FAMILY MEDICINE

## 2021-07-30 LAB
ESTIMATED AVERAGE GLUCOSE: 171.4 MG/DL
HBA1C MFR BLD: 7.6 %

## 2021-08-03 DIAGNOSIS — B02.29 POST HERPETIC NEURALGIA: ICD-10-CM

## 2021-08-03 RX ORDER — PREGABALIN 100 MG/1
CAPSULE ORAL
Qty: 90 CAPSULE | Refills: 3 | Status: SHIPPED | OUTPATIENT
Start: 2021-08-03 | End: 2022-01-29

## 2021-08-03 NOTE — TELEPHONE ENCOUNTER
Last OV 4/13/21  Future Appointments   Date Time Provider Johana Calderon   8/4/2021  2:30 PM Jaiden, PT [address] [facility] PT [facility]   8/6/2021  2:15 PM Jaiden, PT [place] [facility]ANEGAS PT [facility]   8/11/2021  8:20 AM SCHEDULE, [facility] [place] [facility]   8/11/2021  2:30 PM [facility]   8/13/2021 10:00 AM Mimi Saunders MD [facility] [place] [facility]   8/13/2021  2:15 PM Ana Maria [facility]tran[facility]   3/1/2022  2:00 PM SCHEDULE, [facility], JAMES ZAZUETA [facility] [place] [facility]

## 2021-08-04 ENCOUNTER — HOSPITAL ENCOUNTER (OUTPATIENT)
Dept: PHYSICAL THERAPY | Age: 71
Setting detail: THERAPIES SERIES
Discharge: HOME | End: 2021-08-04
Payer: MEDICARE

## 2021-08-04 PROCEDURE — 97140 MANUAL THERAPY 1/> REGIONS: CPT

## 2021-08-04 PROCEDURE — 97112 NEUROMUSCULAR REEDUCATION: CPT

## 2021-08-04 PROCEDURE — 97110 THERAPEUTIC EXERCISES: CPT

## 2021-08-04 NOTE — FLOWSHEET NOTE
[facility], [address] [place] [address] [address], [address]  Phone: [phone], [phone]    Physical Therapy Treatment Note/ Progress Report:     Date:  2021    Patient Name:  Tin Barrow \"Ashwini\"    :  1950  MRN: [id number]  Restrictions/Precautions:    Medical/Treatment Diagnosis Information:  · Diagnosis: R shoulder subacromial bursitis  · Treatment Diagnosis: M25.511, S34.603  Insurance/Certification information:  PT Insurance Information: Medicare  Physician Information:  Referring Practitioner: More Cordon  Has the plan of care been signed (Y/N):        []  Yes  [x]  No     Date of Patient follow up with Physician: unknown    Is this a Progress Report:     []  Yes  [x]  No      If Yes:  Date Range for reporting period:  Initial Eval: 2021  Beginnin2021 --- Endin21    Progress report will be due (10 Rx or 30 days whichever is less):      Recertification will be due (POC Duration  / 90 days whichever is less): 9/10/21      Visit # Insurance Allowable Auth Required   In Person [address] []  Yes     []  No    Tele Health 0  []  Yes     []  No    Total 4         Functional Scale: Quick Dash: 52% (Total Number Sum: 34)   Date assessed: 2021     Latex Allergy:  [x]NO      []YES  Preferred Language for Healthcare:   [x]English       []other:    Pain level:  5/10     SUBJECTIVE:  Patient having increased pain today. She is frustrated and feels like she should be doing better. She is wondering if she should go back to MD. Discussed possibility of going back after one additional visit to therapy.      OBJECTIVE: See eval   Observation:    Test measurements:      RESTRICTIONS/PRECAUTIONS: h/o lymphoma     Exercises/Interventions:   Therapeutic Ex (51022)  Therapeutic Activity (29604)  NMR re-education (47027) Sets/Reps Notes/CUES   Pulley 2' Could only tolerate 2'        Shoulder shrugs  X30     T-Band Row / Extension  Red x20 / red x20  Added to HEP B ER t-band           Jannet Fontana on trampoline     Wall Push Up          Chin tucks 20x    scap retract          Attempt supine cane press 20x Not able to flex past 90 deg        Table slides flex 10x 10\"    Table slides abd 10x 10\"          ER stretch doorway R only 10x10\"         Levator stretch 3x30\" ea         Supine press with cane  X20     Supine cane flexion                Postural Education   Computer placement, proper chairs, proper postures. Manual Intervention (63523)     PROM 10' Including light STM to anterior shoulder                            Medbridge access code: [email or web address]                    Patient Education         Therapeutic Exercise and NMR EXR  [x] (05988) Provided verbal/tactile cueing for activities related to strengthening, flexibility, endurance, ROM  for improvements in scapular, scapulothoracic and UE control with self care, reaching, carrying, lifting, house/yardwork, driving/computer work. [x] (75642) Provided verbal/tactile cueing for activities related to improving balance, coordination, kinesthetic sense, posture, motor skill, proprioception  to assist with  scapular, scapulothoracic and UE control with self care, reaching, carrying, lifting, house/yardwork, driving/computer work. Therapeutic Activities:    [x] (25721 or 68170) Provided verbal/tactile cueing for activities related to improving balance, coordination, kinesthetic sense, posture, motor skill, proprioception and motor activation to allow for proper function of scapular, scapulothoracic and UE control with self care, carrying, lifting, driving/computer work.      Home Exercise Program:    [x] (39143) Reviewed/Progressed HEP activities related to strengthening, flexibility, endurance, ROM of scapular, scapulothoracic and UE control with self care, reaching, carrying, lifting, house/yardwork, driving/computer work  [x] (30574) Reviewed/Progressed HEP activities related to improving balance, less for the QuickDash/Oswestry to assist with reaching prior level of function. []? Progressing: []? Met: []? Not Met: []? Adjusted      2. Patient will demonstrate increased AROM to equal the opposite side bilaterally to allow for proper joint functioning as indicated by patients Functional Deficits. []? Progressing: []? Met: []? Not Met: []? Adjusted       3. Patient will demonstrate an increase in strength to 5/5 to allow for proper functional mobility as indicated by patients Functional Deficits. []? Progressing: []? Met: []? Not Met: []? Adjusted       4. Patient will return to all transfers, work activities, and functional activities without increased symptoms or restriction. []? Progressing: []? Met: []? Not Met: []? Adjusted       5. Patient will have 0/10 pain with ADL's.  []? Progressing: []? Met: []? Not Met: []? Adjusted       6. Patient stated goal: Patient wants to be able to reach overhead to cabinets, behind back to wash back, and out to side to get on computer without pain or restriction. []? Progressing: []? Met: []? Not Met: []? Adjusted                 (Cut Chinyere Silver from eval)    Overall Progression Towards Functional goals/ Treatment Progress Update:  [] Patient is progressing as expected towards functional goals listed. [x] Progression is slowed due to complexities/Impairments listed. [] Progression has been slowed due to co-morbidities.   [] Plan just implemented, too soon to assess goals progression <30days   [] Goals require adjustment due to lack of progress  [] Patient is not progressing as expected and requires additional follow up with physician  [] Other    Prognosis for POC: [x] Good [] Fair  [] Poor    Patient requires continued skilled intervention: [x] Yes  [] No    Treatment/Activity Tolerance:  [x] Patient able to complete treatment  [] Patient limited by fatigue  [x] Patient limited by pain    [] Patient limited by other medical complications  [] Other:     Return to Play: (if applicable)   []  Stage 1: Intro to Strength   []  Stage 2: Return to Run and Strength   []  Stage 3: Return to Jump and Strength   []  Stage 4: Dynamic Strength and Agility   []  Stage 5: Sport Specific Training     []  Ready to Return to Play, Meets All Above Stages   []  Not Ready for Return to Sports   Comments:                         PLAN: See eval  [x] Continue per plan of care [] Alter current plan (see comments above)  [] Plan of care initiated [] Hold pending MD visit [] Discharge    Electronically signed by:  Chelsey Webster PT    Note: If patient does not return for scheduled/ recommended follow up visits, this note will serve as a discharge from care along with most recent update on progress.

## 2021-08-06 ENCOUNTER — HOSPITAL ENCOUNTER (OUTPATIENT)
Dept: PHYSICAL THERAPY | Age: 71
Setting detail: THERAPIES SERIES
Discharge: HOME | End: 2021-08-06
Payer: MEDICARE

## 2021-08-06 PROCEDURE — 97110 THERAPEUTIC EXERCISES: CPT

## 2021-08-06 PROCEDURE — 97140 MANUAL THERAPY 1/> REGIONS: CPT

## 2021-08-06 NOTE — FLOWSHEET NOTE
[facility], [address] [place] [address] Teresa Smith 78, [address]  Phone: [phone], [phone]  Date: 2021          Patient Name; :  Yenifer Ayala; 1950   Dx/ICD Code: R shoulder subacromial bursitis       Physician: Jemima Hess        Total PT Visits: 5     Measures Previous Current   Pain (0-10) 5/10 5/10   Disability % 52% 52%   ROM  No changes             Strength  No changes               Specific Functional Improvements & Impressions:  Patient reports no functional improvements and no changes in pain at this time. She actually was worse after one therapy session, but she is frustrated that she hasn't improved much. She plans to make a follow-up appointment with MD to discuss other options to decrease the pain and improve function. Plan & Recommendations:  [] Continue rehabilitation due to objective improvement and continued functional deficits with frequency and duration: n/a  [] Progress toward  []GAP, []Work Conditioning, []Independent HEP   [x] Hold pending MD visit due to   [] All goals achieved, [] Maximized \"medical necessity\" [x] No subjective or objective improvements      Electronically signed by:  Maryjane Gayle, PT  Therapy Plan of Care Re-Certification  This patient has been re-evaluated for physical therapy services and for therapy to continue, Medicare, Medicaid and other insurances require periodic physician review of the treatment plan.  Please review the above re-evaluation and verify that you agree with plan of care as established above by signing the attached document and return it to our office or note changes to established plan below  [] Follow treatment plan as above [] Discontinue physical therapy  [] Change plan to:                                 __________________________________________________    Physician Signature:____________________________________ Date:____________  By signing above, therapists plan is approved by physician    If you have any questions or concerns, please don't hesitate to call.   Thank you for your referral.    [address] office  ([phone])     Fax [phone]     Physical Therapy Treatment Note/ Progress Report:     Date:  2021    Patient Name:  Terence Norris \"Ashwini\"    :  1950  MRN: [id number]  Restrictions/Precautions:    Medical/Treatment Diagnosis Information:  · Diagnosis: R shoulder subacromial bursitis  · Treatment Diagnosis: M25.511, X74.121  Insurance/Certification information:  PT Insurance Information: Medicare  Physician Information:  Referring Practitioner: Eugene Johnson  Has the plan of care been signed (Y/N):        []  Yes  [x]  No     Date of Patient follow up with Physician: unknown    Is this a Progress Report:     []  Yes  [x]  No      If Yes:  Date Range for reporting period:  Initial Eval: 2021  Beginnin2021 --- Endin21    Progress report will be due (10 Rx or 30 days whichever is less): 93     Recertification will be due (POC Duration  / 90 days whichever is less): 9/10/21      Visit # Insurance Allowable Auth Required   In Person [id number] []  Yes     []  No    [facility] 0  []  Yes     []  No    Total 5         Functional Scale: Quick Dash: 52% (Total Number Sum: 34)   Date assessed: 2021     Latex Allergy:  [x]NO      []YES  Preferred Language for Healthcare:   [x]English       []other:    Pain level:  5/10     SUBJECTIVE:  See above    OBJECTIVE: See eval   Observation:    Test measurements:      RESTRICTIONS/PRECAUTIONS: h/o lymphoma     Exercises/Interventions:   Therapeutic Ex (10778)  Therapeutic Activity (92656)  NMR re-education (76940) Sets/Reps Notes/CUES   Pulley 2' Could only tolerate 2'        Shoulder shrugs  X30     T-Band Row / Extension  Red x20 / red x20  Added to HEP   B ER t-band           Ball on trampoline     Wall Push Up          Chin tucks 20x    scap retract          Attempt supine cane press 20x Not able to flex past 90 deg Table slides flex 10x 10\"    Table slides abd 10x 10\"          ER stretch doorway R only 10x10\"         Levator stretch 3x30\" ea         Supine press with cane  X20     Supine cane flexion                Postural Education   Computer placement, proper chairs, proper postures. Manual Intervention (10411)     PROM 10' Including light STM to anterior shoulder                            Medbridge access code: [email or web address]                    Patient Education         Therapeutic Exercise and NMR EXR  [x] (49048) Provided verbal/tactile cueing for activities related to strengthening, flexibility, endurance, ROM  for improvements in scapular, scapulothoracic and UE control with self care, reaching, carrying, lifting, house/yardwork, driving/computer work. [x] (54926) Provided verbal/tactile cueing for activities related to improving balance, coordination, kinesthetic sense, posture, motor skill, proprioception  to assist with  scapular, scapulothoracic and UE control with self care, reaching, carrying, lifting, house/yardwork, driving/computer work. Therapeutic Activities:    [x] (91505 or 77183) Provided verbal/tactile cueing for activities related to improving balance, coordination, kinesthetic sense, posture, motor skill, proprioception and motor activation to allow for proper function of scapular, scapulothoracic and UE control with self care, carrying, lifting, driving/computer work.      Home Exercise Program:    [x] (72903) Reviewed/Progressed HEP activities related to strengthening, flexibility, endurance, ROM of scapular, scapulothoracic and UE control with self care, reaching, carrying, lifting, house/yardwork, driving/computer work  [x] (85700) Reviewed/Progressed HEP activities related to improving balance, coordination, kinesthetic sense, posture, motor skill, proprioception of scapular, scapulothoracic and UE control with self care, reaching, carrying, lifting, house/yardwork, driving/computer work      Manual Treatments:  PROM / STM / Oscillations-Mobs:  G-I, II, III, IV (PA's, Inf., Post.)  [x] (26673) Provided manual therapy to mobilize soft tissue/joints of cervical/CT, scapular GHJ and UE for the purpose of modulating pain, promoting relaxation,  increasing ROM, reducing/eliminating soft tissue swelling/inflammation/restriction, improving soft tissue extensibility and allowing for proper ROM for normal function with self care, reaching, carrying, lifting, house/yardwork, driving/computer work    Modalities:      Charges:  Timed Code Treatment Minutes: 25 due to overlap with another patient   Total Treatment Minutes:  40   BWC:  TE TIME:  NMR TIME:  MANUAL TIME:  UNTIMED MINUTES:  Medicare Total:   n/a  n/a  n/a  n/a  $330 (#3)      [] EVAL (LOW) 90956 (typically 20 minutes face-to-face)  [] EVAL (MOD) 76194 (typically 30 minutes face-to-face)  [] EVAL (HIGH) 83145 (typically 45 minutes face-to-face)  [] RE-EVAL     [x] FQ(85856) x 1    [] IONTO  [] NMR (19148) x 1    [] VASO  [x] Manual (94050) x 1    [] Other:  [] TA x      [] Mech Traction (73596)  [] ES(attended) (73984)     [] ES (un) (95679):    ASSESSMENT:  See above    GOALS:   Short Term Goals: To be achieved in: 2 weeks  1. Independent in HEP and progression per patient tolerance, in order to prevent re-injury. [x]? Progressing: []? Met: []? Not Met: []? Adjusted       2. Patient will have a decrease in pain to facilitate improvement in movement, function, and ADLs as indicated by Functional Deficits. [x]? Progressing: []? Met: []? Not Met: []? Adjusted          Long Term Goals: To be achieved in: 8 weeks  1. Disability index score of 20% or less for the QuickDash/Oswestry to assist with reaching prior level of function. []? Progressing: []? Met: []? Not Met: []? Adjusted      2.  Patient will demonstrate increased AROM to equal the opposite side bilaterally to allow for proper joint functioning as indicated by patients Functional Deficits. []? Progressing: []? Met: []? Not Met: []? Adjusted       3. Patient will demonstrate an increase in strength to 5/5 to allow for proper functional mobility as indicated by patients Functional Deficits. []? Progressing: []? Met: []? Not Met: []? Adjusted       4. Patient will return to all transfers, work activities, and functional activities without increased symptoms or restriction. []? Progressing: []? Met: []? Not Met: []? Adjusted       5. Patient will have 0/10 pain with ADL's.  []? Progressing: []? Met: []? Not Met: []? Adjusted       6. Patient stated goal: Patient wants to be able to reach overhead to cabinets, behind back to wash back, and out to side to get on computer without pain or restriction. []? Progressing: []? Met: []? Not Met: []? Adjusted                 (Cut Daron Jackson from [facility])    Overall Progression Towards Functional goals/ Treatment Progress Update:  [] Patient is progressing as expected towards functional goals listed. [x] Progression is slowed due to complexities/Impairments listed. [] Progression has been slowed due to co-morbidities.   [] Plan just implemented, too soon to assess goals progression <30days   [] Goals require adjustment due to lack of progress  [] Patient is not progressing as expected and requires additional follow up with physician  [] Other    Prognosis for POC: [x] Good [] Fair  [] Poor    Patient requires continued skilled intervention: [x] Yes  [] No    Treatment/Activity Tolerance:  [x] Patient able to complete treatment  [] Patient limited by fatigue  [x] Patient limited by pain    [] Patient limited by other medical complications  [] Other:     Return to Play: (if applicable)   []  Stage 1: Intro to Strength   []  Stage 2: Return to Run and Strength   []  Stage 3: Return to Jump and Strength   []  Stage 4: Dynamic Strength and Agility   []  Stage 5: Sport Specific Training     []  Ready to Return to Play, Meets All Above Stages   [] Not Ready for Return to Sports   Comments:                         PLAN: See eval  [] Continue per plan of care [] Alter current plan (see comments above)  [] Plan of care initiated [x] Hold pending MD visit [] Discharge    Electronically signed by:  Tyra Stubbs PT    Note: If patient does not return for scheduled/ recommended follow up visits, this note will serve as a discharge from care along with most recent update on progress.

## 2021-08-11 ENCOUNTER — HOSPITAL ENCOUNTER (OUTPATIENT)
Dept: PHYSICAL THERAPY | Age: 71
Setting detail: THERAPIES SERIES
Discharge: HOME | End: 2021-08-11
Payer: MEDICARE

## 2021-08-11 ENCOUNTER — ANTI-COAG VISIT (OUTPATIENT)
Dept: FAMILY MEDICINE CLINIC | Age: 71
End: 2021-08-11

## 2021-08-11 ENCOUNTER — NURSE ONLY (OUTPATIENT)
Dept: FAMILY MEDICINE CLINIC | Age: 71
End: 2021-08-11
Payer: MEDICARE

## 2021-08-11 DIAGNOSIS — I48.20 CHRONIC ATRIAL FIBRILLATION (HCC): ICD-10-CM

## 2021-08-11 LAB
INTERNATIONAL NORMALIZATION RATIO, POC: 2.4
PROTHROMBIN TIME, POC: NORMAL

## 2021-08-11 PROCEDURE — 85610 PROTHROMBIN TIME: CPT | Performed by: FAMILY MEDICINE

## 2021-08-11 NOTE — FLOWSHEET NOTE
[facility]    Physical Therapy  Cancellation/No-show Note  Patient Name:  Kurt Blackburn  :  1950   Date:  2021    Cancelled visits to date: 1  No-shows to date: 0    For today's appointment patient:  [x]  Cancelled  []  Rescheduled appointment  []  No-show     Reason given by patient:  []  Patient ill  []  Conflicting appointment  []  No transportation    []  Conflict with work  []  No reason given  [x]  Other:     Comments:  Patient is not noticing any improvement with therapy and would like to cancel all additional appointments at this time until return to MD.   Phone call information:   [x]  Phone call made today to patient. [x]  Patient answered, conversation as follows: see above   []  Patient did not answer. []  Phone call not made today  []  Phone call not needed - pt contacted us to cancel and provided reason for cancellation.      Electronically signed by:  Lilliana Simeon PTA,

## 2021-08-13 ENCOUNTER — OFFICE VISIT (OUTPATIENT)
Dept: FAMILY MEDICINE CLINIC | Age: 71
End: 2021-08-13
Payer: MEDICARE

## 2021-08-13 ENCOUNTER — APPOINTMENT (OUTPATIENT)
Dept: PHYSICAL THERAPY | Age: 71
End: 2021-08-13
Payer: MEDICARE

## 2021-08-13 VITALS
WEIGHT: 230 LBS | HEIGHT: 63 IN | OXYGEN SATURATION: 97 % | SYSTOLIC BLOOD PRESSURE: 128 MMHG | DIASTOLIC BLOOD PRESSURE: 80 MMHG | BODY MASS INDEX: 40.75 KG/M2 | HEART RATE: 80 BPM

## 2021-08-13 DIAGNOSIS — I10 BENIGN ESSENTIAL HTN: ICD-10-CM

## 2021-08-13 DIAGNOSIS — Z79.4 INSULIN-REQUIRING OR DEPENDENT TYPE II DIABETES MELLITUS (HCC): Primary | ICD-10-CM

## 2021-08-13 DIAGNOSIS — N18.4 CKD STAGE 4 DUE TO TYPE 2 DIABETES MELLITUS (HCC): ICD-10-CM

## 2021-08-13 DIAGNOSIS — E11.22 CKD STAGE 4 DUE TO TYPE 2 DIABETES MELLITUS (HCC): ICD-10-CM

## 2021-08-13 DIAGNOSIS — E11.9 INSULIN-REQUIRING OR DEPENDENT TYPE II DIABETES MELLITUS (HCC): Primary | ICD-10-CM

## 2021-08-13 DIAGNOSIS — E06.3 HYPOTHYROIDISM DUE TO HASHIMOTO'S THYROIDITIS: ICD-10-CM

## 2021-08-13 DIAGNOSIS — I48.20 CHRONIC ATRIAL FIBRILLATION (HCC): ICD-10-CM

## 2021-08-13 DIAGNOSIS — E03.8 HYPOTHYROIDISM DUE TO HASHIMOTO'S THYROIDITIS: ICD-10-CM

## 2021-08-13 DIAGNOSIS — Z12.11 SCREEN FOR COLON CANCER: ICD-10-CM

## 2021-08-13 DIAGNOSIS — E11.51 PERIPHERAL VASCULAR DISEASE IN DIABETES MELLITUS (HCC): ICD-10-CM

## 2021-08-13 DIAGNOSIS — C83.53 LYMPHOBLASTIC DIFFUSE LYMPHOMA OF INTRA-ABDOMINAL LYMPH NODES (HCC): ICD-10-CM

## 2021-08-13 DIAGNOSIS — I50.42 CHRONIC COMBINED SYSTOLIC AND DIASTOLIC CONGESTIVE HEART FAILURE (HCC): ICD-10-CM

## 2021-08-13 PROCEDURE — G8399 PT W/DXA RESULTS DOCUMENT: HCPCS | Performed by: FAMILY MEDICINE

## 2021-08-13 PROCEDURE — 1090F PRES/ABSN URINE INCON ASSESS: CPT | Performed by: FAMILY MEDICINE

## 2021-08-13 PROCEDURE — G8417 CALC BMI ABV UP PARAM F/U: HCPCS | Performed by: FAMILY MEDICINE

## 2021-08-13 PROCEDURE — 99214 OFFICE O/P EST MOD 30 MIN: CPT | Performed by: FAMILY MEDICINE

## 2021-08-13 PROCEDURE — 1036F TOBACCO NON-USER: CPT | Performed by: FAMILY MEDICINE

## 2021-08-13 PROCEDURE — 3051F HG A1C>EQUAL 7.0%<8.0%: CPT | Performed by: FAMILY MEDICINE

## 2021-08-13 PROCEDURE — 3017F COLORECTAL CA SCREEN DOC REV: CPT | Performed by: FAMILY MEDICINE

## 2021-08-13 PROCEDURE — G8427 DOCREV CUR MEDS BY ELIG CLIN: HCPCS | Performed by: FAMILY MEDICINE

## 2021-08-13 PROCEDURE — 2022F DILAT RTA XM EVC RTNOPTHY: CPT | Performed by: FAMILY MEDICINE

## 2021-08-13 PROCEDURE — 4040F PNEUMOC VAC/ADMIN/RCVD: CPT | Performed by: FAMILY MEDICINE

## 2021-08-13 PROCEDURE — 1123F ACP DISCUSS/DSCN MKR DOCD: CPT | Performed by: FAMILY MEDICINE

## 2021-08-13 SDOH — ECONOMIC STABILITY: FOOD INSECURITY: WITHIN THE PAST 12 MONTHS, YOU WORRIED THAT YOUR FOOD WOULD RUN OUT BEFORE YOU GOT MONEY TO BUY MORE.: NEVER TRUE

## 2021-08-13 SDOH — ECONOMIC STABILITY: FOOD INSECURITY: WITHIN THE PAST 12 MONTHS, THE FOOD YOU BOUGHT JUST DIDN'T LAST AND YOU DIDN'T HAVE MONEY TO GET MORE.: NEVER TRUE

## 2021-08-13 ASSESSMENT — SOCIAL DETERMINANTS OF HEALTH (SDOH): HOW HARD IS IT FOR YOU TO PAY FOR THE VERY BASICS LIKE FOOD, HOUSING, MEDICAL CARE, AND HEATING?: NOT HARD AT ALL

## 2021-08-13 NOTE — PATIENT INSTRUCTIONS

## 2021-08-13 NOTE — PROGRESS NOTES
Chief Complaint   Patient presents with    Diabetes    Hypertension    Atrial Fibrillation    Other     patient has multiple medical complaints        Internal Administration   First Dose COVID-19, Pfizer, PF, 30mcg/0.3mL  2021   Second Dose COVID-19, Pfizer, PF, 30mcg/0.3mL   2021       Last COVID Lab SARS-CoV-2, PCR (no units)   Date Value   10/27/2020 Not Detected             Wt Readings from Last 3 Encounters:   21 225 lb (102.1 kg)   21 238 lb 6.4 oz (108.1 kg)   21 234 lb (106.1 kg)     BP Readings from Last 3 Encounters:   21 124/80   21 128/80   20 122/66      Lab Results   Component Value Date    LABA1C 7.6 2021    LABA1C 7.7 2021    LABA1C 7.4 2021       HPI:  Lynn Wasserman is a 69 y.o. (: 1950) here today for     Patient states that she checks her sugars twice daily. In the AMs it runs in the 60-120s. In the Pm's it runs in the 120s. Her last A1C a couple weeks ago was 7.6. She states she has been taking about 10 units three times a day with her meals. Denies any other concerns today. [x] Patient has completed an advance directive  [] Patient has NOT completed an advanced directive  [x] Patient has a documented healthcare surrogate  [] Patient does NOT have a documented healthcare surrogate  [] Discussed the importance of establishing and updating an advanced directive. Patient has questions at this time and those were answered. [x] Discussed the importance of establishing and updating an advanced directive. Patient does NOT have questions at this time.     Discussed with: [x] Patient            [] Family             [] Other caregiver    Patient's medications, allergies, past medical, surgical, social and family histories were reviewed and updated asappropriate on 2021 at 9:53 AM.    ROS:  Review of Systems    All other systems reviewed and are negative except as noted above on 2021 at 9:53 AM. Additional review of systems may be scanned into the media section [facility] [place] [facility]. Any responses requiring further intervention were pursued. Past Medical History:   Diagnosis Date    Atrial fibrillation ([facility]     Cancer (HCC)     lymphoma    CKD (chronic kidney disease)     Diabetes mellitus ([facility]     DVT (deep venous thrombosis) ([facility]     Gout 2019    Hyperlipidemia     Hypertension     Hypotension      Family History   Problem Relation Age of Onset    Cancer Father     Cancer Sister         lung    Cancer Brother         lymphoma    Heart Disease Mother      Social History     Socioeconomic History    Marital status: Single     Spouse name: Not on file    Number of children: Not on file    Years of education: Not on file    Highest education level: Not on file   Occupational History    Not on file   Tobacco Use    Smoking status: Former Smoker     Packs/day: 1.00     Years: 35.00     Pack years: 35.00     Quit date:      Years since quittin.6    Smokeless tobacco: Never Used   Vaping Use    Vaping Use: Never used   Substance and Sexual Activity    Alcohol use: No     Alcohol/week: 0.0 standard drinks    Drug use: No    Sexual activity: Not Currently   Other Topics Concern    Not on file   Social History Narrative    Not on file     Social Determinants of Health     Financial Resource Strain: Low Risk     Difficulty of Paying Living Expenses: Not hard at all   Food Insecurity: No Food Insecurity    Worried About Running Out of Food in the Last Year: Never true    Shekhar of Food in the Last Year: Never true   Transportation Needs:     Lack of Transportation (Medical):      Lack of Transportation (Non-Medical):    Physical Activity:     Days of Exercise per Week:     Minutes of Exercise per Session:    Stress:     Feeling of Stress :    Social Connections:     Frequency of Communication with Friends and Family:     Frequency of Social Gatherings with Friends and Family:     Attends Christianity Services:     Active Member of Clubs or Organizations:     Attends Club or Organization Meetings:     Marital Status:    Intimate Partner Violence:     Fear of Current or Ex-Partner:     Emotionally Abused:     Physically Abused:     Sexually Abused:      Prior to Visit Medications    Medication Sig Taking?  Authorizing Provider   pregabalin (LYRICA) 100 MG capsule TAKE 1 CAPSULE BY MOUTH THREE TIMES A DAY  Aravind Fernandes MD   insulin detemir (LEVEMIR FLEXTOUCH) 100 UNIT/ML injection pen Using 50 units  Aravind Fernandes MD   warfarin (COUMADIN) 5 MG tablet TAKE 1 TAB DAILY EXCEPT 10 MG  [address] Susana Irizarry MD   Cholecalciferol (VITAMIN D3) 50 MCG (2000 UT) CAPS Take by mouth  Historical Provider, MD   diclofenac sodium (VOLTAREN) 1 % GEL Apply topically 2 times daily  Delfino Baca MD   allopurinol (ZYLOPRIM) 100 MG tablet   Historical Provider, MD   insulin regular (NOVOLIN R) 100 UNIT/ML injection USE AS DIRECTED PER SLIDING SCALE UP TO 26 UNITS DAILY  Aravind Fernandes MD   UNIFINE PENTIPS 32G X 4 MM MISC USE DAILY AS DIRECTED  DEMI Sampson - CNP   colchicine (COLCRYS) 0.6 MG tablet Take 1 tablet by mouth 2 times daily  Aravind Fernandes MD   furosemide (LASIX) 20 MG tablet Take 1 tablet by mouth daily  Aravind Fernandes MD   ONE TOUCH ULTRASOFT LANCETS MISC USE TO TEST 3 TIMES A DAY and as needed  Aravind Fernandes MD   omeprazole ([address] [place] [address]) 40 MG delayed release capsule TAKE 1 CAPSULE BY MOUTH EVERY DAY  Aravind Fernandes MD   BD VEO INSULIN SYRINGE U/F 31G X 15/64\" 0.3 ML MISC USE UP TO FIVE TIMES DAILY  Aravind Fernandes MD   levothyroxine (SYNTHROID) 100 MCG tablet TAKE 1 TABLET BY MOUTH EVERY DAY  Aravind Fernandes MD   [facility] ULTRA strip USE TO TEST BLOOD SUGAR 3 TIMES A DAY  Aravind Fernandes MD   pentoxifylline (TRENTAL) 400 MG extended release tablet TAKE 1 TABLET BY MOUTH EVERY DAY  Chilo Flowers MD   atorvastatin (LIPITOR) 40 MG tablet TAKE 1 TABLET BY MOUTH EVERY DAY  Chilo Flowers MD   DILT- MG extended release capsule TAKE 1 CAPSULE BY MOUTH EVERY DAY  Chilo Flowers MD   ondansetron (ZOFRAN) 4 MG tablet Take 1 tablet by mouth every 6 hours as needed for Nausea or Vomiting  Chilo Flowers MD   fluticasone [facility]) 50 MCG/ACT nasal spray SPRAY 1 SPRAY INTO EACH NOSTRIL EVERY DAY  Chilo Flowers MD   magnesium oxide (MAG-OX) 400 MG tablet TAKE 1 TABLET BY MOUTH THREE TIMES A DAY  Patient taking differently: Take 400 mg by mouth daily   Lis Wong APRN - CNP     No Known Allergies    OBJECTIVE:  Estimated body mass index is 39.86 kg/m² as calculated from the following:    Height as of this encounter: 5' 3\" (1.6 m). Weight as of 7/13/21: 225 lb (102.1 kg). Vitals:    08/13/21 0950   BP: 128/80   Site: Left Upper Arm   Position: Sitting   Cuff Size: Medium Adult   Pulse: 80   SpO2: 97%   Weight: 230 lb (104.3 kg)   Height: 5' 3\" (1.6 m)       Physical Exam  Vitals and nursing note reviewed. Constitutional:       General: She is not in acute distress. Appearance: She is well-developed. She is not diaphoretic. HENT:      Head: Normocephalic and atraumatic. Right Ear: External ear normal.      Left Ear: External ear normal.      Nose: Nose normal.   Eyes:      General: Lids are normal. No scleral icterus. Right eye: No discharge. Left eye: No discharge. Pupils: Pupils are equal, round, and reactive to light. Neck:      Thyroid: No thyromegaly. Vascular: No JVD. Cardiovascular:      Rate and Rhythm: Normal rate and regular rhythm. Heart sounds: Normal heart sounds. Pulmonary:      Effort: Pulmonary effort is normal. No respiratory distress. Breath sounds: Normal breath sounds.    Abdominal:      Palpations: Abdomen is soft. There is no hepatomegaly or splenomegaly. Tenderness: There is no abdominal tenderness. Musculoskeletal:      Right lower leg: No edema. Left lower leg: No edema. Skin:     General: Skin is warm and dry. Coloration: Skin is not pale. Findings: No erythema or rash. Comments: Turgor normal   Neurological:      Mental Status: She is oriented to person, place, and time. Psychiatric:         Mood and Affect: Mood normal.         Behavior: Behavior normal.         Thought Content: Thought content normal.         Judgment: Judgment normal.              ASSESSMENT PLAN      Diagnosis Orders   1. Insulin-requiring or dependent type II diabetes mellitus (HCC)  insulin regular (NOVOLIN R) 100 UNIT/ML injection   2. Chronic atrial fibrillation (HCC)     3. Benign essential HTN     4. Peripheral vascular disease in diabetes mellitus ([address]     5. CKD stage 4 due to type 2 diabetes mellitus ([address]     6. Hypothyroidism due to Hashimoto's thyroiditis     7. Chronic combined systolic and diastolic congestive heart failure ([address]     8. 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 Screen for colon cancer  Shantanu Erickson MD, Gastroenterology, [facility]   Sugars by enlarge are running well per fingerstick. Last A1c was acceptable. Clinically in sinus rhythm. No ischemic limb changes. Blood pressure acceptable. Monitor labs when due. Thyroid replacement by symptoms appears appropriate. No evidence of active heart failure. Continues to follow-up with oncology. Follow-up 6 months. Patient should call the office immediately with new or ongoing signs or symptoms or worsening, or proceed to the emergency room. No changes in past medical history, past surgical history, social history, or family history were noted during the patient encounter unless specifically listed above.   All updates of past medical history, past surgical history, social history, or family history were reviewed personally by me during the office visit. All problems listed in the assessment are stable unless noted otherwise. Medication profile reviewed personally by me during the visit. Medication side effects and possible impairments from medications were discussed as applicable. This document was prepared by a combination of typing and transcription through a voice recognition software. Scribe attestation: Boogie Keene RN, am scribing for and in the presence of Flavia Perdomo MD. Electronically signed by Taqueria Larose RN on 8/13/2021 at 9:53 AM      Provider attestation:     I, Dr. Sonu French, personally performed the services described in this documentation, as scribed by the above signed scribe in my presence, and it is both accurate and complete. I agree with the ROS and Past Histories independently gathered by the clinical support staff and the remaining scribed note accurately describes my personal service to the patient.       8/13/2021    1:51 PM

## 2021-08-16 RX ORDER — DILTIAZEM HYDROCHLORIDE 240 MG/1
CAPSULE, EXTENDED RELEASE ORAL
Qty: 90 CAPSULE | Refills: 3 | Status: SHIPPED | OUTPATIENT
Start: 2021-08-16 | End: 2022-06-30

## 2021-08-16 NOTE — TELEPHONE ENCOUNTER
Last visit was 8/13/21  Future Appointments   Date Time Provider Johana Yumiko   9/8/2021  8:20 AM SCHEDULE, [facility] [place] [facility]   12/13/2021 10:40 AM Consuelo Retana MD [facility] [place] [facility]   3/1/2022  2:00 PM SCHEDULE, [facility], LPN [facility]  [place] [facility] [place]

## 2021-08-18 DIAGNOSIS — M75.51 SUBACROMIAL BURSITIS OF RIGHT SHOULDER JOINT: ICD-10-CM

## 2021-08-18 NOTE — TELEPHONE ENCOUNTER
Last visit was 8/13/21  Future Appointments   Date Time Provider Johana Yumiko   9/8/2021  8:20 AM SCHEDULE, [facility] [place] [facility]   12/13/2021 10:40 AM Bola Peterson MD [facility] [place] [facility]   3/1/2022  2:00 PM SCHEDULE, [facility], LPN [facility] [place] [facility]

## 2021-08-19 RX ORDER — ATORVASTATIN CALCIUM 40 MG/1
TABLET, FILM COATED ORAL
Qty: 90 TABLET | Refills: 3 | Status: SHIPPED | OUTPATIENT
Start: 2021-08-19 | End: 2022-06-30

## 2021-08-19 RX ORDER — PENTOXIFYLLINE 400 MG/1
TABLET, EXTENDED RELEASE ORAL
Qty: 90 TABLET | Refills: 3 | Status: SHIPPED | OUTPATIENT
Start: 2021-08-19 | End: 2022-06-30

## 2021-09-08 ENCOUNTER — ANTI-COAG VISIT (OUTPATIENT)
Dept: FAMILY MEDICINE CLINIC | Age: 71
End: 2021-09-08

## 2021-09-14 RX ORDER — INSULIN DETEMIR 100 [IU]/ML
50 INJECTION, SOLUTION SUBCUTANEOUS NIGHTLY
Qty: 54 PEN | Refills: 1 | Status: SHIPPED | OUTPATIENT
Start: 2021-09-14 | End: 2022-09-21

## 2021-09-14 RX ORDER — INSULIN DETEMIR 100 [IU]/ML
INJECTION, SOLUTION SUBCUTANEOUS
Qty: 5 PEN | Refills: 1 | Status: SHIPPED | OUTPATIENT
Start: 2021-09-14 | End: 2021-12-27

## 2021-09-14 NOTE — PROGRESS NOTES
Pt called and stated her pharmacy requested to send the original dispense # for this medication so pt can get more for the same copay

## 2021-09-14 NOTE — TELEPHONE ENCOUNTER
Last visit was 8/13/21  Future Appointments   Date Time Provider Johana Yumiko   9/15/2021  8:40 AM SCHEDULE, [facility] [place] [facility]   12/13/2021 10:40 AM Rylee Lion MD [facility] [place] [facility]   3/1/2022  2:00 PM SCHEDULE, [facility], LPN [facility] [place] [facility]

## 2021-09-21 ENCOUNTER — TELEPHONE (OUTPATIENT)
Dept: GASTROENTEROLOGY | Age: 71
End: 2021-09-21

## 2021-10-12 RX ORDER — LEVOTHYROXINE SODIUM 0.1 MG/1
TABLET ORAL
Qty: 90 TABLET | Refills: 3 | Status: SHIPPED | OUTPATIENT
Start: 2021-10-12 | End: 2022-09-21

## 2021-10-13 ENCOUNTER — ANTI-COAG VISIT (OUTPATIENT)
Dept: FAMILY MEDICINE CLINIC | Age: 71
End: 2021-10-13

## 2021-10-13 ENCOUNTER — NURSE ONLY (OUTPATIENT)
Dept: FAMILY MEDICINE CLINIC | Age: 71
End: 2021-10-13
Payer: MEDICARE

## 2021-10-13 DIAGNOSIS — I48.20 CHRONIC ATRIAL FIBRILLATION (HCC): ICD-10-CM

## 2021-10-13 DIAGNOSIS — Z23 NEED FOR INFLUENZA VACCINATION: Primary | ICD-10-CM

## 2021-10-13 LAB
INTERNATIONAL NORMALIZATION RATIO, POC: 3
PROTHROMBIN TIME, POC: NORMAL

## 2021-10-13 PROCEDURE — G0008 ADMIN INFLUENZA VIRUS VAC: HCPCS | Performed by: FAMILY MEDICINE

## 2021-10-13 PROCEDURE — 85610 PROTHROMBIN TIME: CPT | Performed by: FAMILY MEDICINE

## 2021-10-13 PROCEDURE — 90674 CCIIV4 VAC NO PRSV 0.5 ML IM: CPT | Performed by: FAMILY MEDICINE

## 2021-10-14 RX ORDER — WARFARIN SODIUM 1 MG/1
TABLET ORAL
Qty: 30 TABLET | Refills: 0 | Status: SHIPPED | OUTPATIENT
Start: 2021-10-14 | End: 2021-11-08

## 2021-10-27 ENCOUNTER — NURSE ONLY (OUTPATIENT)
Dept: FAMILY MEDICINE CLINIC | Age: 71
End: 2021-10-27
Payer: MEDICARE

## 2021-10-27 ENCOUNTER — ANTI-COAG VISIT (OUTPATIENT)
Dept: FAMILY MEDICINE CLINIC | Age: 71
End: 2021-10-27

## 2021-10-27 DIAGNOSIS — I48.20 CHRONIC ATRIAL FIBRILLATION (HCC): ICD-10-CM

## 2021-10-27 LAB
INTERNATIONAL NORMALIZATION RATIO, POC: 2.3
PROTHROMBIN TIME, POC: NORMAL

## 2021-10-27 PROCEDURE — 85610 PROTHROMBIN TIME: CPT | Performed by: FAMILY MEDICINE

## 2021-11-08 RX ORDER — WARFARIN SODIUM 1 MG/1
TABLET ORAL
Qty: 30 TABLET | Refills: 0 | Status: SHIPPED | OUTPATIENT
Start: 2021-11-08 | End: 2021-11-19

## 2021-11-08 NOTE — TELEPHONE ENCOUNTER
Last visit was 8/13/21  Future Appointments   Date Time Provider Johana Calderon   11/23/2021 10:00 AM SCHEDULE, [facility] [place] [facility]   12/13/2021 10:40 AM Miguel Troncoso [place] [facility]  [place] [facility]   3/1/2022  2:00 PM SCHEDULE, [facility], JAMES [facility] [place] [facility] [place]

## 2021-11-19 DIAGNOSIS — N18.4 CKD STAGE 4 DUE TO TYPE 2 DIABETES MELLITUS (HCC): ICD-10-CM

## 2021-11-19 DIAGNOSIS — Z79.4 INSULIN-REQUIRING OR DEPENDENT TYPE II DIABETES MELLITUS (HCC): ICD-10-CM

## 2021-11-19 DIAGNOSIS — E11.22 CKD STAGE 4 DUE TO TYPE 2 DIABETES MELLITUS (HCC): ICD-10-CM

## 2021-11-19 DIAGNOSIS — M75.51 SUBACROMIAL BURSITIS OF RIGHT SHOULDER JOINT: ICD-10-CM

## 2021-11-19 DIAGNOSIS — E11.9 INSULIN-REQUIRING OR DEPENDENT TYPE II DIABETES MELLITUS (HCC): ICD-10-CM

## 2021-11-19 RX ORDER — BLOOD SUGAR DIAGNOSTIC
STRIP MISCELLANEOUS
Qty: 300 STRIP | Refills: 3 | Status: SHIPPED | OUTPATIENT
Start: 2021-11-19 | End: 2021-12-27

## 2021-11-19 RX ORDER — WARFARIN SODIUM 1 MG/1
TABLET ORAL
Qty: 30 TABLET | Refills: 0 | Status: SHIPPED | OUTPATIENT
Start: 2021-11-19 | End: 2022-07-27

## 2021-11-19 NOTE — TELEPHONE ENCOUNTER
Last visit was 8/13/21  Future Appointments   Date Time Provider Johana Calderon   11/23/2021 10:00 AM SCHEDULE, [facility]  [place] [facility] M   12/13/2021 10:40 AM Glenny Sims [place] [facility]  [place] [facility]   3/1/2022  2:00 PM SCHEDULE, [facility] , SHUBHAMN [facility]  [place] [facility] [place]

## 2021-11-23 ENCOUNTER — NURSE ONLY (OUTPATIENT)
Dept: FAMILY MEDICINE CLINIC | Age: 71
End: 2021-11-23
Payer: MEDICARE

## 2021-11-23 ENCOUNTER — ANTI-COAG VISIT (OUTPATIENT)
Dept: FAMILY MEDICINE CLINIC | Age: 71
End: 2021-11-23

## 2021-11-23 DIAGNOSIS — I48.20 CHRONIC ATRIAL FIBRILLATION (HCC): ICD-10-CM

## 2021-11-23 LAB
INTERNATIONAL NORMALIZATION RATIO, POC: 3.1
PROTHROMBIN TIME, POC: NORMAL

## 2021-11-23 PROCEDURE — 85610 PROTHROMBIN TIME: CPT | Performed by: FAMILY MEDICINE

## 2021-12-07 ENCOUNTER — NURSE ONLY (OUTPATIENT)
Dept: FAMILY MEDICINE CLINIC | Age: 71
End: 2021-12-07
Payer: MEDICARE

## 2021-12-07 ENCOUNTER — ANTI-COAG VISIT (OUTPATIENT)
Dept: FAMILY MEDICINE CLINIC | Age: 71
End: 2021-12-07

## 2021-12-07 DIAGNOSIS — I48.20 CHRONIC ATRIAL FIBRILLATION (HCC): ICD-10-CM

## 2021-12-07 LAB
INTERNATIONAL NORMALIZATION RATIO, POC: 2.8
PROTHROMBIN TIME, POC: NORMAL

## 2021-12-07 PROCEDURE — 85610 PROTHROMBIN TIME: CPT | Performed by: FAMILY MEDICINE

## 2021-12-13 ENCOUNTER — OFFICE VISIT (OUTPATIENT)
Dept: FAMILY MEDICINE CLINIC | Age: 71
End: 2021-12-13
Payer: MEDICARE

## 2021-12-13 ENCOUNTER — TELEPHONE (OUTPATIENT)
Dept: FAMILY MEDICINE CLINIC | Age: 71
End: 2021-12-13

## 2021-12-13 VITALS
WEIGHT: 239 LBS | BODY MASS INDEX: 42.34 KG/M2 | HEART RATE: 78 BPM | OXYGEN SATURATION: 95 % | DIASTOLIC BLOOD PRESSURE: 76 MMHG | SYSTOLIC BLOOD PRESSURE: 117 MMHG

## 2021-12-13 DIAGNOSIS — Z79.4 INSULIN-REQUIRING OR DEPENDENT TYPE II DIABETES MELLITUS (HCC): Primary | ICD-10-CM

## 2021-12-13 DIAGNOSIS — E06.3 HYPOTHYROIDISM DUE TO HASHIMOTO'S THYROIDITIS: ICD-10-CM

## 2021-12-13 DIAGNOSIS — E11.51 PERIPHERAL VASCULAR DISEASE IN DIABETES MELLITUS (HCC): ICD-10-CM

## 2021-12-13 DIAGNOSIS — I10 BENIGN ESSENTIAL HTN: ICD-10-CM

## 2021-12-13 DIAGNOSIS — H65.193 ACUTE MEE (MIDDLE EAR EFFUSION), BILATERAL: ICD-10-CM

## 2021-12-13 DIAGNOSIS — E11.22 CKD STAGE 4 DUE TO TYPE 2 DIABETES MELLITUS (HCC): ICD-10-CM

## 2021-12-13 DIAGNOSIS — N18.4 CKD STAGE 4 DUE TO TYPE 2 DIABETES MELLITUS (HCC): ICD-10-CM

## 2021-12-13 DIAGNOSIS — K21.9 GASTROESOPHAGEAL REFLUX DISEASE WITHOUT ESOPHAGITIS: ICD-10-CM

## 2021-12-13 DIAGNOSIS — I50.42 CHRONIC COMBINED SYSTOLIC AND DIASTOLIC CONGESTIVE HEART FAILURE (HCC): ICD-10-CM

## 2021-12-13 DIAGNOSIS — E11.9 INSULIN-REQUIRING OR DEPENDENT TYPE II DIABETES MELLITUS (HCC): Primary | ICD-10-CM

## 2021-12-13 DIAGNOSIS — Q15.9 EYE ANOMALY: ICD-10-CM

## 2021-12-13 DIAGNOSIS — C92.10 CML (CHRONIC MYELOCYTIC LEUKEMIA) (HCC): ICD-10-CM

## 2021-12-13 DIAGNOSIS — E03.8 HYPOTHYROIDISM DUE TO HASHIMOTO'S THYROIDITIS: ICD-10-CM

## 2021-12-13 DIAGNOSIS — I48.20 CHRONIC ATRIAL FIBRILLATION (HCC): ICD-10-CM

## 2021-12-13 PROCEDURE — 3017F COLORECTAL CA SCREEN DOC REV: CPT | Performed by: FAMILY MEDICINE

## 2021-12-13 PROCEDURE — 99214 OFFICE O/P EST MOD 30 MIN: CPT | Performed by: FAMILY MEDICINE

## 2021-12-13 PROCEDURE — 2022F DILAT RTA XM EVC RTNOPTHY: CPT | Performed by: FAMILY MEDICINE

## 2021-12-13 PROCEDURE — 4040F PNEUMOC VAC/ADMIN/RCVD: CPT | Performed by: FAMILY MEDICINE

## 2021-12-13 PROCEDURE — 1090F PRES/ABSN URINE INCON ASSESS: CPT | Performed by: FAMILY MEDICINE

## 2021-12-13 PROCEDURE — 3051F HG A1C>EQUAL 7.0%<8.0%: CPT | Performed by: FAMILY MEDICINE

## 2021-12-13 PROCEDURE — G8399 PT W/DXA RESULTS DOCUMENT: HCPCS | Performed by: FAMILY MEDICINE

## 2021-12-13 PROCEDURE — G8417 CALC BMI ABV UP PARAM F/U: HCPCS | Performed by: FAMILY MEDICINE

## 2021-12-13 PROCEDURE — G8482 FLU IMMUNIZE ORDER/ADMIN: HCPCS | Performed by: FAMILY MEDICINE

## 2021-12-13 PROCEDURE — 36415 COLL VENOUS BLD VENIPUNCTURE: CPT | Performed by: FAMILY MEDICINE

## 2021-12-13 PROCEDURE — 1123F ACP DISCUSS/DSCN MKR DOCD: CPT | Performed by: FAMILY MEDICINE

## 2021-12-13 PROCEDURE — G8427 DOCREV CUR MEDS BY ELIG CLIN: HCPCS | Performed by: FAMILY MEDICINE

## 2021-12-13 PROCEDURE — 1036F TOBACCO NON-USER: CPT | Performed by: FAMILY MEDICINE

## 2021-12-13 RX ORDER — FLUTICASONE PROPIONATE 50 MCG
SPRAY, SUSPENSION (ML) NASAL
Qty: 1 EACH | Refills: 5 | Status: ON HOLD | OUTPATIENT
Start: 2021-12-13 | End: 2022-01-04

## 2021-12-13 NOTE — TELEPHONE ENCOUNTER
----- Message from Elizabeth Ricks MD sent at 12/13/2021 12:36 PM EST -----  Dr. Olga Nam,   I agree with lovenox bridge. I will have my staff reach out and schedule the colonoscopy. Thanks for reaching out. Gema Zamorano   ----- Message -----  From: Low Roa MD  Sent: 12/13/2021  11:24 AM EST  To: Elizabeth Ricks MD    Good morning, our mutual patient states she was unaware of the voicemail that your office left regarding cardiac clearance and Coumadin management. Patient has atrial fibrillation which is clinically controlled she does not see a cardiologist and I do not think she would need to see a cardiologist.  I would plan on bridging her with Lovenox as I did 5 years ago around a procedure. Thanks for your help I asked her to reach out to your office to schedule the procedure if she has not heard from your office in a week.

## 2021-12-13 NOTE — PROGRESS NOTES
Chief Complaint   Patient presents with    Hypertension    Chronic Kidney Disease    Diabetes     pt sometimes only eats 2 meals per day so she only takes her insulin twice on those days    Other     multiple medical compaints        Internal Administration   First Dose COVID-19, Pfizer, PF, 30mcg/0.3mL  2021   Second Dose COVID-19, Pfizer, PF, 30mcg/0.3mL   2021       Last COVID Lab SARS-CoV-2, PCR (no units)   Date Value   10/27/2020 Not Detected             Wt Readings from Last 3 Encounters:   21 239 lb (108.4 kg)   21 230 lb (104.3 kg)   21 225 lb (102.1 kg)     BP Readings from Last 3 Encounters:   21 117/76   21 128/80   21 124/80      Lab Results   Component Value Date    LABA1C 7.6 2021    LABA1C 7.7 2021    LABA1C 7.4 2021       HPI:  Prudence Odor is a 66 y.o. (: 1950) here today for    Follow up on chronic condition. States she does not monitor her BP or BS at home. She is currently taking 10 units some times 3 times daily and sometimes 2 times daily. Discussed the importance of having her follow up colonoscopy due to polyps found in her previous colonoscopy and the need to bridge with Lovenox for the procedure instead of having a cardiac clearence. Will contact Dr. Sulma Kay with that information. Noticed bilateral cysts in eyes. Recommended she schedule an appt with Dr. Anna Haynes who she has seen in the past for cataract surgery. Discussed the importance of getting her yearly mammogram. She will get that scheduled today. [x] Patient has completed an advance directive  [] Patient has NOT completed an advanced directive  [x] Patient has a documented healthcare surrogate  [] Patient does NOT have a documented healthcare surrogate  [] Discussed the importance of establishing and updating an advanced directive. Patient has questions at this time and those were answered.   [x] Discussed the importance of establishing and updating an advanced directive. Patient does NOT have questions at this time. Discussed with: [x] Patient            [] Family             [] Other caregiver    Patient's medications, allergies, past medical, surgical, social and family histories were reviewed and updated asappropriate on 2021 at 11:02 AM.    ROS:  Review of Systems    All other systems reviewed and are negative except as noted above on 2021 at 11:02 AM. Additional review of systems may be scanned into the media section ofthis medical record. Any responses requiring further intervention were pursued.     Past Medical History:   Diagnosis Date    [facility] ([facility]     Cancer [facility]     lymphoma    CKD (chronic kidney disease)     Diabetes mellitus ([facility]     DVT (deep venous thrombosis) ([facility]     Gout 2019    Hyperlipidemia     Hypertension     Hypotension      Family History   Problem Relation Age of Onset    Cancer Father     Cancer Sister         lung    Cancer Brother         lymphoma    Heart Disease Mother      Social History     Socioeconomic History    Marital status: Single     Spouse name: Not on file    Number of children: Not on file    Years of education: Not on file    Highest education level: Not on file   Occupational History    Not on file   Tobacco Use    Smoking status: Former Smoker     Packs/day: 1.00     Years: 35.00     Pack years: 35.00     Quit date:      Years since quittin.9    Smokeless tobacco: Never Used   Vaping Use    Vaping Use: Never used   Substance and Sexual Activity    Alcohol use: No     Alcohol/week: 0.0 standard drinks    Drug use: No    Sexual activity: Not Currently   Other Topics Concern    Not on file   Social History Narrative    Not on file     Social Determinants of Health     Financial Resource Strain: Low Risk     Difficulty of Paying Living Expenses: Not hard at all   Food Insecurity: No Food Insecurity    Worried About Running Out of Food in the Last Year: Never true    [address] in the Last Year: Never true   Transportation Needs:     Lack of Transportation (Medical): Not on file    Lack of Transportation (Non-Medical): Not on file   Physical Activity:     Days of Exercise per Week: Not on file    Minutes of Exercise per Session: Not on file   Stress:     Feeling of Stress : Not on file   Social Connections:     Frequency of Communication with Friends and Family: Not on file    Frequency of Social Gatherings with Friends and Family: Not on file    Attends Jew Services: Not on file    Active Member of [address] [place] [address] or Organizations: Not on file    Attends Club or Organization Meetings: Not on file    Marital Status: Not on file   Intimate Partner Violence:     Fear of Current or Ex-Partner: Not on file    Emotionally Abused: Not on file    Physically Abused: Not on file    Sexually Abused: Not on file   Housing Stability:     Unable to Pay for Housing in the Last Year: Not on file    Number of [place] in the Last Year: Not on file    Unstable Housing in the Last Year: Not on file     Prior to Visit Medications    Medication Sig Taking?  Authorizing Provider   fluticasone (FLONASE) 50 MCG/ACT nasal spray SPRAY 1 SPRAY INTO EACH NOSTRIL EVERY DAY Yes Iam Payne MD   [facility] ULTRA strip USE TO TEST BLOOD SUGAR 3 TIMES A DAY Yes Iam Payne MD   warfarin (COUMADIN) 1 MG tablet TAKE 1 TABLET BY MOUTH EVERY DAY AS DIRECTED Yes Iam Payne MD   levothyroxine (SYNTHROID) 100 MCG tablet TAKE 1 TABLET BY MOUTH EVERY DAY Yes Iam Payne MD   insulin detemir (LEVEMIR FLEXTOUCH) 100 UNIT/ML injection pen INJECT Kaci Doyle MD   insulin detemir (LEVEMIR FLEXTOUCH) 100 UNIT/ML injection pen Inject 50 Units into the skin nightly Yes Iam Payne MD   pentoxifylline (TRENTAL) 400 MG extended release tablet TAKE 1 TABLET BY Bill Daniel MD   atorvastatin (LIPITOR) 40 MG tablet TAKE 1 TABLET BY MOUTH EVERY DAY Yes Low Roa MD   diclofenac sodium (VOLTAREN) 1 % GEL APPLY TO AFFECTED AREA TWICE A DAY Yes Giancarlo Castañeda MD   DILT- MG extended release capsule TAKE 1 CAPSULE BY MOUTH EVERY DAY Yes Low Roa MD   insulin regular (NOVOLIN R) 100 UNIT/ML injection USE AS DIRECTED PER SLIDING SCALE UP TO 30 UNITS DAILY Yes Low Roa MD   warfarin (COUMADIN) 5 MG tablet TAKE 1 TAB DAILY EXCEPT 10 MG ON MONDAY Yes Low Roa MD   Cholecalciferol (VITAMIN D3) 50 MCG (2000 UT) CAPS Take by mouth Yes Historical Provider, MD   allopurinol (ZYLOPRIM) 100 MG tablet  Yes Historical Provider, MD DIOR PENTIPS 32G X 4 MM MISC USE DAILY AS DIRECTED Yes Rayleen Goldberg, APRN - CNP   colchicine (COLCRYS) 0.6 MG tablet Take 1 tablet by mouth 2 times daily Yes Low Roa MD   furosemide (LASIX) 20 MG tablet Take 1 tablet by mouth daily Yes Low Roa MD   ONE TOUCH ULTRASOFT LANCETS MISC USE TO TEST 3 TIMES A DAY and as needed Yes Low Roa MD   omeprazole (PRILOSEC) 40 MG delayed release capsule TAKE 1 CAPSULE BY MOUTH EVERY DAY Yes Low Roa MD   BD VEO INSULIN SYRINGE U/F 31G X 15/64\" 0.3 ML MISC USE UP TO FIVE TIMES DAILY Yes Low Roa MD   ondansetron (ZOFRAN) 4 MG tablet Take 1 tablet by mouth every 6 hours as needed for Nausea or Vomiting Yes Low Roa MD   magnesium oxide (MAG-OX) 400 MG tablet TAKE 1 TABLET BY MOUTH THREE TIMES A DAY  Patient taking differently: Take 400 mg by mouth daily  Yes Rayleen Goldberg, APRN - CNP   pregabalin (LYRICA) 100 MG capsule TAKE 1 CAPSULE BY MOUTH THREE TIMES A DAY  Low Roa MD     No Known Allergies    OBJECTIVE:  Estimated body mass index is 42.34 kg/m² as calculated from the following: Height as of 8/13/21: 5' 3\" (1.6 m). Weight as of this encounter: 239 lb (108.4 kg). Vitals:    12/13/21 1048   BP: 117/76   Site: Right Upper Arm   Position: Sitting   Cuff Size: Large Adult   Pulse: 78   SpO2: 95%   Weight: 239 lb (108.4 kg)       Physical Exam  Vitals and nursing note reviewed. Constitutional:       General: She is not in acute distress. Appearance: She is well-developed. She is not diaphoretic. HENT:      Head: Normocephalic and atraumatic. Right Ear: External ear normal.      Left Ear: External ear normal.      Nose: Nose normal.   Eyes:      General: Lids are normal. No scleral icterus. Right eye: No discharge. Left eye: No discharge. Pupils: Pupils are equal, round, and reactive to light. Comments: Bilateral lateral upper cystic lesions both eyes right larger than left   Neck:      Thyroid: No thyromegaly. Vascular: No JVD. Cardiovascular:      Rate and Rhythm: Normal rate and regular rhythm. Heart sounds: Normal heart sounds. Pulmonary:      Effort: Pulmonary effort is normal. No respiratory distress. Breath sounds: Normal breath sounds. Abdominal:      Palpations: Abdomen is soft. Tenderness: There is no abdominal tenderness. Musculoskeletal:      Right lower leg: Edema (3+) present. Left lower leg: Edema (3+) present. Skin:     General: Skin is warm and dry. Coloration: Skin is not pale. Findings: No erythema or rash. Comments: Turgor normal   Psychiatric:         Behavior: Behavior normal.         Thought Content: Thought content normal.         Judgment: Judgment normal.              ASSESSMENT PLAN      Diagnosis Orders   1. Insulin-requiring or dependent type II diabetes mellitus ([address]  Hemoglobin A1C   2. Acute ZACKARY (middle ear effusion), bilateral  fluticasone (FLONASE) 50 MCG/ACT nasal spray   3. Chronic atrial fibrillation ([address]     4. Benign essential HTN     5.  Gastroesophageal reflux disease without esophagitis     6. Peripheral vascular disease in diabetes mellitus ([address]     7. CKD stage 4 due to type 2 diabetes mellitus ([address]     8. Hypothyroidism due to Hashimoto's thyroiditis     9. Chronic combined systolic and diastolic congestive heart failure ([address]     10. CML (chronic myelocytic leukemia) ([address]     11. Eye anomaly     Blood sugar readings by glycosylated hemoglobin or home fingerstick blood sugars are acceptable and no change in diabetic treatment is necessary clinically in sinus rhythm today, continue Coumadin. Current blood pressure readings in the office or at home are acceptable and no change in medication is necessary thyroid replacement by symptoms appears appropriate. There is no clinical evidence of coronary insufficiency or failure requiring any change in cardiac medication. No evidence of recurrence of leukemia. Continue to monitor renal function. No evidence of limb ischemia. Reflux controlled. Follow-up 6 months. Reminded of the need for colonoscopy because of past history of polyps. We will send this note to Dr. Thomas Queen indicating I do not think she needs cardiac clearance but we would bridge her with Lovenox as we did 5 years ago. Also recommended mammography. I have asked the patient to follow back up with Dr. Sandip Chamorro her ophthalmologist regarding the bilateral cystic structures in her eyes    Patient should call the office immediately with new or ongoing signs or symptoms or worsening, or proceed to the emergency room. No changes in past medical history, past surgical history, social history, or family history were noted during the patient encounter unless specifically listed above. All updates of past medical history, past surgical history, social history, or family history were reviewed personally by me during the office visit. All problems listed in the assessment are stable unless noted otherwise.   Medication profile reviewed personally by me during the accurately describes my personal service to the patient.       12/13/2021    11:23 AM

## 2021-12-14 ENCOUNTER — TELEPHONE (OUTPATIENT)
Dept: FAMILY MEDICINE CLINIC | Age: 71
End: 2021-12-14

## 2021-12-14 ENCOUNTER — TELEPHONE (OUTPATIENT)
Dept: GASTROENTEROLOGY | Age: 71
End: 2021-12-14

## 2021-12-14 DIAGNOSIS — M75.51 SUBACROMIAL BURSITIS OF RIGHT SHOULDER JOINT: ICD-10-CM

## 2021-12-14 DIAGNOSIS — Z12.11 SCREEN FOR COLON CANCER: Primary | ICD-10-CM

## 2021-12-14 LAB
ESTIMATED AVERAGE GLUCOSE: 157.1 MG/DL
HBA1C MFR BLD: 7.1 %

## 2021-12-14 RX ORDER — POLYETHYLENE GLYCOL 3350 17 G/17G
POWDER, FOR SOLUTION ORAL
Qty: 238 G | Refills: 0 | Status: ON HOLD | OUTPATIENT
Start: 2021-12-14 | End: 2022-01-04 | Stop reason: HOSPADM

## 2021-12-14 RX ORDER — BISACODYL 5 MG
TABLET, DELAYED RELEASE (ENTERIC COATED) ORAL
Qty: 4 TABLET | Refills: 0 | Status: ON HOLD | OUTPATIENT
Start: 2021-12-14 | End: 2022-01-04 | Stop reason: HOSPADM

## 2021-12-14 NOTE — TELEPHONE ENCOUNTER
Pt scheduled at [facility] [address] 3 @1:30pm arrival at 12:30pm  covid test on 12-  Prep meds sent to pharmacy   Order faxed to [facility]  Emailed prep instructions to [email or web address]

## 2021-12-14 NOTE — TELEPHONE ENCOUNTER
Pt states that she is having surgery on 1/3/22. And she was needing to see about the Lovenox bridge and what she needs to do. She uses [facility] in [facility].

## 2021-12-14 NOTE — TELEPHONE ENCOUNTER
Stop Coumadin 5 days before procedure. Lovenox bridging at 1 mg/kg subcu every 12 starting 4 days before procedure and continuing for 4 to 5 days after procedure. Restart Coumadin when surgeon says it is okay to do so.   Check INR 3 days after Coumadin restarted

## 2021-12-20 ENCOUNTER — TELEPHONE (OUTPATIENT)
Dept: FAMILY MEDICINE CLINIC | Age: 71
End: 2021-12-20

## 2021-12-20 NOTE — TELEPHONE ENCOUNTER
Pt called stating that she has a colonoscopy scheduled for 1/3/2022. Pt is needing to know what to do with her coumadin, bridging, and dosage.  Call back pt [phone]

## 2021-12-27 ENCOUNTER — ANESTHESIA EVENT (OUTPATIENT)
Dept: ENDOSCOPY | Age: 71
End: 2021-12-27

## 2021-12-28 ENCOUNTER — HOSPITAL ENCOUNTER (OUTPATIENT)
Age: 71
Discharge: HOME | End: 2021-12-28
Payer: MEDICARE

## 2021-12-28 PROCEDURE — U0005 INFEC AGEN DETEC AMPLI PROBE: HCPCS

## 2021-12-28 PROCEDURE — U0003 INFECTIOUS AGENT DETECTION BY NUCLEIC ACID (DNA OR RNA); SEVERE ACUTE RESPIRATORY SYNDROME CORONAVIRUS 2 (SARS-COV-2) (CORONAVIRUS DISEASE [COVID-19]), AMPLIFIED PROBE TECHNIQUE, MAKING USE OF HIGH THROUGHPUT TECHNOLOGIES AS DESCRIBED BY CMS-2020-01-R: HCPCS

## 2021-12-29 LAB — SARS-COV-2: NOT DETECTED

## 2021-12-30 ENCOUNTER — TELEPHONE (OUTPATIENT)
Dept: GASTROENTEROLOGY | Age: 71
End: 2021-12-30

## 2022-01-03 ENCOUNTER — ANESTHESIA (OUTPATIENT)
Dept: ENDOSCOPY | Age: 72
End: 2022-01-03

## 2022-01-03 VITALS
SYSTOLIC BLOOD PRESSURE: 130 MMHG | OXYGEN SATURATION: 97 % | RESPIRATION RATE: 19 BRPM | DIASTOLIC BLOOD PRESSURE: 92 MMHG

## 2022-01-03 RX ORDER — POLYETHYLENE GLYCOL 3350 17 G/17G
POWDER, FOR SOLUTION ORAL
Qty: 238 G | Refills: 1 | Status: ON HOLD | OUTPATIENT
Start: 2022-01-03 | End: 2022-01-04 | Stop reason: HOSPADM

## 2022-01-04 ENCOUNTER — HOSPITAL ENCOUNTER (OUTPATIENT)
Age: 72
Setting detail: OUTPATIENT SURGERY
Discharge: HOME | End: 2022-01-04
Attending: INTERNAL MEDICINE
Payer: MEDICARE

## 2022-01-04 ENCOUNTER — ANESTHESIA (OUTPATIENT)
Dept: ENDOSCOPY | Age: 72
End: 2022-01-04
Payer: MEDICARE

## 2022-01-04 ENCOUNTER — ANESTHESIA EVENT (OUTPATIENT)
Dept: ENDOSCOPY | Age: 72
End: 2022-01-04
Payer: MEDICARE

## 2022-01-04 VITALS
OXYGEN SATURATION: 96 % | DIASTOLIC BLOOD PRESSURE: 59 MMHG | HEART RATE: 85 BPM | RESPIRATION RATE: 14 BRPM | TEMPERATURE: 97 F | HEIGHT: 64 IN | SYSTOLIC BLOOD PRESSURE: 140 MMHG | BODY MASS INDEX: 40.12 KG/M2 | WEIGHT: 235 LBS

## 2022-01-04 VITALS
RESPIRATION RATE: 10 BRPM | SYSTOLIC BLOOD PRESSURE: 115 MMHG | OXYGEN SATURATION: 98 % | DIASTOLIC BLOOD PRESSURE: 67 MMHG

## 2022-01-04 LAB
GLUCOSE BLD-MCNC: 115 MG/DL (ref 70–99)
INR BLD: 0.99 (ref 0.88–1.12)
PERFORMED ON: ABNORMAL
PROTHROMBIN TIME: 11.2 SEC (ref 9.9–12.7)

## 2022-01-04 PROCEDURE — 36415 COLL VENOUS BLD VENIPUNCTURE: CPT

## 2022-01-04 PROCEDURE — 2580000003 HC RX 258: Performed by: NURSE ANESTHETIST, CERTIFIED REGISTERED

## 2022-01-04 PROCEDURE — 3609010600 HC COLONOSCOPY POLYPECTOMY SNARE/COLD BIOPSY: Performed by: INTERNAL MEDICINE

## 2022-01-04 PROCEDURE — 2709999900 HC NON-CHARGEABLE SUPPLY: Performed by: INTERNAL MEDICINE

## 2022-01-04 PROCEDURE — 88305 TISSUE EXAM BY PATHOLOGIST: CPT

## 2022-01-04 PROCEDURE — 3700000000 HC ANESTHESIA ATTENDED CARE: Performed by: INTERNAL MEDICINE

## 2022-01-04 PROCEDURE — 3700000001 HC ADD 15 MINUTES (ANESTHESIA): Performed by: INTERNAL MEDICINE

## 2022-01-04 PROCEDURE — 6360000002 HC RX W HCPCS: Performed by: NURSE ANESTHETIST, CERTIFIED REGISTERED

## 2022-01-04 PROCEDURE — 2720000010 HC SURG SUPPLY STERILE: Performed by: INTERNAL MEDICINE

## 2022-01-04 PROCEDURE — 3609009900 HC COLONOSCOPY W/CONTROL BLEEDING ANY METHOD: Performed by: INTERNAL MEDICINE

## 2022-01-04 PROCEDURE — 7100000011 HC PHASE II RECOVERY - ADDTL 15 MIN: Performed by: INTERNAL MEDICINE

## 2022-01-04 PROCEDURE — 45385 COLONOSCOPY W/LESION REMOVAL: CPT | Performed by: INTERNAL MEDICINE

## 2022-01-04 PROCEDURE — 2500000003 HC RX 250 WO HCPCS: Performed by: NURSE ANESTHETIST, CERTIFIED REGISTERED

## 2022-01-04 PROCEDURE — 85610 PROTHROMBIN TIME: CPT

## 2022-01-04 PROCEDURE — 7100000010 HC PHASE II RECOVERY - FIRST 15 MIN: Performed by: INTERNAL MEDICINE

## 2022-01-04 DEVICE — WORKING LENGTH 235CM, WORKING CHANNEL 2.8MM
Type: IMPLANTABLE DEVICE | Status: FUNCTIONAL
Brand: RESOLUTION 360 CLIP

## 2022-01-04 RX ORDER — SODIUM CHLORIDE, SODIUM LACTATE, POTASSIUM CHLORIDE, CALCIUM CHLORIDE 600; 310; 30; 20 MG/100ML; MG/100ML; MG/100ML; MG/100ML
INJECTION, SOLUTION INTRAVENOUS CONTINUOUS
Status: DISCONTINUED | OUTPATIENT
Start: 2022-01-04 | End: 2022-01-04 | Stop reason: HOSPADM

## 2022-01-04 RX ORDER — SODIUM CHLORIDE, SODIUM LACTATE, POTASSIUM CHLORIDE, CALCIUM CHLORIDE 600; 310; 30; 20 MG/100ML; MG/100ML; MG/100ML; MG/100ML
INJECTION, SOLUTION INTRAVENOUS CONTINUOUS PRN
Status: DISCONTINUED | OUTPATIENT
Start: 2022-01-04 | End: 2022-01-04 | Stop reason: SDUPTHER

## 2022-01-04 RX ORDER — LIDOCAINE HYDROCHLORIDE 20 MG/ML
INJECTION, SOLUTION INFILTRATION; PERINEURAL PRN
Status: DISCONTINUED | OUTPATIENT
Start: 2022-01-04 | End: 2022-01-04 | Stop reason: SDUPTHER

## 2022-01-04 RX ORDER — PROPOFOL 10 MG/ML
INJECTION, EMULSION INTRAVENOUS PRN
Status: DISCONTINUED | OUTPATIENT
Start: 2022-01-04 | End: 2022-01-04 | Stop reason: SDUPTHER

## 2022-01-04 RX ADMIN — PROPOFOL 200 MG: 10 INJECTION, EMULSION INTRAVENOUS at 13:42

## 2022-01-04 RX ADMIN — SODIUM CHLORIDE, POTASSIUM CHLORIDE, SODIUM LACTATE AND CALCIUM CHLORIDE: 600; 310; 30; 20 INJECTION, SOLUTION INTRAVENOUS at 13:14

## 2022-01-04 RX ADMIN — PROPOFOL 100 MG: 10 INJECTION, EMULSION INTRAVENOUS at 14:03

## 2022-01-04 RX ADMIN — LIDOCAINE HYDROCHLORIDE 60 MG: 20 INJECTION, SOLUTION INFILTRATION; PERINEURAL at 13:18

## 2022-01-04 RX ADMIN — PROPOFOL 200 MG: 10 INJECTION, EMULSION INTRAVENOUS at 13:18

## 2022-01-04 NOTE — ANESTHESIA POSTPROCEDURE EVALUATION
Department of Anesthesiology  Postprocedure Note    Patient: Frankey Fontan  MRN: [id number]  YOB: 1950  Date of evaluation: 1/4/2022  Time:  3:09 PM     Procedure Summary     Date: 01/04/22 Room / Location: [facility] ENDO 01 / [facility]    Anesthesia Start: 6870 Anesthesia Stop: 1426    Procedures:       COLONOSCOPY POLYPECTOMY SNARE/COLD BIOPSY (N/A )      COLONOSCOPY CONTROL HEMORRHAGE (N/A ) Diagnosis: (SCREENING)    Surgeons: Mya Heredia MD Responsible Provider: Trini Stoll MD    Anesthesia Type: MAC ASA Status: 3          Anesthesia Type: MAC    Pacheco Phase I: Pacheco Score: 10    Pacheco Phase II: Pacheco Score: 10    Last vitals: Reviewed and per EMR flowsheets.        Anesthesia Post Evaluation    Comments: Postoperative Anesthesia Note    Name:    Frankey Fontan  MRN:      [id number]    Patient Vitals in the past 12 hrs:  01/04/22 1440, BP:(!) 140/59, Resp:14, SpO2:96 %  01/04/22 1435, BP:(!) 148/62, Resp:14, SpO2:97 %  01/04/22 1430, BP:132/62, Resp:12, SpO2:95 %  01/04/22 1428, Temp:97 °F (36.1 °C), Temp src:Temporal  01/04/22 1425, BP:(!) 140/59, Resp:12, SpO2:96 %  01/04/22 1219, BP:(!) 175/75, Temp:96.9 °F (36.1 °C), Temp src:Temporal, Pulse:85, Resp:11, SpO2:98 %, Height:5' 4\" (1.626 m), Weight:235 lb (106.6 kg)     LABS:    CBC  Lab Results       Component                Value               Date/Time                  WBC                      7.7                 05/11/2021 12:00 PM        HGB                      16.2 (H)            05/11/2021 12:00 PM        HCT                      48.9 (H)            05/11/2021 12:00 PM        PLT                      192                 05/11/2021 12:00 PM   RENAL  Lab Results       Component                Value               Date/Time                  NA                       144                 05/11/2021 12:00 PM        K                        4.7                 05/11/2021 12:00 PM        CL                       101 05/11/2021 12:00 PM        CO2                      27                  05/11/2021 12:00 PM        BUN                      30 (H)              05/11/2021 12:00 PM        CREATININE               1.3 (H)             05/11/2021 12:00 PM        GLUCOSE                  134 (H)             05/11/2021 12:00 PM        GLUCOSE                  123 (H)             03/27/2017 02:22 PM   COAGS  Lab Results       Component                Value               Date/Time                  PROTIME                  11.2                01/04/2022 12:50 PM        PROTIME                  30                  04/09/2013 12:00 AM        INR                      0.99                01/04/2022 12:50 PM        APTT                     46.5 (H)            10/15/2015 12:45 PM     Intake & Output:  @93FOGY@    Nausea & Vomiting:  No    Level of Consciousness:  Awake    Pain Assessment:  Adequate analgesia    Anesthesia Complications:  No apparent anesthetic complications    SUMMARY      Vital signs stable  OK to discharge from Stage I post anesthesia care.   Care transferred from Anesthesiology department on discharge from perioperative area

## 2022-01-04 NOTE — OP NOTE
[address]  [address] [facility]  Phone: [phone] [facility] [address], [address]  Phone: [phone]   JWT:[phone]    Colonoscopy Procedure Note    Patient: Geoff Evangelista  : [birth year]    Procedure: Colonoscopy with polypectomy (snare cautery)    Date:  2022     Endoscopist:  Betzy Roque MD    Referring Physician:  Chela Osman MD    Preoperative Diagnosis:  SCREENING    Postoperative Diagnosis:  See impression    Anesthesia: Anesthesia: MAC  Sedation: Propofol per anesthesia  Start Time:   Stop Time:   ASA Class: 3  Mallampati: III (soft palate, base of uvula visible)    Indications: This is a 66y.o. year old female with medical history of obesity (BMI 40.3), atrial fibrillation on Coumadin, diabetes mellitus type 2, DVT, hyperlipidemia, hypothyroidism, hypertension, CKD and tubular adenomatous and tubulovillous colon polyps who returns surveillance colonoscopy. Procedure Details  Informed consent was obtained for the procedure, including sedation. Risks of perforation, hemorrhage, adverse drug reaction and aspiration were discussed. The patient was placed in the left lateral decubitus position. Based on the pre-procedure assessment, including review of the patient's medical history, medications, allergies, and review of systems, she had been deemed to be an appropriate candidate for sedation; she was therefore sedated with the medications listed below. The patient was monitored continuously with ECG tracing, pulse oximetry, blood pressure monitoring, and direct observations. rectal examination was performed. There were medium external hemorrhoids but no fissures or skin tags. The colonoscope was inserted into the rectum and advanced under direct vision to the cecum, which was identified by the ileocecal valve and appendiceal orifice.   The right colon was examined twice as this increases polyp detection especially if other right colon polyps, older age, male, or oviedo syndrome. When segments could not be distended with CO2, it was filled/distended with water. The quality of the colonic preparation was good. A careful inspection was made as the colonoscope was withdrawn, including a retroflexed view of the rectum; findings and interventions are described below. Appropriate photodocumentation Was Obtained:appendiceal orifice and ileocecal valve. Findings: Three 8-10 mm sessile polyps in the hepatic flexure, removed by snare polypectomy and retrieved for pathology. No bleeding noted. Each polypectomy site closed with 1 hemostatic clip to prevent delayed bleeding. Two 8-10 mm sessile polyps in the transverse colon, removed by snare polypectomy and retrieved for pathology. No bleeding noted. Each polypectomy site closed with 1 hemostatic clip to prevent delayed bleeding. Three 8-10 mm sessile polyps in the descending colon, removed by snare polypectomy and retrieved for pathology. No bleeding noted. Each polypectomy site closed with 1 hemostatic clip to prevent delayed bleeding. Two 8-10 mm sessile polyps in the sigmoid colon, removed by snare polypectomy and retrieved for pathology. No bleeding noted. Medium sized non bleeding internal and external hemorrhoids.       - PREP: MiraLAX and Dulcolax   - Overall difficulty: moderate in degree  - Abdominal pressure: yes - left lower quadrant  - Change in position: no  - Anesthesia issues: no  - Endocoff/Amplieye use: no    Specimens: Was Obtained:     Complications:   None; patient tolerated the procedure well. Disposition:   PACU - hemodynamically stable. Estimated Blood loss:  none    Withdrawal Time:   minutes    Impression: Three 8-10 mm sessile polyps in the hepatic flexure, removed by snare polypectomy and retrieved. 3 hemostatic clip applied.    Two 8-10 mm sessile polyps in the transverse colon, removed by snare polypectomy and retrieved. 2 hemostatic clip applied. Three 8-10 mm sessile polyps in the descending colon, removed by snare polypectomy and retrieved. 3 hemostatic clip to prevent delayed bleeding. Two 8-10 mm sessile polyps in the sigmoid colon, removed by snare polypectomy and retrieved. Medium sized non bleeding internal and external hemorrhoids. Recommendations:  -Await pathology. ,   -Repeat colonoscopy in 1-3 years. ,   -Hold Lovenox. Resume coumadin tomorrow till therapeutic INR. -Follow up with primary care physician.         Saravanan Anderson MD 1/4/22 2:19 PM EST

## 2022-01-04 NOTE — PROGRESS NOTES
Reviewed discharge instructions with patient and Isabell Lama, they verbalized understanding and will call Dr. Jaclyn Velazquez with any concerns

## 2022-01-04 NOTE — ANESTHESIA PRE PROCEDURE
Department of Anesthesiology  Preprocedure Note       Name:  Jim Josue   Age:  66 y.o.  :  1950                                          MRN:  [id number]         Date:  2022      Surgeon: Shari Beltrán):  Renata Sarabia MD    Procedure: Procedure(s):  COLON W/ANES. Medications prior to admission:   Prior to Admission medications    Medication Sig Start Date End Date Taking?  Authorizing Provider   enoxaparin (LOVENOX) 100 MG/ML injection Lovenox bridging at 1 mg/kg subcu every 12 starting 4 days before procedure and continuing for 4 to 5 days after procedure 21  Yes Sandy Campbell MD   levothyroxine (SYNTHROID) 100 MCG tablet TAKE 1 TABLET BY MOUTH EVERY DAY 10/12/21  Yes Sandy Campbell MD   DILT- MG extended release capsule TAKE 1 CAPSULE BY MOUTH EVERY DAY 21  Yes Sandy Campbell MD   warfarin (COUMADIN) 5 MG tablet TAKE 1 TAB DAILY EXCEPT 10 MG ON 21  Yes Sandy Campbell MD   omeprazole (PRILOSEC) 40 MG delayed release capsule TAKE 1 CAPSULE BY MOUTH EVERY DAY 2/3/21  Yes Sandy Campbell MD   fluticasone [facility]) 50 MCG/ACT nasal spray INHALE 1 SPRAY INTO EACH NOSTRIL EVERY DAY 22   Sandy Campbell MD   polyethylene glycol [facility]) 17 GM/SCOOP powder Use as directed for colonoscopy prep 1/3/22   Renata Sarabia MD   diclofenac sodium (VOLTAREN) 1 % GEL APPLY TO AFFECTED AREA TWICE A DAY 12/15/21   Hugh Law MD   [facility]) 17 GM/SCOOP powder Use as directed for coloscopy prep 21   Renata Sarabia MD   bisacodyl (BISACODYL) 5 MG EC tablet Take all four tablets day before colonscopy 21   Renata Sarabia MD   warfarin (COUMADIN) 1 MG tablet TAKE 1 TABLET BY MOUTH EVERY DAY AS DIRECTED 21   Sandy Campbell MD   insulin detemir (LEVEMIR FLEXTOUCH) 100 UNIT/ML injection pen Inject 50 Units into the skin nightly 21   Sandy Campbell MD pentoxifylline (TRENTAL) 400 MG extended release tablet TAKE 1 TABLET BY MOUTH EVERY DAY 8/19/21   Agapito Sprague MD   atorvastatin (LIPITOR) 40 MG tablet TAKE 1 TABLET BY MOUTH EVERY DAY 8/19/21   Agapito Sprague MD   insulin regular (NOVOLIN R) 100 UNIT/ML injection USE AS DIRECTED PER SLIDING SCALE UP TO 30 UNITS DAILY 8/13/21   Agapito Sprague MD   pregabalin (LYRICA) 100 MG capsule TAKE 1 CAPSULE BY MOUTH THREE TIMES A DAY 8/3/21 11/1/21  Agapito Sprague MD   Cholecalciferol (VITAMIN D3) 50 MCG (2000 UT) CAPS Take by mouth    Historical Provider, MD   allopurinol (ZYLOPRIM) 100 MG tablet Take 100 mg by mouth daily  6/15/21   Historical Provider, MD   colchicine (COLCRYS) 0.6 MG tablet Take 1 tablet by mouth 2 times daily 5/10/21   Agapito Sprague MD   furosemide (LASIX) 20 MG tablet Take 1 tablet by mouth daily 4/30/21   Agapito Sprague MD   ond[facility]ron [facility]) 4 MG tablet Take 1 tablet by mouth every 6 hours as needed for Nausea or Vomiting 6/22/20   Agapito Sprague MD   magnesium oxide (MAG-OX) 400 MG tablet TAKE 1 TABLET BY MOUTH THREE TIMES A DAY  Patient taking differently: Take 400 mg by mouth daily  11/27/19   DEMI Greco - CNP       Current medications:    Current Facility-Administered Medications   Medication Dose Route Frequency Provider Last Rate Last Admin    lactated ringers infusion   IntraVENous Continuous Mya Heredia MD           Allergies:  No Known Allergies    Problem List:    Patient Active Problem List   Diagnosis Code    Benign essential HTN I10    Personal history of lymphoma Z85.72    DVT (deep venous thrombosis) ([facility]) I82.409    Hypotension I95.9    Hematochezia K92.1    Stasis dermatitis I87.2    GERD (gastroesophageal reflux disease) K21.9    Fracture of left ankle S82.892A    Insomnia G47.00    Insulin-requiring or dependent type II diabetes mellitus ([facility] E11.9, Z79.4    Peripheral vascular disease in diabetes mellitus (HCC) E11.51    Hypomagnesemia E83.42    Venous insufficiency I87.2    Long term current use of anticoagulant therapy Z79.01    CKD stage 4 due to type 2 diabetes mellitus ([facility] E11.22, N18.4    Chronic atrial fibrillation (HCC) I48.20    Acute diverticulitis K57.92    Medial meniscus tear S83.249A    Morbid obesity due to excess calories ([facility]) E66.01    Hypothyroidism due to Hashimoto's thyroiditis E03.8, E06.3    History of herpes zoster Z86.19    Trigger finger of left thumb M65.312    Trigger finger, left middle finger M65.332    Diabetic mononeuropathy associated with type 2 diabetes mellitus (HCC) E11.41    History of colon polyps Z86.010    Sigmoid polyp K63.5    Polyp of ascending colon K63.5    Leg cramps R25.2    Plantar fasciitis M72.2    Acute gout involving toe of left foot M10.9    Synovial cyst of hand M71.349    Arthritis of finger of both hands M19.041, M19.042    Mucous cyst of digit of right hand M67.441    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    Chronic combined systolic and diastolic congestive heart failure (HCC) I50.42    CML (chronic myelocytic leukemia) (HCC) C92.10    Eye anomaly Q15.9       Past Medical History:        Diagnosis Date    Atrial fibrillation (HCC)     Cancer (HCC)     lymphoma    CKD (chronic kidney disease)     Diabetes mellitus ([facility]     DVT (deep venous thrombosis) ([facility]     Gout 12/02/2019    Hyperlipidemia     Hypertension     Hypotension        Past Surgical History:        Procedure Laterality Date    COLONOSCOPY      COLONOSCOPY  05/23/2018    colon polyps,diverticulosis    HAND SURGERY Right 11/2/2020    EXCISION RIGHT RING FINGER MUCOUS CYST AND BONE SPUR performed by Estelita Fan MD at [address] [place] [address] OTHER SURGICAL HISTORY      vocal cord bx       Social History:    Social History     Tobacco Use    Smoking status: Former Smoker     Packs/day: 1.00     Years: 35.00     Pack years: 35.00     Quit date:      Years since quittin.0    Smokeless tobacco: Never Used   Substance Use Topics    Alcohol use: No     Alcohol/week: 0.0 standard drinks                                Counseling given: Not Answered      Vital Signs (Current):   Vitals:    22 1219   BP: (!) 175/75   Pulse: 85   Resp: 11   Temp: 96.9 °F (36.1 °C)   TempSrc: Temporal   SpO2: 98%   Weight: 235 lb (106.6 kg)   Height: 5' 4\" (1.626 m)                                              BP Readings from Last 3 Encounters:   22 (!) 175/75   22 (!) 165/76   22 (!) 130/92       NPO Status: Time of last liquid consumption: 0600                        Time of last solid consumption: 0800                        Date of last liquid consumption: 22                        Date of last solid food consumption: 22    BMI:   Wt Readings from Last 3 Encounters:   22 235 lb (106.6 kg)   22 235 lb (106.6 kg)   21 235 lb (106.6 kg)     Body mass index is 40.34 kg/m².     CBC:   Lab Results   Component Value Date    WBC 7.7 2021    RBC 5.35 2021    RBC 5.13 2017    HGB 16.2 2021    HCT 48.9 2021    MCV 91.4 2021    RDW 15.0 2021     2021       CMP:   Lab Results   Component Value Date     2021    K 4.7 2021     2021    CO2 27 2021    BUN 30 2021    CREATININE 1.3 2021    GFRAA 49 2021    GFRAA 39 2013    AGRATIO 1.5 2019    LABGLOM 40 2021    GLUCOSE 134 2021    GLUCOSE 123 2017    PROT 6.8 2019    PROT 6.6 2017    CALCIUM 9.1 2021    BILITOT 0.5 2019    ALKPHOS 78 2019    AST 21 2019    ALT 18 2019       POC Tests:   Recent Labs     22  1241   POCGLU 115*       Coags:   Lab Results   Component Value Date    PROTIME 20.1 2015 PROTIME 30 04/09/2013    INR 2.8 12/07/2021    INR 1.74 12/29/2015    APTT 46.5 10/15/2015       HCG (If Applicable): No results found for: PREGTESTUR, PREGSERUM, HCG, HCGQUANT     ABGs: No results found for: PHART, PO2ART, URS5PTB, EQV4IHF, BEART, K6DIHHUU     Type & Screen (If Applicable):  No results found for: LABABO, LABRH    Drug/Infectious Status (If Applicable):  No results found for: HIV, HEPCAB    COVID-19 Screening (If Applicable):   Lab Results   Component Value Date    COVID19 Not Detected 12/28/2021    COVID19 Not Detected 10/27/2020           Anesthesia Evaluation  Patient summary reviewed and Nursing notes reviewed no history of anesthetic complications:   Airway: Mallampati: I     Neck ROM: full   Dental:          Pulmonary:Negative Pulmonary ROS and normal exam                               Cardiovascular:Negative CV ROS    (+) hypertension:, dysrhythmias: atrial fibrillation, CHF:, hyperlipidemia                  Neuro/Psych:   Negative Neuro/Psych ROS  (+) neuromuscular disease (neuropathy):,             GI/Hepatic/Renal: Neg GI/Hepatic/Renal ROS  (+) GERD: well controlled, renal disease: CRI,      (-) hiatal hernia       Endo/Other: Negative Endo/Other ROS   (+) Diabetes, hypothyroidism: arthritis:., .                 Abdominal:             Vascular: Other Findings:             Anesthesia Plan      general     ASA 3     (I discussed with the patient the risks and benefits of PIV, general anesthesia, IV Narcotics, PACU. All questions were answered the patient agrees with the plan and wishes to proceed.  )  Induction: intravenous. Pre-Operative Diagnosis: SCREENING    66 y.o.   BMI:  Body mass index is 40.34 kg/m².      Vitals:    01/04/22 1219   BP: (!) 175/75   Pulse: 85   Resp: 11   Temp: 96.9 °F (36.1 °C)   TempSrc: Temporal   SpO2: 98%   Weight: 235 lb (106.6 kg)   Height: 5' 4\" (1.626 m)       No Known Allergies    Social History     Tobacco Use    Smoking status: Former Smoker     Packs/day: 1.00     Years: 35.00     Pack years: 35.00     Quit date:      Years since quittin.0    Smokeless tobacco: Never Used   Substance Use Topics    Alcohol use: No     Alcohol/week: 0.0 standard drinks       LABS:    CBC  Lab Results   Component Value Date/Time    WBC 7.7 2021 12:00 PM    HGB 16.2 (H) 2021 12:00 PM    HCT 48.9 (H) 2021 12:00 PM     2021 12:00 PM     RENAL  Lab Results   Component Value Date/Time     2021 12:00 PM    K 4.7 2021 12:00 PM     2021 12:00 PM    CO2 27 2021 12:00 PM    BUN 30 (H) 2021 12:00 PM    CREATININE 1.3 (H) 2021 12:00 PM    GLUCOSE 134 (H) 2021 12:00 PM    GLUCOSE 123 (H) 2017 02:22 PM     COAGS  Lab Results   Component Value Date/Time    PROTIME 20.1 (H) 2015 10:01 AM    PROTIME 30 2013 12:00 AM    INR 2.8 2021 07:59 AM    INR 1.74 (H) 2015 10:01 AM    APTT 46.5 (H) 10/15/2015 12:45 PM         Nancy Quezada MD   2022

## 2022-01-04 NOTE — H&P
[address] ,  [address] [facility], [place] [address]  Phone: [phone]       CHIEF COMPLAINT  No chief complaint on file. Malik Beltran is a 66 y.o. female with medical history of obesity (BMI 40.3), atrial fibrillation on Coumadin, diabetes mellitus type 2, DVT, hyperlipidemia, hypothyroidism, hypertension, CKD and tubular adenomatous and tubulovillous colon polyps who returns surveillance colonoscopy. Date of last office visit and details: 5/23/2018 Dr. Natalie Livingston:   Patient is morbidly obese with BMI of 41 and has multiple comorbidities including chronic Afib on coumadin, CKD, DM, prior DVT. Patient denies any GI symptoms currently. Denies bowel habit changes. She reports sometimes she sees a little blood on tissue due to her hemorrhoids. She reports having a colonoscopy about 10 or more years ago and had 'polyps' removed, no records on this are available. She has never had a CVA before. She has had a DVT 5 years ago and has Afib. She denies prior h/o PE. She denies any family h/o colon cancer. Last Colonoscopy 5/23/2018 Dr. Natalie Livingston: Mild diverticulosis in the sigmoid. Two sessile 1 cm polyps in the ascending colon, removed with hot snare (path - tubular adenoma). A large 1.5 cm pedunculated polyp in the sigmoid removed with hot snare (path - tubulovillious adenoma). A 1 cm sessile distal rectal polyp (2 cm above dentate) removed with hot snare (path - tubular adenoma). Internal and external hemorrhoids.     PAST MEDICAL HISTORY     Past Medical History:   Diagnosis Date    Atrial fibrillation ([address]     Cancer ([facility]     lymphoma    CKD (chronic kidney disease)     Diabetes mellitus ([address]     DVT (deep venous thrombosis) ([facility]     Gout 12/02/2019    Hyperlipidemia     Hypertension     Hypotension      FAMILY HISTORY     Family History   Problem Relation Age of Onset    Cancer Father     Cancer Sister         lung    Cancer Brother         lymphoma    Heart Disease Mother      SOCIAL HISTORY     Social History     Socioeconomic History    Marital status: Single     Spouse name: Not on file    Number of children: Not on file    Years of education: Not on file    Highest education level: Not on file   Occupational History    Not on file   Tobacco Use    Smoking status: Former Smoker     Packs/day: 1.00     Years: 35.00     Pack years: 35.00     Quit date:      Years since quittin.0    Smokeless tobacco: Never Used   Vaping Use    Vaping Use: Never used   Substance and Sexual Activity    Alcohol use: No     Alcohol/week: 0.0 standard drinks    Drug use: No    Sexual activity: Not Currently   Other Topics Concern    Not on file   Social History Narrative    Not on file     Social Determinants of Health     Financial Resource Strain: Low Risk     Difficulty of Paying Living Expenses: Not hard at all   Food Insecurity: No Food Insecurity    Worried About [address] in the Last Year: Never true    [address] [facility] [address] in the Last Year: Never true   Transportation Needs:     Lack of Transportation (Medical): Not on file    Lack of Transportation (Non-Medical):  Not on file   Physical Activity:     Days of Exercise per Week: Not on file    Minutes of Exercise per Session: Not on file   Stress:     Feeling of Stress : Not on file   Social Connections:     Frequency of Communication with Friends and Family: Not on file    Frequency of Social Gatherings with Friends and Family: Not on file    Attends [employer] Services: Not on file    Active Member of Clubs or Organizations: Not on file    Attends Club or Organization Meetings: Not on file    Marital Status: Not on file   Intimate Partner Violence:     Fear of Current or Ex-Partner: Not on file    Emotionally Abused: Not on file    Physically Abused: Not on file    Sexually Abused: Not on file   Housing Stability:     Unable to Pay for Housing in the Last Year: Not on file    Number of Places Lived in the Last Year: Not on file    Unstable Housing in the Last Year: Not on file     SURGICAL HISTORY     Past Surgical History:   Procedure Laterality Date    COLONOSCOPY      COLONOSCOPY  05/23/2018    colon polyps,diverticulosis    HAND SURGERY Right 11/2/2020    EXCISION RIGHT RING FINGER MUCOUS CYST AND BONE SPUR performed by Keira Quintero MD at [facility]      vocal cord bx     CURRENT MEDICATIONS   (This list may include medications prescribed during this encounter as epic can not insert only the list prior to this encounter.)    ALLERGIES   No Known Allergies  IMMUNIZATIONS     Immunization History   Administered Date(s) Administered    COVID-19, Pfizer, PF, 30mcg/0.3mL 01/23/2021, 02/13/2021, 10/19/2021    Influenza 09/25/2013    Influenza A ([id number]) Vaccine PF IM 12/16/2009    Influenza Virus Vaccine 09/25/2013, 10/22/2014, 09/30/2015, 09/19/2017, 09/11/2018    Influenza, High Dose (Fluzone 65 yrs and older) 09/07/2016    Influenza, MDCK Quadv, IM, PF (Flucelvax 2 yrs and older) 10/13/2021    Influenza, Quadv, IM, (6 mo and older Fluzone, Flulaval, Fluarix and 3 yrs and older Afluria) 09/19/2017, 09/11/2018    Influenza, Quadv, IM, PF (6 mo and older Fluzone, Flulaval, Fluarix, and 3 yrs and older Afluria) 09/17/2019, 10/12/2020    Pneumococcal Conjugate 13-valent ([id number]) 01/26/2016    Pneumococcal Polysaccharide ([id number]) 04/10/2017    Tdap (Boostrix, Adacel) 09/17/2019    Zoster Recombinant (Shingrix) 05/04/2018, 07/18/2018       REVIEW OF SYSTEMS   See HPI for further details and pertinent postiives. Negative for the following:  Constitutional: Negative for weight change. Negative for appetite change and fatigue. HENT: Negative for nosebleeds, sore throat, mouth sores, and voice change. Respiratory: Negative for cough, choking and chest tightness.    Cardiovascular: Negative for chest pain   Gastrointestinal: See HPI  Musculoskeletal: Negative for arthralgias. Skin: Negative for pallor. Neurological: Negative for weakness and light-headedness. Hematological: Negative for adenopathy. Does not bruise/bleed easily. Psychiatric/Behavioral: Negative for suicidal ideas. PHYSICAL EXAM   VITAL SIGNS: BP (!) 175/75   Pulse 85   Temp 96.9 °F (36.1 °C) (Temporal)   Resp 11   Ht 5' 4\" (1.626 m)   Wt 235 lb (106.6 kg)   LMP  (LMP Unknown)   SpO2 98%   BMI 40.34 kg/m²   Wt Readings from Last 3 Encounters:   01/04/22 235 lb (106.6 kg)   01/03/22 235 lb (106.6 kg)   12/27/21 235 lb (106.6 kg)     Constitutional: Well developed, Well nourished, No acute distress, Non-toxic appearance. HENT: Normocephalic, Atraumatic, Bilateral external ears normal, Oropharynx moist, No oral exudates, Nose normal.   Eyes: Conjunctiva normal, No discharge. Neck: Normal range of motion, No tenderness, Supple  Cardiovascular: Normal heart rate, Normal rhythm, No murmurs, No rubs, No gallops. Thorax & Lungs: Normal breath sounds, No respiratory distress, No wheezing, No chest tenderness. Abdomen: Pannus abdomen, normal bowel sounds, soft, non tender, non distended, no hernias   Rectal:  Deferred. Skin: Warm, Dry, No erythema, No rash. No bruising. Lower Extremities: Intact distal pulses, No edema, No tenderness, No cyanosis, No clubbing. Neurologic: Alert & oriented x 3. No results found. FINAL IMPRESSION   Personal history of tubular adenomatous and tubulovillous colon polyps    Plan: colonoscopy    Colonoscopy with alternatives, rationale, benefits and risks, not limited to bleeding, infection, perforation, need of surgery, prolong hospital stay and anesthesia side effects were explained. Patient verbalized understanding and agrees to proceed with colonoscopy.      ORDERED FUTURE/PENDING TESTS     Lab Frequency Next Occurrence   COVID-19 Once 12/14/2021   POCT INR Three times a Week        FOLLOWUP   No follow-ups on file.           Ekta Moe MD 1/2/22 8:56 PM EST    CC:  Marci Franco MD

## 2022-01-05 ENCOUNTER — NURSE ONLY (OUTPATIENT)
Dept: FAMILY MEDICINE CLINIC | Age: 72
End: 2022-01-05
Payer: MEDICARE

## 2022-01-05 ENCOUNTER — ANTI-COAG VISIT (OUTPATIENT)
Dept: FAMILY MEDICINE CLINIC | Age: 72
End: 2022-01-05

## 2022-01-05 DIAGNOSIS — I48.20 CHRONIC ATRIAL FIBRILLATION (HCC): ICD-10-CM

## 2022-01-05 DIAGNOSIS — E11.69 TYPE 2 DIABETES MELLITUS WITH OTHER SPECIFIED COMPLICATION, WITHOUT LONG-TERM CURRENT USE OF INSULIN (HCC): Primary | ICD-10-CM

## 2022-01-05 LAB
INTERNATIONAL NORMALIZATION RATIO, POC: 1.1
PROTHROMBIN TIME, POC: NORMAL

## 2022-01-05 PROCEDURE — 85610 PROTHROMBIN TIME: CPT | Performed by: FAMILY MEDICINE

## 2022-01-05 PROCEDURE — 36415 COLL VENOUS BLD VENIPUNCTURE: CPT | Performed by: FAMILY MEDICINE

## 2022-01-05 NOTE — PROGRESS NOTES
This message was sent to me - just shows [employer] processor information where patient has an upcoming appointment. Message was signed. I addend message and no anticoagulation encounter noted.    Please route back if message needs further assessment and evaluation

## 2022-01-05 NOTE — PROGRESS NOTES
Patient had a colonoscopy yesterday so she had been holding her warfarin for 1 week but did bridge with lovenox

## 2022-01-05 NOTE — PROGRESS NOTES
Why is INR so low? Did she have a procedure or surgery recently and had to stop Warfarin?   Please route back for further evaluation and directions for her INR results

## 2022-01-06 LAB
ESTIMATED AVERAGE GLUCOSE: 159.9 MG/DL
HBA1C MFR BLD: 7.2 %

## 2022-01-07 ENCOUNTER — NURSE ONLY (OUTPATIENT)
Dept: FAMILY MEDICINE CLINIC | Age: 72
End: 2022-01-07
Payer: MEDICARE

## 2022-01-07 DIAGNOSIS — I48.20 CHRONIC ATRIAL FIBRILLATION (HCC): ICD-10-CM

## 2022-01-07 LAB
INTERNATIONAL NORMALIZATION RATIO, POC: 1.5
PROTHROMBIN TIME, POC: NORMAL

## 2022-01-07 PROCEDURE — 85610 PROTHROMBIN TIME: CPT | Performed by: FAMILY MEDICINE

## 2022-01-10 ENCOUNTER — NURSE ONLY (OUTPATIENT)
Dept: FAMILY MEDICINE CLINIC | Age: 72
End: 2022-01-10
Payer: MEDICARE

## 2022-01-10 ENCOUNTER — ANTI-COAG VISIT (OUTPATIENT)
Dept: FAMILY MEDICINE CLINIC | Age: 72
End: 2022-01-10

## 2022-01-10 DIAGNOSIS — I48.20 CHRONIC ATRIAL FIBRILLATION (HCC): ICD-10-CM

## 2022-01-10 LAB
INTERNATIONAL NORMALIZATION RATIO, POC: 3
PROTHROMBIN TIME, POC: NORMAL

## 2022-01-10 PROCEDURE — 85610 PROTHROMBIN TIME: CPT | Performed by: FAMILY MEDICINE

## 2022-01-11 ENCOUNTER — HOSPITAL ENCOUNTER (OUTPATIENT)
Dept: MAMMOGRAPHY | Age: 72
Discharge: HOME | End: 2022-01-16
Payer: MEDICARE

## 2022-01-11 VITALS — BODY MASS INDEX: 40.75 KG/M2 | HEIGHT: 63 IN | WEIGHT: 230 LBS

## 2022-01-11 DIAGNOSIS — Z12.31 VISIT FOR SCREENING MAMMOGRAM: ICD-10-CM

## 2022-01-11 PROCEDURE — 77067 SCR MAMMO BI INCL CAD: CPT

## 2022-01-18 ENCOUNTER — NURSE ONLY (OUTPATIENT)
Dept: FAMILY MEDICINE CLINIC | Age: 72
End: 2022-01-18
Payer: MEDICARE

## 2022-01-18 ENCOUNTER — ANTI-COAG VISIT (OUTPATIENT)
Dept: FAMILY MEDICINE CLINIC | Age: 72
End: 2022-01-18

## 2022-01-18 DIAGNOSIS — I48.20 CHRONIC ATRIAL FIBRILLATION (HCC): ICD-10-CM

## 2022-01-18 LAB
INTERNATIONAL NORMALIZATION RATIO, POC: 2.3
PROTHROMBIN TIME, POC: NORMAL

## 2022-01-18 PROCEDURE — 85610 PROTHROMBIN TIME: CPT | Performed by: FAMILY MEDICINE

## 2022-01-18 RX ORDER — SYRING-NEEDL,DISP,INSUL,0.3 ML 31GX15/64"
SYRINGE, EMPTY DISPOSABLE MISCELLANEOUS
Qty: 300 EACH | Refills: 3 | Status: SHIPPED | OUTPATIENT
Start: 2022-01-18

## 2022-01-27 DIAGNOSIS — B02.29 POST HERPETIC NEURALGIA: ICD-10-CM

## 2022-01-29 RX ORDER — PREGABALIN 100 MG/1
CAPSULE ORAL
Qty: 90 CAPSULE | Refills: 5 | Status: SHIPPED | OUTPATIENT
Start: 2022-01-29 | End: 2022-09-21

## 2022-02-09 DIAGNOSIS — M75.51 SUBACROMIAL BURSITIS OF RIGHT SHOULDER JOINT: ICD-10-CM

## 2022-02-09 RX ORDER — OMEPRAZOLE 40 MG/1
CAPSULE, DELAYED RELEASE ORAL
Qty: 90 CAPSULE | Refills: 3 | Status: SHIPPED | OUTPATIENT
Start: 2022-02-09

## 2022-02-15 ENCOUNTER — NURSE ONLY (OUTPATIENT)
Dept: FAMILY MEDICINE CLINIC | Age: 72
End: 2022-02-15
Payer: MEDICARE

## 2022-02-15 ENCOUNTER — ANTI-COAG VISIT (OUTPATIENT)
Dept: FAMILY MEDICINE CLINIC | Age: 72
End: 2022-02-15

## 2022-02-15 DIAGNOSIS — I48.20 CHRONIC ATRIAL FIBRILLATION (HCC): ICD-10-CM

## 2022-02-15 LAB
INTERNATIONAL NORMALIZATION RATIO, POC: 2
PROTHROMBIN TIME, POC: NORMAL

## 2022-02-15 PROCEDURE — 85610 PROTHROMBIN TIME: CPT | Performed by: FAMILY MEDICINE

## 2022-03-01 ENCOUNTER — TELEMEDICINE (OUTPATIENT)
Dept: FAMILY MEDICINE CLINIC | Age: 72
End: 2022-03-01
Payer: MEDICARE

## 2022-03-01 DIAGNOSIS — Z00.00 MEDICARE ANNUAL WELLNESS VISIT, SUBSEQUENT: Primary | ICD-10-CM

## 2022-03-01 PROCEDURE — 3017F COLORECTAL CA SCREEN DOC REV: CPT | Performed by: FAMILY MEDICINE

## 2022-03-01 PROCEDURE — G0439 PPPS, SUBSEQ VISIT: HCPCS | Performed by: FAMILY MEDICINE

## 2022-03-01 PROCEDURE — 1123F ACP DISCUSS/DSCN MKR DOCD: CPT | Performed by: FAMILY MEDICINE

## 2022-03-01 PROCEDURE — 4040F PNEUMOC VAC/ADMIN/RCVD: CPT | Performed by: FAMILY MEDICINE

## 2022-03-01 ASSESSMENT — PATIENT HEALTH QUESTIONNAIRE - PHQ9
1. LITTLE INTEREST OR PLEASURE IN DOING THINGS: 0
SUM OF ALL RESPONSES TO PHQ QUESTIONS 1-9: 0
SUM OF ALL RESPONSES TO PHQ9 QUESTIONS 1 & 2: 0
SUM OF ALL RESPONSES TO PHQ QUESTIONS 1-9: 0
2. FEELING DOWN, DEPRESSED OR HOPELESS: 0
SUM OF ALL RESPONSES TO PHQ QUESTIONS 1-9: 0
SUM OF ALL RESPONSES TO PHQ QUESTIONS 1-9: 0

## 2022-03-01 ASSESSMENT — LIFESTYLE VARIABLES: HOW OFTEN DO YOU HAVE A DRINK CONTAINING ALCOHOL: NEVER

## 2022-03-01 NOTE — PATIENT INSTRUCTIONS
Personalized Preventive Plan for Alondra Solano - 3/1/2022  Medicare offers a range of preventive health benefits. Some of the tests and screenings are paid in full while other may be subject to a deductible, co-insurance, and/or copay. Some of these benefits include a comprehensive review of your medical history including lifestyle, illnesses that may run in your family, and various assessments and screenings as appropriate. After reviewing your medical record and screening and assessments performed today your provider may have ordered immunizations, labs, imaging, and/or referrals for you. A list of these orders (if applicable) as well as your Preventive Care list are included within your After Visit Summary for your review. Other Preventive Recommendations:    · A preventive eye exam performed by an eye specialist is recommended every 1-2 years to screen for glaucoma; cataracts, macular degeneration, and other eye disorders. · A preventive dental visit is recommended every 6 months. · Try to get at least 150 minutes of exercise per week or 10,000 steps per day on a pedometer . · Order or download the FREE \"Exercise & Physical Activity: Your Everyday Guide\" from [employer]. Call 7-922.579.5053 or search [employer] online. · You need 8402-5122 mg of calcium and 5021-9563 IU of vitamin D per day. It is possible to meet your calcium requirement with diet alone, but a vitamin D supplement is usually necessary to meet this goal.  · When exposed to the sun, use a sunscreen that protects against both UVA and UVB radiation with an SPF of 30 or greater. Reapply every 2 to 3 hours or after sweating, drying off with a towel, or swimming. · Always wear a seat belt when traveling in a car. Always wear a helmet when riding a bicycle or motorcycle. The medication list included in this document is our record of what you are currently taking, including any changes that were made at today's visit.  If you find any differences when compared to your medications at home, or have any questions that were not answered at your visit, please contact the office.   ·

## 2022-03-01 NOTE — PROGRESS NOTES
Medicare Annual Wellness Visit    Yumiko Issa is here for Medicare [address] was seen today for medicare awv. Diagnoses and all orders for this visit:    Medicare annual wellness visit, subsequent         Recommendations for Preventive Services Due: see orders and patient instructions/AVS.  Recommended screening schedule for the next 5-10 years is provided to the patient in written form: see Patient Instructions/AVS.     No follow-ups on file. Subjective   Patient's complete Health Risk Assessment and screening values have been reviewed and are found in Flowsheets. The following problems were reviewed today and where indicated follow up appointments were made and/or referrals ordered. Positive Risk Factor Screenings with Interventions:              Health Habits/Nutrition:     Physical Activity: Sufficiently Active    Days of Exercise per Week: 7 days    Minutes of Exercise per Session: 60 min     Have you lost any weight without trying in the past 3 months?: No     Have you seen the dentist within the past year?: (!) No    Health Habits/Nutrition Interventions:  · patient encouraged to make an appointment to see her dentist.     Safety:  Do you have working smoke detectors?: Yes  Do you have any tripping hazards - loose or unsecured carpets or rugs?: No  Do you have any tripping hazards - clutter in doorways, halls, or stairs?: No  Do you have either shower bars, grab bars, non-slip mats or non-slip surfaces in your shower or bathtub?: Yes  Do all of your stairways have a railing or banister?: (!) No (no stairs)  Do you always fasten your seatbelt when you are in a car?: Yes           Objective      Patient-Reported Vitals  Patient-Reported Systolic (Top): 120 mmHg  Patient-Reported Diastolic (Bottom): 60 mmHg  Patient-Reported Pulse: 80  Patient-Reported Weight: 230 lbs           No Known Allergies  Prior to Visit Medications    Medication Sig Taking?  Authorizing Provider   omeprazole (PRILOSEC) 40 MG delayed release capsule TAKE 1 CAPSULE BY MOUTH EVERY DAY  Vivian Bryson MD   pregabalin (LYRICA) 100 MG capsule TAKE 1 CAPSULE BY MOUTH THREE TIMES A DAY  Vivian Brysno MD   BD VEO INSULIN SYRINGE U/F 31G X 15/64\" 0.3 ML MISC USE UP TO FIVE TIMES DAILY  Vivian Bryson MD   fluticasone (FLONASE) 50 MCG/ACT nasal spray INHALE 1 SPRAY INTO EACH NOSTRIL EVERY DAY  Vivian Bryson MD   diclofenac sodium (VOLTAREN) 1 % GEL APPLY TO AFFECTED AREA TWICE A DAY  Jhon Pierre MD   warfarin (COUMADIN) 1 MG tablet TAKE 1 TABLET BY MOUTH EVERY DAY AS DIRECTED  Vivian Bryson MD   levothyroxine (SYNTHROID) 100 MCG tablet TAKE 1 TABLET BY MOUTH EVERY DAY  Vivian Bryson MD   insulin detemir (LEVEMIR FLEXTOUCH) 100 UNIT/ML injection pen Inject 50 Units into the skin nightly  Vivian Bryson MD   pentoxifylline (TRENTAL) 400 MG extended release tablet TAKE 1 TABLET BY MOUTH EVERY DAY  Vivian Bryson MD   atorvastatin (LIPITOR) 40 MG tablet TAKE 1 TABLET BY MOUTH EVERY DAY  Vivian Bryson MD   DILT- MG extended release capsule TAKE 1 CAPSULE BY MOUTH EVERY DAY  Vivian Bryson MD   insulin regular (NOVOLIN R) 100 UNIT/ML injection USE AS DIRECTED PER SLIDING SCALE UP TO [address] Vivian Bryson MD   warfarin (COUMADIN) 5 MG tablet TAKE 1 TAB DAILY EXCEPT 10 MG  [address] Magaly Paris MD   Cholecalciferol (VITAMIN D3) 50 MCG (2000 UT) CAPS Take by mouth  Historical Provider, MD   allopurinol (ZYLOPRIM) 100 MG tablet Take 100 mg by mouth daily   Historical Provider, MD   colchicine (COLCRYS) 0.6 MG tablet Take 1 tablet by mouth 2 times daily  Vivian Bryson MD   furosemide (LASIX) 20 MG tablet Take 1 tablet by mouth daily  Vivian Bryson MD   ondansetron (ZOFRAN) 4 MG tablet Take 1 tablet by mouth every 6 hours as needed for Nausea or Vomiting  Verena Dakin, MD   magnesium oxide (MAG-OX) 400 MG tablet TAKE 1 TABLET BY MOUTH THREE TIMES A DAY  Patient taking differently: Take 400 mg by mouth daily   DEMI Sánchez - [facility] (Including outside providers/suppliers regularly involved in providing care):   Patient Care Team:  Verena Dakin, MD as PCP - Lita Mcghee MD as PCP - Hematology/Oncology (Medical Oncology)  Verena Dakin, MD as PCP - [address] [place] [address] [facility]  Javy Mcclellan MD as Consulting Physician (Pain Management)    Reviewed and updated this visit:  Xiomara Torres, was evaluated through a synchronous (real-time) telephone encounter. The patient (or guardian if applicable) is aware that this is a billable service. Verbal consent to proceed has been obtained within the past 12 months. The visit was conducted pursuant to the emergency declaration under the [address] [place] [address], [address] [place] [address] authority and the [employer] Act. Patient identification was verified, and a caregiver was present when appropriate. The patient was located in a state where the provider was credentialed to provide care. --Sofia Graves LPN on 7/8/5827 at 50:47 PM    An electronic signature was used to authenticate this note. Chelsea Stovall LPN, 2/9/6108, performed the documented evaluation under the direct supervision of the attending physician. This encounter was performed under Masha ordonez MDs, direct supervision, 3/1/2022.

## 2022-03-08 ENCOUNTER — TELEMEDICINE (OUTPATIENT)
Dept: FAMILY MEDICINE CLINIC | Age: 72
End: 2022-03-08
Payer: MEDICARE

## 2022-03-08 DIAGNOSIS — R05.9 COUGH: ICD-10-CM

## 2022-03-08 DIAGNOSIS — J20.9 ACUTE BRONCHITIS DUE TO INFECTION: Primary | ICD-10-CM

## 2022-03-08 DIAGNOSIS — R06.2 WHEEZING: ICD-10-CM

## 2022-03-08 PROCEDURE — 99442 PR PHYS/QHP TELEPHONE EVALUATION 11-20 MIN: CPT | Performed by: NURSE PRACTITIONER

## 2022-03-08 RX ORDER — AZITHROMYCIN 250 MG/1
250 TABLET, FILM COATED ORAL SEE ADMIN INSTRUCTIONS
Qty: 6 TABLET | Refills: 0 | Status: SHIPPED | OUTPATIENT
Start: 2022-03-08 | End: 2022-03-13

## 2022-03-08 RX ORDER — ALBUTEROL SULFATE 90 UG/1
2 AEROSOL, METERED RESPIRATORY (INHALATION) EVERY 4 HOURS PRN
Qty: 18 G | Refills: 0 | Status: SHIPPED | OUTPATIENT
Start: 2022-03-08 | End: 2022-05-11

## 2022-03-08 RX ORDER — DEXTROMETHORPHAN HYDROBROMIDE AND PROMETHAZINE HYDROCHLORIDE 15; 6.25 MG/5ML; MG/5ML
5 SYRUP ORAL 4 TIMES DAILY PRN
Qty: 240 ML | Refills: 0 | Status: SHIPPED | OUTPATIENT
Start: 2022-03-08

## 2022-03-08 RX ORDER — GUAIFENESIN 600 MG/1
600 TABLET, EXTENDED RELEASE ORAL 2 TIMES DAILY
Qty: 14 TABLET | Refills: 0 | Status: SHIPPED | OUTPATIENT
Start: 2022-03-08 | End: 2022-03-15

## 2022-03-08 ASSESSMENT — ENCOUNTER SYMPTOMS
VOMITING: 0
APNEA: 0
COUGH: 1
STRIDOR: 0
TROUBLE SWALLOWING: 0
SINUS PRESSURE: 0
DIARRHEA: 0
SINUS PAIN: 0
SHORTNESS OF BREATH: 1
ABDOMINAL PAIN: 0
SWOLLEN GLANDS: 0
ALLERGIC/IMMUNOLOGIC NEGATIVE: 1
CHEST TIGHTNESS: 1
CHOKING: 0
FACIAL SWELLING: 0
GASTROINTESTINAL NEGATIVE: 1
SORE THROAT: 0
WHEEZING: 1
NAUSEA: 0
RHINORRHEA: 0
VOICE CHANGE: 0

## 2022-03-08 NOTE — PROGRESS NOTES
3/8/2022    TELEHEALTH EVALUATION -- Audio (During RLTQJ-05 public health emergency)    HPI:    Cindy Arcos (:  1950) has requested an audio evaluation for the following concern(s):    URI   This is a new problem. The current episode started in the past 7 days. The problem has been gradually worsening. There has been no fever. Associated symptoms include congestion (Chest), coughing (Productive ), headaches (When coughing) and wheezing. Pertinent negatives include no abdominal pain, chest pain, diarrhea, dysuria, ear pain, joint pain, joint swelling, nausea, neck pain, plugged ear sensation, rash, rhinorrhea, sinus pain, sneezing, sore throat, swollen glands or vomiting. She has tried acetaminophen (Corticiden HBP ) for the symptoms. The treatment provided no relief. Review of Systems   Constitutional: Positive for fatigue (Chronic). Negative for activity change, appetite change, chills, diaphoresis, fever and unexpected weight change. HENT: Positive for congestion (Chest), postnasal drip and tinnitus (Chronic). Negative for dental problem, drooling, ear discharge, ear pain, facial swelling, hearing loss, mouth sores, nosebleeds, rhinorrhea, sinus pressure, sinus pain, sneezing, sore throat, trouble swallowing and voice change. Respiratory: Positive for cough (Productive ), chest tightness, shortness of breath (With exertion) and wheezing. Negative for apnea, choking and stridor. Cardiovascular: Negative. Negative for chest pain. Gastrointestinal: Negative. Negative for abdominal pain, diarrhea, nausea and vomiting. Genitourinary: Negative for dysuria. Musculoskeletal: Negative for joint pain and neck pain. Skin: Negative for rash. Allergic/Immunologic: Negative. Neurological: Positive for headaches (When coughing). Negative for dizziness, tremors, seizures, syncope, facial asymmetry, speech difficulty, weakness, light-headedness and numbness.        Prior to Visit Medications Medication Sig Taking?  Authorizing Provider   azithromycin (ZITHROMAX) 250 MG tablet Take 1 tablet by mouth See Admin Instructions for 5 days 500mg on day 1 followed by 250mg on days 2 - 5 Yes Lamona Sep, APRN - CNP   promethazine-dextromethorphan (PROMETHAZINE-DM) 6.25-15 MG/5ML syrup Take 5 mLs by mouth 4 times daily as needed for Cough Yes Lamona Sep, APRN - CNP   guaiFENesin (MUCINEX) 600 MG extended release tablet Take 1 tablet by mouth 2 times daily for 7 days Yes Lamona Sep, APRN - CNP   albuterol sulfate HFA (VENTOLIN HFA) 108 (90 Base) MCG/ACT inhaler Inhale 2 puffs into the lungs every 4 hours as needed for Wheezing or Shortness of Breath Yes Lamona Sep, APRN - CNP   omeprazole (PRILOSEC) 40 MG delayed release capsule TAKE 1 CAPSULE BY MOUTH EVERY DAY Yes Manjula Murphy MD   pregabalin (LYRICA) 100 MG capsule TAKE 1 CAPSULE BY MOUTH THREE TIMES A DAY Yes Manjula Murphy MD   BD VEO INSULIN SYRINGE U/F 31G X 15/64\" 0.3 ML MISC USE UP TO FIVE TIMES DAILY Yes Manjula Murphy MD   fluticasone (FLONASE) 50 MCG/ACT nasal spray INHALE 1 SPRAY INTO EACH NOSTRIL EVERY DAY Yes Manjula Murphy MD   diclofenac sodium (VOLTAREN) 1 % GEL APPLY TO AFFECTED AREA TWICE A DAY Yes Flora Garcia MD   warfarin (COUMADIN) 1 MG tablet TAKE 1 TABLET BY MOUTH EVERY DAY AS DIRECTED Yes Manjula Murphy MD   levothyroxine (SYNTHROID) 100 MCG tablet TAKE 1 TABLET BY MOUTH EVERY DAY Yes Manjula Murphy MD   insulin detemir (LEVEMIR FLEXTOUCH) 100 UNIT/ML injection pen Inject 50 Units into the skin nightly Yes Manjula Murphy MD   pentoxifylline (TRENTAL) 400 MG extended release tablet TAKE 1 TABLET BY MOUTH EVERY DAY Yes Manjula Murphy MD   atorvastatin (LIPITOR) 40 MG tablet TAKE 1 TABLET BY MOUTH EVERY DAY Yes Manjula Murphy MD   DILT- MG extended release capsule TAKE 1 CAPSULE BY MOUTH [address] Vivi Loredo MD   insulin regular (NOVOLIN R) 100 UNIT/ML injection USE AS DIRECTED PER SLIDING SCALE UP TO 30 UNITS DAILY Yes Vivi Loredo MD   warfarin (COUMADIN) 5 MG tablet TAKE 1 TAB DAILY EXCEPT 10 MG ON MONDAY Yes Vivi Loredo MD   Cholecalciferol (VITAMIN D3) 50 MCG ( UT) CAPS Take by mouth Yes Historical Provider, MD   allopurinol (ZYLOPRIM) 100 MG tablet Take 100 mg by mouth daily  Yes Historical Provider, MD   colchicine (COLCRYS) 0.6 MG tablet Take 1 tablet by mouth 2 times daily Yes Vivi Loredo MD   furosemide (LASIX) 20 MG tablet Take 1 tablet by mouth daily Yes Vivi Loredo MD   magnesium oxide (MAG-OX) 400 MG tablet TAKE 1 TABLET BY MOUTH THREE TIMES A DAY  Patient taking differently: Take 400 mg by mouth daily  Yes Jaymie Albrecht APRN - CNP       Social History     Tobacco Use    Smoking status: Former Smoker     Packs/day: 1.00     Years: 35.00     Pack years: 35.00     Quit date:      Years since quittin.1    Smokeless tobacco: Never Used   Vaping Use    Vaping Use: Never used   Substance Use Topics    Alcohol use: No     Alcohol/week: 0.0 standard drinks    Drug use: No        No Known Allergies,   Past Medical History:   Diagnosis Date    Atrial fibrillation (HCC)     Cancer ([facility]     lymphoma    CKD (chronic kidney disease)     Diabetes mellitus ([facility]     DVT (deep venous thrombosis) ([facility]     Gout 2019    Hyperlipidemia     Hypertension     Hypotension    ,   Past Surgical History:   Procedure Laterality Date    COLONOSCOPY      COLONOSCOPY  2018    colon polyps,diverticulosis    COLONOSCOPY N/A 2022    COLONOSCOPY POLYPECTOMY SNARE/COLD BIOPSY performed by See Nieto MD at [facility] COLONOSCOPY N/A 2022    COLONOSCOPY CONTROL HEMORRHAGE performed by See Nieto MD at [address] Right 2020    EXCISION RIGHT RING FINGER MUCOUS CYST AND BONE SPUR [facility]      OTHER SURGICAL HISTORY      vocal cord bx    OTHER SURGICAL HISTORY  2022      colonoscopy with biopsy   ,   Social History     Tobacco Use    Smoking status: Former Smoker     Packs/day: 1.00     Years: 35.00     Pack years: 35.00     Quit date:      Years since quittin.1    Smokeless tobacco: Never Used   Vaping Use    Vaping Use: Never used   Substance Use Topics    Alcohol use: No     Alcohol/week: 0.0 standard drinks    Drug use: No   ,   Family History   Problem Relation Age of Onset    Cancer Father     Cancer Sister         lung    Cancer Brother         lymphoma    Heart Disease Mother    ,   Immunization History   Administered Date(s) Administered    COVID-19, Pfizer Purple top, DILUTE for use, 12+ yrs, 30mcg/0.3mL dose 2021, 2021, 10/19/2021    Influenza 2013    Influenza A ([id number]) Vaccine PF IM 2009    Influenza Virus Vaccine 2013, 10/22/2014, 2015, 2017, 2018    Influenza, High Dose (Fluzone 65 yrs and older) 2016    Influenza, MDCK Quadv, IM, PF (Flucelvax 2 yrs and older) 10/13/2021    Influenza, Quadv, IM, (6 mo and older Fluzone, Flulaval, Fluarix and 3 yrs and older Afluria) 2017, 2018    Influenza, Quadv, IM, PF (6 mo and older Fluzone, Flulaval, Fluarix, and 3 yrs and older Afluria) 2019, 10/12/2020    Pneumococcal Conjugate 13-valent ([id number]) 2016    Pneumococcal Polysaccharide ([id number]) 04/10/2017    Tdap (Boostrix, Adacel) 2019    Zoster Recombinant (Shingrix) 2018, 2018   ,   Health Maintenance   Topic Date Due    COVID-19 Vaccine (4 - Booster for [employer] series) 2022    Diabetic foot exam  2022    Lipid screen  2022    TSH testing  2022    A1C test (Diabetic or Prediabetic)  2023    Potassium monitoring 01/05/2023    Creatinine monitoring  01/05/2023    Diabetic retinal exam  02/24/2023    Depression Screen  03/01/2023    Annual Wellness Visit (AWV)  03/02/2023    Breast cancer screen  01/11/2024    Colorectal Cancer Screen  01/04/2025    DTaP/Tdap/Td vaccine (2 - Td or Tdap) 09/17/2029    DEXA (modify frequency per FRAX score)  Completed    Flu vaccine  Completed    Shingles Vaccine  Completed    Pneumococcal 65+ yrs at Risk Vaccine  Completed    Hepatitis C screen  Completed    Hepatitis A vaccine  Aged Out    Hib vaccine  Aged Out    Meningococcal (ACWY) vaccine  Aged Out       PHYSICAL EXAMINATION: Unable to perform - audio only       ASSESSMENT/PLAN:  Megan Melendez was seen today for cough. Diagnoses and all orders for this visit:    Acute bronchitis due to infection  -     azithromycin (ZITHROMAX) 250 MG tablet; Take 1 tablet by mouth See Admin Instructions for 5 days 500mg on day 1 followed by 250mg on days 2 - 5  -     promethazine-dextromethorphan (PROMETHAZINE-DM) 6.25-15 MG/5ML syrup; Take 5 mLs by mouth 4 times daily as needed for Cough  -     guaiFENesin (MUCINEX) 600 MG extended release tablet; Take 1 tablet by mouth 2 times daily for 7 days  -     albuterol sulfate HFA (VENTOLIN HFA) 108 (90 Base) MCG/ACT inhaler; Inhale 2 puffs into the lungs every 4 hours as needed for Wheezing or Shortness of Breath    Cough  -     promethazine-dextromethorphan (PROMETHAZINE-DM) 6.25-15 MG/5ML syrup; Take 5 mLs by mouth 4 times daily as needed for Cough  -     guaiFENesin (MUCINEX) 600 MG extended release tablet; Take 1 tablet by mouth 2 times daily for 7 days    Wheezing  -     albuterol sulfate HFA (VENTOLIN HFA) 108 (90 Base) MCG/ACT inhaler; Inhale 2 puffs into the lungs every 4 hours as needed for Wheezing or Shortness of Breath        Return if symptoms worsen or fail to improve.     Harwood Ganser is a 66 y.o. female being evaluated by a Virtual Visit (audio visit) encounter to address concerns as mentioned above. A caregiver was present when appropriate. Due to this being a TeleHealth encounter (During [facility]-20 public health emergency), evaluation of the following organ systems was limited: Vitals/Constitutional/EENT/Resp/CV/GI//MS/Neuro/Skin/Heme-Lymph-Imm. Pursuant to the emergency declaration under the [facility], [address] [place] [address] authority and the [employer] Act, this Virtual Visit was conducted with patient's (and/or legal guardian's) consent, to reduce the patient's risk of exposure to COVID-19 and provide necessary medical care. The patient (and/or legal guardian) has also been advised to contact this office for worsening conditions or problems, and seek emergency medical treatment and/or call 911 if deemed necessary. Patient identification was verified at the start of the visit: Yes    Total time spent on this encounter: 11 minutes 15 seconds     Services were provided through an audio synchronous discussion virtually to substitute for in-person clinic visit. Patient and provider were located at their individual homes. --DEMI Alexis - CNP on 3/8/2022 at 2:33 PM    An electronic signature was used to authenticate this note. Patient should call the office immediately with new or ongoing signs or symptoms or worsening, or proceed to the emergency room. All entries in chief complaint and history of present illness are reviewed and validated by me. No changes in past medical history, past surgical history, social history, or family history were noted during the patient encounter unless specifically listed above. All updates of past medical history, past surgical history, social history, or family history were reviewed personally by me during the office visit. All problems listed in the assessment are stable unless noted otherwise.   Medication profile reviewed personally by me during the office visit. Medication side effects and possible impairments from medications were discussed as applicable. Every effort has been made to assure accurate transcription by this voice recognition software. However, mistakes in transcription may still occur    You are being started on a new medication. All medications have the potential for adverse effects. All medications effect each person differently. Please read and review provided information related to medication. If the medication that you have been prescribed has the potential to cause sedation, do not drive or operate car, truck, or heavy machinery until you know how the medication will effect you. If you experience any adverse effects from the medication, please call the office or report to the emergency department.

## 2022-03-08 NOTE — PATIENT INSTRUCTIONS

## 2022-03-15 ENCOUNTER — NURSE ONLY (OUTPATIENT)
Dept: FAMILY MEDICINE CLINIC | Age: 72
End: 2022-03-15
Payer: MEDICARE

## 2022-03-15 DIAGNOSIS — I48.20 CHRONIC ATRIAL FIBRILLATION (HCC): ICD-10-CM

## 2022-03-15 LAB
INTERNATIONAL NORMALIZATION RATIO, POC: 2.1
PROTHROMBIN TIME, POC: NORMAL

## 2022-03-15 PROCEDURE — 85610 PROTHROMBIN TIME: CPT | Performed by: FAMILY MEDICINE

## 2022-03-15 NOTE — PROGRESS NOTES
INR WNL. No changes in Warfarin. Recheck in one month like she has been. Not sure why INR continues to have signed encounter, but Message sent as encounter has been signed.

## 2022-03-15 NOTE — PROGRESS NOTES
Patient called and informed to continue with same coumadin dosing and to recheck in one month, scheduled appointment.

## 2022-04-06 NOTE — PROGRESS NOTES
Patient notified and scheduled for INR friday Bexarotene Pregnancy And Lactation Text: This medication is Pregnancy Category X and should not be given to women who are pregnant or may become pregnant. This medication should not be used if you are breast feeding.

## 2022-04-14 ENCOUNTER — ANTI-COAG VISIT (OUTPATIENT)
Dept: FAMILY MEDICINE CLINIC | Age: 72
End: 2022-04-14

## 2022-04-14 ENCOUNTER — NURSE ONLY (OUTPATIENT)
Dept: FAMILY MEDICINE CLINIC | Age: 72
End: 2022-04-14
Payer: MEDICARE

## 2022-04-14 DIAGNOSIS — I48.20 CHRONIC ATRIAL FIBRILLATION (HCC): ICD-10-CM

## 2022-04-14 LAB
INTERNATIONAL NORMALIZATION RATIO, POC: 2
PROTHROMBIN TIME, POC: NORMAL

## 2022-04-14 PROCEDURE — 85610 PROTHROMBIN TIME: CPT | Performed by: FAMILY MEDICINE

## 2022-05-11 DIAGNOSIS — M75.51 SUBACROMIAL BURSITIS OF RIGHT SHOULDER JOINT: ICD-10-CM

## 2022-05-11 DIAGNOSIS — J20.9 ACUTE BRONCHITIS DUE TO INFECTION: ICD-10-CM

## 2022-05-11 DIAGNOSIS — H65.193 ACUTE MEE (MIDDLE EAR EFFUSION), BILATERAL: ICD-10-CM

## 2022-05-11 DIAGNOSIS — R06.2 WHEEZING: ICD-10-CM

## 2022-05-11 DIAGNOSIS — I87.2 VENOUS INSUFFICIENCY: ICD-10-CM

## 2022-05-11 RX ORDER — FLUTICASONE PROPIONATE 50 MCG
SPRAY, SUSPENSION (ML) NASAL
Qty: 48 G | Refills: 1 | Status: SHIPPED | OUTPATIENT
Start: 2022-05-11

## 2022-05-11 RX ORDER — FUROSEMIDE 20 MG/1
TABLET ORAL
Qty: 90 TABLET | Refills: 3 | Status: SHIPPED | OUTPATIENT
Start: 2022-05-11

## 2022-05-11 RX ORDER — ALBUTEROL SULFATE 90 UG/1
AEROSOL, METERED RESPIRATORY (INHALATION)
Qty: 18 EACH | Refills: 5 | Status: SHIPPED | OUTPATIENT
Start: 2022-05-11

## 2022-05-16 ENCOUNTER — ANTI-COAG VISIT (OUTPATIENT)
Dept: FAMILY MEDICINE CLINIC | Age: 72
End: 2022-05-16

## 2022-05-16 ENCOUNTER — NURSE ONLY (OUTPATIENT)
Dept: FAMILY MEDICINE CLINIC | Age: 72
End: 2022-05-16
Payer: MEDICARE

## 2022-05-16 DIAGNOSIS — I48.20 CHRONIC ATRIAL FIBRILLATION (HCC): ICD-10-CM

## 2022-05-16 LAB
INTERNATIONAL NORMALIZATION RATIO, POC: 1.9
PROTHROMBIN TIME, POC: NORMAL

## 2022-05-16 PROCEDURE — 85610 PROTHROMBIN TIME: CPT | Performed by: FAMILY MEDICINE

## 2022-05-18 ENCOUNTER — TELEPHONE (OUTPATIENT)
Dept: FAMILY MEDICINE CLINIC | Age: 72
End: 2022-05-18

## 2022-05-18 RX ORDER — COLCHICINE 0.6 MG/1
TABLET ORAL
Qty: 20 TABLET | Refills: 0 | Status: SHIPPED | OUTPATIENT
Start: 2022-05-18

## 2022-05-18 NOTE — TELEPHONE ENCOUNTER
Pt states that she has gout in her toe. Was wanting to see if she could get something called in to [address]

## 2022-05-18 NOTE — TELEPHONE ENCOUNTER
Pended the medication but multiple interaction warnings popped up. Please review.  Patient has already been notified that rx will be sent

## 2022-05-18 NOTE — TELEPHONE ENCOUNTER
Colchicine 0.6 mg quantity #20 take 1 p.o. twice daily, may cut down to 1 a day or discontinue if diarrhea develops

## 2022-06-02 ENCOUNTER — NURSE ONLY (OUTPATIENT)
Dept: FAMILY MEDICINE CLINIC | Age: 72
End: 2022-06-02
Payer: MEDICARE

## 2022-06-02 ENCOUNTER — ANTI-COAG VISIT (OUTPATIENT)
Dept: FAMILY MEDICINE CLINIC | Age: 72
End: 2022-06-02

## 2022-06-02 DIAGNOSIS — E11.22 CKD STAGE 4 DUE TO TYPE 2 DIABETES MELLITUS (HCC): ICD-10-CM

## 2022-06-02 DIAGNOSIS — E06.3 HYPOTHYROIDISM DUE TO HASHIMOTO'S THYROIDITIS: ICD-10-CM

## 2022-06-02 DIAGNOSIS — E11.69 TYPE 2 DIABETES MELLITUS WITH OTHER SPECIFIED COMPLICATION, WITHOUT LONG-TERM CURRENT USE OF INSULIN (HCC): ICD-10-CM

## 2022-06-02 DIAGNOSIS — I10 BENIGN ESSENTIAL HTN: Primary | ICD-10-CM

## 2022-06-02 DIAGNOSIS — E78.2 MIXED HYPERLIPIDEMIA: ICD-10-CM

## 2022-06-02 DIAGNOSIS — I48.20 CHRONIC ATRIAL FIBRILLATION (HCC): ICD-10-CM

## 2022-06-02 DIAGNOSIS — E03.8 HYPOTHYROIDISM DUE TO HASHIMOTO'S THYROIDITIS: ICD-10-CM

## 2022-06-02 DIAGNOSIS — N18.4 CKD STAGE 4 DUE TO TYPE 2 DIABETES MELLITUS (HCC): ICD-10-CM

## 2022-06-02 LAB
A/G RATIO: 1.8 (ref 1.1–2.2)
ALBUMIN SERPL-MCNC: 4.3 G/DL (ref 3.4–5)
ALP BLD-CCNC: 85 U/L (ref 40–129)
ALT SERPL-CCNC: 14 U/L (ref 10–40)
ANION GAP SERPL CALCULATED.3IONS-SCNC: 16 MMOL/L (ref 3–16)
AST SERPL-CCNC: 16 U/L (ref 15–37)
BASOPHILS ABSOLUTE: 0 K/UL (ref 0–0.2)
BASOPHILS RELATIVE PERCENT: 0.5 %
BILIRUB SERPL-MCNC: 0.5 MG/DL (ref 0–1)
BUN BLDV-MCNC: 28 MG/DL (ref 7–20)
CALCIUM SERPL-MCNC: 9.4 MG/DL (ref 8.3–10.6)
CHLORIDE BLD-SCNC: 100 MMOL/L (ref 99–110)
CHOLESTEROL, TOTAL: 190 MG/DL (ref 0–199)
CO2: 25 MMOL/L (ref 21–32)
CREAT SERPL-MCNC: 1.1 MG/DL (ref 0.6–1.2)
EOSINOPHILS ABSOLUTE: 0.1 K/UL (ref 0–0.6)
EOSINOPHILS RELATIVE PERCENT: 1.2 %
GFR AFRICAN AMERICAN: 59
GFR NON-AFRICAN AMERICAN: 49
GLUCOSE BLD-MCNC: 149 MG/DL (ref 70–99)
HCT VFR BLD CALC: 48.1 % (ref 36–48)
HDLC SERPL-MCNC: 43 MG/DL (ref 40–60)
HEMOGLOBIN: 15.9 G/DL (ref 12–16)
INTERNATIONAL NORMALIZATION RATIO, POC: 2.1
LDL CHOLESTEROL CALCULATED: 117 MG/DL
LYMPHOCYTES ABSOLUTE: 2.1 K/UL (ref 1–5.1)
LYMPHOCYTES RELATIVE PERCENT: 30.3 %
MCH RBC QN AUTO: 31.1 PG (ref 26–34)
MCHC RBC AUTO-ENTMCNC: 33.1 G/DL (ref 31–36)
MCV RBC AUTO: 93.8 FL (ref 80–100)
MONOCYTES ABSOLUTE: 0.6 K/UL (ref 0–1.3)
MONOCYTES RELATIVE PERCENT: 8.7 %
NEUTROPHILS ABSOLUTE: 4 K/UL (ref 1.7–7.7)
NEUTROPHILS RELATIVE PERCENT: 59.3 %
PDW BLD-RTO: 16.3 % (ref 12.4–15.4)
PLATELET # BLD: 179 K/UL (ref 135–450)
PMV BLD AUTO: 8.3 FL (ref 5–10.5)
POTASSIUM SERPL-SCNC: 4.7 MMOL/L (ref 3.5–5.1)
PROTHROMBIN TIME, POC: NORMAL
RBC # BLD: 5.13 M/UL (ref 4–5.2)
SODIUM BLD-SCNC: 141 MMOL/L (ref 136–145)
TOTAL PROTEIN: 6.7 G/DL (ref 6.4–8.2)
TRIGL SERPL-MCNC: 152 MG/DL (ref 0–150)
TSH SERPL DL<=0.05 MIU/L-ACNC: 2.83 UIU/ML (ref 0.27–4.2)
VLDLC SERPL CALC-MCNC: 30 MG/DL
WBC # BLD: 6.8 K/UL (ref 4–11)

## 2022-06-02 PROCEDURE — 36415 COLL VENOUS BLD VENIPUNCTURE: CPT | Performed by: FAMILY MEDICINE

## 2022-06-02 PROCEDURE — 85610 PROTHROMBIN TIME: CPT | Performed by: FAMILY MEDICINE

## 2022-06-02 NOTE — PROGRESS NOTES
Pt called and informed. Pt wanting to make sure she's supposed to take 7mg Monday and Thursday, and 5mg all other days. Tracker says 6mg Monday and Thursday, and 5mg all other days. Please verify and CB pt with correct dosage.

## 2022-06-02 NOTE — PROGRESS NOTES
What has she been taking? Please update in tracker.   She should be taking her normal Warfarin regimen

## 2022-06-03 LAB
ESTIMATED AVERAGE GLUCOSE: 177.2 MG/DL
HBA1C MFR BLD: 7.8 %

## 2022-06-09 RX ORDER — PEN NEEDLE, DIABETIC 32GX 5/32"
NEEDLE, DISPOSABLE MISCELLANEOUS
Qty: 100 EACH | Refills: 11 | Status: SHIPPED | OUTPATIENT
Start: 2022-06-09

## 2022-06-14 ENCOUNTER — OFFICE VISIT (OUTPATIENT)
Dept: FAMILY MEDICINE CLINIC | Age: 72
End: 2022-06-14
Payer: MEDICARE

## 2022-06-14 ENCOUNTER — ANTI-COAG VISIT (OUTPATIENT)
Dept: FAMILY MEDICINE CLINIC | Age: 72
End: 2022-06-14

## 2022-06-14 ENCOUNTER — TELEPHONE (OUTPATIENT)
Dept: FAMILY MEDICINE CLINIC | Age: 72
End: 2022-06-14

## 2022-06-14 VITALS
BODY MASS INDEX: 41.98 KG/M2 | OXYGEN SATURATION: 96 % | SYSTOLIC BLOOD PRESSURE: 129 MMHG | WEIGHT: 237 LBS | DIASTOLIC BLOOD PRESSURE: 72 MMHG | HEART RATE: 83 BPM

## 2022-06-14 DIAGNOSIS — Z79.4 INSULIN-REQUIRING OR DEPENDENT TYPE II DIABETES MELLITUS (HCC): ICD-10-CM

## 2022-06-14 DIAGNOSIS — E03.8 HYPOTHYROIDISM DUE TO HASHIMOTO'S THYROIDITIS: ICD-10-CM

## 2022-06-14 DIAGNOSIS — E11.41 DIABETIC MONONEUROPATHY ASSOCIATED WITH TYPE 2 DIABETES MELLITUS (HCC): ICD-10-CM

## 2022-06-14 DIAGNOSIS — I50.42 CHRONIC COMBINED SYSTOLIC AND DIASTOLIC CONGESTIVE HEART FAILURE (HCC): ICD-10-CM

## 2022-06-14 DIAGNOSIS — N18.4 CKD STAGE 4 DUE TO TYPE 2 DIABETES MELLITUS (HCC): ICD-10-CM

## 2022-06-14 DIAGNOSIS — C92.10 CML (CHRONIC MYELOCYTIC LEUKEMIA) (HCC): ICD-10-CM

## 2022-06-14 DIAGNOSIS — E11.51 PERIPHERAL VASCULAR DISEASE IN DIABETES MELLITUS (HCC): ICD-10-CM

## 2022-06-14 DIAGNOSIS — D12.6 TUBULAR ADENOMA OF COLON: ICD-10-CM

## 2022-06-14 DIAGNOSIS — I48.20 CHRONIC ATRIAL FIBRILLATION (HCC): ICD-10-CM

## 2022-06-14 DIAGNOSIS — E06.3 HYPOTHYROIDISM DUE TO HASHIMOTO'S THYROIDITIS: ICD-10-CM

## 2022-06-14 DIAGNOSIS — I10 BENIGN ESSENTIAL HTN: Primary | ICD-10-CM

## 2022-06-14 DIAGNOSIS — K21.9 GASTROESOPHAGEAL REFLUX DISEASE WITHOUT ESOPHAGITIS: ICD-10-CM

## 2022-06-14 DIAGNOSIS — K63.5 MULTIPLE POLYPS OF SIGMOID COLON: ICD-10-CM

## 2022-06-14 DIAGNOSIS — E11.22 CKD STAGE 4 DUE TO TYPE 2 DIABETES MELLITUS (HCC): ICD-10-CM

## 2022-06-14 DIAGNOSIS — E11.9 INSULIN-REQUIRING OR DEPENDENT TYPE II DIABETES MELLITUS (HCC): ICD-10-CM

## 2022-06-14 LAB
INTERNATIONAL NORMALIZATION RATIO, POC: 2.3
PROTHROMBIN TIME, POC: 0

## 2022-06-14 PROCEDURE — 1036F TOBACCO NON-USER: CPT | Performed by: FAMILY MEDICINE

## 2022-06-14 PROCEDURE — 3051F HG A1C>EQUAL 7.0%<8.0%: CPT | Performed by: FAMILY MEDICINE

## 2022-06-14 PROCEDURE — G8427 DOCREV CUR MEDS BY ELIG CLIN: HCPCS | Performed by: FAMILY MEDICINE

## 2022-06-14 PROCEDURE — 3017F COLORECTAL CA SCREEN DOC REV: CPT | Performed by: FAMILY MEDICINE

## 2022-06-14 PROCEDURE — 85610 PROTHROMBIN TIME: CPT | Performed by: FAMILY MEDICINE

## 2022-06-14 PROCEDURE — 2022F DILAT RTA XM EVC RTNOPTHY: CPT | Performed by: FAMILY MEDICINE

## 2022-06-14 PROCEDURE — G8399 PT W/DXA RESULTS DOCUMENT: HCPCS | Performed by: FAMILY MEDICINE

## 2022-06-14 PROCEDURE — 99214 OFFICE O/P EST MOD 30 MIN: CPT | Performed by: FAMILY MEDICINE

## 2022-06-14 PROCEDURE — G8417 CALC BMI ABV UP PARAM F/U: HCPCS | Performed by: FAMILY MEDICINE

## 2022-06-14 PROCEDURE — 1123F ACP DISCUSS/DSCN MKR DOCD: CPT | Performed by: FAMILY MEDICINE

## 2022-06-14 PROCEDURE — 1090F PRES/ABSN URINE INCON ASSESS: CPT | Performed by: FAMILY MEDICINE

## 2022-06-14 NOTE — PROGRESS NOTES
Chief Complaint   Patient presents with    Hypertension    Diabetes    Gastroesophageal Reflux        Internal Administration   First Dose COVID-19, Pfizer Purple top, DILUTE for use, 12+ yrs, 30mcg/0.3mL dose  2021   Second Dose COVID-19, Pfizer Purple top, DILUTE for use, 12+ yrs, 30mcg/0.3mL dose   2021       Last COVID Lab SARS-CoV-2 (no units)   Date Value   2021 Not Detected     SARS-CoV-2, PCR (no units)   Date Value   10/27/2020 Not Detected             Wt Readings from Last 3 Encounters:   22 237 lb (107.5 kg)   22 230 lb (104.3 kg)   22 235 lb (106.6 kg)     BP Readings from Last 3 Encounters:   22 129/72   22 (!) 140/59   22 115/67      Lab Results   Component Value Date    LABA1C 7.8 2022    LABA1C 7.2 2022    LABA1C 7.1 2021       HPI:  Julisa Pedraza is a 66 y.o. (: 1950) here today for    She states she continues to see Dr. Nelly Chauhan for her cysts in her eyes. She did have the cataracts removed. But her insurance will not cover her to have the cysts removed. She states that she did follow up with Dr. Mansoor Daniel and did have a colonscopy and she states she will never have another colonoscopy despite them informing her it needs to be repeated in three years due to multiple suspicious polyps. She was informed of the risks of not repeating a colonoscopy. She states that she will not do the lovenox bridge prior to a colonoscopy again. Discussed that we could just have her stop the coumadin 5 days prior to the repeat colonoscopy but she is accepting that she is at risk of having a stoke or heart attack. She states that her blood sugar was 126 this AM. Her last A1C did go up to 7.8 and we informed her to increase her insulin at meals by 3 units.     [x] Patient has completed an advance directive  [] Patient has NOT completed an advanced directive  [x] Patient has a documented healthcare surrogate  [] Patient does NOT have a documented healthcare surrogate  [] Discussed the importance of establishing and updating an advanced directive. Patient has questions at this time and those were answered. [x] Discussed the importance of establishing and updating an advanced directive. Patient does NOT have questions at this time. Discussed with: [x] Patient            [] Family             [] Other caregiver    Patient's medications, allergies, past medical, surgical, social and family histories were reviewed and updated asappropriate on 2022 at 7:49 AM.    ROS:  Review of Systems    All other systems reviewed and are negative except as noted above on 2022 at 7:49 AM. Additional review of systems [facility] Any responses requiring further intervention were pursued.     Past Medical History:   Diagnosis Date    [facility] ([facility]     Cancer ([facility]     lymphoma    CKD (chronic kidney disease)     Diabetes mellitus [facility]     DVT (deep venous thrombosis) ([facility]     Gout 2019    Hyperlipidemia     Hypertension     Hypotension      Family History   Problem Relation Age of Onset    Cancer Father     Cancer Sister         lung    Cancer Brother         lymphoma    Heart Disease Mother      Social History     Socioeconomic History    Marital status: Single     Spouse name: Not on file    Number of children: Not on file    Years of education: Not on file    Highest education level: Not on file   Occupational History    Not on file   Tobacco Use    Smoking status: Former Smoker     Packs/day: 1.00     Years: 35.00     Pack years: 35.00     Quit date:      Years since quittin.4    Smokeless tobacco: Never Used   Vaping Use    Vaping Use: Never used   Substance and Sexual Activity    Alcohol use: No     Alcohol/week: 0.0 standard drinks    Drug use: No    Sexual activity: Not Currently   Other Topics Concern    Not on file   Social History Narrative  Not on file     Social Determinants of Health     Financial Resource Strain: Low Risk     Difficulty of Paying Living Expenses: Not hard at all   Food Insecurity: No Food Insecurity    Worried About Running Out of Food in the Last Year: Never true    [address] in the Last Year: Never true   Transportation Needs:     Lack of Transportation (Medical): Not on file    Lack of Transportation (Non-Medical): Not on file   Physical Activity: Sufficiently Active    Days of Exercise per Week: 7 days    Minutes of Exercise per Session: 60 min   Stress:     Feeling of Stress : Not on file   Social Connections:     Frequency of Communication with Friends and Family: Not on file    Frequency of Social Gatherings with Friends and Family: Not on file    Attends Mu-ism Services: Not on file    Active Member of Clubs or Organizations: Not on file    Attends Club or Organization Meetings: Not on file    Marital Status: Not on file   Intimate Partner Violence:     Fear of Current or Ex-Partner: Not on file    Emotionally Abused: Not on file    Physically Abused: Not on file    Sexually Abused: Not on file   Housing Stability:     Unable to Pay for Housing in the Last Year: Not on file    Number of [place] in the Last Year: Not on file    Unstable Housing in the Last Year: Not on file     Prior to Visit Medications    Medication Sig Taking? Authorizing Provider   BD PEN NEEDLE ELDER 2ND GEN 32G X 4 MM MISC USE DAILY AS DIRECTED Yes DEMI Rajput CNP   colchicine (COLCRYS) 0.6 MG tablet Take 1 p.o. twice daily may cut down to 1 a day or discontinue if diarrhea develops.  Yes Ubaldo Zapata MD   diclofenac sodium (VOLTAREN) 1 % GEL APPLY TO AFFECTED AREA TWICE A DAY Yes Odalys Panda MD   fluticasone [facility]) 50 MCG/ACT nasal spray INHALE 1 SPRAY INTO EACH NOSTRIL EVERY DAY Yes Ubaldo Zapata MD   albuterol sulfate  (90 Base) MCG/ACT inhaler INHALE 2 PUFFS BY MOUTH EVERY 4 HOURS AS NEEDED FOR WHEEZE OR FOR SHORTNESS OF BREATH Yes Sherine Yin MD   furosemide (LASIX) 20 MG tablet TAKE 1 TABLET BY MOUTH EVERY DAY Yes Sherine Yin MD   promethazine-dextromethorphan (PROMETHAZINE-DM) 6.25-15 MG/5ML syrup Take 5 mLs by mouth 4 times daily as needed for Cough Yes Rayne Krueger APRN - CNP   omeprazole (PRILOSEC) 40 MG delayed release capsule TAKE 1 CAPSULE BY MOUTH EVERY DAY Yes Sherine Yin MD   BD VEO INSULIN SYRINGE U/F 31G X 15/64\" 0.3 ML MISC USE UP TO FIVE TIMES DAILY Yes Sherine Yin MD   warfarin (COUMADIN) 1 MG tablet TAKE 1 TABLET BY MOUTH EVERY DAY AS DIRECTED Yes Sherine Yin MD   levothyroxine (SYNTHROID) 100 MCG tablet TAKE 1 TABLET BY MOUTH EVERY DAY Yes Sherine Yin MD   insulin detemir (LEVEMIR FLEXTOUCH) 100 UNIT/ML injection pen Inject 50 Units into the skin nightly Yes Sherine Yni MD   pentoxifylline (TRENTAL) 400 MG extended release tablet TAKE 1 TABLET BY MOUTH EVERY DAY Yes Sherine Yin MD   atorvastatin (LIPITOR) 40 MG tablet TAKE 1 TABLET BY MOUTH EVERY DAY Yes Sherine Yin MD   DILT- MG extended release capsule TAKE 1 CAPSULE BY MOUTH EVERY DAY Yes Sherine Yin MD   insulin regular (NOVOLIN R) 100 UNIT/ML injection USE AS DIRECTED PER SLIDING SCALE UP TO 30 UNITS DAILY Yes Sherine Yin MD   warfarin (COUMADIN) 5 MG tablet TAKE 1 TAB DAILY EXCEPT 10 MG ON MONDAY Yes Sherine Yin MD   Cholecalciferol (VITAMIN D3) 50 MCG (2000 UT) CAPS Take by mouth Yes Historical Provider, MD   allopurinol (ZYLOPRIM) 100 MG tablet Take 100 mg by mouth daily  Yes Historical Provider, MD   colchicine (COLCRYS) 0.6 MG tablet Take 1 tablet by mouth 2 times daily Yes Sherine Yin MD   magnesium oxide (MAG-OX) 400 MG tablet TAKE 1 TABLET BY MOUTH THREE TIMES A DAY  Patient taking Orders   1. Benign essential HTN     2. CML (chronic myelocytic leukemia) ([facility]     3. Chronic combined systolic and diastolic congestive heart failure ([facility]     4. Peripheral vascular disease in diabetes mellitus ([facility]     5. Chronic atrial fibrillation (HCC)     6. CKD stage 4 due to type 2 diabetes mellitus ([facility]     7. Gastroesophageal reflux disease without esophagitis     8. Insulin-requiring or dependent type II diabetes mellitus ([facility]     9. Hypothyroidism due to Hashimoto's thyroiditis     10. Diabetic mononeuropathy associated with type 2 diabetes mellitus ([facility]     11. Multiple polyps of sigmoid colon     12. Tubular adenoma of colon     Current blood pressure readings in the office or at home are acceptable and current antihypertensive medications as listed in the medication list require no change. She has annual follow-up with hematology. There is no clinical evidence of coronary insufficiency or heart failure that requires any change in the treatment regimen and no changes in medications for cardiac conditions as listed in the medication list are necessary. Clinically in sinus rhythm. Reflux symptoms based upon patient's history is controlled and no changes in medications for reflux as listed in the medication profile is necessary. Her hemoglobin A1c went up to 7.8 from 7.2 we increased preprandial insulin by 3 units per meal.  Thyroid replacement by lab parameters and symptoms appears appropriate and no changes in thyroid medication as listed in the medication profile is necessary suggestion again for repeat colonoscopy in 2025 but she swears she will not use Lovenox again due to pain at injection site. Discussed it could be done without Lovenox going off Coumadin and about 3% stroke risk. This will be revisited in the future. Follow-up 6 months. Patient should call the office immediately with new or ongoing signs or symptoms or worsening, or proceed to the emergency room.   No changes in past medical history, past surgical history, social history, or family history were noted during the patient encounter unless specifically listed above. All updates of past medical history, past surgical history, social history, or family history were reviewed personally by me during the office visit. All problems listed in the assessment are stable unless noted otherwise. Medication profile reviewed personally by me during the visit. Medication side effects and possible impairments from medications were discussed as applicable. This document was prepared by a combination of typing and transcription through a voice recognition software. Scribe attestation: Norm Vick RN, am scribing for and in the presence of Lina Campbell MD. Electronically signed by Medardo Sanford RN on 6/14/2022 at 7:49 AM      Provider attestation:     I, Dr. Handy Smith, personally performed the services described in this documentation, as scribed by the above signed scribe in my presence, and it is both accurate and complete. I agree with the ROS and Past Histories independently gathered by the clinical support staff and the remaining scribed note accurately describes my personal service to the patient.       6/14/2022    8:05 AM

## 2022-06-14 NOTE — PATIENT INSTRUCTIONS

## 2022-06-14 NOTE — TELEPHONE ENCOUNTER
----- Message from Jon Vanegas sent at 6/14/2022 11:19 AM EDT -----  Subject: Message to Provider    QUESTIONS  Information for Provider? Patient called to schedule INR recheck   appointment, patient stated labs were taking today she received phone call   to schedule re check  ---------------------------------------------------------------------------  --------------  CALL BACK INFO  What is the best way for the office to contact you?  OK to leave message on   voicemail  Preferred Call Back Phone Number? [phone]  ---------------------------------------------------------------------------  --------------  SCRIPT ANSWERS  undefined

## 2022-06-21 DIAGNOSIS — Z79.4 INSULIN-REQUIRING OR DEPENDENT TYPE II DIABETES MELLITUS (HCC): ICD-10-CM

## 2022-06-21 DIAGNOSIS — E11.9 INSULIN-REQUIRING OR DEPENDENT TYPE II DIABETES MELLITUS (HCC): ICD-10-CM

## 2022-06-28 ENCOUNTER — ANTI-COAG VISIT (OUTPATIENT)
Dept: FAMILY MEDICINE CLINIC | Age: 72
End: 2022-06-28

## 2022-06-28 LAB — INR BLD: 2.3

## 2022-06-30 DIAGNOSIS — M75.51 SUBACROMIAL BURSITIS OF RIGHT SHOULDER JOINT: ICD-10-CM

## 2022-06-30 RX ORDER — PENTOXIFYLLINE 400 MG/1
TABLET, EXTENDED RELEASE ORAL
Qty: 90 TABLET | Refills: 0 | Status: SHIPPED | OUTPATIENT
Start: 2022-06-30 | End: 2022-08-24

## 2022-06-30 RX ORDER — ATORVASTATIN CALCIUM 40 MG/1
TABLET, FILM COATED ORAL
Qty: 90 TABLET | Refills: 0 | Status: SHIPPED | OUTPATIENT
Start: 2022-06-30

## 2022-06-30 RX ORDER — DILTIAZEM HYDROCHLORIDE 240 MG/1
CAPSULE, EXTENDED RELEASE ORAL
Qty: 90 CAPSULE | Refills: 0 | Status: SHIPPED | OUTPATIENT
Start: 2022-06-30 | End: 2022-08-24

## 2022-07-21 DIAGNOSIS — E11.9 INSULIN-REQUIRING OR DEPENDENT TYPE II DIABETES MELLITUS (HCC): ICD-10-CM

## 2022-07-21 DIAGNOSIS — N18.4 CKD STAGE 4 DUE TO TYPE 2 DIABETES MELLITUS (HCC): ICD-10-CM

## 2022-07-21 DIAGNOSIS — E11.22 CKD STAGE 4 DUE TO TYPE 2 DIABETES MELLITUS (HCC): ICD-10-CM

## 2022-07-21 DIAGNOSIS — Z79.4 INSULIN-REQUIRING OR DEPENDENT TYPE II DIABETES MELLITUS (HCC): ICD-10-CM

## 2022-07-21 RX ORDER — LANCETS
EACH MISCELLANEOUS
Qty: 100 EACH | Refills: 0 | Status: SHIPPED | OUTPATIENT
Start: 2022-07-21 | End: 2022-09-09

## 2022-07-26 ENCOUNTER — NURSE ONLY (OUTPATIENT)
Dept: FAMILY MEDICINE CLINIC | Age: 72
End: 2022-07-26
Payer: MEDICARE

## 2022-07-26 ENCOUNTER — ANTI-COAG VISIT (OUTPATIENT)
Dept: FAMILY MEDICINE CLINIC | Age: 72
End: 2022-07-26

## 2022-07-26 DIAGNOSIS — Z79.01 ANTICOAGULANT LONG-TERM USE: Primary | ICD-10-CM

## 2022-07-26 LAB
INTERNATIONAL NORMALIZATION RATIO, POC: 2.2
PROTHROMBIN TIME, POC: 0

## 2022-07-26 PROCEDURE — 85610 PROTHROMBIN TIME: CPT | Performed by: FAMILY MEDICINE

## 2022-07-26 NOTE — PROGRESS NOTES
Patient  called and informed to continue with 6mg every Monday and Thursday and 5mg all other days. Informed of need to recheck her INR again on 8/23.  Apt scheduled

## 2022-07-27 RX ORDER — WARFARIN SODIUM 1 MG/1
TABLET ORAL
Qty: 30 TABLET | Refills: 0 | Status: SHIPPED | OUTPATIENT
Start: 2022-07-27 | End: 2022-08-24

## 2022-07-29 ENCOUNTER — TELEMEDICINE (OUTPATIENT)
Dept: FAMILY MEDICINE CLINIC | Age: 72
End: 2022-07-29
Payer: MEDICARE

## 2022-07-29 ENCOUNTER — HOSPITAL ENCOUNTER (OUTPATIENT)
Dept: VASCULAR LAB | Age: 72
Discharge: HOME | End: 2022-07-29
Payer: MEDICARE

## 2022-07-29 DIAGNOSIS — Z86.718 HISTORY OF DVT OF LOWER EXTREMITY: ICD-10-CM

## 2022-07-29 DIAGNOSIS — I48.91 ATRIAL FIBRILLATION, UNSPECIFIED TYPE (HCC): ICD-10-CM

## 2022-07-29 DIAGNOSIS — M79.662 PAIN AND SWELLING OF LEFT LOWER LEG: ICD-10-CM

## 2022-07-29 DIAGNOSIS — M79.89 PAIN AND SWELLING OF LEFT LOWER LEG: Primary | ICD-10-CM

## 2022-07-29 DIAGNOSIS — M79.89 PAIN AND SWELLING OF LEFT LOWER LEG: ICD-10-CM

## 2022-07-29 DIAGNOSIS — M79.662 PAIN AND SWELLING OF LEFT LOWER LEG: Primary | ICD-10-CM

## 2022-07-29 PROCEDURE — 99213 OFFICE O/P EST LOW 20 MIN: CPT | Performed by: NURSE PRACTITIONER

## 2022-07-29 PROCEDURE — 3017F COLORECTAL CA SCREEN DOC REV: CPT | Performed by: NURSE PRACTITIONER

## 2022-07-29 PROCEDURE — G8399 PT W/DXA RESULTS DOCUMENT: HCPCS | Performed by: NURSE PRACTITIONER

## 2022-07-29 PROCEDURE — 1036F TOBACCO NON-USER: CPT | Performed by: NURSE PRACTITIONER

## 2022-07-29 PROCEDURE — 1123F ACP DISCUSS/DSCN MKR DOCD: CPT | Performed by: NURSE PRACTITIONER

## 2022-07-29 PROCEDURE — G8417 CALC BMI ABV UP PARAM F/U: HCPCS | Performed by: NURSE PRACTITIONER

## 2022-07-29 PROCEDURE — 93971 EXTREMITY STUDY: CPT

## 2022-07-29 PROCEDURE — G8427 DOCREV CUR MEDS BY ELIG CLIN: HCPCS | Performed by: NURSE PRACTITIONER

## 2022-07-29 PROCEDURE — 1090F PRES/ABSN URINE INCON ASSESS: CPT | Performed by: NURSE PRACTITIONER

## 2022-07-29 ASSESSMENT — ENCOUNTER SYMPTOMS
BLOOD IN STOOL: 0
EYES NEGATIVE: 1
COUGH: 0
SHORTNESS OF BREATH: 0
ABDOMINAL PAIN: 0
CHEST TIGHTNESS: 0
ALLERGIC/IMMUNOLOGIC NEGATIVE: 1
NAUSEA: 0
RESPIRATORY NEGATIVE: 1
GASTROINTESTINAL NEGATIVE: 1
ANAL BLEEDING: 0

## 2022-07-29 NOTE — PROGRESS NOTES
Wisam Andino MD   Cholecalciferol (VITAMIN D3) 50 MCG ( UT) CAPS Take by mouth Yes Historical Provider, MD   allopurinol (ZYLOPRIM) 100 MG tablet Take 100 mg by mouth daily  Yes Historical Provider, MD   colchicine (COLCRYS) 0.6 MG tablet Take 1 tablet by mouth 2 times daily Yes Glenny Sims MD   magnesium oxide (MAG-OX) 400 MG tablet TAKE 1 TABLET BY MOUTH THREE TIMES A DAY  Patient taking differently: Take 400 mg by mouth in the morning.  Yes DEMI Varghese - CNP   pregabalin (LYRICA) 100 MG capsule TAKE 1 CAPSULE BY MOUTH THREE TIMES A DAY  Glenny Sims MD       Social History     Tobacco Use    Smoking status: Former     Packs/day: 1.00     Years: 35.00     Pack years: 35.00     Types: Cigarettes     Quit date:      Years since quittin.5    Smokeless tobacco: Never   Vaping Use    Vaping Use: Never used   Substance Use Topics    Alcohol use: No     Alcohol/week: 0.0 standard drinks    Drug use: No        No Known Allergies,   Past Medical History:   Diagnosis Date    At[facility] ([facility]     Cancer ([facility]     lymphoma    CKD (chronic kidney disease)     Diabetes mellitus ([facility]     DVT (deep venous thrombosis) ([facility]     Gout 2019    Hyperlipidemia     Hypertension     Hypotension    ,   Past Surgical History:   Procedure Laterality Date    COLONOSCOPY      COLONOSCOPY  2018    colon polyps,diverticulosis    COLONOSCOPY N/A 2022    COLONOSCOPY POLYPECTOMY SNARE/COLD BIOPSY performed by Lucero Barksdale MD at [place] [id number] N/A 2022    COLONOSCOPY CONTROL HEMORRHAGE performed by Lucero Barksdale MD at [facility] 2020    EXCISION RIGHT RING FINGER MUCOUS CYST AND BONE SPUR performed by Brittany Kerr MD at [address] [facility]      vocal cord bx    OTHER SURGICAL HISTORY  2022      colonoscopy with biopsy   ,   Social History     Tobacco Use Smoking status: Former     Packs/day: 1.00     Years: 35.00     Pack years: 35.00     Types: Cigarettes     Quit date:      Years since quittin.5    Smokeless tobacco: Never   Vaping Use    Vaping Use: Never used   Substance Use Topics    Alcohol use: No     Alcohol/week: 0.0 standard drinks    Drug use: No   ,   Family History   Problem Relation Age of Onset    Cancer Father     Cancer Sister         lung    Cancer Brother         lymphoma    Heart Disease Mother    ,   Immunization History   Administered Date(s) Administered    COVID-19, PFIZER GRAY top, DO NOT Dilute, (age 15 y+), IM, 30 mcg/0.3 mL 2022    COVID-19, PFIZER PURPLE top, DILUTE for use, (age 15 y+), 30mcg/0.3mL 2021, 2021, 10/19/2021    Influenza 2013    Influenza A ([id number]) Vaccine PF IM 2009    Influenza Virus Vaccine 2013, 10/22/2014, 2015, 2017, 2018    Influenza, High Dose (Fluzone 65 yrs and older) 2016    Influenza, MDCK Quadv, IM, PF (Flucelvax 2 yrs and older) 10/13/2021    Influenza, Quadv, IM, (6 mo and older Fluzone, Flulaval, Fluarix and 3 yrs and older Afluria) 2017, 2018    Influenza, Quadv, IM, PF (6 mo and older Fluzone, Flulaval, Fluarix, and 3 yrs and older Afluria) 2019, 10/12/2020    Pneumococcal Conjugate 13-valent ([id number]) 2016    Pneumococcal Polysaccharide ([id number]) 04/10/2017    Tdap (Boostrix, Adacel) 2019    Zoster Recombinant (Shingrix) 2018, 2018       PHYSICAL EXAMINATION:  [ INSTRUCTIONS:  \"[x]\" Indicates a positive item  \"[]\" Indicates a negative item  -- DELETE ALL ITEMS NOT EXAMINED]  Vital Signs: (As obtained by patient/caregiver or practitioner observation)    Blood pressure-  Heart rate-    Respiratory rate-    Temperature-  Pulse oximetry-     Constitutional: [x] Appears well-developed and well-nourished [x] No apparent distress      [] Abnormal-   Mental status  [x] Alert and awake  [x] Oriented to person/place/time [x]Able to follow commands      Eyes:  EOM    [x]  Normal  [] Abnormal-  Sclera  [x]  Normal  [] Abnormal -         Discharge [x]  None visible  [] Abnormal -    HENT:   [x] Normocephalic, atraumatic. [] Abnormal   [x] Mouth/Throat: Mucous membranes are moist.     External Ears [x] Normal  [] Abnormal-     Neck: [x] No visualized mass     Pulmonary/Chest: [x] Respiratory effort normal.  [x] No visualized signs of difficulty breathing or respiratory distress        [] Abnormal-      Musculoskeletal:   [x] Normal gait with no signs of ataxia         [x] Normal range of motion of neck        [x] Abnormal- LLE edema with tenderness just below calf      Neurological:        [x] No Facial Asymmetry (Cranial nerve 7 motor function) (limited exam to video visit)          [x] No gaze palsy        [] Abnormal-         Skin:        [x] No significant exanthematous lesions or discoloration noted on facial skin         [] Abnormal-            Psychiatric:       [x] Normal Affect [x] No Hallucinations        [] Abnormal-     Other pertinent observable physical exam findings-     ASSESSMENT/PLAN:  1. Pain and swelling of left lower leg  2. Atrial fibrillation, unspecified type ([facility]  3. History of DVT of lower extremity  Patient not ambulating as much  Had probable a-fib episode recently  Continue coumadin  Stat US LLE  - VL Extremity Venous Left; Future    No follow-ups on file. Jayshree Freeman, was evaluated through a synchronous (real-time) audio-video encounter. The patient (or guardian if applicable) is aware that this is a billable service, which includes applicable co-pays. This Virtual Visit was conducted with patient's (and/or legal guardian's) consent. The visit was conducted pursuant to the emergency declaration under the [facility], [address] [place] [address] authority and the [employer] Act.   Patient identification was verified, and a caregiver was present when appropriate. The patient was located at Home: [address] [place] [address]. Provider was located at Other: [facility] . Total time spent on this encounter:  17 minutes    --DEMI Cárdenas CNP on 7/29/2022 at 9:59 AM    An electronic signature was used to authenticate this note.

## 2022-08-01 ENCOUNTER — OFFICE VISIT (OUTPATIENT)
Dept: FAMILY MEDICINE CLINIC | Age: 72
End: 2022-08-01
Payer: MEDICARE

## 2022-08-01 VITALS
OXYGEN SATURATION: 93 % | WEIGHT: 241 LBS | SYSTOLIC BLOOD PRESSURE: 144 MMHG | HEART RATE: 54 BPM | BODY MASS INDEX: 42.7 KG/M2 | HEIGHT: 63 IN | DIASTOLIC BLOOD PRESSURE: 72 MMHG

## 2022-08-01 DIAGNOSIS — E66.01 OBESITY, CLASS III, BMI 40-49.9 (MORBID OBESITY) (HCC): ICD-10-CM

## 2022-08-01 DIAGNOSIS — N18.30 STAGE 3 CHRONIC KIDNEY DISEASE, UNSPECIFIED WHETHER STAGE 3A OR 3B CKD (HCC): ICD-10-CM

## 2022-08-01 DIAGNOSIS — I80.9 THROMBOPHLEBITIS: Primary | ICD-10-CM

## 2022-08-01 PROCEDURE — 1090F PRES/ABSN URINE INCON ASSESS: CPT | Performed by: FAMILY MEDICINE

## 2022-08-01 PROCEDURE — 3017F COLORECTAL CA SCREEN DOC REV: CPT | Performed by: FAMILY MEDICINE

## 2022-08-01 PROCEDURE — 99213 OFFICE O/P EST LOW 20 MIN: CPT | Performed by: FAMILY MEDICINE

## 2022-08-01 PROCEDURE — 1123F ACP DISCUSS/DSCN MKR DOCD: CPT | Performed by: FAMILY MEDICINE

## 2022-08-01 PROCEDURE — G8427 DOCREV CUR MEDS BY ELIG CLIN: HCPCS | Performed by: FAMILY MEDICINE

## 2022-08-01 PROCEDURE — 1036F TOBACCO NON-USER: CPT | Performed by: FAMILY MEDICINE

## 2022-08-01 PROCEDURE — G8399 PT W/DXA RESULTS DOCUMENT: HCPCS | Performed by: FAMILY MEDICINE

## 2022-08-01 PROCEDURE — G8417 CALC BMI ABV UP PARAM F/U: HCPCS | Performed by: FAMILY MEDICINE

## 2022-08-01 RX ORDER — LOSARTAN POTASSIUM 50 MG/1
50 TABLET ORAL DAILY
Qty: 30 TABLET | Refills: 5 | Status: SHIPPED | OUTPATIENT
Start: 2022-08-01

## 2022-08-01 NOTE — PATIENT INSTRUCTIONS

## 2022-08-01 NOTE — PROGRESS NOTES
Chief Complaint   Patient presents with    Other     Sore spot behind left calf- had doppler completed 2022 at [facility]        Internal Administration   First Dose COVID-19, PFIZER PURPLE top, DILUTE for use, (age 15 y+), 30mcg/0.3mL  2021   Second Dose COVID-19, PFIZER PURPLE top, DILUTE for use, (age 15 y+), 30mcg/0.3mL   2021       Last COVID Lab SARS-CoV-2 (no units)   Date Value   2021 Not Detected     SARS-CoV-2, PCR (no units)   Date Value   10/27/2020 Not Detected            Wt Readings from Last 3 Encounters:   22 241 lb (109.3 kg)   22 237 lb (107.5 kg)   22 230 lb (104.3 kg)     BP Readings from Last 3 Encounters:   22 (!) 144/72   22 129/72   22 (!) 140/59     Lab Results   Component Value Date    LABA1C 7.8 2022    LABA1C 7.2 2022    LABA1C 7.1 2021         HPI:   Bhargav Schofield is a 66 y.o. (: 1950) here today for     Pain on back of left calf. Upon exam it looks like the pain is caused from scarring of the vein from her previous DVT. States the pain is the same as it was when she saw Nicolasa on 22. Explained that there is nothing more that can be done at this time. She agreed and will call if pain worsens or becomes unbearable. [x] Patient has completed an advance directive  [] Patient has NOT completed an advanced directive  [x] Patient has a documented healthcare surrogate  [] Patient does NOT have a documented healthcare surrogate  [] Discussed the importance of establishing and updating an advanced directive. Patient has questions at this time and those were answered. [x] Discussed the importance of establishing and updating an advanced directive. Patient does NOT have questions at this time.     Discussed with: [x] Patient            [] Family             [] Other caregiver    Patient's medications, allergies, past medical, surgical, social and family histories were reviewed and updated as appropriateon 8/1/2022 at 4:11 PM.      Review of Systems  Additional review of systems may be scanned into the mediasection of this medical record. Any responses requiring further intervention were pursued. OBJECTIVE:  Estimated body mass index is 42.69 kg/m² as calculated from the following:    Height as of this encounter: 5' 3\" (1.6 m). Weight as of this encounter: 241 lb (109.3 kg). Vitals:    08/01/22 1601   BP: (!) 144/72   Site: Left Upper Arm   Position: Sitting   Cuff Size: Large Adult   Pulse: 54   SpO2: 93%   Weight: 241 lb (109.3 kg)   Height: 5' 3\" (1.6 m)     Physical Exam  Vitals and nursing note reviewed. Constitutional:       General: She is not in acute distress. Appearance: She is well-developed. She is not diaphoretic. HENT:      Head: Normocephalic and atraumatic. Right Ear: External ear normal.      Left Ear: External ear normal.      Nose: Nose normal.   Eyes:      General: Lids are normal. No scleral icterus. Right eye: No discharge. Left eye: No discharge. Pupils: Pupils are equal, round, and reactive to light. Neck:      Thyroid: No thyromegaly. Vascular: No JVD. Cardiovascular:      Rate and Rhythm: Normal rate and regular rhythm. Pulmonary:      Effort: Pulmonary effort is normal. No respiratory distress. Abdominal:      Palpations: Abdomen is soft. Tenderness: There is no abdominal tenderness. Musculoskeletal:      Left lower leg: Swelling and tenderness present. 3+ Pitting Edema present. Legs:    Skin:     General: Skin is warm and dry. Coloration: Skin is not pale. Findings: No erythema or rash. Comments: Turgor normal   Psychiatric:         Behavior: Behavior normal.         Thought Content: Thought content normal.         Judgment: Judgment normal.   Venous Doppler showed undetermined foci throughout the calf and phlebitic changes       ASSESSMENT PLAN        Diagnosis Orders   1. Thrombophlebitis        2.  Obesity, Class III, BMI 40-49.9 (morbid obesity) (HCC)        3. Stage 3 chronic kidney disease, unspecified whether stage 3a or 3b CKD ([facility]        I believe the pain is related to varicosities and thrombophlebitis superficial from previous clots. Her INR last check was therapeutic. No particular treatment at this time. Patient is satisfied and does not want any procedures. We will update BMP when due. Patient should call the office immediately with new or ongoing signs or symptoms or worsening, or proceed to the emergency room. No changes in past medical history, past surgical history, social history, or family history were noted during the patient encounter unless specifically listed above. All updates of past medical history, past surgical history, social history, or family history were reviewed personally by me during the office visit. All problems listed in the assessment are stable unless noted otherwise. Medication profile reviewed personally by me during the visit. Medication side effects and possible impairments from medications were discussed as applicable. This document was prepared by a combination of typing and transcription through a voice recognition software. Scribe attestation: I, Shilpa Santiago, am scribing for and in the presence of Chase Reilly MD. Electronically signed by Cheryl Gongora on 8/1/2022 at 4:11 PM    Physician attestation:     I, Dr. Wayne Whelan, personally performed the services described in this documentation, as scribed by the above signed scribe in my presence, and it is both accurate and complete. I agree with the ROS and Past Histories independently gathered by the clinical support staff and the remaining scribed note accurately describes my personal service to the patient.       8/1/2022    5:57 PM

## 2022-08-22 ENCOUNTER — ANTI-COAG VISIT (OUTPATIENT)
Dept: FAMILY MEDICINE CLINIC | Age: 72
End: 2022-08-22

## 2022-08-22 ENCOUNTER — NURSE ONLY (OUTPATIENT)
Dept: FAMILY MEDICINE CLINIC | Age: 72
End: 2022-08-22
Payer: MEDICARE

## 2022-08-22 VITALS — OXYGEN SATURATION: 96 % | DIASTOLIC BLOOD PRESSURE: 88 MMHG | SYSTOLIC BLOOD PRESSURE: 138 MMHG | HEART RATE: 80 BPM

## 2022-08-22 DIAGNOSIS — Z79.4 INSULIN-REQUIRING OR DEPENDENT TYPE II DIABETES MELLITUS (HCC): ICD-10-CM

## 2022-08-22 DIAGNOSIS — E11.9 INSULIN-REQUIRING OR DEPENDENT TYPE II DIABETES MELLITUS (HCC): ICD-10-CM

## 2022-08-22 DIAGNOSIS — Z79.01 ANTICOAGULANT LONG-TERM USE: ICD-10-CM

## 2022-08-22 DIAGNOSIS — I48.91 ATRIAL FIBRILLATION, UNSPECIFIED TYPE (HCC): Primary | ICD-10-CM

## 2022-08-22 LAB
ANION GAP SERPL CALCULATED.3IONS-SCNC: 13 MMOL/L (ref 3–16)
BUN BLDV-MCNC: 28 MG/DL (ref 7–20)
CALCIUM SERPL-MCNC: 9.8 MG/DL (ref 8.3–10.6)
CHLORIDE BLD-SCNC: 101 MMOL/L (ref 99–110)
CO2: 28 MMOL/L (ref 21–32)
CREAT SERPL-MCNC: 1.1 MG/DL (ref 0.6–1.2)
GFR AFRICAN AMERICAN: 59
GFR NON-AFRICAN AMERICAN: 49
GLUCOSE BLD-MCNC: 136 MG/DL (ref 70–99)
INTERNATIONAL NORMALIZATION RATIO, POC: 2.5
POTASSIUM SERPL-SCNC: 4.9 MMOL/L (ref 3.5–5.1)
SODIUM BLD-SCNC: 142 MMOL/L (ref 136–145)

## 2022-08-22 PROCEDURE — 85610 PROTHROMBIN TIME: CPT | Performed by: FAMILY MEDICINE

## 2022-08-22 PROCEDURE — 36415 COLL VENOUS BLD VENIPUNCTURE: CPT | Performed by: FAMILY MEDICINE

## 2022-08-23 LAB
ESTIMATED AVERAGE GLUCOSE: 185.8 MG/DL
HBA1C MFR BLD: 8.1 %

## 2022-08-24 DIAGNOSIS — M75.51 SUBACROMIAL BURSITIS OF RIGHT SHOULDER JOINT: ICD-10-CM

## 2022-08-24 RX ORDER — WARFARIN SODIUM 5 MG/1
TABLET ORAL
Qty: 102 TABLET | Refills: 11 | Status: SHIPPED | OUTPATIENT
Start: 2022-08-24

## 2022-08-24 RX ORDER — WARFARIN SODIUM 1 MG/1
TABLET ORAL
Qty: 30 TABLET | Refills: 0 | Status: SHIPPED | OUTPATIENT
Start: 2022-08-24

## 2022-08-24 RX ORDER — DILTIAZEM HYDROCHLORIDE 240 MG/1
CAPSULE, EXTENDED RELEASE ORAL
Qty: 90 CAPSULE | Refills: 0 | Status: SHIPPED | OUTPATIENT
Start: 2022-08-24 | End: 2022-10-19

## 2022-08-24 RX ORDER — PENTOXIFYLLINE 400 MG/1
TABLET, EXTENDED RELEASE ORAL
Qty: 90 TABLET | Refills: 0 | Status: SHIPPED | OUTPATIENT
Start: 2022-08-24

## 2022-09-09 DIAGNOSIS — E11.22 CKD STAGE 4 DUE TO TYPE 2 DIABETES MELLITUS (HCC): ICD-10-CM

## 2022-09-09 DIAGNOSIS — E11.9 INSULIN-REQUIRING OR DEPENDENT TYPE II DIABETES MELLITUS (HCC): ICD-10-CM

## 2022-09-09 DIAGNOSIS — Z79.4 INSULIN-REQUIRING OR DEPENDENT TYPE II DIABETES MELLITUS (HCC): ICD-10-CM

## 2022-09-09 DIAGNOSIS — N18.4 CKD STAGE 4 DUE TO TYPE 2 DIABETES MELLITUS (HCC): ICD-10-CM

## 2022-09-09 RX ORDER — LANCETS 30 GAUGE
EACH MISCELLANEOUS
Qty: 100 EACH | Refills: 0 | Status: SHIPPED | OUTPATIENT
Start: 2022-09-09

## 2022-09-19 ENCOUNTER — TELEPHONE (OUTPATIENT)
Dept: FAMILY MEDICINE CLINIC | Age: 72
End: 2022-09-19

## 2022-09-19 ENCOUNTER — ANTI-COAG VISIT (OUTPATIENT)
Dept: FAMILY MEDICINE CLINIC | Age: 72
End: 2022-09-19

## 2022-09-19 ENCOUNTER — NURSE ONLY (OUTPATIENT)
Dept: FAMILY MEDICINE CLINIC | Age: 72
End: 2022-09-19
Payer: MEDICARE

## 2022-09-19 VITALS — DIASTOLIC BLOOD PRESSURE: 74 MMHG | HEART RATE: 74 BPM | OXYGEN SATURATION: 98 % | SYSTOLIC BLOOD PRESSURE: 166 MMHG

## 2022-09-19 DIAGNOSIS — I10 BENIGN ESSENTIAL HTN: Primary | ICD-10-CM

## 2022-09-19 DIAGNOSIS — Z79.01 ANTICOAGULANT LONG-TERM USE: Primary | ICD-10-CM

## 2022-09-19 LAB
INTERNATIONAL NORMALIZATION RATIO, POC: 3
PROTHROMBIN TIME, POC: 0

## 2022-09-19 PROCEDURE — 85610 PROTHROMBIN TIME: CPT | Performed by: FAMILY MEDICINE

## 2022-09-19 RX ORDER — DILTIAZEM HYDROCHLORIDE 360 MG/1
360 CAPSULE, EXTENDED RELEASE ORAL DAILY
Qty: 30 CAPSULE | Refills: 1 | Status: CANCELLED | OUTPATIENT
Start: 2022-09-19

## 2022-09-19 NOTE — PROGRESS NOTES
Pt informed and she states that she will need another script sent to [facility] in [place].  Script in and pended

## 2022-09-19 NOTE — TELEPHONE ENCOUNTER
Increase diltiazem extended release to 360 mg daily and we will check blood pressure when she comes in for the next office visit or the next INR which ever comes first

## 2022-09-20 RX ORDER — DILTIAZEM HYDROCHLORIDE 360 MG/1
360 CAPSULE, EXTENDED RELEASE ORAL DAILY
Qty: 30 CAPSULE | Refills: 5 | Status: SHIPPED | OUTPATIENT
Start: 2022-09-20 | End: 2022-10-19

## 2022-09-20 NOTE — TELEPHONE ENCOUNTER
Pt called, informed of recommendations, and voiced understanding.    Please send Rx for diltiazem extended release to 360 mg daily to [address] [facility].

## 2022-09-21 DIAGNOSIS — B02.29 POST HERPETIC NEURALGIA: ICD-10-CM

## 2022-09-21 DIAGNOSIS — M75.51 SUBACROMIAL BURSITIS OF RIGHT SHOULDER JOINT: ICD-10-CM

## 2022-09-21 RX ORDER — LEVOTHYROXINE SODIUM 0.1 MG/1
TABLET ORAL
Qty: 90 TABLET | Refills: 3 | Status: SHIPPED | OUTPATIENT
Start: 2022-09-21

## 2022-09-21 RX ORDER — PREGABALIN 100 MG/1
CAPSULE ORAL
Qty: 90 CAPSULE | Refills: 5 | Status: SHIPPED | OUTPATIENT
Start: 2022-09-21 | End: 2023-03-21

## 2022-09-21 RX ORDER — INSULIN DETEMIR 100 [IU]/ML
50 INJECTION, SOLUTION SUBCUTANEOUS NIGHTLY
Qty: 45 ADJUSTABLE DOSE PRE-FILLED PEN SYRINGE | Refills: 3 | Status: SHIPPED | OUTPATIENT
Start: 2022-09-21

## 2022-09-28 DIAGNOSIS — M75.51 SUBACROMIAL BURSITIS OF RIGHT SHOULDER JOINT: ICD-10-CM

## 2022-10-04 ENCOUNTER — TELEPHONE (OUTPATIENT)
Dept: FAMILY MEDICINE CLINIC | Age: 72
End: 2022-10-04

## 2022-10-04 ENCOUNTER — NURSE ONLY (OUTPATIENT)
Dept: FAMILY MEDICINE CLINIC | Age: 72
End: 2022-10-04
Payer: MEDICARE

## 2022-10-04 DIAGNOSIS — Z23 NEED FOR INFLUENZA VACCINATION: ICD-10-CM

## 2022-10-04 DIAGNOSIS — Z79.01 ANTICOAGULANT LONG-TERM USE: Primary | ICD-10-CM

## 2022-10-04 LAB
INTERNATIONAL NORMALIZATION RATIO, POC: 2.6
PROTHROMBIN TIME, POC: 0

## 2022-10-04 PROCEDURE — 85610 PROTHROMBIN TIME: CPT | Performed by: FAMILY MEDICINE

## 2022-10-04 PROCEDURE — G0008 ADMIN INFLUENZA VIRUS VAC: HCPCS | Performed by: FAMILY MEDICINE

## 2022-10-04 PROCEDURE — 90694 VACC AIIV4 NO PRSRV 0.5ML IM: CPT | Performed by: FAMILY MEDICINE

## 2022-10-04 NOTE — LETTER
[address] [facility] [address]  Phone: [phone]  Fax: [phone]    DEMI Silva CNP         October 4, 2022     Patient: Madi Vieyra   YOB: 1950   Date of Visit: 10/4/2022       To Whom It May Concern: It is my medical opinion that Shelton Lundborg requires a disability parking placard for the following reasons:  She cannot walk without assistance from another person or the use of an assistance device (cane, crutch, prosthetic device, wheelchair, etc.). Duration of need: 5 years    If you have any questions or concerns, please don't hesitate to call.     Sincerely,        DEMI Silva CNP

## 2022-10-04 NOTE — TELEPHONE ENCOUNTER
Pt was needing to get her handicap placard her's has  and she never noticed it. Was wanting to see if we could mail it to her.

## 2022-10-19 ENCOUNTER — ANTI-COAG VISIT (OUTPATIENT)
Dept: FAMILY MEDICINE CLINIC | Age: 72
End: 2022-10-19

## 2022-10-19 ENCOUNTER — NURSE ONLY (OUTPATIENT)
Dept: FAMILY MEDICINE CLINIC | Age: 72
End: 2022-10-19

## 2022-10-19 DIAGNOSIS — Z79.01 ANTICOAGULANT LONG-TERM USE: Primary | ICD-10-CM

## 2022-10-19 DIAGNOSIS — I10 BENIGN ESSENTIAL HTN: ICD-10-CM

## 2022-10-19 DIAGNOSIS — M75.51 SUBACROMIAL BURSITIS OF RIGHT SHOULDER JOINT: ICD-10-CM

## 2022-10-19 LAB
INTERNATIONAL NORMALIZATION RATIO, POC: 2.4
PROTHROMBIN TIME, POC: 0

## 2022-10-19 RX ORDER — DILTIAZEM HYDROCHLORIDE 240 MG/1
CAPSULE, EXTENDED RELEASE ORAL
Qty: 90 CAPSULE | Refills: 3 | Status: SHIPPED | OUTPATIENT
Start: 2022-10-19

## 2022-10-19 RX ORDER — DILTIAZEM HYDROCHLORIDE 360 MG/1
CAPSULE, EXTENDED RELEASE ORAL
Qty: 90 CAPSULE | Refills: 1 | Status: SHIPPED | OUTPATIENT
Start: 2022-10-19

## 2022-11-14 DIAGNOSIS — E11.22 CKD STAGE 4 DUE TO TYPE 2 DIABETES MELLITUS (HCC): ICD-10-CM

## 2022-11-14 DIAGNOSIS — Z79.4 INSULIN-REQUIRING OR DEPENDENT TYPE II DIABETES MELLITUS (HCC): ICD-10-CM

## 2022-11-14 DIAGNOSIS — N18.4 CKD STAGE 4 DUE TO TYPE 2 DIABETES MELLITUS (HCC): ICD-10-CM

## 2022-11-14 DIAGNOSIS — E11.9 INSULIN-REQUIRING OR DEPENDENT TYPE II DIABETES MELLITUS (HCC): ICD-10-CM

## 2022-11-14 RX ORDER — LANCETS 30 GAUGE
EACH MISCELLANEOUS
Qty: 100 EACH | Refills: 5 | Status: SHIPPED | OUTPATIENT
Start: 2022-11-14

## 2022-11-16 ENCOUNTER — NURSE ONLY (OUTPATIENT)
Dept: FAMILY MEDICINE CLINIC | Age: 72
End: 2022-11-16
Payer: MEDICARE

## 2022-11-16 ENCOUNTER — ANTI-COAG VISIT (OUTPATIENT)
Dept: FAMILY MEDICINE CLINIC | Age: 72
End: 2022-11-16

## 2022-11-16 DIAGNOSIS — Z79.01 ANTICOAGULANT LONG-TERM USE: ICD-10-CM

## 2022-11-16 DIAGNOSIS — E11.9 INSULIN-REQUIRING OR DEPENDENT TYPE II DIABETES MELLITUS (HCC): Primary | ICD-10-CM

## 2022-11-16 DIAGNOSIS — Z79.4 INSULIN-REQUIRING OR DEPENDENT TYPE II DIABETES MELLITUS (HCC): Primary | ICD-10-CM

## 2022-11-16 LAB
INTERNATIONAL NORMALIZATION RATIO, POC: 2.8
PROTHROMBIN TIME, POC: 0

## 2022-11-16 PROCEDURE — 36415 COLL VENOUS BLD VENIPUNCTURE: CPT | Performed by: FAMILY MEDICINE

## 2022-11-16 PROCEDURE — 85610 PROTHROMBIN TIME: CPT | Performed by: FAMILY MEDICINE

## 2022-11-17 LAB
ESTIMATED AVERAGE GLUCOSE: 171.4 MG/DL
HBA1C MFR BLD: 7.6 %

## 2022-12-13 ENCOUNTER — ANTI-COAG VISIT (OUTPATIENT)
Dept: FAMILY MEDICINE CLINIC | Age: 72
End: 2022-12-13

## 2022-12-13 ENCOUNTER — OFFICE VISIT (OUTPATIENT)
Dept: FAMILY MEDICINE CLINIC | Age: 72
End: 2022-12-13
Payer: MEDICARE

## 2022-12-13 VITALS
OXYGEN SATURATION: 94 % | DIASTOLIC BLOOD PRESSURE: 80 MMHG | WEIGHT: 242 LBS | HEART RATE: 75 BPM | SYSTOLIC BLOOD PRESSURE: 130 MMHG | BODY MASS INDEX: 42.87 KG/M2

## 2022-12-13 DIAGNOSIS — E11.41 DIABETIC MONONEUROPATHY ASSOCIATED WITH TYPE 2 DIABETES MELLITUS (HCC): Primary | ICD-10-CM

## 2022-12-13 DIAGNOSIS — N18.4 CKD STAGE 4 DUE TO TYPE 2 DIABETES MELLITUS (HCC): ICD-10-CM

## 2022-12-13 DIAGNOSIS — Z79.01 ANTICOAGULANT LONG-TERM USE: ICD-10-CM

## 2022-12-13 DIAGNOSIS — E03.8 HYPOTHYROIDISM DUE TO HASHIMOTO'S THYROIDITIS: ICD-10-CM

## 2022-12-13 DIAGNOSIS — I48.20 CHRONIC ATRIAL FIBRILLATION (HCC): ICD-10-CM

## 2022-12-13 DIAGNOSIS — I10 BENIGN ESSENTIAL HTN: ICD-10-CM

## 2022-12-13 DIAGNOSIS — Z79.899 CONTROLLED SUBSTANCE AGREEMENT SIGNED: ICD-10-CM

## 2022-12-13 DIAGNOSIS — E11.9 INSULIN-REQUIRING OR DEPENDENT TYPE II DIABETES MELLITUS (HCC): ICD-10-CM

## 2022-12-13 DIAGNOSIS — E11.22 CKD STAGE 4 DUE TO TYPE 2 DIABETES MELLITUS (HCC): ICD-10-CM

## 2022-12-13 DIAGNOSIS — H91.93 BILATERAL HEARING LOSS, UNSPECIFIED HEARING LOSS TYPE: ICD-10-CM

## 2022-12-13 DIAGNOSIS — Z79.4 INSULIN-REQUIRING OR DEPENDENT TYPE II DIABETES MELLITUS (HCC): ICD-10-CM

## 2022-12-13 DIAGNOSIS — K21.9 GASTROESOPHAGEAL REFLUX DISEASE WITHOUT ESOPHAGITIS: ICD-10-CM

## 2022-12-13 DIAGNOSIS — I87.2 VENOUS INSUFFICIENCY: ICD-10-CM

## 2022-12-13 DIAGNOSIS — E06.3 HYPOTHYROIDISM DUE TO HASHIMOTO'S THYROIDITIS: ICD-10-CM

## 2022-12-13 LAB
INTERNATIONAL NORMALIZATION RATIO, POC: 2.4
PROTHROMBIN TIME, POC: 0

## 2022-12-13 PROCEDURE — G8399 PT W/DXA RESULTS DOCUMENT: HCPCS | Performed by: FAMILY MEDICINE

## 2022-12-13 PROCEDURE — G8417 CALC BMI ABV UP PARAM F/U: HCPCS | Performed by: FAMILY MEDICINE

## 2022-12-13 PROCEDURE — 1036F TOBACCO NON-USER: CPT | Performed by: FAMILY MEDICINE

## 2022-12-13 PROCEDURE — 99214 OFFICE O/P EST MOD 30 MIN: CPT | Performed by: FAMILY MEDICINE

## 2022-12-13 PROCEDURE — 3017F COLORECTAL CA SCREEN DOC REV: CPT | Performed by: FAMILY MEDICINE

## 2022-12-13 PROCEDURE — 3051F HG A1C>EQUAL 7.0%<8.0%: CPT | Performed by: FAMILY MEDICINE

## 2022-12-13 PROCEDURE — 36415 COLL VENOUS BLD VENIPUNCTURE: CPT | Performed by: FAMILY MEDICINE

## 2022-12-13 PROCEDURE — G8427 DOCREV CUR MEDS BY ELIG CLIN: HCPCS | Performed by: FAMILY MEDICINE

## 2022-12-13 PROCEDURE — 2022F DILAT RTA XM EVC RTNOPTHY: CPT | Performed by: FAMILY MEDICINE

## 2022-12-13 PROCEDURE — 1090F PRES/ABSN URINE INCON ASSESS: CPT | Performed by: FAMILY MEDICINE

## 2022-12-13 PROCEDURE — 3074F SYST BP LT 130 MM HG: CPT | Performed by: FAMILY MEDICINE

## 2022-12-13 PROCEDURE — 1123F ACP DISCUSS/DSCN MKR DOCD: CPT | Performed by: FAMILY MEDICINE

## 2022-12-13 PROCEDURE — 3078F DIAST BP <80 MM HG: CPT | Performed by: FAMILY MEDICINE

## 2022-12-13 PROCEDURE — G8484 FLU IMMUNIZE NO ADMIN: HCPCS | Performed by: FAMILY MEDICINE

## 2022-12-13 PROCEDURE — 85610 PROTHROMBIN TIME: CPT | Performed by: FAMILY MEDICINE

## 2022-12-13 SDOH — ECONOMIC STABILITY: FOOD INSECURITY: WITHIN THE PAST 12 MONTHS, YOU WORRIED THAT YOUR FOOD WOULD RUN OUT BEFORE YOU GOT MONEY TO BUY MORE.: NEVER TRUE

## 2022-12-13 SDOH — ECONOMIC STABILITY: FOOD INSECURITY: WITHIN THE PAST 12 MONTHS, THE FOOD YOU BOUGHT JUST DIDN'T LAST AND YOU DIDN'T HAVE MONEY TO GET MORE.: NEVER TRUE

## 2022-12-13 ASSESSMENT — SOCIAL DETERMINANTS OF HEALTH (SDOH): HOW HARD IS IT FOR YOU TO PAY FOR THE VERY BASICS LIKE FOOD, HOUSING, MEDICAL CARE, AND HEATING?: NOT HARD AT ALL

## 2022-12-13 NOTE — PATIENT INSTRUCTIONS

## 2022-12-13 NOTE — PROGRESS NOTES
Chief Complaint   Patient presents with    Hypertension    Gastroesophageal Reflux    Diabetes        Internal Administration   First Dose COVID-19, PFIZER PURPLE top, DILUTE for use, (age 15 y+), 30mcg/0.3mL  2021   Second Dose COVID-19, PFIZER PURPLE top, DILUTE for use, (age 15 y+), 30mcg/0.3mL   2021       Last COVID Lab SARS-CoV-2 (no units)   Date Value   2021 Not Detected     SARS-CoV-2, PCR (no units)   Date Value   10/27/2020 Not Detected             Wt Readings from Last 3 Encounters:   22 242 lb (109.8 kg)   22 241 lb (109.3 kg)   22 237 lb (107.5 kg)     BP Readings from Last 3 Encounters:   22 130/80   22 (!) 166/74   22 138/88      Lab Results   Component Value Date    LABA1C 7.6 2022    LABA1C 8.1 2022    LABA1C 7.8 2022       HPI:  Elin Ren is a 67 y.o. (: 1950) here today for    She states that she has been using about 12 units before each meal but admits she only eats about twice a day    She states she has been only taking her lyrica twice a day and admits she is having more issues with her neuropathy. Informed its prescribed for three times daily so she can begin to take one in the AM and two in the evening to see if this helps her legs. Will also update her UDS today. She denies any side effects of taking the medication. She states that she has been having a lot of issues with pain in her right ear. It does not show anything as far as wax or infection but the ear drum is retracted. She admits she can not hear at all even with her hearing aids. Will refer to audiologist.    [x] Patient has completed an advance directive  [] Patient has NOT completed an advanced directive  [x] Patient has a documented healthcare surrogate  [] Patient does NOT have a documented healthcare surrogate  [] Discussed the importance of establishing and updating an advanced directive.   Patient has questions at this time and those Insecurity    Worried About Running Out of Food in the Last Year: Never true    Ran Out of Food in the Last Year: Never true   Transportation Needs: Not on file   Physical Activity: Sufficiently Active    Days of Exercise per Week: 7 days    Minutes of Exercise per Session: 60 min   Stress: Not on file   Social Connections: Not on file   Intimate Partner Violence: Not on file   Housing Stability: Not on file     Prior to Visit Medications    Medication Sig Taking? Authorizing Provider   Lancets (ONETOUCH DELICA PLUS [id number]) [address] [facility] TEST 3 TIMES DAILY AS NEEDED *INSURANCE ONLY ALLOWS 100 EVERY 3 MONTHS Yes Dorcus Cogan, MD   dilTIAZem (CARDIZEM CD) 360 MG extended release capsule TAKE 1 CAPSULE BY MOUTH EVERY DAY Yes Dorcus Cogan, MD   DILT- MG extended release capsule TAKE 1 CAPSULE BY MOUTH EVERY DAY Yes Dorcus Cogan, MD   levothyroxine (SYNTHROID) 100 MCG tablet TAKE 1 TABLET BY MOUTH EVERY DAY Yes Dorcus Cogan, MD   pregabalin (LYRICA) 100 MG capsule TAKE 1 CAPSULE BY MOUTH THREE TIMES A DAY Yes Dorcus Cogan, MD   insulin detemir (LEVEMIR FLEXTOUCH) 100 UNIT/ML injection pen INJECT 50 UNITS INTO THE SKIN NIGHTLY Yes Dorcus Cogan, MD   warfarin (COUMADIN) 5 MG tablet TAKE 1 TAB DAILY EXCEPT 10 MG ON MONDAY Yes Dorcus Cogan, MD   pentoxifylline (TRENTAL) 400 MG extended release tablet TAKE 1 TABLET BY MOUTH EVERY DAY Yes Dorcus Cogan, MD   warfarin (COUMADIN) 1 MG tablet TAKE 1 TABLET BY MOUTH EVERY DAY AS DIRECTED Yes Dorcus Cogan, MD   losartan (COZAAR) 50 MG tablet Take 1 tablet by mouth in the morning.  Yes Dorcus Cogan, MD   atorvastatin (LIPITOR) 40 MG tablet TAKE 1 TABLET BY MOUTH EVERY DAY Yes DEMI Grimes CNP   insulin regular (NOVOLIN R) 100 UNIT/ML injection USE AS DIRECTED PER SLIDING SCALE UP TO 26 UNITS DAILY Yes Dorcus Cogan, MD   BD PEN NEEDLE ELDER 2ND GEN 32G X 4 MM MISC USE DAILY AS DIRECTED Yes Danna Leyden, APRN - CNP   colchicine (COLCRYS) 0.6 MG tablet Take 1 p.o. twice daily may cut down to 1 a day or discontinue if diarrhea develops. Yes Bo Hayes MD   diclofenac sodium (VOLTAREN) 1 % GEL APPLY TO AFFECTED AREA TWICE A DAY Yes Jim Barlow MD   fluticasone (FLONASE) 50 MCG/ACT nasal spray INHALE 1 SPRAY INTO EACH NOSTRIL EVERY DAY Yes Bo Hayes MD   albuterol sulfate  (90 Base) MCG/ACT inhaler INHALE 2 PUFFS BY MOUTH EVERY 4 HOURS AS NEEDED FOR WHEEZE OR FOR SHORTNESS OF BREATH Yes Bo Hayes MD   furosemide (LASIX) 20 MG tablet TAKE 1 TABLET BY MOUTH EVERY DAY Yes Bo Hayes MD   promethazine-dextromethorphan (PROMETHAZINE-DM) 6.25-15 MG/5ML syrup Take 5 mLs by mouth 4 times daily as needed for Cough Yes Danna Leyden, APRN - CNP   omeprazole (PRILOSEC) 40 MG delayed release capsule TAKE 1 CAPSULE BY MOUTH EVERY DAY Yes Bo Hayes MD   BD VEO INSULIN SYRINGE U/F 31G X 15/64\" 0.3 ML MISC USE UP TO FIVE TIMES DAILY Yes Bo Hayes MD   Cholecalciferol (VITAMIN D3) 50 MCG (2000 UT) CAPS Take by mouth Yes Historical Provider, MD   allopurinol (ZYLOPRIM) 100 MG tablet Take 100 mg by mouth daily  Yes Historical Provider, MD   colchicine (COLCRYS) 0.6 MG tablet Take 1 tablet by mouth 2 times daily Yes Bo Hayes MD   magnesium oxide (MAG-OX) 400 MG tablet TAKE 1 TABLET BY MOUTH THREE TIMES A DAY  Patient taking differently: Take 400 mg by mouth daily Yes Danna Leyden, APRN - CNP     No Known Allergies    OBJECTIVE:  Estimated body mass index is 42.87 kg/m² as calculated from the following:    Height as of 8/1/22: 5' 3\" (1.6 m). Weight as of this encounter: 242 lb (109.8 kg).   Vitals:    12/13/22 0752   BP: 130/80   Pulse: 75   SpO2: 94%   Weight: 242 lb (109.8 kg)       Physical Exam  Cardiovascular:      Rate and Rhythm: Regular rhythm. Heart sounds: Normal heart sounds. Pulmonary:      Breath sounds: Normal breath sounds. Abdominal:      Tenderness: There is no abdominal tenderness. Musculoskeletal:      Right lower leg: No edema. Left lower leg: No edema. Neurological:      Mental Status: She is oriented to person, place, and time. Psychiatric:         Mood and Affect: Mood normal.         Behavior: Behavior normal.            ASSESSMENT PLAN      Diagnosis Orders   1. Diabetic mononeuropathy associated with type 2 diabetes mellitus ([facility]        2. Anticoagulant long-term use  POCT INR      3. Insulin-requiring or dependent type II diabetes mellitus (HCC)  insulin regular (NOVOLIN R) 100 UNIT/ML injection    Hemoglobin A1C    DISCONTINUED: insulin regular (NOVOLIN R) 100 UNIT/ML injection      4. Venous insufficiency        5. Benign essential HTN        6. Hypothyroidism due to Hashimoto's thyroiditis        7. Chronic atrial fibrillation (HCC)        8. Gastroesophageal reflux disease without esophagitis        9. CKD stage 4 due to type 2 diabetes mellitus ([facility]        10. Controlled substance agreement signed  Drug Panel-PM-HI Res-UR Interp-A      11. Bilateral hearing loss, unspecified hearing loss type  Jovanna Paredes, [place], [place]. [place], [facility]      Blood sugar readings by glycosylated hemoglobin or home fingerstick blood sugars are acceptable and no changes in diabetic medication as listed in the medication list are necessary. Current blood pressure readings in the office or at home are acceptable and current antihypertensive medications as listed in the medication list require no change. No significant lower extremity swelling. Thyroid replacement by lab parameters and symptoms appears appropriate and no changes in thyroid medication as listed in the medication profile is necessary clinically in sinus rhythm.   Reflux symptoms based upon patient's history is controlled and no

## 2022-12-14 LAB
ESTIMATED AVERAGE GLUCOSE: 168.6 MG/DL
HBA1C MFR BLD: 7.5 %

## 2022-12-17 LAB
6-ACETYLMORPHINE: NOT DETECTED
7-AMINOCLONAZEPAM: NOT DETECTED
ALPHA-OH-ALPRAZOLAM: NOT DETECTED
ALPHA-OH-MIDAZOLAM, URINE: NOT DETECTED
ALPRAZOLAM: NOT DETECTED
AMPHETAMINE: NOT DETECTED
BARBITURATES: NOT DETECTED
BENZOYLECGONINE: NOT DETECTED
BUPRENORPHINE: NOT DETECTED
CARISOPRODOL: NOT DETECTED
CLONAZEPAM: NOT DETECTED
CODEINE: NOT DETECTED
CREATININE URINE: 23.2 MG/DL (ref 20–400)
DIAZEPAM: NOT DETECTED
DRUGS EXPECTED: NORMAL
EER PAIN MGT DRUG PANEL, HIGH RES/EMIT U: NORMAL
ETHYL GLUCURONIDE: NOT DETECTED
FENTANYL: NOT DETECTED
GABAPENTIN: NOT DETECTED
HYDROCODONE: NOT DETECTED
HYDROMORPHONE: NOT DETECTED
LORAZEPAM: NOT DETECTED
MARIJUANA METABOLITE: NOT DETECTED
MDA: NOT DETECTED
MDEA: NOT DETECTED
MDMA URINE: NOT DETECTED
MEPERIDINE: NOT DETECTED
METHADONE: NOT DETECTED
METHAMPHETAMINE: NOT DETECTED
METHYLPHENIDATE: NOT DETECTED
MIDAZOLAM: NOT DETECTED
MORPHINE: NOT DETECTED
NALOXONE: NOT DETECTED
NORBUPRENORPHINE, FREE: NOT DETECTED
NORDIAZEPAM: NOT DETECTED
NORFENTANYL: NOT DETECTED
NORHYDROCODONE, URINE: NOT DETECTED
NOROXYCODONE: NOT DETECTED
NOROXYMORPHONE, URINE: NOT DETECTED
OXAZEPAM: NOT DETECTED
OXYCODONE: NOT DETECTED
OXYMORPHONE: NOT DETECTED
PAIN MANAGEMENT DRUG PANEL: NORMAL
PAIN MANAGEMENT DRUG PANEL: NORMAL
PCP: NOT DETECTED
PHENTERMINE: NOT DETECTED
PREGABALIN: PRESENT
TAPENTADOL, URINE: NOT DETECTED
TAPENTADOL-O-SULFATE, URINE: NOT DETECTED
TEMAZEPAM: NOT DETECTED
TRAMADOL: NOT DETECTED
ZOLPIDEM: NOT DETECTED

## 2023-01-05 ENCOUNTER — NURSE ONLY (OUTPATIENT)
Dept: FAMILY MEDICINE CLINIC | Age: 73
End: 2023-01-05

## 2023-01-05 ENCOUNTER — TELEPHONE (OUTPATIENT)
Dept: FAMILY MEDICINE CLINIC | Age: 73
End: 2023-01-05

## 2023-01-05 DIAGNOSIS — R68.89 FLU-LIKE SYMPTOMS: Primary | ICD-10-CM

## 2023-01-05 DIAGNOSIS — J02.9 SORE THROAT: ICD-10-CM

## 2023-01-05 LAB
INFLUENZA A ANTIBODY: NORMAL
INFLUENZA B ANTIBODY: NORMAL
Lab: NORMAL
QC PASS/FAIL: NORMAL
S PYO AG THROAT QL: POSITIVE
SARS-COV-2 RDRP RESP QL NAA+PROBE: NEGATIVE

## 2023-01-05 NOTE — TELEPHONE ENCOUNTER
Pt called and stated that she has a sore throat and a cough. Was wanting to see if she could get in to have it looked at or come and get some swabs done. Will be going past here at 10:00 this morning and can stop.

## 2023-01-06 ENCOUNTER — TELEMEDICINE (OUTPATIENT)
Dept: FAMILY MEDICINE CLINIC | Age: 73
End: 2023-01-06

## 2023-01-06 DIAGNOSIS — J06.9 VIRAL URI: ICD-10-CM

## 2023-01-06 DIAGNOSIS — J02.0 ACUTE STREPTOCOCCAL PHARYNGITIS: Primary | ICD-10-CM

## 2023-01-06 DIAGNOSIS — R05.1 ACUTE COUGH: ICD-10-CM

## 2023-01-06 RX ORDER — DEXTROMETHORPHAN HYDROBROMIDE AND PROMETHAZINE HYDROCHLORIDE 15; 6.25 MG/5ML; MG/5ML
5 SYRUP ORAL 4 TIMES DAILY PRN
Qty: 240 ML | Refills: 0 | Status: SHIPPED | OUTPATIENT
Start: 2023-01-06

## 2023-01-06 RX ORDER — AMOXICILLIN 500 MG/1
500 CAPSULE ORAL 2 TIMES DAILY
Qty: 14 CAPSULE | Refills: 0 | Status: SHIPPED | OUTPATIENT
Start: 2023-01-06 | End: 2023-01-13

## 2023-01-06 ASSESSMENT — ENCOUNTER SYMPTOMS
SINUS PRESSURE: 0
RECTAL PAIN: 0
COUGH: 1
DIARRHEA: 0
ANAL BLEEDING: 0
RHINORRHEA: 0
CONSTIPATION: 0
NAUSEA: 0
ABDOMINAL PAIN: 0
CHOKING: 0
BLOOD IN STOOL: 0
TROUBLE SWALLOWING: 0
VOICE CHANGE: 1
EYES NEGATIVE: 1
CHEST TIGHTNESS: 0
SORE THROAT: 1
WHEEZING: 1
STRIDOR: 0
APNEA: 0
FACIAL SWELLING: 0
SINUS PAIN: 0
ABDOMINAL DISTENTION: 0
SHORTNESS OF BREATH: 1
VOMITING: 0

## 2023-01-06 NOTE — PROGRESS NOTES
2023    TELEHEALTH EVALUATION -- Audio/Visual (During TXXXZ-87 public health emergency)    HPI:    Ham Gonzalez (:  1950) has requested an audio/video evaluation for the following concern(s):    URI   This is a new problem. The current episode started in the past 7 days (3 days). The problem has been gradually worsening. There has been no fever. Associated symptoms include congestion (Throat; nasal), coughing (Productive), ear pain (Right ear (chronic)), headaches, sneezing, a sore throat and wheezing. Pertinent negatives include no abdominal pain, diarrhea, nausea, rhinorrhea, sinus pain or vomiting. She has tried acetaminophen for the symptoms. She admits she did a home COVID-19 test and it was negative. Her strep test was positive. Review of Systems   Constitutional:  Positive for fatigue. Negative for activity change, appetite change, chills, diaphoresis, fever and unexpected weight change. HENT:  Positive for congestion (Throat; nasal), ear pain (Right ear (chronic)), postnasal drip, sneezing, sore throat and voice change (Hoarse voice). Negative for dental problem, drooling, ear discharge, facial swelling, hearing loss, mouth sores, nosebleeds, rhinorrhea, sinus pressure, sinus pain, tinnitus and trouble swallowing. Eyes: Negative. Respiratory:  Positive for cough (Productive), shortness of breath (Mild (with exertion)) and wheezing. Negative for apnea, choking, chest tightness and stridor. Gastrointestinal:  Negative for abdominal distention, abdominal pain, anal bleeding, blood in stool, constipation, diarrhea, nausea, rectal pain and vomiting. Neurological:  Positive for headaches. Prior to Visit Medications    Medication Sig Taking?  Authorizing Provider   promethazine-dextromethorphan (PROMETHAZINE-DM) 6.25-15 MG/5ML syrup Take 5 mLs by mouth 4 times daily as needed for Cough Yes DEMI Hurt - CNP   amoxicillin (AMOXIL) 500 MG capsule Take 1 capsule by mouth 2 times daily for 7 days Yes DEMI Mayen CNP   insulin regular (NOVOLIN R) 100 UNIT/ML injection USE AS DIRECTED PER SLIDING SCALE UP TO 36 UNITS DAILY Yes Truong German MD   Lancets ([address]) [address] [facility] TEST 3 TIMES DAILY AS NEEDED *INSURANCE ONLY ALLOWS 100 EVERY 3 MONTHS Yes Truong German MD   dilTIAZem (CARDIZEM CD) 360 MG extended release capsule TAKE 1 CAPSULE BY MOUTH EVERY DAY Yes Truong German MD   levothyroxine (SYNTHROID) 100 MCG tablet TAKE 1 TABLET BY MOUTH EVERY DAY Yes Truong German MD   pregabalin (LYRICA) 100 MG capsule TAKE 1 CAPSULE BY MOUTH THREE TIMES A DAY Yes Truong German MD   insulin detemir (LEVEMIR FLEXTOUCH) 100 UNIT/ML injection pen INJECT 50 UNITS INTO THE SKIN NIGHTLY Yes Truong German MD   warfarin (COUMADIN) 5 MG tablet TAKE 1 TAB DAILY EXCEPT 10 MG ON MONDAY Yes Truong German MD   pentoxifylline (TRENTAL) 400 MG extended release tablet TAKE 1 TABLET BY MOUTH EVERY DAY Yes Truong German MD   warfarin (COUMADIN) 1 MG tablet TAKE 1 TABLET BY MOUTH EVERY DAY AS DIRECTED Yes Truong German MD   losartan (COZAAR) 50 MG tablet Take 1 tablet by mouth in the morning. Yes Truong German MD   atorvastatin (LIPITOR) 40 MG tablet TAKE 1 TABLET BY MOUTH EVERY DAY Yes DEMI Holland CNP   BD PEN NEEDLE ELDER 2ND GEN 32G X 4 MM MISC USE DAILY AS DIRECTED Yes DEMI Mayen CNP   colchicine (COLCRYS) 0.6 MG tablet Take 1 p.o. twice daily may cut down to 1 a day or discontinue if diarrhea develops.  Yes Truong German MD   diclofenac sodium (VOLTAREN) 1 % GEL APPLY TO AFFECTED AREA TWICE A DAY Yes Jazmyn Ariza MD   fluticasone [facility]) 50 MCG/ACT nasal spray INHALE 1 SPRAY INTO EACH NOSTRIL EVERY DAY Yes Truong Geramn MD   albuterol sulfate  (90 Base) MCG/ACT inhaler INHALE 2 PUFFS BY MOUTH EVERY 4 HOURS AS NEEDED FOR WHEEZE OR FOR SHORTNESS OF BREATH Yes Eveline Garibay MD   furosemide (LASIX) 20 MG tablet TAKE 1 TABLET BY MOUTH EVERY DAY Yes Eveline Garibay MD   omeprazole (PRILOSEC) 40 MG delayed release capsule TAKE 1 CAPSULE BY MOUTH EVERY DAY Yes Eevline Garibay MD   BD VEO INSULIN SYRINGE U/F 31G X 15/64\" 0.3 ML MISC USE UP TO FIVE TIMES DAILY Yes Eveline Garibay MD   Cholecalciferol (VITAMIN D3) 50 MCG (2000) CAPS Take by mouth Yes Historical Provider, MD   allopurinol (ZYLOPRIM) 100 MG tablet Take 100 mg by mouth daily  Yes Historical Provider, MD   magnesium oxide (MAG-OX) 400 MG tablet TAKE 1 TABLET BY MOUTH THREE TIMES A DAY  Patient taking differently: Take 400 mg by mouth daily Yes DEMI Bliss - CNP       Social History     Tobacco Use    Smoking status: Former     Packs/day: 1.00     Years: 35.00     Pack years: 35.00     Types: Cigarettes     Quit date:      Years since quittin.0    Smokeless tobacco: Never   Vaping Use    Vaping Use: Never used   Substance Use Topics    Alcohol use: No     Alcohol/week: 0.0 standard drinks    Drug use: No        No Known Allergies,   Past Medical History:   Diagnosis Date    Atrial fibrillation ([facility]     Cancer ([facility]     lymphoma    CKD (chronic kidney disease)     Diabetes mellitus ([facility]     DVT (deep venous thrombosis) ([facility]     Gout 2019    Hyperlipidemia     Hypertension     Hypotension    ,   Past Surgical History:   Procedure Laterality Date    COLONOSCOPY      COLONOSCOPY  2018    colon polyps,diverticulosis    COLONOSCOPY N/A 2022    COLONOSCOPY POLYPECTOMY SNARE/COLD BIOPSY performed by Angelic Floyd MD at [facility] N/A 2022    COLONOSCOPY CONTROL HEMORRHAGE performed by Angelic Floyd MD at [address] 2020    EXCISION RIGHT RING FINGER MUCOUS CYST AND BONE SPUR performed by Winter Gonsalez MD Rachid at [address] [place] [address]      OTHER SURGICAL HISTORY      vocal cord bx    OTHER SURGICAL HISTORY  2022      colonoscopy with biopsy   ,   Social History     Tobacco Use    Smoking status: Former     Packs/day: 1.00     Years: 35.00     Pack years: 35.00     Types: Cigarettes     Quit date:      Years since quittin.0    Smokeless tobacco: Never   Vaping Use    Vaping Use: Never used   Substance Use Topics    Alcohol use: No     Alcohol/week: 0.0 standard drinks    Drug use: No   ,   Family History   Problem Relation Age of Onset    Cancer Father     Cancer Sister         lung    Cancer Brother         lymphoma    Heart Disease Mother    ,   Immunization History   Administered Date(s) Administered    COVID-19, PFIZER Bivalent BOOSTER, DO NOT Dilute, (age 12y+), IM, 27 mcg/0.3 mL 2022    COVID-19, PFIZER GRAY top, DO NOT Dilute, (age 15 y+), IM, 30 mcg/0.3 mL 2022    COVID-19, PFIZER PURPLE top, DILUTE for use, (age 15 y+), 30mcg/0.3mL 2021, 2021, 10/19/2021    Influenza 2013    Influenza A (X0W9-06) Vaccine PF IM 2009    Influenza Virus Vaccine 2013, 10/22/2014, 2015, 2017, 2018    Influenza, AFLURIA (age 1 yrs+), FLUZONE, (age 10 mo+), MDV, 0.5mL 2017, 2018    Influenza, FLUAD, (age 72 y+), Adjuvanted, 0.5mL 10/04/2022    Influenza, FLUARIX, FLULAVAL, FLUZONE (age 10 mo+) AND AFLURIA, (age 1 y+), PF, 0.5mL 2019, 10/12/2020    Influenza, FLUCELVAX, (age 10 mo+), MDCK, PF, 0.5mL 10/13/2021    Influenza, High Dose (Fluzone 65 yrs and older) 2016    Pneumococcal Conjugate 13-valent ([id number]) 2016    Pneumococcal Polysaccharide (Blcalpyru97) 04/10/2017    Tdap (Boostrix, Adacel) 2019    Zoster Recombinant (Shingrix) 2018, 2018   ,   Health Maintenance   Topic Date Due    Diabetic foot exam  2022    Diabetic Alb to Cr ratio (uACR) test  2022    Diabetic retinal exam 02/24/2023    Depression Screen  03/01/2023    Annual Wellness Visit (AWV)  03/02/2023    Lipids  06/02/2023    A1C test (Diabetic or Prediabetic)  12/13/2023    GFR test (Diabetes, CKD 3-4, OR last GFR 15-59)  12/13/2023    Breast cancer screen  01/11/2024    Colorectal Cancer Screen  01/04/2025    DTaP/Tdap/Td vaccine (2 - Td or Tdap) 09/17/2029    DEXA (modify frequency per FRAX score)  Completed    Flu vaccine  Completed    Shingles vaccine  Completed    Pneumococcal 65+ years Vaccine  Completed    COVID-19 Vaccine  Completed    Hepatitis C screen  Completed    Hepatitis A vaccine  Aged Out    Hib vaccine  Aged Out    Meningococcal (ACWY) vaccine  Aged Out       PHYSICAL EXAMINATION:  [ INSTRUCTIONS:  \"[x]\" Indicates a positive item  \"[]\" Indicates a negative item      Vital Signs: (As obtained by patient/caregiver or practitioner observation)    Blood pressure-  Heart rate-    Respiratory rate-    Temperature-  Pulse oximetry-     Constitutional: [x] Appears well-developed and well-nourished [x] No apparent distress      [] Abnormal-   Mental status  [x] Alert and awake  [x] Oriented to person/place/time [x]Able to follow commands      Eyes:  EOM    [x]  Normal  [] Abnormal-  Sclera  [x]  Normal  [] Abnormal -         Discharge [x]  None visible  [] Abnormal -    HENT:   [x] Normocephalic, atraumatic.   [] Abnormal   [x] Mouth/Throat: Mucous membranes are moist.     External Ears [x] Normal  [] Abnormal-     Neck: [x] No visualized mass     Pulmonary/Chest: [x] Respiratory effort normal.  [x] No visualized signs of difficulty breathing or respiratory distress        [] Abnormal-      Musculoskeletal:   [x] Normal gait with no signs of ataxia         [x] Normal range of motion of neck        [] Abnormal-       Neurological:        [x] No Facial Asymmetry (Cranial nerve 7 motor function) (limited exam to video visit)          [x] No gaze palsy        [] Abnormal-         Skin:        [x] No significant exanthematous lesions or discoloration noted on facial skin         [] Abnormal-            Psychiatric:       [x] Normal Affect [x] No Hallucinations        [] Abnormal-     ASSESSMENT/PLAN:  Grzegorz Gilliland was seen today for cough. Recommend rest and good nutrition. May use salt water gargles. Recommend a coolmist humidifier at night. Promethazine DM sent in for her cough. Informed her that her strep throat is still contagious until she has been on antibiotics for at least 24 hours. Did inform her that if she does cough she needs to cough in her elbow or high to keep her cough covered as her cough can still be contagious with a viral infection. Did inform her if her symptoms fail to improve she is to call the office on Monday. She did already have a COVID, flu, strep test performed in the office. She did do a repeat home COVID test today which was negative. Diagnoses and all orders for this visit:    Acute streptococcal pharyngitis  -     amoxicillin (AMOXIL) 500 MG capsule; Take 1 capsule by mouth 2 times daily for 7 days    Acute cough  -     promethazine-dextromethorphan (PROMETHAZINE-DM) 6.25-15 MG/5ML syrup; Take 5 mLs by mouth 4 times daily as needed for Cough    Viral URI      Return if symptoms worsen or fail to improve. Angus Roach is a 67 y.o. female being evaluated by a Virtual Visit (video visit) encounter to address concerns as mentioned above. A caregiver was present when appropriate. Due to this being a TeleHealth encounter (During JZQVU-81 public health emergency), evaluation of the following organ systems was limited: Vitals/Constitutional/EENT/Resp/CV/GI//MS/Neuro/Skin/Heme-Lymph-Imm.   Pursuant to the emergency declaration under the [facility] [address] waiver authority and the [email or web address] and [employer] Act, this Virtual Visit was conducted with patient's (and/or legal guardian's) consent, to reduce the patient's risk of exposure to COVID-19 and provide necessary medical care. The patient (and/or legal guardian) has also been advised to contact this office for worsening conditions or problems, and seek emergency medical treatment and/or call 911 if deemed necessary. Patient identification was verified at the start of the visit: Yes    Total time spent on this encounter: Not billed by time    Services were provided through a video synchronous discussion virtually to substitute for in-person clinic visit. Patient and provider were located at their individual homes. --Raina Lopez, ESTEFANY, APRN - CNP on 1/6/2023 at 10:19 AM    An electronic signature was used to authenticate this note. Patient should call the office immediately with new or ongoing signs or symptoms or worsening, or proceed to the emergency room. All entries in chief complaint and history of present illness are reviewed and validated by me. No changes in past medical history, past surgical history, social history, or family history were noted during the patient encounter unless specifically listed above. All updates of past medical history, past surgical history, social history, or family history were reviewed personally by me during the office visit. All problems listed in the assessment are stable unless noted otherwise. Medication profile reviewed personally by me during the office visit. Medication side effects and possible impairments from medications were discussed as applicable. Every effort has been made to assure accurate transcription by this voice recognition software. However, mistakes in transcription may still occur    You are being started on a new medication. All medications have the potential for adverse effects. All medications effect each person differently. Please read and review provided information related to medication.  If the medication that you have been prescribed has the potential to cause sedation, do not drive or operate car, truck, or heavy machinery until you know how the medication will effect you. If you experience any adverse effects from the medication, please call the office or report to the emergency department.

## 2023-01-08 LAB — THROAT CULTURE: NORMAL

## 2023-01-12 DIAGNOSIS — N18.4 CKD STAGE 4 DUE TO TYPE 2 DIABETES MELLITUS (HCC): ICD-10-CM

## 2023-01-12 DIAGNOSIS — Z79.4 INSULIN-REQUIRING OR DEPENDENT TYPE II DIABETES MELLITUS (HCC): ICD-10-CM

## 2023-01-12 DIAGNOSIS — E11.22 CKD STAGE 4 DUE TO TYPE 2 DIABETES MELLITUS (HCC): ICD-10-CM

## 2023-01-12 DIAGNOSIS — E11.9 INSULIN-REQUIRING OR DEPENDENT TYPE II DIABETES MELLITUS (HCC): ICD-10-CM

## 2023-01-12 RX ORDER — BLOOD SUGAR DIAGNOSTIC
STRIP MISCELLANEOUS
Qty: 300 STRIP | Refills: 0 | Status: SHIPPED | OUTPATIENT
Start: 2023-01-12 | End: 2023-02-10

## 2023-01-13 ENCOUNTER — NURSE ONLY (OUTPATIENT)
Dept: FAMILY MEDICINE CLINIC | Age: 73
End: 2023-01-13

## 2023-01-13 ENCOUNTER — ANTI-COAG VISIT (OUTPATIENT)
Dept: FAMILY MEDICINE CLINIC | Age: 73
End: 2023-01-13

## 2023-01-13 DIAGNOSIS — Z79.01 ANTICOAGULANT LONG-TERM USE: Primary | ICD-10-CM

## 2023-01-13 LAB
INTERNATIONAL NORMALIZATION RATIO, POC: 2.4
PROTHROMBIN TIME, POC: 0

## 2023-01-18 ENCOUNTER — TELEPHONE (OUTPATIENT)
Dept: FAMILY MEDICINE CLINIC | Age: 73
End: 2023-01-18

## 2023-01-18 ENCOUNTER — NURSE ONLY (OUTPATIENT)
Dept: FAMILY MEDICINE CLINIC | Age: 73
End: 2023-01-18

## 2023-01-18 VITALS — OXYGEN SATURATION: 93 % | SYSTOLIC BLOOD PRESSURE: 98 MMHG | HEART RATE: 77 BPM | DIASTOLIC BLOOD PRESSURE: 70 MMHG

## 2023-01-18 DIAGNOSIS — I10 BENIGN ESSENTIAL HTN: ICD-10-CM

## 2023-01-18 RX ORDER — DILTIAZEM HYDROCHLORIDE 240 MG/1
240 CAPSULE, COATED, EXTENDED RELEASE ORAL DAILY
Qty: 90 CAPSULE | Refills: 3 | Status: SHIPPED | OUTPATIENT
Start: 2023-01-18

## 2023-01-18 NOTE — TELEPHONE ENCOUNTER
Pt came in today for bp check  [facility] told her to go to her PCP    [facility] bp readings   Right arm 92/65  Left arm 141/78    My readings  Left arm 160/64  Right arm 98/70  77HR  93%

## 2023-01-18 NOTE — TELEPHONE ENCOUNTER
Pressures significantly lower today.  Will reduce diltiazem CD from 360 mg back to 240 mg a day.  She is already had her losartan reduced from 50 mg to 25 mg leave at the same.  Call in readings in 2 days.  She does not feel severely dizzy as 1 would expect with readings in the 80s and 90s systolic.

## 2023-01-20 ENCOUNTER — TELEPHONE (OUTPATIENT)
Dept: FAMILY MEDICINE CLINIC | Age: 73
End: 2023-01-20

## 2023-01-20 ENCOUNTER — NURSE ONLY (OUTPATIENT)
Dept: FAMILY MEDICINE CLINIC | Age: 73
End: 2023-01-20

## 2023-01-20 VITALS — HEART RATE: 73 BPM | OXYGEN SATURATION: 91 % | DIASTOLIC BLOOD PRESSURE: 72 MMHG | SYSTOLIC BLOOD PRESSURE: 154 MMHG

## 2023-01-20 NOTE — TELEPHONE ENCOUNTER
Pt called with BP readings for this morning. Left arm: /68 HR 73  Right arm: /65 HR 70    States that they went up to the fire house to have \"the boys\" check her BP. Left arm: /75 HR 77  Right arm: /77 HR 84    Pt wanted to know if maybe her port, that is on her right side, could be messing with her BP readings?     Call back pt with recommendations [phone]  Routing to Ashwini due to PCP out of office

## 2023-01-20 NOTE — TELEPHONE ENCOUNTER
Pt came in for bp check   179/80 Left arm  154/72 Left arm  113/75 Right arm  110/74 Right arm  73HR  91%   Pt states she is always tired   She said she never sits down and busy busy all day and she just been so tired to do anything lately. I asked pt when the tiredness started and she couldn't give me an answer she doesn't remember. Tiredness is her major complaint as of now. Pt denies any chest pain or anywhere else or chest palpations. Pt denies any dizziness.

## 2023-01-24 ENCOUNTER — OFFICE VISIT (OUTPATIENT)
Dept: FAMILY MEDICINE CLINIC | Age: 73
End: 2023-01-24
Payer: MEDICARE

## 2023-01-24 ENCOUNTER — PROCEDURE VISIT (OUTPATIENT)
Dept: AUDIOLOGY | Age: 73
End: 2023-01-24
Payer: MEDICARE

## 2023-01-24 VITALS
DIASTOLIC BLOOD PRESSURE: 64 MMHG | HEART RATE: 75 BPM | OXYGEN SATURATION: 90 % | WEIGHT: 246 LBS | BODY MASS INDEX: 43.58 KG/M2 | SYSTOLIC BLOOD PRESSURE: 86 MMHG

## 2023-01-24 DIAGNOSIS — I48.20 CHRONIC ATRIAL FIBRILLATION (HCC): ICD-10-CM

## 2023-01-24 DIAGNOSIS — N18.4 CKD STAGE 4 DUE TO TYPE 2 DIABETES MELLITUS (HCC): ICD-10-CM

## 2023-01-24 DIAGNOSIS — E11.9 INSULIN-REQUIRING OR DEPENDENT TYPE II DIABETES MELLITUS (HCC): ICD-10-CM

## 2023-01-24 DIAGNOSIS — R09.89 UNEQUAL BLOOD PRESSURES IN PAIRED EXTREMITIES: ICD-10-CM

## 2023-01-24 DIAGNOSIS — E11.41 DIABETIC MONONEUROPATHY ASSOCIATED WITH TYPE 2 DIABETES MELLITUS (HCC): ICD-10-CM

## 2023-01-24 DIAGNOSIS — H93.13 TINNITUS OF BOTH EARS: ICD-10-CM

## 2023-01-24 DIAGNOSIS — Z79.4 INSULIN-REQUIRING OR DEPENDENT TYPE II DIABETES MELLITUS (HCC): ICD-10-CM

## 2023-01-24 DIAGNOSIS — H90.3 SENSORINEURAL HEARING LOSS, BILATERAL: Primary | ICD-10-CM

## 2023-01-24 DIAGNOSIS — C92.10 CML (CHRONIC MYELOCYTIC LEUKEMIA) (HCC): ICD-10-CM

## 2023-01-24 DIAGNOSIS — M75.42 SHOULDER IMPINGEMENT, LEFT: ICD-10-CM

## 2023-01-24 DIAGNOSIS — I50.42 CHRONIC COMBINED SYSTOLIC AND DIASTOLIC CONGESTIVE HEART FAILURE (HCC): ICD-10-CM

## 2023-01-24 DIAGNOSIS — E11.22 CKD STAGE 4 DUE TO TYPE 2 DIABETES MELLITUS (HCC): ICD-10-CM

## 2023-01-24 DIAGNOSIS — E11.51 PERIPHERAL VASCULAR DISEASE IN DIABETES MELLITUS (HCC): Primary | ICD-10-CM

## 2023-01-24 PROCEDURE — 1123F ACP DISCUSS/DSCN MKR DOCD: CPT | Performed by: FAMILY MEDICINE

## 2023-01-24 PROCEDURE — 1090F PRES/ABSN URINE INCON ASSESS: CPT | Performed by: FAMILY MEDICINE

## 2023-01-24 PROCEDURE — 3074F SYST BP LT 130 MM HG: CPT | Performed by: FAMILY MEDICINE

## 2023-01-24 PROCEDURE — G8417 CALC BMI ABV UP PARAM F/U: HCPCS | Performed by: FAMILY MEDICINE

## 2023-01-24 PROCEDURE — 3046F HEMOGLOBIN A1C LEVEL >9.0%: CPT | Performed by: FAMILY MEDICINE

## 2023-01-24 PROCEDURE — 92567 TYMPANOMETRY: CPT | Performed by: AUDIOLOGIST

## 2023-01-24 PROCEDURE — 3078F DIAST BP <80 MM HG: CPT | Performed by: FAMILY MEDICINE

## 2023-01-24 PROCEDURE — G8427 DOCREV CUR MEDS BY ELIG CLIN: HCPCS | Performed by: FAMILY MEDICINE

## 2023-01-24 PROCEDURE — 92557 COMPREHENSIVE HEARING TEST: CPT | Performed by: AUDIOLOGIST

## 2023-01-24 PROCEDURE — 1036F TOBACCO NON-USER: CPT | Performed by: FAMILY MEDICINE

## 2023-01-24 PROCEDURE — G8399 PT W/DXA RESULTS DOCUMENT: HCPCS | Performed by: FAMILY MEDICINE

## 2023-01-24 PROCEDURE — 3017F COLORECTAL CA SCREEN DOC REV: CPT | Performed by: FAMILY MEDICINE

## 2023-01-24 PROCEDURE — 2022F DILAT RTA XM EVC RTNOPTHY: CPT | Performed by: FAMILY MEDICINE

## 2023-01-24 PROCEDURE — 99214 OFFICE O/P EST MOD 30 MIN: CPT | Performed by: FAMILY MEDICINE

## 2023-01-24 PROCEDURE — G8484 FLU IMMUNIZE NO ADMIN: HCPCS | Performed by: FAMILY MEDICINE

## 2023-01-24 RX ORDER — LOSARTAN POTASSIUM 50 MG/1
TABLET ORAL
Qty: 90 TABLET | Refills: 3 | Status: SHIPPED | OUTPATIENT
Start: 2023-01-24

## 2023-01-24 ASSESSMENT — PATIENT HEALTH QUESTIONNAIRE - PHQ9
SUM OF ALL RESPONSES TO PHQ QUESTIONS 1-9: 0
SUM OF ALL RESPONSES TO PHQ QUESTIONS 1-9: 0
2. FEELING DOWN, DEPRESSED OR HOPELESS: 0
SUM OF ALL RESPONSES TO PHQ9 QUESTIONS 1 & 2: 0
SUM OF ALL RESPONSES TO PHQ QUESTIONS 1-9: 0
SUM OF ALL RESPONSES TO PHQ QUESTIONS 1-9: 0
1. LITTLE INTEREST OR PLEASURE IN DOING THINGS: 0

## 2023-01-24 NOTE — Clinical Note
Dr. Flori Toro MD,  Thank you for your referral for audiometric testing on this patient. Today's results revealed abnormal hearing sensitivity, bilaterally. Good/excellent speech understanding when in quiet. Tympanometry indicates normal middle ear function. Discussed the patient bringing her hearing aids in for me to evaluate. There may be the possibility of reprogramming them to better suit her hearing loss and perception of sound. Please see the scanned audiogram (under \"Media\" tab) and encounter note for details. If you have any questions, or if there is anything else you need, please let me know.    Dustin Jefferson AuD Audiologist --- [address] [facility] - Audiology

## 2023-01-24 NOTE — PATIENT INSTRUCTIONS

## 2023-01-24 NOTE — TELEPHONE ENCOUNTER
Future Appointments   Date Time Provider Johana Calderon   1/24/2023 11:30 AM Randall Hanna CLER AUDIO [place]   1/24/2023  3:00 PM MD Perry Case  [place] - DYJOSE M   2/15/2023  8:20 AM SCHEDULE, MHC[facility] PERRY [place]  [place] - DYJOSE M   3/15/2023  7:40 AM MD Perry Case  [place]  [place]

## 2023-01-24 NOTE — PROGRESS NOTES
Chief Complaint   Patient presents with    Hypertension    Diabetes        Internal Administration   First Dose COVID-19, PFIZER PURPLE top, DILUTE for use, (age 15 y+), 30mcg/0.3mL  2021   Second Dose COVID-19, PFIZER PURPLE top, DILUTE for use, (age 15 y+), 30mcg/0.3mL   2021       Last COVID Lab SARS-CoV-2 (no units)   Date Value   2021 Not Detected     SARS-CoV-2, PCR (no units)   Date Value   10/27/2020 Not Detected     SARS-COV-2, RdRp gene (no units)   Date Value   2023 Negative             Wt Readings from Last 3 Encounters:   22 242 lb (109.8 kg)   22 241 lb (109.3 kg)   22 237 lb (107.5 kg)     BP Readings from Last 3 Encounters:   23 (!) 154/72   23 98/70   22 130/80      Lab Results   Component Value Date    LABA1C 7.5 2022    LABA1C 7.6 2022    LABA1C 8.1 2022       HPI:  Sunil Foote is a 67 y.o. (: 1950) here today for    She is here today due to issues of her blood pressures being all over the place and she recently noticed the pressure in her right arm is lower than her left arm. Our blood pressures today in the office reflect that her right arm is a lower blood pressure. Will check a bilateral arm aterial doppler. She states she is having issues with her left shoulder and she states she was seeing ortho in different location but it was a far drive and requesting a closer location. [x] Patient has completed an advance directive  [] Patient has NOT completed an advanced directive  [x] Patient has a documented healthcare surrogate  [] Patient does NOT have a documented healthcare surrogate  [] Discussed the importance of establishing and updating an advanced directive. Patient has questions at this time and those were answered. [x] Discussed the importance of establishing and updating an advanced directive. Patient does NOT have questions at this time.     Discussed with: [x] Patient            [] Family             [] Other caregiver    Patient's medications, allergies, past medical, surgical, social and family histories were reviewed and updated asappropriate on 2023 at 2:58 PM.    ROS:  Review of Systems    All other systems reviewed and are negative except as noted above on 2023 at 2:58 PM. Additional review of systems may [facility]. Any responses requiring further intervention were pursued.     Past Medical History:   Diagnosis Date    Atrial fibrillation ([facility]     Cancer ([facility]     lymphoma    CKD (chronic kidney disease)     Diabetes mellitus ([facility]     DVT (deep venous thrombosis) ([facility]     Gout 2019    Hyperlipidemia     Hypertension     Hypotension      Family History   Problem Relation Age of Onset    Cancer Father     Cancer Sister         lung    Cancer Brother         lymphoma    Heart Disease Mother      Social History     Socioeconomic History    Marital status: Single     Spouse name: Not on file    Number of children: Not on file    Years of education: Not on file    Highest education level: Not on file   Occupational History    Not on file   Tobacco Use    Smoking status: Former     Packs/day: 1.00     Years: 35.00     Pack years: 35.00     Types: Cigarettes     Quit date:      Years since quittin.0    Smokeless tobacco: Never   Vaping Use    Vaping Use: Never used   Substance and Sexual Activity    Alcohol use: No     Alcohol/week: 0.0 standard drinks    Drug use: No    Sexual activity: Not Currently   Other Topics Concern    Not on file   Social History Narrative    Not on file     Social Determinants of Health     Financial Resource Strain: Low Risk     Difficulty of Paying Living Expenses: Not hard at all   Food Insecurity: No Food Insecurity    Worried About Running Out of Food in the Last Year: Never true    Ran Out of Food in the Last Year: Never true   Transportation Needs: Not on file   Physical Activity: Sufficiently Active    Days of Exercise per Week: 7 days    Minutes of Exercise per Session: 60 min   Stress: Not on file   Social Connections: Not on file   Intimate Partner Violence: Not on file   Housing Stability: Not on file     Prior to Visit Medications    Medication Sig Taking?  Authorizing Provider   losartan (COZAAR) 50 MG tablet TAKE 1 TABLET BY MOUTH EVERY DAY IN THE MORNING Yes Felice Hammond MD   dilTIAZem (CARDIZEM CD) 240 MG extended release capsule Take 1 capsule by mouth daily Yes Felice Hammond MD   [facility] ULTRA strip USE TO TEST BLOOD SUGAR 3 TIMES A DAY Yes DEMI Haddad CNP   promethazine-dextromethorphan (PROMETHAZINE-DM) 6.25-15 MG/5ML syrup Take 5 mLs by mouth 4 times daily as needed for Cough Yes DEMI Haddad CNP   insulin regular (NOVOLIN R) 100 UNIT/ML injection USE AS DIRECTED PER SLIDING SCALE UP TO 36 UNITS DAILY Yes Felice Hammond MD   Lancets ([address]) MISC TEST 3 TIMES DAILY AS NEEDED *INSURANCE ONLY ALLOWS 100 EVERY 3 MONTHS Yes Felice Hamomnd MD   levothyroxine (SYNTHROID) 100 MCG tablet TAKE 1 TABLET BY MOUTH EVERY DAY Yes Felice Hammond MD   pregabalin (LYRICA) 100 MG capsule TAKE 1 CAPSULE BY MOUTH THREE TIMES A DAY Yes Felice Hammond MD   insulin detemir (LEVEMIR FLEXTOUCH) 100 UNIT/ML injection pen INJECT 50 UNITS INTO THE SKIN NIGHTLY Yes Felice Hammond MD   warfarin (COUMADIN) 5 MG tablet TAKE 1 TAB DAILY EXCEPT 10 MG ON MONDAY Yes Felice Hammond MD   pentoxifylline (TRENTAL) 400 MG extended release tablet TAKE 1 TABLET BY MOUTH EVERY DAY Yes Felice Hammond MD   warfarin (COUMADIN) 1 MG tablet TAKE 1 TABLET BY MOUTH EVERY DAY AS DIRECTED Yes Felice Hammond MD   atorvastatin (LIPITOR) 40 MG tablet TAKE 1 TABLET BY MOUTH EVERY DAY Yes DEMI Kong CNP   BD PEN NEEDLE ELDER 2ND GEN 32G X 4 MM MISC USE DAILY AS DIRECTED Yes DEMI Bliss CNP   colchicine (COLCRYS) 0.6 MG tablet Take 1 p.o. twice daily may cut down to 1 a day or discontinue if diarrhea develops. Yes Eveline Garibay MD   diclofenac sodium (VOLTAREN) 1 % GEL APPLY TO AFFECTED AREA TWICE A DAY Yes Rosalinda Del Castillo MD   fluticasone (FLONASE) 50 MCG/ACT nasal spray INHALE 1 SPRAY INTO EACH NOSTRIL EVERY DAY Yes Eveline Garibay MD   albuterol sulfate  (90 Base) MCG/ACT inhaler INHALE 2 PUFFS BY MOUTH EVERY 4 HOURS AS NEEDED FOR WHEEZE OR FOR SHORTNESS OF BREATH Yes Eveline Garibay MD   furosemide (LASIX) 20 MG tablet TAKE 1 TABLET BY MOUTH EVERY DAY Yes Eveline Garibay MD   omeprazole (PRILOSEC) 40 MG delayed release capsule TAKE 1 CAPSULE BY MOUTH EVERY DAY Yes Eveline Garibay MD   BD VEO INSULIN SYRINGE U/F 31G X 15/64\" 0.3 ML MISC USE UP TO FIVE TIMES DAILY Yes Eveline Garibay MD   Cholecalciferol (VITAMIN D3) 50 MCG (2000 UT) CAPS Take by mouth Yes Historical Provider, MD   allopurinol (ZYLOPRIM) 100 MG tablet Take 100 mg by mouth daily  Yes Historical Provider, MD   magnesium oxide (MAG-OX) 400 MG tablet TAKE 1 TABLET BY MOUTH THREE TIMES A DAY  Patient taking differently: Take 400 mg by mouth daily Yes DEMI Bliss CNP     No Known Allergies    OBJECTIVE:  Estimated body mass index is 42.87 kg/m² as calculated from the following:    Height as of 8/1/22: 5' 3\" (1.6 m). Weight as of 12/13/22: 242 lb (109.8 kg). Vitals:    01/24/23 1500 01/24/23 1502   BP: (!) 145/64 86/64   Site: Left Upper Arm    Pulse: 70 75   SpO2: 95% 90%   Weight: 246 lb (111.6 kg)        Physical Exam  Vitals and nursing note reviewed. Constitutional:       General: She is not in acute distress. Appearance: She is well-developed. She is not diaphoretic. HENT:      Head: Normocephalic and atraumatic.       Right Ear: External ear normal.      Left Ear: External ear normal.      Nose: Nose normal.   Eyes:      General: Lids are normal. No scleral icterus. Right eye: No discharge. Left eye: No discharge. Pupils: Pupils are equal, round, and reactive to light. Neck:      Thyroid: No thyromegaly. Vascular: No JVD. Cardiovascular:      Rate and Rhythm: Normal rate and regular rhythm. Pulses:           Radial pulses are 1+ on the right side and 1+ on the left side. Heart sounds: Normal heart sounds. Pulmonary:      Effort: Pulmonary effort is normal. No respiratory distress. Breath sounds: Normal breath sounds. Abdominal:      Palpations: Abdomen is soft. There is no hepatomegaly or splenomegaly. Tenderness: There is no abdominal tenderness. Musculoskeletal:      Right lower leg: No edema. Left lower leg: No edema. Skin:     General: Skin is warm and dry. Coloration: Skin is not pale. Findings: No erythema or rash. Comments: Turgor normal   Neurological:      Mental Status: She is oriented to person, place, and time. Psychiatric:         Mood and Affect: Mood normal.         Behavior: Behavior normal.         Thought Content: Thought content normal.         Judgment: Judgment normal.            ASSESSMENT PLAN      Diagnosis Orders   1. Peripheral vascular disease in diabetes mellitus ([address]        2. CML (chronic myelocytic leukemia) ([address]        3. Chronic combined systolic and diastolic congestive heart failure ([address]        4. Diabetic mononeuropathy associated with type 2 diabetes mellitus ([address]        5. CKD stage 4 due to type 2 diabetes mellitus ([address]        6. Chronic atrial fibrillation (HCC)        7. Unequal blood pressures in paired extremities  VL DUP UPPER EXTREMITY ARTERIES BILATERAL      8. Shoulder impingement, left  Michelle Martinez DO, Orthopedics and Sports Medicine (Hip; Knee; Shoulder), [facility]      9.  Insulin-requiring or dependent type II diabetes mellitus ([address] Patient presents with concerns of unequal blood pressure right versus left arm. Right is lower. Pulses seem weak in both wrist.  There is still good capillary refill bilateral.  No signs of limb ischemia. We will get a right upper extremity arterial Doppler. Subclavian stenosis would be a concern, patient also has not had any episodes of syncope or near syncope. CML monitored by CBC, no symptoms. There is no clinical evidence of coronary insufficiency or heart failure that requires any change in the treatment regimen and no changes in medications for cardiac conditions as listed in the medication list are necessary. Blood sugar readings by glycosylated hemoglobin or home fingerstick blood sugars are acceptable and no changes in diabetic medication as listed in the medication list are necessary. Rhythm is rate controlled. Continue to monitor renal function. Complaining of left shoulder pain which appears to be more joint related, refer to orthopedics. Follow-up as regularly scheduled     patient should call the office immediately with new or ongoing signs or symptoms or worsening, or proceed to the emergency room. No changes in past medical history, past surgical history, social history, or family history were noted during the patient encounter unless specifically listed above. All updates of past medical history, past surgical history, social history, or family history were reviewed personally by me during the office visit. All problems listed in the assessment are stable unless noted otherwise. Medication profile reviewed personally by me during the visit. Medication side effects and possible impairments from medications were discussed as applicable. Unless stated otherwise, we will continue current medications as listed in the medication review report contained in the patient's medical record.     This document was prepared by a combination of typing and transcription through a voice recognition software.                Scribe attestation: I,Frida Gonzalez RN, am scribing for and in the presence of [place] REED MD. Electronically signed by Frida Gonzalez RN on 1/24/2023 at 2:58 PM      Provider attestation:     I, Dr. ELVIN Reed, personally performed the services described in this documentation, as scribed by the above signed scribe in my presence, and it is both accurate and complete. I agree with the ROS and Past Histories independently gathered by the clinical support staff and the remaining scribed note accurately describes my personal service to the patient.      1/25/2023    8:07 AM

## 2023-01-24 NOTE — PROGRESS NOTES
Raz Drew   1950, 67 y.o. female   [id number]       Referring Provider: Susan Thrasher MD   Referral Type: In an order in [address] [place] [address]    Reason for Visit: Evaluation of the cause of disorders of hearing, tinnitus, or balance. ADULT AUDIOLOGIC EVALUATION      Raz Drew is a 67 y.o. female seen today, 1/24/2023 , for an initial audiologic evaluation. AUDIOLOGIC AND OTHER PERTINENT MEDICAL HISTORY:      Raz Drew reports a gradual decline in hearing sensitivity over the last several years. She struggles to hear and understand speech, especially in environments with competing noise. She has hearing aids that she purchased from [facility] approximately 2 years ago that she does  not wear. She states they are too loud/sharp for her. She reports bilateral tinnitus that is mostly non-intrusive to daily activities such as task concentration, relaxation, and/or sleeping. She also reports aural fullness or pressure in the right ear. Medical history is reportedly significant for chemotherapy treatments 10 years ago. No other significant otologic history reported. She denied otalgia, otorrhea, dizziness, imbalance, history of falls, history of head trauma, and history of ear surgery. Date: 1/24/2023     IMPRESSIONS:      Today's results revealed abnormal hearing sensitivity, bilaterally. Good/excellent speech understanding when in quiet. Tympanometry indicates normal middle ear function. Discussed the patient bringing her hearing aids in for me to evaluate. There may be the possibility of reprogramming them to better suit her hearing loss and perception of sound. Follow medical recommendations of Higinio Vargas MD.     ASSESSMENT AND FINDINGS:     Otoscopy unremarkable. RIGHT EAR:  Hearing Sensitivity: A mild to severe sensorineural hearing loss.   Speech Recognition Threshold: 45 dB HL  Word Recognition: Excellent 100%, based on NU-6 25-word list at 85 dBHL using recorded speech stimuli. Tympanometry: Normal peak pressure and compliance, Type A tympanogram, consistent with normal middle ear function. Volume 2.1 cm3, Peak 29 daPa, 0.64 mmho. LEFT EAR:  Hearing Sensitivity: A mild to moderate sensorineural hearing loss. Speech Recognition Threshold: 40 dB HL  Word Recognition: Good 84%, based on NU-6 25-word list at 80 dBHL using recorded speech stimuli. Tympanometry: Normal peak pressure and compliance, Type A tympanogram, consistent with normal middle ear function. Volume 1.6 cm3, Peak -3 daPa, 0.25 mmho. Reliability: Good   Transducer: Inserts    See scanned audiogram dated 1/24/2023 for results. PATIENT EDUCATION:       The following items were discussed with the patient:   - Good Communication Strategies  - Hearing Loss and Hearing Aids  - Tinnitus Management Strategies      Educational information was shared in the After Visit Summary. RECOMMENDATIONS:                                                                                                                                                                                                                                                            The following items are recommended based on patient report and results from today's appointment:   - Continue medical follow-up with Carlos Rojas MD .   - Retest hearing as medically indicated and/or sooner if a change in hearing is noted. - If desired, schedule a Hearing Aid Evaluation (HAE) appointment to discuss hearing aid options. - Utilize \"Good Communication Strategies\" as discussed to assist in speech understanding with communication partners.        Randall Collier  Audiologist    Chart CC'd to: Carlos Rojas MD       Degree of   Hearing Sensitivity dB Range   Within Normal Limits (WNL) 0 - 20   Mild 20 - 40   Moderate 40 - 55   Moderately-Severe 55 - 70   Severe 70 - 90 Profound 90 +

## 2023-01-26 RX ORDER — ATORVASTATIN CALCIUM 40 MG/1
TABLET, FILM COATED ORAL
Qty: 90 TABLET | Refills: 0 | Status: SHIPPED | OUTPATIENT
Start: 2023-01-26

## 2023-01-26 NOTE — TELEPHONE ENCOUNTER
Jackquline [place] is requesting refill(s)   Last OV 1/25/23 (pertaining to medication)  LR 10/19/22 (per medication requested)  Next office visit scheduled or attempted Yes   2/15/23

## 2023-02-06 ENCOUNTER — PROCEDURE VISIT (OUTPATIENT)
Dept: AUDIOLOGY | Age: 73
End: 2023-02-06

## 2023-02-06 DIAGNOSIS — H90.3 SENSORINEURAL HEARING LOSS, BILATERAL: Primary | ICD-10-CM

## 2023-02-06 PROCEDURE — 99999 PR OFFICE/OUTPT VISIT,PROCEDURE ONLY: CPT | Performed by: AUDIOLOGIST

## 2023-02-06 NOTE — PROGRESS NOTES
Max Mcknight  [birth year]73 y.o. female   [id number]    HEARING [address] [facility]    Max Mcknight was seen today, 2/6/2023, for a hearing aid check appointment. PROCEDURES:     Otoscopy revealed: Clear ear canals bilaterally    Patient brought in her 2019 NuEar CICs. I attempted to connect her devices to our software to reprogram them for her. She is not happy with her devices and does not want to return to the practice she purchased them from. I did not have the appropriate  in office. Patient will return once I have obtained the correct  ([id number]). Quoted patient $150 to program her aids. PATIENT EDUCATION:     - Verbally and visually reviewed importance of consistent use and care and maintenance of devices. Information was verbally shared with patient during appointment. RECOMMENDATIONS:     Continue consistent hearing aid use  Return for hearing aid checks as needed  Retest hearing as medically indicated and/or sooner if a change in hearing is noted. Contact audiologist with questions/concerns as needed    **No charge for today's appointment.       Randall Duron  Audiologist

## 2023-02-10 DIAGNOSIS — E11.22 CKD STAGE 4 DUE TO TYPE 2 DIABETES MELLITUS (HCC): ICD-10-CM

## 2023-02-10 DIAGNOSIS — E11.9 INSULIN-REQUIRING OR DEPENDENT TYPE II DIABETES MELLITUS (HCC): ICD-10-CM

## 2023-02-10 DIAGNOSIS — Z79.4 INSULIN-REQUIRING OR DEPENDENT TYPE II DIABETES MELLITUS (HCC): ICD-10-CM

## 2023-02-10 DIAGNOSIS — N18.4 CKD STAGE 4 DUE TO TYPE 2 DIABETES MELLITUS (HCC): ICD-10-CM

## 2023-02-10 RX ORDER — BLOOD SUGAR DIAGNOSTIC
STRIP MISCELLANEOUS
Qty: 300 STRIP | Refills: 0 | Status: SHIPPED | OUTPATIENT
Start: 2023-02-10

## 2023-02-10 RX ORDER — ATORVASTATIN CALCIUM 40 MG/1
TABLET, FILM COATED ORAL
Qty: 90 TABLET | Refills: 0 | Status: SHIPPED | OUTPATIENT
Start: 2023-02-10

## 2023-02-14 ENCOUNTER — OFFICE VISIT (OUTPATIENT)
Dept: ORTHOPEDIC SURGERY | Age: 73
End: 2023-02-14

## 2023-02-14 VITALS — HEIGHT: 63 IN | WEIGHT: 246 LBS | BODY MASS INDEX: 43.59 KG/M2

## 2023-02-14 DIAGNOSIS — E66.01 OBESITY, CLASS III, BMI 40-49.9 (MORBID OBESITY) (HCC): ICD-10-CM

## 2023-02-14 DIAGNOSIS — M75.102 ROTATOR CUFF SYNDROME OF LEFT SHOULDER: ICD-10-CM

## 2023-02-14 DIAGNOSIS — M25.512 LEFT SHOULDER PAIN, UNSPECIFIED CHRONICITY: Primary | ICD-10-CM

## 2023-02-14 DIAGNOSIS — M25.511 ACUTE PAIN OF RIGHT SHOULDER: ICD-10-CM

## 2023-02-14 RX ORDER — BUPIVACAINE HYDROCHLORIDE 2.5 MG/ML
30 INJECTION, SOLUTION EPIDURAL; INFILTRATION; INTRACAUDAL ONCE
Status: COMPLETED | OUTPATIENT
Start: 2023-02-14 | End: 2023-02-14

## 2023-02-14 RX ORDER — TRIAMCINOLONE ACETONIDE 40 MG/ML
40 INJECTION, SUSPENSION INTRA-ARTICULAR; INTRAMUSCULAR ONCE
Status: COMPLETED | OUTPATIENT
Start: 2023-02-14 | End: 2023-02-14

## 2023-02-14 RX ADMIN — TRIAMCINOLONE ACETONIDE 40 MG: 40 INJECTION, SUSPENSION INTRA-ARTICULAR; INTRAMUSCULAR at 11:36

## 2023-02-14 RX ADMIN — BUPIVACAINE HYDROCHLORIDE 75 MG: 2.5 INJECTION, SOLUTION EPIDURAL; INFILTRATION; INTRACAUDAL at 11:36

## 2023-02-14 NOTE — PROGRESS NOTES
Date:  2023    Name:  Leontine Moritz  Address:  [address] [place] [address]    :  1950      Age:   67 y.o.    SSN:  [id number]      Medical Record Number:  [id number]    Reason for Visit:    Chief Complaint    Shoulder Pain (L SHOULDER )      DOS:2023     HPI: Sofi Crowell is a 67 y.o. female here today for new patient evaluation regarding her bilateral shoulders, left much worse than right. The patient is right-hand dominant. She reports worsening shoulder pain for the past month. She denies any injury. 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 Fortunately she is in remission. Patient is also type II diabetic with her last A1c being 7.4. She is on insulin. The patient reports that her left shoulder she is having difficulty reaching with that and has some sharp and throbbing pain with some cracking and popping. This has not been an issue for her before. Every once a while she will have some numbness sensation go down her arm. Pain Assessment  Location of Pain: Shoulder  Location Modifiers: Left, Right  Severity of Pain: 7  Quality of Pain: Aching, Sharp, Throbbing, Popping, Cracking  Duration of Pain: A few minutes  Frequency of Pain: Intermittent  Aggravating Factors: Bending, Stretching, Straightening  Limiting Behavior: Yes  Relieving Factors: Heat  Result of Injury: No  Work-Related Injury: No  Are there other pain locations you wish to document?: No  ROS: All systems reviewed on patient intake form. Pertinent items are noted in HPI.         Past Medical History:   Diagnosis Date    Atrial fibrillation ([facility]     Cancer ([facility]     lymphoma    CKD (chronic kidney disease)     Diabetes mellitus ([facility]     [facility]     Gout 2019    Hyperlipidemia     Hypertension     Hypotension         Past Surgical History:   Procedure Laterality Date    COLONOSCOPY      COLONOSCOPY  2018    colon polyps,diverticulosis    COLONOSCOPY N/A 2022    COLONOSCOPY POLYPECTOMY SNARE/COLD BIOPSY performed by Vane Ravi MD at [facility] N/A 2022    COLONOSCOPY CONTROL HEMORRHAGE performed by Vane Ravi MD at [address] 2020    EXCISION RIGHT RING FINGER MUCOUS CYST AND BONE SPUR performed by Jose Orozco MD at [address] BIOPSY      OTHER SURGICAL HISTORY      vocal cord bx    OTHER SURGICAL HISTORY  2022      colonoscopy with biopsy       Family History   Problem Relation Age of Onset    Cancer Father     Cancer Sister         lung    Cancer Brother         lymphoma    Heart Disease Mother        Social History     Socioeconomic History    Marital status: Single   Tobacco Use    Smoking status: Former     Packs/day: 1.00     Years: 35.00     Pack years: 35.00     Types: Cigarettes     Quit date:      Years since quittin.1    Smokeless tobacco: Never   Vaping Use    Vaping Use: Never used   Substance and Sexual Activity    Alcohol use: No     Alcohol/week: 0.0 standard drinks    Drug use: No    Sexual activity: Not Currently     Social Determinants of Health     Financial Resource Strain: Low Risk     Difficulty of Paying Living Expenses: Not hard at all   Food Insecurity: No Food Insecurity    Worried About Running Out of Food in the Last Year: Never true    Ran Out of Food in the Last Year: Never true   Physical Activity: Sufficiently Active    Days of Exercise per Week: 7 days    Minutes of Exercise per Session: 60 min       Current Outpatient Medications   Medication Sig Dispense Refill    omeprazole (PRILOSEC) 40 MG delayed release capsule TAKE 1 CAPSULE BY MOUTH EVERY DAY 90 capsule 0    ONETOUCH ULTRA strip USE TO TEST BLOOD SUGAR 3 TIMES A  strip 0    BD VEO INSULIN SYRINGE U/F 31G X 15/64\" 0.3 ML MISC USE UP TO FIVE TIMES DAILY 100 each 3    warfarin (COUMADIN) 1 MG tablet TAKE 1 TABLET BY MOUTH EVERY DAY AS DIRECTED 30 tablet 2    pentoxifylline (TRENTAL) 400 MG extended release tablet TAKE 1 TABLET BY MOUTH EVERY DAY 90 tablet 0    atorvastatin (LIPITOR) 40 MG tablet TAKE 1 TABLET BY MOUTH EVERY DAY 90 tablet 0    losartan (COZAAR) 50 MG tablet TAKE 1 TABLET BY MOUTH EVERY DAY IN THE MORNING (Patient taking differently: 25 mg daily) 90 tablet 3    dilTIAZem (CARDIZEM CD) 240 MG extended release capsule Take 1 capsule by mouth daily 90 capsule 3    promethazine-dextromethorphan (PROMETHAZINE-DM) 6.25-15 MG/5ML syrup Take 5 mLs by mouth 4 times daily as needed for Cough 240 mL 0    insulin regular (NOVOLIN R) 100 UNIT/ML injection USE AS DIRECTED PER SLIDING SCALE UP TO 36 UNITS DAILY 30 mL 3    Lancets (ONETOUCH DELICA PLUS [id number]) MISC TEST 3 TIMES DAILY AS NEEDED *INSURANCE ONLY ALLOWS 100 EVERY 3 MONTHS 100 each 5    levothyroxine (SYNTHROID) 100 MCG tablet TAKE 1 TABLET BY MOUTH EVERY DAY 90 tablet 3    pregabalin (LYRICA) 100 MG capsule TAKE 1 CAPSULE BY MOUTH THREE TIMES A DAY 90 capsule 5    insulin detemir (LEVEMIR FLEXTOUCH) 100 UNIT/ML injection pen INJECT 50 UNITS INTO THE SKIN NIGHTLY 45 Adjustable Dose Pre-filled Pen Syringe 3    warfarin (COUMADIN) 5 MG tablet TAKE 1 TAB DAILY EXCEPT 10 MG ON MONDAY 102 tablet 11    BD PEN NEEDLE ELDER 2ND GEN 32G X 4 MM MISC USE DAILY AS DIRECTED 100 each 11    colchicine (COLCRYS) 0.6 MG tablet Take 1 p.o. twice daily may cut down to 1 a day or discontinue if diarrhea develops.  20 tablet 0    diclofenac sodium (VOLTAREN) 1 % GEL APPLY TO AFFECTED AREA TWICE A  g 0    fluticasone (FLONASE) 50 MCG/ACT nasal spray INHALE 1 SPRAY INTO EACH NOSTRIL EVERY DAY 48 g 1    albuterol sulfate  (90 Base) MCG/ACT inhaler INHALE 2 PUFFS BY MOUTH EVERY 4 HOURS AS NEEDED FOR WHEEZE OR FOR SHORTNESS OF BREATH 18 each 5    furosemide (LASIX) 20 MG tablet TAKE 1 TABLET BY MOUTH EVERY DAY 90 tablet 3    Cholecalciferol (VITAMIN D3) 50 MCG (2000 UT) CAPS Take by mouth      allopurinol (ZYLOPRIM) 100 MG tablet Take 100 mg by mouth daily       magnesium oxide (MAG-OX) 400 MG tablet TAKE 1 TABLET BY MOUTH THREE TIMES A DAY (Patient taking differently: Take 400 mg by mouth daily) 270 tablet 0     No current facility-administered medications for this visit. No Known Allergies    Vital signs:  Ht 5' 3\" (1.6 m)   Wt 246 lb (111.6 kg)   LMP  (LMP Unknown)   BMI 43.58 kg/m²      Left shoulder exam    Inspection:  Held in a normal posture. Normal contour at the acromioclavicular joint. No swelling, ecchymosis, or erythema about the shoulder. No atrophy appreciated. No scapular winging. Palpation:  No subacromial crepitus. Tender over the [facility] joint and tender to the anterior aspect of the shoulder in the bicipital groove. Range of Motion: Active forward flexion up to 50 degrees, passively up to 140 degrees, external rotation to 50 degrees, internal rotation abduction demonstrated to be 70 degrees. Strength: 5 out of 5 supraspinatus and infraspinatus strength testing, 4 out of 5 subscapularis strength testing    Stability: No anterior instability. No posterior instability. Special Tests: Impingement findings are negative. Labral findings are negative. Speed sign positive    Other findings: The skin is warm dry and well perfused. 2+ radial pulse. Sensation is intact to light touch over the deltoid. Right comparison shoulder exam    Inspection:  Held in a normal posture. Normal contour at the acromioclavicular joint. No swelling, ecchymosis, or erythema about the shoulder. No atrophy appreciated. No scapular winging. Palpation:  No subacromial crepitus. Tender over the [facility] joint and tender to the anterior aspect of the shoulder in the bicipital groove.     Range of Motion: Active forward flexion up to 110 degrees, passively up to 140 degrees, external rotation to 50 degrees, internal rotation abduction demonstrated to be 70 degrees. Strength: 5 out of 5 supraspinatus and infraspinatus strength testing, 5 out of 5 subscapularis strength testing    Stability: No anterior instability. No posterior instability. Special Tests: Impingement findings are negative. Labral findings are negative. Speed sign positive    Other findings: The skin is warm dry and well perfused. 2+ radial pulse. Sensation is intact to light touch over the deltoid. Diagnostics:  Radiology:       3 views of the bilateral shoulders taken in the office today demonstrate bilateral moderate AC joint arthritis. Well-maintained glenohumeral joint spaces and appropriate acromiohumeral interval.    Assessment: 67 y.o. female with bilateral shoulder rotator cuff tendinitis bicipital tendinitis, left worse than right    Plan: Pertinent imaging was reviewed. The etiology, natural history, and treatment options for the disorder were discussed. The roles of activity medication, antiinflammatories, injections, bracing, physical therapy, and surgical interventions were all described to the patient and questions were answered. Overall the patient does not appear to have a full-thickness rotator cuff tear or frozen shoulder. She has great passive range of motion. A lot of her motion restrictions are secondary to pain. Therefore I will do a steroid injection into the left shoulder. Given her diabetes I will hold off on any treatment for the right shoulder in terms of steroids at this time. We will also give her home program for rotator cuff strengthening. Follow-up in 2 months for recheck. Amos Reyes is in agreement with this plan. All questions were answered to patient's satisfaction and was encouraged to call with any further questions.        The indications and risks of steroid injection as well as treatment alternatives were discussed with the patient who consented to the procedure. Under sterile conditions and after informed consent was obtained, using posterior approach the patient was given an injection into the left shoulder split equally between subacromial space and glenohumeral joint. 2mL 40 mg of Kenalog and 4 mL of quarter percent bupivacaine were placed in the shoulder after it was prepped with chlorhexidine. This resulted in good relief of symptoms. There were no complications. The patient was advised to ice the shoulder this evening and to avoid vigorous activities for the next 2 days. They were advised to call us if there was any erythema, enduration, swelling or increasing pain.       Christy Howell,   Orthopedic Surgery and Sports Medicine  2/14/2023    Orders Placed This Encounter   Procedures    XR SHOULDER LEFT (MIN 2 VIEWS)     Standing Status:   Future     Number of Occurrences:   1     Standing Expiration Date:   2/10/2024    XR SHOULDER RIGHT (MIN 2 VIEWS)     Standing Status:   Future     Number of Occurrences:   1     Standing Expiration Date:   2/14/2024    [id number] - ID DRAIN/INJECT LARGE JOINT/BURSA

## 2023-02-15 ENCOUNTER — ANTI-COAG VISIT (OUTPATIENT)
Dept: FAMILY MEDICINE CLINIC | Age: 73
End: 2023-02-15

## 2023-02-15 ENCOUNTER — HOSPITAL ENCOUNTER (OUTPATIENT)
Dept: VASCULAR LAB | Age: 73
Discharge: HOME | End: 2023-02-15
Payer: MEDICARE

## 2023-02-15 ENCOUNTER — NURSE ONLY (OUTPATIENT)
Dept: FAMILY MEDICINE CLINIC | Age: 73
End: 2023-02-15

## 2023-02-15 DIAGNOSIS — Z79.01 ANTICOAGULANT LONG-TERM USE: Primary | ICD-10-CM

## 2023-02-15 DIAGNOSIS — R09.89 UNEQUAL BLOOD PRESSURES IN PAIRED EXTREMITIES: ICD-10-CM

## 2023-02-15 LAB
INTERNATIONAL NORMALIZATION RATIO, POC: 2.6
PROTHROMBIN TIME, POC: 0

## 2023-02-15 PROCEDURE — 93930 UPPER EXTREMITY STUDY: CPT

## 2023-02-17 RX ORDER — MAGNESIUM OXIDE 400 MG/1
400 TABLET ORAL DAILY
Qty: 90 TABLET | Refills: 0 | Status: SHIPPED | OUTPATIENT
Start: 2023-02-17

## 2023-02-17 NOTE — TELEPHONE ENCOUNTER
Pharmacy called stating that pt was getting Rx's OTC, but it's not avail OTC now. States that the only way pt can use a discount card for the med is with an Rx.    Please send to  [facility] [facility] [place] due to PCP out of office

## 2023-02-20 ENCOUNTER — PROCEDURE VISIT (OUTPATIENT)
Dept: AUDIOLOGY | Age: 73
End: 2023-02-20
Payer: MEDICARE

## 2023-02-20 DIAGNOSIS — H93.13 TINNITUS OF BOTH EARS: ICD-10-CM

## 2023-02-20 DIAGNOSIS — H90.3 SENSORINEURAL HEARING LOSS, BILATERAL: Primary | ICD-10-CM

## 2023-02-20 PROCEDURE — V5011 HEARING AID FITTING/CHECKING: HCPCS | Performed by: AUDIOLOGIST

## 2023-02-20 NOTE — PROGRESS NOTES
Augusto Sands  [birth year]30 y.o. female   [id number]    HEARING [address] [facility]    Augusto Sands was seen today, 2/20/2023, for a hearing aid check appointment. PROCEDURES:     Otoscopy revealed: Clear ear canals bilaterally    Patient was seen today for programming of her Jenny Finleydis Krishna) Muse i1600 CICs, which require the [employer] wireless . REM was performed today and aids were adjusted as close to NAL NL2 targets as possible. There were some limitations as the aids only had 40 gain receivers. The right aid did max out at a few mid to high frequencies. Patient now has a volume control option: R-raise, L-lower. Patient reported significant improvement in hearing and reduction of tinnitus. PATIENT EDUCATION:     - Verbally and visually reviewed importance of consistent use and care and maintenance of devices. Information was verbally shared with patient during appointment. RECOMMENDATIONS:     Continue consistent hearing aid use  Return for hearing aid checks as needed  Retest hearing as medically indicated and/or sooner if a change in hearing is noted. Contact audiologist with questions/concerns as needed  Return in 1 month for follow up if needed      **Patient paid $150.00 for today's appointment.     Randall Valdez  Audiologist

## 2023-03-02 ENCOUNTER — TELEPHONE (OUTPATIENT)
Dept: FAMILY MEDICINE CLINIC | Age: 73
End: 2023-03-02

## 2023-03-02 ENCOUNTER — NURSE ONLY (OUTPATIENT)
Dept: FAMILY MEDICINE CLINIC | Age: 73
End: 2023-03-02

## 2023-03-02 DIAGNOSIS — R39.9 UTI SYMPTOMS: Primary | ICD-10-CM

## 2023-03-02 LAB
BILIRUBIN, POC: ABNORMAL
BLOOD URINE, POC: ABNORMAL
CLARITY, POC: ABNORMAL
COLOR, POC: YELLOW
GLUCOSE URINE, POC: ABNORMAL
KETONES, POC: ABNORMAL
LEUKOCYTE EST, POC: ABNORMAL
NITRITE, POC: ABNORMAL
PH, POC: 5.5
PROTEIN, POC: ABNORMAL
SPECIFIC GRAVITY, POC: 1.02
UROBILINOGEN, POC: 0.2

## 2023-03-02 NOTE — TELEPHONE ENCOUNTER
Pt called stating that for the last few days she's been spotting a little blood in her urine, and then today there was a small clot. Pt states that she has no symptoms and not sure what's going on. No avail appts until next week. Call back pt with recommendations [phone].   Routing to [facility] due to PCP out of office

## 2023-03-04 LAB — URINE CULTURE, ROUTINE: NORMAL

## 2023-03-15 ENCOUNTER — OFFICE VISIT (OUTPATIENT)
Dept: FAMILY MEDICINE CLINIC | Age: 73
End: 2023-03-15

## 2023-03-15 ENCOUNTER — PROCEDURE VISIT (OUTPATIENT)
Dept: AUDIOLOGY | Age: 73
End: 2023-03-15

## 2023-03-15 ENCOUNTER — ANTI-COAG VISIT (OUTPATIENT)
Dept: FAMILY MEDICINE CLINIC | Age: 73
End: 2023-03-15

## 2023-03-15 VITALS
SYSTOLIC BLOOD PRESSURE: 121 MMHG | HEART RATE: 83 BPM | DIASTOLIC BLOOD PRESSURE: 80 MMHG | BODY MASS INDEX: 42.51 KG/M2 | WEIGHT: 240 LBS | OXYGEN SATURATION: 95 %

## 2023-03-15 DIAGNOSIS — I87.2 CHRONIC VENOUS STASIS DERMATITIS OF LEFT LOWER EXTREMITY: ICD-10-CM

## 2023-03-15 DIAGNOSIS — E11.51 PERIPHERAL VASCULAR DISEASE IN DIABETES MELLITUS (HCC): ICD-10-CM

## 2023-03-15 DIAGNOSIS — E03.8 HYPOTHYROIDISM DUE TO HASHIMOTO'S THYROIDITIS: ICD-10-CM

## 2023-03-15 DIAGNOSIS — Z79.4 INSULIN-REQUIRING OR DEPENDENT TYPE II DIABETES MELLITUS (HCC): ICD-10-CM

## 2023-03-15 DIAGNOSIS — E11.9 INSULIN-REQUIRING OR DEPENDENT TYPE II DIABETES MELLITUS (HCC): ICD-10-CM

## 2023-03-15 DIAGNOSIS — I50.42 CHRONIC COMBINED SYSTOLIC AND DIASTOLIC CONGESTIVE HEART FAILURE (HCC): ICD-10-CM

## 2023-03-15 DIAGNOSIS — R31.0 GROSS HEMATURIA: ICD-10-CM

## 2023-03-15 DIAGNOSIS — H90.3 SENSORINEURAL HEARING LOSS, BILATERAL: Primary | ICD-10-CM

## 2023-03-15 DIAGNOSIS — I48.20 CHRONIC ATRIAL FIBRILLATION (HCC): ICD-10-CM

## 2023-03-15 DIAGNOSIS — I10 BENIGN ESSENTIAL HTN: ICD-10-CM

## 2023-03-15 DIAGNOSIS — Z00.00 MEDICARE ANNUAL WELLNESS VISIT, SUBSEQUENT: Primary | ICD-10-CM

## 2023-03-15 DIAGNOSIS — N18.31 STAGE 3A CHRONIC KIDNEY DISEASE (HCC): ICD-10-CM

## 2023-03-15 DIAGNOSIS — E06.3 HYPOTHYROIDISM DUE TO HASHIMOTO'S THYROIDITIS: ICD-10-CM

## 2023-03-15 LAB
BILIRUBIN, POC: NORMAL
BLOOD URINE, POC: NORMAL
CLARITY, POC: CLEAR
COLOR, POC: YELLOW
GLUCOSE URINE, POC: NORMAL
INTERNATIONAL NORMALIZATION RATIO, POC: 2.4
KETONES, POC: NORMAL
LEUKOCYTE EST, POC: NORMAL
NITRITE, POC: NORMAL
PH, POC: 5.5
PROTEIN, POC: NORMAL
PROTHROMBIN TIME, POC: 0
SPECIFIC GRAVITY, POC: 1.01
UROBILINOGEN, POC: 0.2

## 2023-03-15 PROCEDURE — 99999 PR OFFICE/OUTPT VISIT,PROCEDURE ONLY: CPT | Performed by: AUDIOLOGIST

## 2023-03-15 SDOH — ECONOMIC STABILITY: FOOD INSECURITY: WITHIN THE PAST 12 MONTHS, YOU WORRIED THAT YOUR FOOD WOULD RUN OUT BEFORE YOU GOT MONEY TO BUY MORE.: NEVER TRUE

## 2023-03-15 SDOH — ECONOMIC STABILITY: FOOD INSECURITY: WITHIN THE PAST 12 MONTHS, THE FOOD YOU BOUGHT JUST DIDN'T LAST AND YOU DIDN'T HAVE MONEY TO GET MORE.: NEVER TRUE

## 2023-03-15 SDOH — ECONOMIC STABILITY: HOUSING INSECURITY
IN THE LAST 12 MONTHS, WAS THERE A TIME WHEN YOU DID NOT HAVE A STEADY PLACE TO SLEEP OR SLEPT IN A SHELTER (INCLUDING NOW)?: NO

## 2023-03-15 SDOH — ECONOMIC STABILITY: INCOME INSECURITY: HOW HARD IS IT FOR YOU TO PAY FOR THE VERY BASICS LIKE FOOD, HOUSING, MEDICAL CARE, AND HEATING?: NOT HARD AT ALL

## 2023-03-15 ASSESSMENT — PATIENT HEALTH QUESTIONNAIRE - PHQ9
SUM OF ALL RESPONSES TO PHQ QUESTIONS 1-9: 0
SUM OF ALL RESPONSES TO PHQ QUESTIONS 1-9: 0
2. FEELING DOWN, DEPRESSED OR HOPELESS: 0
1. LITTLE INTEREST OR PLEASURE IN DOING THINGS: 0
SUM OF ALL RESPONSES TO PHQ9 QUESTIONS 1 & 2: 0
SUM OF ALL RESPONSES TO PHQ QUESTIONS 1-9: 0
SUM OF ALL RESPONSES TO PHQ QUESTIONS 1-9: 0

## 2023-03-15 ASSESSMENT — LIFESTYLE VARIABLES
HOW MANY STANDARD DRINKS CONTAINING ALCOHOL DO YOU HAVE ON A TYPICAL DAY: PATIENT DOES NOT DRINK
HOW OFTEN DO YOU HAVE A DRINK CONTAINING ALCOHOL: NEVER

## 2023-03-15 NOTE — PROGRESS NOTES
Chief Complaint   Patient presents with    Hypertension    Diabetes        Internal Administration   First Dose COVID-19, PFIZER PURPLE top, DILUTE for use, (age 15 y+), 30mcg/0.3mL  2021   Second Dose COVID-19, PFIZER PURPLE top, DILUTE for use, (age 15 y+), 30mcg/0.3mL   2021       Last COVID Lab SARS-CoV-2 (no units)   Date Value   2021 Not Detected     SARS-CoV-2, PCR (no units)   Date Value   10/27/2020 Not Detected     SARS-COV-2, RdRp gene (no units)   Date Value   2023 Negative             Wt Readings from Last 3 Encounters:   03/15/23 240 lb (108.9 kg)   23 246 lb (111.6 kg)   23 246 lb (111.6 kg)     BP Readings from Last 3 Encounters:   03/15/23 121/80   23 86/64   23 (!) 154/72      Lab Results   Component Value Date    LABA1C 7.5 2022    LABA1C 7.6 2022    LABA1C 8.1 2022       HPI:  Dominique Baumgarten is a 67 y.o. (: 1950) here today for    Discussed her INR, informed to continue same dose and repeat in 1 month. Discussed the doppler results. She is going to see vascular on Friday. She states that she feels like her lyrica is continue to work for her neuropathy. She is not entirely sure if the increase helped her. Will continue with the same dose. Her UDS was last completed on 22. She states that her a1c this morning was 97. She states that she has been able to feel her heart fluttering lately. She does have a.fib though. Denies any chest pain or shortness of breath. She states she wanted to know about what to do for the blood in her urine the beginning of the month. It only occurred for a week. No infection was found though. Will check a repeat UA today. Informed that if she wanted to look into if further it would require a CT of the abdomen with contrast. But informed due to her [employer] function very hesitant to use the dye.  Discussed potentially a US of bladder and kidneys could be ordered instead which would not have the potential to injure her kidneys. She brought up that her left lower leg is red on the shin/calf. The area is not hot to the touch and she has no pain but she is numb in her legs. Discussed the symptoms to watch for that would indicate cellulitis. [x] Patient has completed an advance directive  [] Patient has NOT completed an advanced directive  [x] Patient has a documented healthcare surrogate  [] Patient does NOT have a documented healthcare surrogate  [] Discussed the importance of establishing and updating an advanced directive. Patient has questions at this time and those were answered. [x] Discussed the importance of establishing and updating an advanced directive. Patient does NOT have questions at this time. Discussed with: [x] Patient            [] Family             [] Other caregiver    Patient's medications, allergies, past medical, surgical, social and family histories were reviewed and updated asappropriate on 3/15/2023 at 7:38 AM.    ROS:  Review of Systems    All other systems reviewed and are negative except as noted above on 3/15/2023 at 7:38 AM. Additional review of systems may be scanned into the media section ofthis medical record. Any responses requiring further intervention were pursued.     Past Medical History:   Diagnosis Date    Atrial fibrillation ([facility] [address]     Cancer ([facility] [address]     lymphoma    CKD (chronic kidney disease)     Diabetes mellitus ([address]     DVT (deep venous thrombosis) ([facility] [address]     Gout 12/02/2019    Hyperlipidemia     Hypertension     Hypotension      Family History   Problem Relation Age of Onset    Cancer Father     Cancer Sister         lung    Cancer Brother         lymphoma    Heart Disease Mother      Social History     Socioeconomic History    Marital status: Single     Spouse name: Not on file    Number of children: Not on file    Years of education: Not on file    Highest education level: Not on file   Occupational History    Not on file Tobacco Use    Smoking status: Former     Packs/day: 1.00     Years: 35.00     Pack years: 35.00     Types: Cigarettes     Quit date:      Years since quittin.2    Smokeless tobacco: Never   Vaping Use    Vaping Use: Never used   Substance and Sexual Activity    Alcohol use: No     Alcohol/week: 0.0 standard drinks    Drug use: No    Sexual activity: Not Currently   Other Topics Concern    Not on file   Social History Narrative    Not on file     Social Determinants of Health     Financial Resource Strain: Low Risk     Difficulty of Paying Living Expenses: Not hard at all   Food Insecurity: No Food Insecurity    Worried About [employer] in the Last Year: Never true    [employer] in the Last Year: Never true   Transportation Needs: Unknown    Lack of Transportation (Medical): Not on file    Lack of Transportation (Non-Medical): No   Physical Activity: Not on file   Stress: Not on file   Social Connections: Not on file   Intimate Partner Violence: Not on file   Housing Stability: Unknown    Unable to Pay for Housing in the Last Year: Not on file    Number of Places Lived in the Last Year: Not on file    Unstable Housing in the Last Year: No     Prior to Visit Medications    Medication Sig Taking?  Authorizing Provider   magnesium oxide (MAG-OX) 400 MG tablet Take 1 tablet by mouth daily Yes DEMI Silva CNP   omeprazole (PRILOSEC) 40 MG delayed release capsule TAKE 1 CAPSULE BY MOUTH EVERY DAY Yes DEMI Berger CNP   ONETOUCH ULTRA strip USE TO TEST BLOOD SUGAR 3 TIMES A DAY Yes DEMI Berger CNP   BD VEO INSULIN SYRINGE U/F 31G X 15\" 0.3 ML MISC USE UP TO FIVE TIMES DAILY Yes DEMI Berger CNP   warfarin (COUMADIN) 1 MG tablet TAKE 1 TABLET BY MOUTH EVERY DAY AS DIRECTED Yes DEMI Berger CNP   pentoxifylline (TRENTAL) 400 MG extended release tablet TAKE 1 TABLET BY MOUTH EVERY DAY Yes DEMI Berger CNP atorvastatin (LIPITOR) 40 MG tablet TAKE 1 TABLET BY MOUTH EVERY DAY Yes DEMI Puga CNP   losartan (COZAAR) 50 MG tablet TAKE 1 TABLET BY MOUTH EVERY DAY IN THE MORNING  Patient taking differently: 25 mg daily Yes Skye Rivera MD   dilTIAZem (CARDIZEM CD) 240 MG extended release capsule Take 1 capsule by mouth daily Yes Skye Rivera MD   promethazine-dextromethorphan (PROMETHAZINE-DM) 6.25-15 MG/5ML syrup Take 5 mLs by mouth 4 times daily as needed for Cough Yes DEMI Gonzalez CNP   insulin regular (NOVOLIN R) 100 UNIT/ML injection USE AS DIRECTED PER SLIDING SCALE UP TO 36 UNITS DAILY Yes Skye Rivera MD   Lancets ([address] [facility]) MISC TEST 3 TIMES DAILY AS NEEDED *INSURANCE ONLY ALLOWS 100 EVERY 3 MONTHS Yes Skye Rivera MD   levothyroxine (SYNTHROID) 100 MCG tablet TAKE 1 TABLET BY MOUTH EVERY DAY Yes Skye Rivera MD   pregabalin (LYRICA) 100 MG capsule TAKE 1 CAPSULE BY MOUTH THREE TIMES A DAY Yes Skye Rivera MD   insulin detemir (LEVEMIR FLEXTOUCH) 100 UNIT/ML injection pen INJECT 50 UNITS INTO THE SKIN NIGHTLY Yes Skye Rivera MD   warfarin (COUMADIN) 5 MG tablet TAKE 1 TAB DAILY EXCEPT 10 MG ON MONDAY Yes Skye Rivera MD   BD PEN NEEDLE ELDER 2ND GEN 32G X 4 MM MISC USE DAILY AS DIRECTED Yes DEMI Gonzalez CNP   colchicine (COLCRYS) 0.6 MG tablet Take 1 p.o. twice daily may cut down to 1 a day or discontinue if diarrhea develops.  Yes Skye Rivera MD   diclofenac sodium (VOLTAREN) 1 % GEL APPLY TO AFFECTED AREA TWICE A DAY Yes Lexie Fowler MD   fluticasone (FLONASE) 50 MCG/ACT nasal spray INHALE 1 SPRAY INTO EACH NOSTRIL EVERY DAY Yes Skye Rivera MD   albuterol sulfate  (90 Base) MCG/ACT inhaler INHALE 2 PUFFS BY MOUTH EVERY 4 HOURS AS NEEDED FOR WHEEZE OR FOR SHORTNESS OF BREATH Yes Vicci Ormond Patti Montoya MD   furosemide (LASIX) 20 MG tablet TAKE 1 TABLET BY MOUTH EVERY DAY Yes Jane Hill MD   Cholecalciferol (VITAMIN D3) 50 MCG (2000 UT) CAPS Take by mouth Yes Historical Provider, MD   allopurinol (ZYLOPRIM) 100 MG tablet Take 100 mg by mouth daily  Yes Historical Provider, MD     No Known Allergies    OBJECTIVE:  Estimated body mass index is 42.51 kg/m² as calculated from the following:    Height as of 2/14/23: 5' 3\" (1.6 m). Weight as of this encounter: 240 lb (108.9 kg). Vitals:    03/15/23 0734   BP: 121/80   Pulse: 83   SpO2: 95%   Weight: 240 lb (108.9 kg)       Physical Exam  Vitals and nursing note reviewed. Constitutional:       General: She is not in acute distress. Appearance: She is well-developed. She is not diaphoretic. HENT:      Head: Normocephalic and atraumatic. Right Ear: External ear normal.      Left Ear: External ear normal.      Nose: Nose normal.   Eyes:      General: Lids are normal. No scleral icterus. Right eye: No discharge. Left eye: No discharge. Pupils: Pupils are equal, round, and reactive to light. Neck:      Thyroid: No thyromegaly. Vascular: No JVD. Cardiovascular:      Rate and Rhythm: Normal rate and regular rhythm. Pulses:           Radial pulses are 1+ on the right side and 2+ on the left side. Heart sounds: Normal heart sounds. Pulmonary:      Effort: Pulmonary effort is normal. No respiratory distress. Breath sounds: Normal breath sounds. Abdominal:      Palpations: Abdomen is soft. There is no hepatomegaly or splenomegaly. Tenderness: There is no abdominal tenderness. Musculoskeletal:      Right lower leg: No edema. Left lower leg: No edema. Skin:     General: Skin is warm and dry. Coloration: Skin is not pale. Findings: Erythema (left lower extremity shin, no heat or pain) present. No rash.       Comments: Turgor normal   Neurological:      Mental Status: She is oriented to person, place, and time. Psychiatric:         Mood and Affect: Mood normal.         Behavior: Behavior normal.         Thought Content: Thought content normal.         Judgment: Judgment normal.            ASSESSMENT PLAN      Diagnosis Orders   1. Medicare annual wellness visit, subsequent        2. Benign essential HTN        3. Insulin-requiring or dependent type II diabetes mellitus (HCC)  Hemoglobin A1C      4. Hypothyroidism due to Hashimoto's thyroiditis        5. Chronic atrial fibrillation (HCC)  POCT INR      6. Gross hematuria  POCT Urinalysis no Micro      7. Stage 3a chronic kidney disease ([facility] [address]        8. Peripheral vascular disease in diabetes mellitus ([facility] [address]        9. Chronic combined systolic and diastolic congestive heart failure ([facility] [address]        10. Chronic venous stasis dermatitis of left lower extremity        In addition to the AWV several other issues to discuss. Patient previously had blood in the urine, culture negative. There is been no further blood noted and today's point-of-care urine unremarkable. Due to her past history of GFR less than 30 we had decided that we would do an ultrasound of the bladder and kidneys if there was still hematuria rather than a contrasted CT. At this point we will just check the urine again in 6 months. Her leg where previous trauma on the left has some erythema. It is nontender. There is warmth but she has no flulike symptoms. Suggest that this was venous stasis and not cellulitis but could be observed. She has the arterial insufficiency in the right upper extremity, is to see vascular in 2 days. She is adamant that she will not do Lovenox again or she has come off Coumadin as she had lumps in much pain in her abdominal wall. Talked about the pros and cons of discontinuing Coumadin without bridging and risk of stroke. I believe if she needed a procedure that required discontinuation of anticoagulation she would proceed without bridging. She intermittently feels her A-fib but has no other symptoms with that no changes necessary. Thyroid replacement by lab parameters and symptoms appears appropriate and no changes in thyroid medication as listed in the medication profile is necessary there is no clinical evidence of coronary insufficiency or heart failure that requires any change in the treatment regimen and no changes in medications for cardiac conditions as listed in the medication list are necessary. 6 months      Patient should call the office immediately with new or ongoing signs or symptoms or worsening, or proceed to the emergency room. No changes in past medical history, past surgical history, social history, or family history were noted during the patient encounter unless specifically listed above. All updates of past medical history, past surgical history, social history, or family history were reviewed personally by me during the office visit. All problems listed in the assessment are stable unless noted otherwise. Medication profile reviewed personally by me during the visit. Medication side effects and possible impairments from medications were discussed as applicable. Unless stated otherwise, we will continue current medications as listed in the medication review report contained in the patient's medical record. This document was prepared by a combination of typing and transcription through a voice recognition software. Scribe attestation: Dick Monroe RN, am scribing for and in the presence of Dasie Opitz, MD. Electronically signed by Rao Garcia RN on 3/15/2023 at 7:38 AM      Provider attestation:     I, Dr. Ignacio Montano, personally performed the services described in this documentation, as scribed by the above signed scribe in my presence, and it is both accurate and complete.  I agree with the ROS and Past Histories independently gathered by the clinical support staff and the remaining scribed note accurately describes my personal service to the patient.       3/15/2023    9:13 AM

## 2023-03-15 NOTE — PROGRESS NOTES
Coco Landeros  1950.72 y.o. female   [id number]    HEARING AID CHECK    Coco Landeros was seen today, 3/15/2023, for a hearing aid check appointment.    PROCEDURES:     Otoscopy revealed: Clear ear canals bilaterally    Patient noted feeling that her right aid was too loud and her left wasn't loud enough. She also stated running water and chip bags were too loud.  Hearing aids were cleaned and checked. Microphone and  ports were suctioned, domes and wax filters were changed, and devices completed a desiccant cycle through [employer]. Post-cleaning listening check revealed good function of the devices.       Connected devices to fitting software. Decreased MPO by 6dB, and loud gain by 4dB overall. Then increased low frequency gain on the left aid 2dB and decreased low frequency gain on the right by 2dB for balance. Patient reported improvement.    PATIENT EDUCATION:     - Verbally and visually reviewed importance of consistent use and care and maintenance of devices.      Information was verbally shared with patient during appointment.        RECOMMENDATIONS:     Continue consistent hearing aid use  Return for hearing aid checks as needed  Retest hearing as medically indicated and/or sooner if a change in hearing is noted.  Contact audiologist with questions/concerns as needed    **No charge for today's appointment - Stated any further appointment would require an office visit charge. Patient reported understanding.        Randall Fairchild  Audiologist

## 2023-03-15 NOTE — PROGRESS NOTES
Medicare Annual Wellness Visit    Caroline Mandel is here for Hypertension, Diabetes, and Medicare AWV    Assessment & Plan   Medicare annual wellness visit, subsequent  Benign essential HTN  Insulin-requiring or dependent type II diabetes mellitus ([facility] [address]  -     Hemoglobin A1C  Hypothyroidism due to Hashimoto's thyroiditis  Chronic atrial fibrillation (HCC)  -     POCT INR  Gross hematuria  -     POCT Urinalysis no Micro  Stage 3a chronic kidney disease ([facility] [address]  Peripheral vascular disease in diabetes mellitus ([facility] [address]  Chronic combined systolic and diastolic congestive heart failure ([facility] [address]      Recommendations for Preventive Services Due: see orders and patient instructions/AVS.  Recommended screening schedule for the next 5-10 years is provided to the patient in written form: see Patient Instructions/AVS.     No follow-ups on file. Subjective       Patient's complete Health Risk Assessment and screening values have been reviewed and are found in Flowsheets. The following problems were reviewed today and where indicated follow up appointments were made and/or referrals ordered. Positive Risk Factor Screenings with Interventions:                 Weight and Activity:  Physical Activity: Inactive    Days of Exercise per Week: 0 days    Minutes of Exercise per Session: 0 min     On average, how many days per week do you engage in moderate to strenuous exercise (like a brisk walk)?: 0 days  Have you lost any weight without trying in the past 3 months?: No          Hearing Screen:  Do you or your family notice any trouble with your hearing that hasn't been managed with hearing aids?: (!) Yes       Patient does wear hearing aids but does still have some issues. Objective   Vitals:    03/15/23 0734   BP: 121/80   Pulse: 83   SpO2: 95%   Weight: 240 lb (108.9 kg)      Body mass index is 42.51 kg/m². No Known Allergies  Prior to Visit Medications    Medication Sig Taking?  Authorizing Provider magnesium oxide (MAG-OX) 400 MG tablet Take 1 tablet by mouth daily Yes DEMI Livingston CNP   omeprazole (PRILOSEC) 40 MG delayed release capsule TAKE 1 CAPSULE BY MOUTH EVERY DAY Yes DEMI Elder CNP   ONETOUCH ULTRA strip USE TO TEST BLOOD SUGAR 3 TIMES A DAY Yes DEMI Elder CNP   BD VEO INSULIN SYRINGE U/F 31G X 15/64\" 0.3 ML MISC USE UP TO FIVE TIMES DAILY Yes DEMI Elder CNP   warfarin (COUMADIN) 1 MG tablet TAKE 1 TABLET BY MOUTH EVERY DAY AS DIRECTED Yes DEMI Elder CNP   pentoxifylline (TRENTAL) 400 MG extended release tablet TAKE 1 TABLET BY MOUTH EVERY DAY Yes DEMI Elder CNP   atorvastatin (LIPITOR) 40 MG tablet TAKE 1 TABLET BY MOUTH EVERY DAY Yes DEMI Elder CNP   losartan (COZAAR) 50 MG tablet TAKE 1 TABLET BY MOUTH EVERY DAY IN THE MORNING  Patient taking differently: 25 mg daily Yes Luis A Mcpherson MD   dilTIAZem (CARDIZEM CD) 240 MG extended release capsule Take 1 capsule by mouth daily Yes Luis A Mcpherson MD   promethazine-dextromethorphan (PROMETHAZINE-DM) 6.25-15 MG/5ML syrup Take 5 mLs by mouth 4 times daily as needed for Cough Yes DEMI Livingston CNP   insulin regular (NOVOLIN R) 100 UNIT/ML injection USE AS DIRECTED PER SLIDING SCALE UP TO 36 UNITS DAILY Yes Luis A Mcpherson MD   Lancets ([address]) [address] [facility] TEST 3 TIMES DAILY AS NEEDED *INSURANCE ONLY ALLOWS 100 EVERY 3 MONTHS Yes Luis A Mcpherson MD   levothyroxine (SYNTHROID) 100 MCG tablet TAKE 1 TABLET BY MOUTH EVERY DAY Yes Luis A Mcpherson MD   pregabalin (LYRICA) 100 MG capsule TAKE 1 CAPSULE BY MOUTH THREE TIMES A DAY Yes Luis A Mcpherson MD   insulin detemir (LEVEMIR FLEXTOUCH) 100 UNIT/ML injection pen INJECT 50 UNITS INTO THE SKIN NIGHTLY Yes Luis A Mcpherson MD   warfarin (COUMADIN) 5 MG tablet TAKE 1 TAB DAILY EXCEPT 10 MG ON 19 Holly Mireles MD   BD PEN NEEDLE ELDER 2ND GEN 32G X 4 MM MISC USE DAILY AS DIRECTED Yes DEMI Mead - CNP   colchicine (COLCRYS) 0.6 MG tablet Take 1 p.o. twice daily may cut down to 1 a day or discontinue if diarrhea develops. Yes Kassie Escobar MD   diclofenac sodium (VOLTAREN) 1 % GEL APPLY TO AFFECTED AREA TWICE A DAY Yes Venkat Guevara MD   fluticasone [facility]) 50 MCG/ACT nasal spray INHALE 1 SPRAY INTO EACH NOSTRIL EVERY DAY Yes Kassie Escobar MD   albuterol sulfate  (90 Base) MCG/ACT inhaler INHALE 2 PUFFS BY MOUTH EVERY 4 HOURS AS NEEDED FOR WHEEZE OR FOR SHORTNESS OF BREATH Yes Kassie Escobar MD   furosemide (LASIX) 20 MG tablet TAKE 1 TABLET BY MOUTH EVERY DAY Yes Kassie Escobar MD   Cholecalciferol (VITAMIN D3) 50 MCG (2000 UT) CAPS Take by mouth Yes Historical Provider, MD   allopurinol (ZYLOPRIM) 100 MG tablet Take 100 mg by mouth daily  Yes Historical Provider, MD Vang (Including outside providers/suppliers regularly involved in providing care):   Patient Care Team:  Kassie Escobar MD as PCP - La Laureano MD as PCP - Hematology/Oncology (Medical Oncology)  Kassie Escobar MD as PCP - Empaneled Provider  Kevon Fuentes MD as Consulting Physician (Pain Management)     Reviewed and updated this visit:  Tobacco  Allergies  Meds  Problems  Med Hx  Surg Hx  Soc Hx  Fam Hx          I, Brooke Sullivan LPN, [birth year]47[birth year], performed the documented evaluation under the direct supervision of the attending physician. This encounter was performed under my, Tenzin Rose, direct supervision, 3/15/2023.      Brooke Sullivan LPN

## 2023-03-15 NOTE — PATIENT INSTRUCTIONS
Patient should call the office immediately with new or ongoing signs or symptoms or worsening, or proceed to the emergency room. If you are on medications which could impair your senses, you are at risk of weakness, falls, dizziness, or drowsiness. You should be careful during activities which could place you at risk of harm, such as climbing, using stairs, operating machinery, or driving vehicles. If you feel you cannot safely do these activities, you should request others to help you, or avoid the activities altogether. If you are drowsy for any other reason, you should use the same precautions as listed above. Web Address for Advance Directive:    [employer]     Personalized Preventive Plan for Qasim Tee - 3/15/2023  Medicare offers a range of preventive health benefits. Some of the tests and screenings are paid in full while other may be subject to a deductible, co-insurance, and/or copay. Some of these benefits include a comprehensive review of your medical history including lifestyle, illnesses that may run in your family, and various assessments and screenings as appropriate. After reviewing your medical record and screening and assessments performed today your provider may have ordered immunizations, labs, imaging, and/or referrals for you. A list of these orders (if applicable) as well as your Preventive Care list are included within your After Visit Summary for your review. Other Preventive Recommendations:    A preventive eye exam performed by an eye specialist is recommended every 1-2 years to screen for glaucoma; cataracts, macular degeneration, and other eye disorders. A preventive dental visit is recommended every 6 months. Try to get at least 150 minutes of exercise per week or 10,000 steps per day on a pedometer . Order or download the FREE \"Exercise & Physical Activity: Your Everyday Guide\" from [employer].  Call 1-658.265.1865 or search [employer] on [address] [place] [address]. You need 7942-9954 mg of calcium and 5710-1772 IU of vitamin D per day. It is possible to meet your calcium requirement with diet alone, but a vitamin D supplement is usually necessary to meet this goal.  When exposed to the sun, use a sunscreen that protects against both UVA and UVB radiation with an SPF of 30 or greater. Reapply every 2 to 3 hours or after sweating, drying off with a towel, or swimming. Always wear a seat belt when traveling in a car. Always wear a helmet when riding a bicycle or motorcycle.

## 2023-03-16 LAB
EST. AVERAGE GLUCOSE BLD GHB EST-MCNC: 214.5 MG/DL
HBA1C MFR BLD: 9.1 %

## 2023-03-17 ENCOUNTER — OFFICE VISIT (OUTPATIENT)
Dept: VASCULAR SURGERY | Age: 73
End: 2023-03-17
Payer: MEDICARE

## 2023-03-17 VITALS
HEIGHT: 63 IN | DIASTOLIC BLOOD PRESSURE: 90 MMHG | SYSTOLIC BLOOD PRESSURE: 180 MMHG | WEIGHT: 242 LBS | BODY MASS INDEX: 42.88 KG/M2

## 2023-03-17 DIAGNOSIS — I70.208: Primary | ICD-10-CM

## 2023-03-17 PROCEDURE — G8417 CALC BMI ABV UP PARAM F/U: HCPCS | Performed by: SURGERY

## 2023-03-17 PROCEDURE — 3079F DIAST BP 80-89 MM HG: CPT | Performed by: SURGERY

## 2023-03-17 PROCEDURE — 99203 OFFICE O/P NEW LOW 30 MIN: CPT | Performed by: SURGERY

## 2023-03-17 PROCEDURE — G8427 DOCREV CUR MEDS BY ELIG CLIN: HCPCS | Performed by: SURGERY

## 2023-03-17 PROCEDURE — 3074F SYST BP LT 130 MM HG: CPT | Performed by: SURGERY

## 2023-03-17 PROCEDURE — G8484 FLU IMMUNIZE NO ADMIN: HCPCS | Performed by: SURGERY

## 2023-03-17 PROCEDURE — 1090F PRES/ABSN URINE INCON ASSESS: CPT | Performed by: SURGERY

## 2023-03-17 NOTE — PROGRESS NOTES
Outpatient Consultation / H&P    Date of Consultation:  3/17/2023    PCP:  Wagner Alvarado MD     Referring Provider:  Dr. Jonna Barksdale     Chief Complaint:   Chief Complaint   Patient presents with    Other     Patient ref by Trenton Pickard MD for right arm arterial stenosis. pamlr        History of Present Illness: We are asked to see this patient in consultation by Dr. Jonna Barksdale regarding upper extremity arterial disease. Elin Ren is a 67 y.o. female who reports was noted to have asymmetric blood pressures in upper extremities. She was sent for further investigation of this with doppler exam.  She denies any pain or numbness in the right arm. She is right handed and hasw no limitations on activity. Past Medical History:  Past Medical History:   Diagnosis Date    Atrial fibrillation ([facility]     Cancer ([facility]     lymphoma    CKD (chronic kidney disease)     Diabetes mellitus ([facility]     DVT (deep venous thrombosis) ([facility]     Gout 12/02/2019    Hyperlipidemia     Hypertension     Hypotension        Past Surgical History:  Past Surgical History:   Procedure Laterality Date    COLONOSCOPY      COLONOSCOPY  05/23/2018    colon polyps,diverticulosis    COLONOSCOPY N/A 1/4/2022    COLONOSCOPY POLYPECTOMY SNARE/COLD BIOPSY performed by Yolanda Perez MD at [place] [address] N/A 1/4/2022    COLONOSCOPY CONTROL HEMORRHAGE performed by Yolanda Perez MD at [address] [place] [address] 11/2/2020    EXCISION RIGHT RING FINGER MUCOUS CYST AND BONE SPUR performed by Veronica Castañeda MD at [facility] [place]      vocal cord bx    OTHER SURGICAL HISTORY  01/04/2022      colonoscopy with biopsy       Home Medications:   Prior to Admission medications    Medication Sig Start Date End Date Taking?  Authorizing Provider   magnesium oxide (MAG-OX) 400 MG tablet Take 1 tablet by mouth daily 2/17/23  Yes DEMI Mead - CNP omeprazole (PRILOSEC) 40 MG delayed release capsule TAKE 1 CAPSULE BY MOUTH EVERY DAY 2/10/23  Yes DEMI Romero CNP   ONETOUCH ULTRA strip USE TO TEST BLOOD SUGAR 3 TIMES A DAY 2/10/23  Yes DEMI Romero CNP   BD VEO INSULIN SYRINGE U/F 31G X 15/64\" 0.3 ML MISC USE UP TO FIVE TIMES DAILY 2/10/23  Yes DEMI Romero CNP   warfarin (COUMADIN) 1 MG tablet TAKE 1 TABLET BY MOUTH EVERY DAY AS DIRECTED 2/10/23  Yes DEMI Romero CNP   pentoxifylline (TRENTAL) 400 MG extended release tablet TAKE 1 TABLET BY MOUTH EVERY DAY 2/10/23  Yes DEMI Romero CNP   atorvastatin (LIPITOR) 40 MG tablet TAKE 1 TABLET BY MOUTH EVERY DAY 2/10/23  Yes DEMI Romero CNP   losartan (COZAAR) 50 MG tablet TAKE 1 TABLET BY MOUTH EVERY DAY IN THE MORNING  Patient taking differently: 25 mg daily 1/24/23  Yes Federica Abrams MD   dilTIAZem (CARDIZEM CD) 240 MG extended release capsule Take 1 capsule by mouth daily 1/18/23  Yes Federica Abrams MD   promethazine-dextromethorphan (PROMETHAZINE-DM) 6.25-15 MG/5ML syrup Take 5 mLs by mouth 4 times daily as needed for Cough 1/6/23  Yes DEMI Moore CNP   insulin regular (NOVOLIN R) 100 UNIT/ML injection USE AS DIRECTED PER SLIDING SCALE UP TO 36 UNITS DAILY 12/13/22  Yes Federica Abrams MD   [facility] PLUS [id number]) MISC TEST 3 TIMES DAILY AS NEEDED *INSURANCE ONLY ALLOWS 100 EVERY 3 MONTHS 11/14/22  Yes Federica Abrams MD   levothyroxine (SYNTHROID) 100 MCG tablet TAKE 1 TABLET BY MOUTH EVERY DAY 9/21/22  Yes Federica Abrams MD   pregabalin (LYRICA) 100 MG capsule TAKE 1 CAPSULE BY MOUTH THREE TIMES A DAY 9/21/22 3/21/23 Yes Federica Abrams MD   insulin detemir (LEVEMIR FLEXTOUCH) 100 UNIT/ML injection pen INJECT 50 UNITS INTO THE SKIN NIGHTLY 9/21/22  Yes Federica Abrams MD   warfarin (COUMADIN) 5 MG tablet TAKE 1 TAB DAILY EXCEPT 10 MG ON 22  Yes Sarah Gomez MD   BD PEN NEEDLE ELDER 2ND GEN 32G X 4 MM MISC USE DAILY AS DIRECTED 22  Yes DEMI Argueta CNP   colchicine (COLCRYS) 0.6 MG tablet Take 1 p.o. twice daily may cut down to 1 a day or discontinue if diarrhea develops. 22  Yes Sarah Gomez MD   diclofenac sodium (VOLTAREN) 1 % GEL APPLY TO AFFECTED AREA TWICE A DAY 22  Yes Katie Corbin MD   fluticasone [facility]) 50 MCG/ACT nasal spray INHALE 1 SPRAY INTO EACH NOSTRIL EVERY DAY 22  Yes Sarah Gomez MD   albuterol sulfate  (90 Base) MCG/ACT inhaler INHALE 2 PUFFS BY MOUTH EVERY 4 HOURS AS NEEDED FOR WHEEZE OR FOR SHORTNESS OF BREATH 22  Yes Sarah Gomez MD   furosemide (LASIX) 20 MG tablet TAKE 1 TABLET BY MOUTH EVERY DAY 22  Yes Sarah Gomez MD   Cholecalciferol (VITAMIN D3) 50 MCG (2000 UT) CAPS Take by mouth   Yes Historical Provider, MD   allopurinol (ZYLOPRIM) 100 MG tablet Take 100 mg by mouth daily  6/15/21  Yes Historical Provider, MD        Allergies:  Patient has no known allergies.       Social History:      Social History     Socioeconomic History    Marital status: Single     Spouse name: Not on file    Number of children: Not on file    Years of education: Not on file    Highest education level: Not on file   Occupational History    Not on file   Tobacco Use    Smoking status: Former     Packs/day: 1.00     Years: 35.00     Pack years: 35.00     Types: Cigarettes     Quit date:      Years since quittin.2    Smokeless tobacco: Never   Vaping Use    Vaping Use: Never used   Substance and Sexual Activity    Alcohol use: No     Alcohol/week: 0.0 standard drinks    Drug use: No    Sexual activity: Not Currently   Other Topics Concern    Not on file   Social History Narrative    Not on file     Social Determinants of Health     Financial Resource Strain: Low Risk Difficulty of Paying Living Expenses: Not hard at all   Food Insecurity: No Food Insecurity    Worried About [email or web address] in the Last Year: Never true    Ran Out of Food in the Last Year: Never true   Transportation Needs: Unknown    Lack of Transportation (Medical): Not on file    Lack of Transportation (Non-Medical): No   Physical Activity: Inactive    Days of Exercise per Week: 0 days    Minutes of Exercise per Session: 0 min   Stress: Not on file   Social Connections: Not on file   Intimate Partner Violence: Not on file   Housing Stability: Unknown    Unable to Pay for Housing in the Last Year: Not on file    Number of Places Lived in the Last Year: Not on file    Unstable Housing in the Last Year: No       Family History:        Problem Relation Age of Onset    Cancer Father     Cancer Sister         lung    Cancer Brother         lymphoma    Heart Disease Mother        Review of Systems:  A 14 point review of systems was completed. Pertinent positives identified in the HPI, all other review of systems negative. Physical Examination:    BP (!) 180/90 (Site: Left Upper Arm)   Ht 5' 3\" (1.6 m)   Wt 242 lb (109.8 kg)   LMP  (LMP Unknown)   BMI 42.87 kg/m²     Weight: 242 lb (109.8 kg)     BP     General appearance: NAD  Eyes: PERRLA  Neck: no JVD, no lymphadenopathy. Respiratory: effort is unlabored, no crackles, wheezes or rubs. Cardiovascular: regular, no murmur. No carotid bruits. No edema or varicosities. Pulses:    radial   RIGHT 2   LEFT 2   GI: abdomen soft, nondistended, no organomegaly. Musculoskeletal: strength and tone normal.  Extremities: warm and pink. No ischemic changes or muscle wasting in upper extremities. Skin: no dermatitis or ulceration. Neuro/psychiatric: grossly intact. MEDICAL DECISION MAKING/TESTING    Arterial duplex:    Right Impression   Technically difficult and limited study due to body habitus/vessel depth and   motion bilaterally.    The right wrist/brachial index is 0.88 (the UA is 144 and the RA is 145 mmHg). The presence of multiphasic waveforms at the subclavian artery level suggests   no inflow disease. There is atherosclerotic plaque seen within the subclavian artery consistent   with a < 50% stenosis with no other gross plaques seen involving the right   upper extremity. The radial and ulnar arteries appear patent. Left Impression   The left wrist/brachial index is 1.21 (the UA is 199 and the RA is 188 mmHg). The presence of multiphasic waveforms at the subclavian artery level suggests   no inflow disease. There is atherosclerotic plaque seen within the distal radial artery   consistent with a < 50% stenosis with no other gross plaques seen involving   the left upper extremity. Previous WBI: R 0.93, L 1.05       Conclusions        Summary        Abnormal right wrist/brachial index consistent with mild arterial    insufficiency. Normal left wrist/brachial index. Less than 50% stenosis right subclavian artery without other arterial    disease seen in the right arm. Distal left radial artery stenosis of less than 50%. Assessment:      Diagnosis Orders   1. Occlusive disease of artery of upper extremity (HCC)          Very mild arterial disease in the right upper extremity. Patient is asymptomatic    Recommendations/Plan:  No further testing or intervention indicated for asymptomatic arterial disease.         Milagros Reid MD, FACS

## 2023-03-20 RX ORDER — INSULIN DETEMIR 100 [IU]/ML
60 INJECTION, SOLUTION SUBCUTANEOUS NIGHTLY
Qty: 45 ADJUSTABLE DOSE PRE-FILLED PEN SYRINGE | Refills: 3
Start: 2023-03-20

## 2023-03-21 DIAGNOSIS — B02.29 POST HERPETIC NEURALGIA: ICD-10-CM

## 2023-03-21 RX ORDER — PREGABALIN 100 MG/1
CAPSULE ORAL
Qty: 90 CAPSULE | Refills: 0 | Status: SHIPPED | OUTPATIENT
Start: 2023-03-21 | End: 2023-05-01

## 2023-03-22 DIAGNOSIS — I87.2 VENOUS INSUFFICIENCY: ICD-10-CM

## 2023-03-22 DIAGNOSIS — M75.51 SUBACROMIAL BURSITIS OF RIGHT SHOULDER JOINT: ICD-10-CM

## 2023-03-22 RX ORDER — LANOLIN ALCOHOL/MO/W.PET/CERES
CREAM (GRAM) TOPICAL
Qty: 90 TABLET | Refills: 0 | Status: SHIPPED | OUTPATIENT
Start: 2023-03-22

## 2023-03-22 RX ORDER — FUROSEMIDE 20 MG/1
TABLET ORAL
Qty: 90 TABLET | Refills: 0 | Status: SHIPPED | OUTPATIENT
Start: 2023-03-22

## 2023-03-22 NOTE — TELEPHONE ENCOUNTER
Patricia Medrano is requesting refill(s)   Last OV 3/15/23 (pertaining to medication)  LR 2/17/23 (per medication requested)  Next office visit scheduled or attempted Yes   If no, reason:

## 2023-03-28 ENCOUNTER — OFFICE VISIT (OUTPATIENT)
Dept: ORTHOPEDIC SURGERY | Age: 73
End: 2023-03-28
Payer: MEDICARE

## 2023-03-28 VITALS — BODY MASS INDEX: 42.88 KG/M2 | HEIGHT: 63 IN | WEIGHT: 242 LBS

## 2023-03-28 DIAGNOSIS — M75.102 ROTATOR CUFF SYNDROME OF LEFT SHOULDER: ICD-10-CM

## 2023-03-28 DIAGNOSIS — M75.51 SUBACROMIAL BURSITIS OF RIGHT SHOULDER JOINT: Primary | ICD-10-CM

## 2023-03-28 PROCEDURE — 1090F PRES/ABSN URINE INCON ASSESS: CPT | Performed by: STUDENT IN AN ORGANIZED HEALTH CARE EDUCATION/TRAINING PROGRAM

## 2023-03-28 PROCEDURE — 1123F ACP DISCUSS/DSCN MKR DOCD: CPT | Performed by: STUDENT IN AN ORGANIZED HEALTH CARE EDUCATION/TRAINING PROGRAM

## 2023-03-28 PROCEDURE — 99213 OFFICE O/P EST LOW 20 MIN: CPT | Performed by: STUDENT IN AN ORGANIZED HEALTH CARE EDUCATION/TRAINING PROGRAM

## 2023-03-28 PROCEDURE — G8417 CALC BMI ABV UP PARAM F/U: HCPCS | Performed by: STUDENT IN AN ORGANIZED HEALTH CARE EDUCATION/TRAINING PROGRAM

## 2023-03-28 PROCEDURE — 1036F TOBACCO NON-USER: CPT | Performed by: STUDENT IN AN ORGANIZED HEALTH CARE EDUCATION/TRAINING PROGRAM

## 2023-03-28 PROCEDURE — 3017F COLORECTAL CA SCREEN DOC REV: CPT | Performed by: STUDENT IN AN ORGANIZED HEALTH CARE EDUCATION/TRAINING PROGRAM

## 2023-03-28 PROCEDURE — G8484 FLU IMMUNIZE NO ADMIN: HCPCS | Performed by: STUDENT IN AN ORGANIZED HEALTH CARE EDUCATION/TRAINING PROGRAM

## 2023-03-28 PROCEDURE — G8399 PT W/DXA RESULTS DOCUMENT: HCPCS | Performed by: STUDENT IN AN ORGANIZED HEALTH CARE EDUCATION/TRAINING PROGRAM

## 2023-03-28 PROCEDURE — G8427 DOCREV CUR MEDS BY ELIG CLIN: HCPCS | Performed by: STUDENT IN AN ORGANIZED HEALTH CARE EDUCATION/TRAINING PROGRAM

## 2023-03-28 NOTE — PROGRESS NOTES
Chief Complaint  Shoulder Pain (CK LT shoulder)      History of Present Illness:  Mahin Mccauley is a pleasant 67 y.o. female here today for repeat evaluation of her bilateral shoulders. She underwent a steroid injection to the left shoulder last visit which helped her pain greatly but unfortunately also increased her A1c. Her current A1c is 9.1. The patient continues to have a lot of right shoulder pain. She cannot take anti-inflammatory medication secondary to warfarin use. Prior HPI 2/14/23:  Lexi Rodriguez is a 67 y.o. female here today for new patient evaluation regarding her bilateral shoulders, left much worse than right. The patient is right-hand dominant. She reports worsening shoulder pain for the past month. She denies any injury. 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 Fortunately she is in remission. Patient is also type II diabetic with her last A1c being 7.4. She is on insulin. The patient reports that her left shoulder she is having difficulty reaching with that and has some sharp and throbbing pain with some cracking and popping. This has not been an issue for her before. Every once a while she will have some numbness sensation go down her arm. Medical History:  Patient's medications, allergies, past medical, surgical, social and family histories were reviewed and updated as appropriate. Pertinent items are noted in HPI  Review of systems reviewed from Patient History Form available in the patient's chart under the Media tab. Vital Signs: There were no vitals filed for this visit. Constitutional: In no apparent distress. Normal affect. Alert and oriented X3 and is cooperative. Right comparison shoulder exam     Inspection:  Held in a normal posture. Normal contour at the acromioclavicular joint.  No swelling,

## 2023-04-04 ENCOUNTER — ANTI-COAG VISIT (OUTPATIENT)
Dept: FAMILY MEDICINE CLINIC | Age: 73
End: 2023-04-04

## 2023-04-04 ENCOUNTER — NURSE ONLY (OUTPATIENT)
Dept: FAMILY MEDICINE CLINIC | Age: 73
End: 2023-04-04
Payer: MEDICARE

## 2023-04-04 DIAGNOSIS — I48.20 CHRONIC ATRIAL FIBRILLATION (HCC): Primary | ICD-10-CM

## 2023-04-04 LAB
INTERNATIONAL NORMALIZATION RATIO, POC: 2.3
PROTHROMBIN TIME, POC: 0

## 2023-04-04 PROCEDURE — 85610 PROTHROMBIN TIME: CPT | Performed by: FAMILY MEDICINE

## 2023-04-19 ENCOUNTER — HOSPITAL ENCOUNTER (OUTPATIENT)
Dept: PHYSICAL THERAPY | Age: 73
Setting detail: THERAPIES SERIES
Discharge: HOME | End: 2023-04-19
Payer: MEDICARE

## 2023-04-19 PROCEDURE — 97140 MANUAL THERAPY 1/> REGIONS: CPT | Performed by: PHYSICAL THERAPY ASSISTANT

## 2023-04-19 PROCEDURE — 97110 THERAPEUTIC EXERCISES: CPT | Performed by: PHYSICAL THERAPY ASSISTANT

## 2023-04-26 ENCOUNTER — HOSPITAL ENCOUNTER (OUTPATIENT)
Dept: PHYSICAL THERAPY | Age: 73
Setting detail: THERAPIES SERIES
Discharge: HOME | End: 2023-04-26
Payer: MEDICARE

## 2023-04-26 NOTE — FLOWSHEET NOTE
[place] [address], [facility])    Physical Therapy  Cancellation/No-show Note  Patient Name:  Kasey Francisco  :  1950   Date:  2023    Cancelled visits to date: 1  No-shows to date: 0    For today's appointment patient:  [x]  Cancelled  []  Rescheduled appointment  []  No-show     Reason given by patient:  []  Patient ill  []  Conflicting appointment  []  No transportation    []  Conflict with work  []  No reason given  [x]  Other:     Comments: concerned about the financial responsibility       Phone call information:   []  Phone call made today to patient. []  Patient answered, conversation as follows:    []  Patient did not answer. [x]  Phone call not needed - pt contacted us to cancel and provided reason for cancellation.      Electronically signed by:  Maria Guadalupe Negron PTA,

## 2023-05-01 DIAGNOSIS — B02.29 POST HERPETIC NEURALGIA: ICD-10-CM

## 2023-05-01 RX ORDER — PREGABALIN 100 MG/1
CAPSULE ORAL
Qty: 90 CAPSULE | Refills: 0 | Status: SHIPPED | OUTPATIENT
Start: 2023-05-01 | End: 2023-05-31

## 2023-05-02 ENCOUNTER — ANTI-COAG VISIT (OUTPATIENT)
Dept: FAMILY MEDICINE CLINIC | Age: 73
End: 2023-05-02

## 2023-05-02 ENCOUNTER — NURSE ONLY (OUTPATIENT)
Dept: FAMILY MEDICINE CLINIC | Age: 73
End: 2023-05-02
Payer: MEDICARE

## 2023-05-02 DIAGNOSIS — I48.20 CHRONIC ATRIAL FIBRILLATION (HCC): Primary | ICD-10-CM

## 2023-05-02 LAB
INTERNATIONAL NORMALIZATION RATIO, POC: 2.7
PROTHROMBIN TIME, POC: 0

## 2023-05-02 PROCEDURE — 85610 PROTHROMBIN TIME: CPT | Performed by: FAMILY MEDICINE

## 2023-05-02 NOTE — PROGRESS NOTES
Called and left detailed voicemail informing to continue her same dosing of 6mg every mon and Thursday and 5 mg all other day and to recheck Inr on 5/30. Repeat this 2x.

## 2023-05-25 ENCOUNTER — TELEPHONE (OUTPATIENT)
Dept: FAMILY MEDICINE CLINIC | Age: 73
End: 2023-05-25

## 2023-05-25 NOTE — TELEPHONE ENCOUNTER
Spoke with patient and advised her to go to the ER for evaluation of her red leg. She declined to go to ER.   She states that if she develops fever or redness worsens then she will go to ER

## 2023-05-25 NOTE — TELEPHONE ENCOUNTER
Pt states that her left leg is red and warm but not tender. States that Dr. Layla Rocha seen it and know's that it is red. Was wanting to see what she needs to do.

## 2023-05-26 ENCOUNTER — HOSPITAL ENCOUNTER (EMERGENCY)
Age: 73
Discharge: HOME | End: 2023-05-26
Attending: EMERGENCY MEDICINE
Payer: MEDICARE

## 2023-05-26 VITALS
HEIGHT: 63 IN | RESPIRATION RATE: 16 BRPM | HEART RATE: 77 BPM | WEIGHT: 244 LBS | OXYGEN SATURATION: 98 % | TEMPERATURE: 98.1 F | DIASTOLIC BLOOD PRESSURE: 80 MMHG | SYSTOLIC BLOOD PRESSURE: 186 MMHG | BODY MASS INDEX: 43.23 KG/M2

## 2023-05-26 DIAGNOSIS — L03.116 CELLULITIS OF LEFT LOWER EXTREMITY: Primary | ICD-10-CM

## 2023-05-26 PROCEDURE — 99283 EMERGENCY DEPT VISIT LOW MDM: CPT

## 2023-05-26 RX ORDER — CEPHALEXIN 500 MG/1
500 CAPSULE ORAL 4 TIMES DAILY
Qty: 28 CAPSULE | Refills: 0 | Status: SHIPPED | OUTPATIENT
Start: 2023-05-26 | End: 2023-05-30 | Stop reason: ALTCHOICE

## 2023-05-26 ASSESSMENT — ENCOUNTER SYMPTOMS
SORE THROAT: 0
EYE REDNESS: 0
SHORTNESS OF BREATH: 0
RHINORRHEA: 0
ABDOMINAL PAIN: 0

## 2023-05-26 ASSESSMENT — PAIN - FUNCTIONAL ASSESSMENT: PAIN_FUNCTIONAL_ASSESSMENT: NONE - DENIES PAIN

## 2023-05-26 ASSESSMENT — LIFESTYLE VARIABLES: HOW OFTEN DO YOU HAVE A DRINK CONTAINING ALCOHOL: NEVER

## 2023-05-26 NOTE — DISCHARGE INSTRUCTIONS
All antibiotics can affect your coumadin (INR) level. Low risk include clindamycin and cephalexin. We are going to start with cephalexin. While these are low risk, you should still get your INR checked in several days to make sure that your Coumadin level is not increasing.

## 2023-05-26 NOTE — ED NOTES
AVS provided and reviewed with the patient. The patient verbalized understanding of care at home, follow up care, and emergent symptoms to return for. The patient verbalized understanding of completing entire medication course as prescribed. No questions or concerns verbalized at this time. The patient is alert, oriented, stable, and ambulatory out of the department at the time of discharge.        Jerri Christopher RN  05/26/23 [id number]

## 2023-05-26 NOTE — ED PROVIDER NOTES
100 UNIT/ML INJECTION    USE AS DIRECTED PER SLIDING SCALE UP TO 36 UNITS DAILY    LANCETS (ONETOUCH DELICA PLUS [id number]) MISC    TEST 3 TIMES DAILY AS NEEDED *INSURANCE ONLY ALLOWS 100 EVERY 3 MONTHS    LEVOTHYROXINE (SYNTHROID) 100 MCG TABLET    TAKE 1 TABLET BY MOUTH EVERY DAY    LOSARTAN (COZAAR) 50 MG TABLET    TAKE 1 TABLET BY MOUTH EVERY DAY IN THE MORNING    MAGNESIUM OXIDE (MAG-OX) 400 (240 MG) MG TABLET    TAKE 1 TABLET BY MOUTH EVERY DAY    OMEPRAZOLE (PRILOSEC) 40 MG DELAYED RELEASE CAPSULE    TAKE 1 CAPSULE BY MOUTH EVERY DAY    ONETOUCH ULTRA STRIP    USE TO TEST BLOOD SUGAR 3 TIMES A DAY    PENTOXIFYLLINE (TRENTAL) 400 MG EXTENDED RELEASE TABLET    TAKE 1 TABLET BY MOUTH EVERY DAY    PREGABALIN (LYRICA) 100 MG CAPSULE    TAKE 1 CAPSULE BY MOUTH THREE TIMES A DAY    PROMETHAZINE-DEXTROMETHORPHAN (PROMETHAZINE-DM) 6.25-15 MG/5ML SYRUP    Take 5 mLs by mouth 4 times daily as needed for Cough    WARFARIN (COUMADIN) 1 MG TABLET    TAKE 1 TABLET BY MOUTH EVERY DAY AS DIRECTED    WARFARIN (COUMADIN) 5 MG TABLET    TAKE 1 TAB DAILY EXCEPT 10 MG ON MONDAY      SCREENINGS                           CIWA Assessment  BP: (!) 186/80  Pulse: 77                  PHYSICAL EXAM :  ED Triage Vitals [05/26/23 0912]   BP Temp Temp Source Pulse Respirations SpO2 Height Weight - Scale   (!) 186/80 98.1 °F (36.7 °C) Oral 77 16 98 % 5' 3\" (1.6 m) 244 lb (110.7 kg)        Physical Exam  Vitals and nursing note reviewed. Constitutional:       Appearance: She is well-developed. She is not diaphoretic. HENT:      Head: Normocephalic and atraumatic. Eyes:      General:         Right eye: No discharge. Left eye: No discharge. Conjunctiva/sclera: Conjunctivae normal.   Cardiovascular:      Rate and Rhythm: Rhythm irregular. Heart sounds: No murmur heard. Pulmonary:      Effort: Pulmonary effort is normal. No respiratory distress. Breath sounds: Normal breath sounds.    Abdominal:      Palpations:

## 2023-05-30 ENCOUNTER — OFFICE VISIT (OUTPATIENT)
Dept: FAMILY MEDICINE CLINIC | Age: 73
End: 2023-05-30

## 2023-05-30 VITALS
HEIGHT: 63 IN | WEIGHT: 243 LBS | HEART RATE: 81 BPM | DIASTOLIC BLOOD PRESSURE: 72 MMHG | SYSTOLIC BLOOD PRESSURE: 122 MMHG | OXYGEN SATURATION: 96 % | BODY MASS INDEX: 43.05 KG/M2

## 2023-05-30 DIAGNOSIS — Z79.01 ANTICOAGULATED ON COUMADIN: ICD-10-CM

## 2023-05-30 DIAGNOSIS — Z09 ENCOUNTER FOR EXAMINATION FOLLOWING TREATMENT AT HOSPITAL: Primary | ICD-10-CM

## 2023-05-30 DIAGNOSIS — L03.116 CELLULITIS OF LEFT LOWER EXTREMITY: ICD-10-CM

## 2023-05-30 LAB
INTERNATIONAL NORMALIZATION RATIO, POC: 2.1
PROTHROMBIN TIME, POC: 0

## 2023-05-30 RX ORDER — DOXYCYCLINE HYCLATE 100 MG
100 TABLET ORAL 2 TIMES DAILY
Qty: 14 TABLET | Refills: 0 | Status: SHIPPED | OUTPATIENT
Start: 2023-05-30 | End: 2023-06-06

## 2023-05-30 ASSESSMENT — ENCOUNTER SYMPTOMS
RESPIRATORY NEGATIVE: 1
COLOR CHANGE: 1
GASTROINTESTINAL NEGATIVE: 1

## 2023-05-30 NOTE — PROGRESS NOTES
INR WNL. Recheck in 2 days with being on Doxycycline. No changes in Warfarin.
Lm informing pt
EVERY DAY 90 capsule 0    diclofenac sodium (VOLTAREN) 1 % GEL APPLY TO AFFECTED AREA TWICE A  g 0    furosemide (LASIX) 20 MG tablet TAKE 1 TABLET BY MOUTH EVERY DAY 90 tablet 0    magnesium oxide (MAG-OX) 400 (240 Mg) MG tablet TAKE 1 TABLET BY MOUTH EVERY DAY 90 tablet 0    insulin detemir (LEVEMIR FLEXTOUCH) 100 UNIT/ML injection pen Inject 60 Units into the skin nightly 45 Adjustable Dose Pre-filled Pen Syringe 3    ONETOUCH ULTRA strip USE TO TEST BLOOD SUGAR 3 TIMES A  strip 0    BD VEO INSULIN SYRINGE U/F 31G X 15/64\" 0.3 ML MISC USE UP TO FIVE TIMES DAILY 100 each 3    warfarin (COUMADIN) 1 MG tablet TAKE 1 TABLET BY MOUTH EVERY DAY AS DIRECTED 30 tablet 2    atorvastatin (LIPITOR) 40 MG tablet TAKE 1 TABLET BY MOUTH EVERY DAY 90 tablet 0    losartan (COZAAR) 50 MG tablet TAKE 1 TABLET BY MOUTH EVERY DAY IN THE MORNING (Patient taking differently: 0.5 tablets daily) 90 tablet 3    dilTIAZem (CARDIZEM CD) 240 MG extended release capsule Take 1 capsule by mouth daily 90 capsule 3    promethazine-dextromethorphan (PROMETHAZINE-DM) 6.25-15 MG/5ML syrup Take 5 mLs by mouth 4 times daily as needed for Cough 240 mL 0    insulin regular (NOVOLIN R) 100 UNIT/ML injection USE AS DIRECTED PER SLIDING SCALE UP TO 36 UNITS DAILY 30 mL 3    Lancets (ONETOUCH DELICA PLUS [id number]) MISC TEST 3 TIMES DAILY AS NEEDED *INSURANCE ONLY ALLOWS 100 EVERY 3 MONTHS 100 each 5    levothyroxine (SYNTHROID) 100 MCG tablet TAKE 1 TABLET BY MOUTH EVERY DAY 90 tablet 3    warfarin (COUMADIN) 5 MG tablet TAKE 1 TAB DAILY EXCEPT 10 MG ON MONDAY 102 tablet 11    BD PEN NEEDLE ELDER 2ND GEN 32G X 4 MM MISC USE DAILY AS DIRECTED 100 each 11    colchicine (COLCRYS) 0.6 MG tablet Take 1 p.o. twice daily may cut down to 1 a day or discontinue if diarrhea develops.  20 tablet 0    fluticasone (FLONASE) 50 MCG/ACT nasal spray INHALE 1 SPRAY INTO EACH NOSTRIL EVERY DAY 48 g 1    albuterol sulfate  (90 Base) MCG/ACT inhaler

## 2023-06-01 ENCOUNTER — NURSE ONLY (OUTPATIENT)
Dept: FAMILY MEDICINE CLINIC | Age: 73
End: 2023-06-01

## 2023-06-01 DIAGNOSIS — I48.20 CHRONIC ATRIAL FIBRILLATION (HCC): Primary | ICD-10-CM

## 2023-06-01 LAB
INTERNATIONAL NORMALIZATION RATIO, POC: 1.9
PROTHROMBIN TIME, POC: 0

## 2023-06-01 NOTE — PROGRESS NOTES
INR is slightly low. She may take 7 mg of Warfarin tonight instead of 6 mg and then resume normal home Warfarin regimen. Recheck in one week.

## 2023-06-02 DIAGNOSIS — B02.29 POST HERPETIC NEURALGIA: ICD-10-CM

## 2023-06-02 RX ORDER — PREGABALIN 100 MG/1
CAPSULE ORAL
Qty: 90 CAPSULE | Refills: 0 | Status: SHIPPED | OUTPATIENT
Start: 2023-06-02 | End: 2023-07-02

## 2023-06-02 NOTE — TELEPHONE ENCOUNTER
Lata Lopez is requesting refill(s)   Last OV 3/15/23 (pertaining to medication)  LR 5/1/23 (per medication requested)  Next office visit scheduled or attempted Yes   If no, reason:  6/21/23

## 2023-06-05 DIAGNOSIS — E11.9 INSULIN-REQUIRING OR DEPENDENT TYPE II DIABETES MELLITUS (HCC): ICD-10-CM

## 2023-06-05 DIAGNOSIS — Z79.4 INSULIN-REQUIRING OR DEPENDENT TYPE II DIABETES MELLITUS (HCC): ICD-10-CM

## 2023-06-05 DIAGNOSIS — N18.4 CKD STAGE 4 DUE TO TYPE 2 DIABETES MELLITUS (HCC): ICD-10-CM

## 2023-06-05 DIAGNOSIS — E11.22 CKD STAGE 4 DUE TO TYPE 2 DIABETES MELLITUS (HCC): ICD-10-CM

## 2023-06-06 RX ORDER — LANCETS 30 GAUGE
EACH MISCELLANEOUS
Qty: 100 EACH | Refills: 5 | Status: SHIPPED | OUTPATIENT
Start: 2023-06-06

## 2023-06-08 ENCOUNTER — NURSE ONLY (OUTPATIENT)
Dept: FAMILY MEDICINE CLINIC | Age: 73
End: 2023-06-08

## 2023-06-08 DIAGNOSIS — I48.20 CHRONIC ATRIAL FIBRILLATION (HCC): Primary | ICD-10-CM

## 2023-06-08 LAB
INTERNATIONAL NORMALIZATION RATIO, POC: 2.2
PROTHROMBIN TIME, POC: 0

## 2023-06-14 ENCOUNTER — NURSE ONLY (OUTPATIENT)
Dept: FAMILY MEDICINE CLINIC | Age: 73
End: 2023-06-14
Payer: MEDICARE

## 2023-06-14 DIAGNOSIS — Z79.899 MEDICATION MANAGEMENT: ICD-10-CM

## 2023-06-14 DIAGNOSIS — E03.8 HYPOTHYROIDISM DUE TO HASHIMOTO'S THYROIDITIS: ICD-10-CM

## 2023-06-14 DIAGNOSIS — E06.3 HYPOTHYROIDISM DUE TO HASHIMOTO'S THYROIDITIS: ICD-10-CM

## 2023-06-14 DIAGNOSIS — I10 BENIGN ESSENTIAL HTN: Primary | ICD-10-CM

## 2023-06-14 DIAGNOSIS — I48.20 CHRONIC ATRIAL FIBRILLATION (HCC): ICD-10-CM

## 2023-06-14 LAB
INTERNATIONAL NORMALIZATION RATIO, POC: 2.5
PROTHROMBIN TIME, POC: 0

## 2023-06-14 PROCEDURE — 85610 PROTHROMBIN TIME: CPT | Performed by: FAMILY MEDICINE

## 2023-06-21 ENCOUNTER — ANTI-COAG VISIT (OUTPATIENT)
Dept: FAMILY MEDICINE CLINIC | Age: 73
End: 2023-06-21

## 2023-06-21 ENCOUNTER — HOSPITAL ENCOUNTER (OUTPATIENT)
Age: 73
Discharge: HOME | End: 2023-06-21
Payer: MEDICARE

## 2023-06-21 ENCOUNTER — HOSPITAL ENCOUNTER (OUTPATIENT)
Dept: GENERAL RADIOLOGY | Age: 73
Discharge: HOME | End: 2023-06-21
Payer: MEDICARE

## 2023-06-21 ENCOUNTER — OFFICE VISIT (OUTPATIENT)
Dept: FAMILY MEDICINE CLINIC | Age: 73
End: 2023-06-21
Payer: MEDICARE

## 2023-06-21 VITALS
OXYGEN SATURATION: 96 % | SYSTOLIC BLOOD PRESSURE: 108 MMHG | BODY MASS INDEX: 44.29 KG/M2 | WEIGHT: 250 LBS | DIASTOLIC BLOOD PRESSURE: 57 MMHG | HEART RATE: 81 BPM

## 2023-06-21 DIAGNOSIS — Z13.220 SCREENING FOR LIPID DISORDERS: ICD-10-CM

## 2023-06-21 DIAGNOSIS — E03.8 HYPOTHYROIDISM DUE TO HASHIMOTO'S THYROIDITIS: ICD-10-CM

## 2023-06-21 DIAGNOSIS — E11.22 CKD STAGE 4 DUE TO TYPE 2 DIABETES MELLITUS (HCC): ICD-10-CM

## 2023-06-21 DIAGNOSIS — R06.02 SHORTNESS OF BREATH: ICD-10-CM

## 2023-06-21 DIAGNOSIS — E06.3 HYPOTHYROIDISM DUE TO HASHIMOTO'S THYROIDITIS: ICD-10-CM

## 2023-06-21 DIAGNOSIS — G25.3 MYOCLONIC JERKING: ICD-10-CM

## 2023-06-21 DIAGNOSIS — N18.4 CKD STAGE 4 DUE TO TYPE 2 DIABETES MELLITUS (HCC): ICD-10-CM

## 2023-06-21 DIAGNOSIS — I10 BENIGN ESSENTIAL HTN: ICD-10-CM

## 2023-06-21 DIAGNOSIS — E11.9 INSULIN-REQUIRING OR DEPENDENT TYPE II DIABETES MELLITUS (HCC): ICD-10-CM

## 2023-06-21 DIAGNOSIS — E11.41 DIABETIC MONONEUROPATHY ASSOCIATED WITH TYPE 2 DIABETES MELLITUS (HCC): ICD-10-CM

## 2023-06-21 DIAGNOSIS — J81.0 ACUTE PULMONARY EDEMA (HCC): Primary | ICD-10-CM

## 2023-06-21 DIAGNOSIS — Z79.4 INSULIN-REQUIRING OR DEPENDENT TYPE II DIABETES MELLITUS (HCC): ICD-10-CM

## 2023-06-21 DIAGNOSIS — Z13.29 SCREENING FOR THYROID DISORDER: ICD-10-CM

## 2023-06-21 DIAGNOSIS — I50.42 CHRONIC COMBINED SYSTOLIC AND DIASTOLIC CONGESTIVE HEART FAILURE (HCC): ICD-10-CM

## 2023-06-21 DIAGNOSIS — Z79.01 LONG TERM CURRENT USE OF ANTICOAGULANT THERAPY: ICD-10-CM

## 2023-06-21 DIAGNOSIS — I48.20 CHRONIC ATRIAL FIBRILLATION (HCC): ICD-10-CM

## 2023-06-21 LAB
ALBUMIN SERPL-MCNC: 4.4 G/DL (ref 3.4–5)
ALBUMIN/GLOB SERPL: 2 {RATIO} (ref 1.1–2.2)
ALP SERPL-CCNC: 80 U/L (ref 40–129)
ALT SERPL-CCNC: 17 U/L (ref 10–40)
ANION GAP SERPL CALCULATED.3IONS-SCNC: 14 MMOL/L (ref 3–16)
AST SERPL-CCNC: 21 U/L (ref 15–37)
BASOPHILS # BLD: 0 K/UL (ref 0–0.2)
BASOPHILS NFR BLD: 0.8 %
BILIRUB SERPL-MCNC: 0.4 MG/DL (ref 0–1)
BUN SERPL-MCNC: 25 MG/DL (ref 7–20)
CALCIUM SERPL-MCNC: 9.4 MG/DL (ref 8.3–10.6)
CHLORIDE SERPL-SCNC: 102 MMOL/L (ref 99–110)
CHOLEST SERPL-MCNC: 168 MG/DL (ref 0–199)
CO2 SERPL-SCNC: 27 MMOL/L (ref 21–32)
CREAT SERPL-MCNC: 1.1 MG/DL (ref 0.6–1.2)
DEPRECATED RDW RBC AUTO: 16.7 % (ref 12.4–15.4)
EOSINOPHIL # BLD: 0.1 K/UL (ref 0–0.6)
EOSINOPHIL NFR BLD: 1 %
GFR SERPLBLD CREATININE-BSD FMLA CKD-EPI: 53 ML/MIN/{1.73_M2}
GLUCOSE SERPL-MCNC: 123 MG/DL (ref 70–99)
HCT VFR BLD AUTO: 48.8 % (ref 36–48)
HDLC SERPL-MCNC: 46 MG/DL (ref 40–60)
HGB BLD-MCNC: 15.3 G/DL (ref 12–16)
INTERNATIONAL NORMALIZATION RATIO, POC: 3
LDLC SERPL CALC-MCNC: 95 MG/DL
LYMPHOCYTES # BLD: 1.5 K/UL (ref 1–5.1)
LYMPHOCYTES NFR BLD: 28.1 %
MCH RBC QN AUTO: 31.4 PG (ref 26–34)
MCHC RBC AUTO-ENTMCNC: 31.3 G/DL (ref 31–36)
MCV RBC AUTO: 100.2 FL (ref 80–100)
MONOCYTES # BLD: 0.5 K/UL (ref 0–1.3)
MONOCYTES NFR BLD: 9.6 %
NEUTROPHILS # BLD: 3.3 K/UL (ref 1.7–7.7)
NEUTROPHILS NFR BLD: 60.5 %
PLATELET # BLD AUTO: 179 K/UL (ref 135–450)
PMV BLD AUTO: 8.9 FL (ref 5–10.5)
POTASSIUM SERPL-SCNC: 4.5 MMOL/L (ref 3.5–5.1)
PROT SERPL-MCNC: 6.6 G/DL (ref 6.4–8.2)
PROTHROMBIN TIME, POC: 0
RBC # BLD AUTO: 4.87 M/UL (ref 4–5.2)
SODIUM SERPL-SCNC: 143 MMOL/L (ref 136–145)
TRIGL SERPL-MCNC: 137 MG/DL (ref 0–150)
TSH SERPL DL<=0.005 MIU/L-ACNC: 2.02 UIU/ML (ref 0.27–4.2)
VLDLC SERPL CALC-MCNC: 27 MG/DL
WBC # BLD AUTO: 5.5 K/UL (ref 4–11)

## 2023-06-21 PROCEDURE — 1090F PRES/ABSN URINE INCON ASSESS: CPT | Performed by: FAMILY MEDICINE

## 2023-06-21 PROCEDURE — 99215 OFFICE O/P EST HI 40 MIN: CPT | Performed by: FAMILY MEDICINE

## 2023-06-21 PROCEDURE — 71046 X-RAY EXAM CHEST 2 VIEWS: CPT

## 2023-06-21 PROCEDURE — 36415 COLL VENOUS BLD VENIPUNCTURE: CPT | Performed by: FAMILY MEDICINE

## 2023-06-21 PROCEDURE — G8417 CALC BMI ABV UP PARAM F/U: HCPCS | Performed by: FAMILY MEDICINE

## 2023-06-21 PROCEDURE — 2022F DILAT RTA XM EVC RTNOPTHY: CPT | Performed by: FAMILY MEDICINE

## 2023-06-21 PROCEDURE — G8427 DOCREV CUR MEDS BY ELIG CLIN: HCPCS | Performed by: FAMILY MEDICINE

## 2023-06-21 PROCEDURE — G8399 PT W/DXA RESULTS DOCUMENT: HCPCS | Performed by: FAMILY MEDICINE

## 2023-06-21 PROCEDURE — 3046F HEMOGLOBIN A1C LEVEL >9.0%: CPT | Performed by: FAMILY MEDICINE

## 2023-06-21 PROCEDURE — 1123F ACP DISCUSS/DSCN MKR DOCD: CPT | Performed by: FAMILY MEDICINE

## 2023-06-21 PROCEDURE — 3078F DIAST BP <80 MM HG: CPT | Performed by: FAMILY MEDICINE

## 2023-06-21 PROCEDURE — 1036F TOBACCO NON-USER: CPT | Performed by: FAMILY MEDICINE

## 2023-06-21 PROCEDURE — 3017F COLORECTAL CA SCREEN DOC REV: CPT | Performed by: FAMILY MEDICINE

## 2023-06-21 PROCEDURE — 85610 PROTHROMBIN TIME: CPT | Performed by: FAMILY MEDICINE

## 2023-06-21 PROCEDURE — 3074F SYST BP LT 130 MM HG: CPT | Performed by: FAMILY MEDICINE

## 2023-06-21 NOTE — PATIENT INSTRUCTIONS
INR take 5mg daily but 6mg on Monday     Lasix-   Today take 3 total tablets of lasix  Thursday - 2 tablets of lasix  Friday- two tablets of lasix  Starting Saturday you should have lost 4-5 lbs if you have not  then continue to take 2 lasix daily and call on Monday about how you are. If you are down 4-5 lbs then go back to one tablet daily. cut lyrica back to 2 a day starting today for a week and then after a week cut it down to one a day for a week and then stop. Patient should call the office immediately with new or ongoing signs or symptoms or worsening, or proceed to the emergency room. If you are on medications which could impair your senses, you are at risk of weakness, falls, dizziness, or drowsiness. You should be careful during activities which could place you at risk of harm, such as climbing, using stairs, operating machinery, or driving vehicles. If you feel you cannot safely do these activities, you should request others to help you, or avoid the activities altogether. If you are drowsy for any other reason, you should use the same precautions as listed above.      Web Address for Advance Directive:    [email or web address]

## 2023-06-21 NOTE — PROGRESS NOTES
Chief Complaint   Patient presents with    Hypertension    Diabetes        Internal Administration   First Dose COVID-19, PFIZER PURPLE top, DILUTE for use, (age 15 y+), 30mcg/0.3mL  2021   Second Dose COVID-19, PFIZER PURPLE top, DILUTE for use, (age 15 y+), 30mcg/0.3mL   2021       Last COVID Lab SARS-CoV-2 (no units)   Date Value   2021 Not Detected     SARS-CoV-2, PCR (no units)   Date Value   10/27/2020 Not Detected     SARS-COV-2, RdRp gene (no units)   Date Value   2023 Negative             Wt Readings from Last 3 Encounters:   23 250 lb (113.4 kg)   23 243 lb (110.2 kg)   23 244 lb (110.7 kg)     BP Readings from Last 3 Encounters:   23 (!) 108/57   23 122/72   23 (!) 186/80      Lab Results   Component Value Date    LABA1C 9.1 03/15/2023    LABA1C 7.5 2022    LABA1C 7.6 2022       HPI:  Yudi Brown is a 67 y.o. (: 1950) here today for    Patient wants to know what dose of losartan she is supposed to be on. Informed to keep taking the losartan as 25mg daily. She states that she has gained about 7lbs in the last month despite working on her diet and she states that she has felt very short of breath and she is waking in the night due to not being able to catch her breath. Discussed the concern that she is building up water on her lungs. Will listen to her lungs and determine if she needs to have a chest xray. Informed to take two additional lasix today and then tomorrow and Friday take 2 tablets. Doing this to try and get the excess fluid off due to her lasix having been cut in half by nephrology. Informed patient of her INR today and will recheck her INR and a bmp and mag in a week    She states that she has noted while on the lyrica that it has caused her to jerk and also feels its affecting her memory.  Informed to go ahead and cut her lyrica back to 2 a day for a week and then after a week cut it down to one a day

## 2023-06-22 LAB
EST. AVERAGE GLUCOSE BLD GHB EST-MCNC: 157.1 MG/DL
HBA1C MFR BLD: 7.1 %

## 2023-06-23 ENCOUNTER — TELEPHONE (OUTPATIENT)
Dept: FAMILY MEDICINE CLINIC | Age: 73
End: 2023-06-23

## 2023-06-26 ENCOUNTER — TELEPHONE (OUTPATIENT)
Dept: FAMILY MEDICINE CLINIC | Age: 73
End: 2023-06-26

## 2023-06-28 ENCOUNTER — ANTI-COAG VISIT (OUTPATIENT)
Dept: FAMILY MEDICINE CLINIC | Age: 73
End: 2023-06-28

## 2023-06-28 ENCOUNTER — NURSE ONLY (OUTPATIENT)
Dept: FAMILY MEDICINE CLINIC | Age: 73
End: 2023-06-28
Payer: MEDICARE

## 2023-06-28 DIAGNOSIS — Z79.01 LONG TERM CURRENT USE OF ANTICOAGULANT THERAPY: ICD-10-CM

## 2023-06-28 DIAGNOSIS — I10 BENIGN ESSENTIAL HTN: Primary | ICD-10-CM

## 2023-06-28 LAB
ANION GAP SERPL CALCULATED.3IONS-SCNC: 15 MMOL/L (ref 3–16)
BUN SERPL-MCNC: 31 MG/DL (ref 7–20)
CALCIUM SERPL-MCNC: 9.6 MG/DL (ref 8.3–10.6)
CHLORIDE SERPL-SCNC: 98 MMOL/L (ref 99–110)
CO2 SERPL-SCNC: 26 MMOL/L (ref 21–32)
CREAT SERPL-MCNC: 1.3 MG/DL (ref 0.6–1.2)
GFR SERPLBLD CREATININE-BSD FMLA CKD-EPI: 43 ML/MIN/{1.73_M2}
GLUCOSE SERPL-MCNC: 143 MG/DL (ref 70–99)
INTERNATIONAL NORMALIZATION RATIO, POC: 2.4
MAGNESIUM SERPL-MCNC: 1.8 MG/DL (ref 1.8–2.4)
POTASSIUM SERPL-SCNC: 4.8 MMOL/L (ref 3.5–5.1)
PROTHROMBIN TIME, POC: 0
SODIUM SERPL-SCNC: 139 MMOL/L (ref 136–145)

## 2023-06-28 PROCEDURE — 36415 COLL VENOUS BLD VENIPUNCTURE: CPT | Performed by: FAMILY MEDICINE

## 2023-06-28 PROCEDURE — 85610 PROTHROMBIN TIME: CPT | Performed by: FAMILY MEDICINE

## 2023-07-01 DIAGNOSIS — Z79.4 INSULIN-REQUIRING OR DEPENDENT TYPE II DIABETES MELLITUS (HCC): ICD-10-CM

## 2023-07-01 DIAGNOSIS — E11.9 INSULIN-REQUIRING OR DEPENDENT TYPE II DIABETES MELLITUS (HCC): ICD-10-CM

## 2023-07-03 NOTE — TELEPHONE ENCOUNTER
Carlitos Aguirre is requesting refill(s)   Last OV 6- (pertaining to medication)  LR 12- (per medication requested)  Next office visit scheduled or attempted Yes   If no, reason:  9-

## 2023-07-12 ENCOUNTER — NURSE ONLY (OUTPATIENT)
Dept: FAMILY MEDICINE CLINIC | Age: 73
End: 2023-07-12
Payer: MEDICARE

## 2023-07-12 ENCOUNTER — ANTI-COAG VISIT (OUTPATIENT)
Dept: FAMILY MEDICINE CLINIC | Age: 73
End: 2023-07-12

## 2023-07-12 DIAGNOSIS — Z79.01 LONG TERM CURRENT USE OF ANTICOAGULANT THERAPY: ICD-10-CM

## 2023-07-12 LAB
INTERNATIONAL NORMALIZATION RATIO, POC: 1.9
PROTHROMBIN TIME, POC: 0

## 2023-07-12 PROCEDURE — 85610 PROTHROMBIN TIME: CPT | Performed by: FAMILY MEDICINE

## 2023-07-19 ENCOUNTER — NURSE ONLY (OUTPATIENT)
Dept: FAMILY MEDICINE CLINIC | Age: 73
End: 2023-07-19
Payer: MEDICARE

## 2023-07-19 ENCOUNTER — ANTI-COAG VISIT (OUTPATIENT)
Dept: FAMILY MEDICINE CLINIC | Age: 73
End: 2023-07-19

## 2023-07-19 DIAGNOSIS — Z79.01 LONG TERM CURRENT USE OF ANTICOAGULANT THERAPY: ICD-10-CM

## 2023-07-19 LAB
INTERNATIONAL NORMALIZATION RATIO, POC: 2.2
PROTHROMBIN TIME, POC: 0

## 2023-07-19 PROCEDURE — 85610 PROTHROMBIN TIME: CPT | Performed by: FAMILY MEDICINE

## 2023-07-19 NOTE — PROGRESS NOTES
Patient called and informed of her INR and to continue with 6mg every Monday and 5mg all other days.  She was informed to recheck her INR on 8/2 but she has an appointment on 8/3, pcp okay with her coming in on 8/3 so placed reminder for INR along with her labs on 8/3 appointment

## 2023-08-03 ENCOUNTER — NURSE ONLY (OUTPATIENT)
Dept: FAMILY MEDICINE CLINIC | Age: 73
End: 2023-08-03
Payer: MEDICARE

## 2023-08-03 DIAGNOSIS — Z79.01 LONG TERM CURRENT USE OF ANTICOAGULANT THERAPY: ICD-10-CM

## 2023-08-03 DIAGNOSIS — I10 BENIGN ESSENTIAL HTN: Primary | ICD-10-CM

## 2023-08-03 LAB
ANION GAP SERPL CALCULATED.3IONS-SCNC: 15 MMOL/L (ref 3–16)
BUN SERPL-MCNC: 31 MG/DL (ref 7–20)
CALCIUM SERPL-MCNC: 9.8 MG/DL (ref 8.3–10.6)
CHLORIDE SERPL-SCNC: 96 MMOL/L (ref 99–110)
CO2 SERPL-SCNC: 27 MMOL/L (ref 21–32)
CREAT SERPL-MCNC: 1.3 MG/DL (ref 0.6–1.2)
GFR SERPLBLD CREATININE-BSD FMLA CKD-EPI: 43 ML/MIN/{1.73_M2}
GLUCOSE SERPL-MCNC: 153 MG/DL (ref 70–99)
INTERNATIONAL NORMALIZATION RATIO, POC: 2.7
POTASSIUM SERPL-SCNC: 4.3 MMOL/L (ref 3.5–5.1)
PROTHROMBIN TIME, POC: 0
SODIUM SERPL-SCNC: 138 MMOL/L (ref 136–145)

## 2023-08-03 PROCEDURE — 85610 PROTHROMBIN TIME: CPT | Performed by: FAMILY MEDICINE

## 2023-08-03 PROCEDURE — 36415 COLL VENOUS BLD VENIPUNCTURE: CPT | Performed by: FAMILY MEDICINE

## 2023-08-04 ENCOUNTER — TELEPHONE (OUTPATIENT)
Dept: FAMILY MEDICINE CLINIC | Age: 73
End: 2023-08-04

## 2023-08-04 ENCOUNTER — E-VISIT (OUTPATIENT)
Dept: FAMILY MEDICINE CLINIC | Age: 73
End: 2023-08-04

## 2023-08-04 DIAGNOSIS — M10.9 EXACERBATION OF GOUT: Primary | ICD-10-CM

## 2023-08-04 RX ORDER — COLCHICINE 0.6 MG/1
TABLET ORAL
Qty: 20 TABLET | Refills: 0 | Status: SHIPPED | OUTPATIENT
Start: 2023-08-04

## 2023-08-04 NOTE — TELEPHONE ENCOUNTER
Pt called wanting to know if something could be called in for gout.  Pt uses [address] Call back [phone]

## 2023-08-04 NOTE — PROGRESS NOTES
HPI: as per patient provided history  Exam: N/A (electronic visit)  ASSESSMENT/PLAN:  1. Exacerbation of gout  - colchicine (COLCRYS) 0.6 MG tablet; Take 1 p.o. twice daily may cut down to 1 a day or discontinue if diarrhea develops. Dispense: 20 tablet; Refill: 0       Patient instructed to call the office if worsens, or fails to improve as anticipated. 5-10 minutes were spent on the digital evaluation and management of this patient.

## 2023-08-07 RX ORDER — ATORVASTATIN CALCIUM 40 MG/1
TABLET, FILM COATED ORAL
Qty: 90 TABLET | Refills: 3 | Status: SHIPPED | OUTPATIENT
Start: 2023-08-07

## 2023-08-07 NOTE — TELEPHONE ENCOUNTER
Refill Request     CONFIRM preferrred pharmacy with the patient. If Mail Order Rx - Pend for 90 day refill. Last Seen: Last Seen Department: 8/4/2023  Last Seen by PCP: 7/29/2022    Last Written: 2-    If no future appointment scheduled, route STAFF MESSAGE with patient name to the [facility] for scheduling. Next Appointment:   Future Appointments   Date Time Provider [address] [place] [address]   9/13/2023  8:00 AM Bret Hernandez MD [facility] [place] - DYJOSE M   2/8/2024  8:00 AM SCHEDULE, [facility] - DYD       Message sent to [address] [place] [address] to schedule appt with patient?   N/A      Requested Prescriptions     Pending Prescriptions Disp Refills    atorvastatin (LIPITOR) 40 MG tablet [Pharmacy Med Name: ATORVASTATIN 40 MG TABLET] 90 tablet 0     Sig: TAKE 1 TABLET BY MOUTH EVERY DAY

## 2023-08-14 DIAGNOSIS — I87.2 VENOUS INSUFFICIENCY: ICD-10-CM

## 2023-08-14 RX ORDER — FUROSEMIDE 20 MG/1
TABLET ORAL
Qty: 90 TABLET | Refills: 0 | Status: SHIPPED | OUTPATIENT
Start: 2023-08-14

## 2023-08-14 NOTE — TELEPHONE ENCOUNTER
Refill Request     CONFIRM preferrred pharmacy with the patient. If Mail Order Rx - Pend for 90 day refill. Last Seen: Last Seen Department: 8/4/2023  Last Seen by PCP: 7/29/2022    Last Written: 5/19/2023    If no future appointment scheduled, route STAFF MESSAGE with patient name to the [facility] for scheduling. Next Appointment:   Future Appointments   Date Time Provider [address] [place] [address]   9/13/2023  8:00 AM [facility] [place] [facility] [place]   2/8/2024  8:00 AM [facility] [place] [facility]       Message sent to [employer] to schedule appt with patient?   N/A      Requested Prescriptions     Pending Prescriptions Disp Refills    furosemide (LASIX) 20 MG tablet [Pharmacy Med Name: FUROSEMIDE 20 MG TABLET] 90 tablet 0     Sig: TAKE 1 TABLET BY MOUTH EVERY DAY

## 2023-08-21 ENCOUNTER — NURSE ONLY (OUTPATIENT)
Dept: FAMILY MEDICINE CLINIC | Age: 73
End: 2023-08-21
Payer: MEDICARE

## 2023-08-21 ENCOUNTER — ANTI-COAG VISIT (OUTPATIENT)
Dept: FAMILY MEDICINE CLINIC | Age: 73
End: 2023-08-21

## 2023-08-21 DIAGNOSIS — Z79.01 LONG TERM CURRENT USE OF ANTICOAGULANT THERAPY: ICD-10-CM

## 2023-08-21 LAB
INTERNATIONAL NORMALIZATION RATIO, POC: 2.9
PROTHROMBIN TIME, POC: 0

## 2023-08-21 PROCEDURE — 85610 PROTHROMBIN TIME: CPT | Performed by: FAMILY MEDICINE

## 2023-09-05 RX ORDER — INSULIN DETEMIR 100 [IU]/ML
INJECTION, SOLUTION SUBCUTANEOUS
Qty: 45 ADJUSTABLE DOSE PRE-FILLED PEN SYRINGE | Refills: 3 | Status: SHIPPED | OUTPATIENT
Start: 2023-09-05

## 2023-09-05 RX ORDER — SYRING-NEEDL,DISP,INSUL,0.3 ML 31GX15/64"
SYRINGE, EMPTY DISPOSABLE MISCELLANEOUS
Qty: 100 EACH | Refills: 2 | Status: SHIPPED | OUTPATIENT
Start: 2023-09-05

## 2023-09-05 NOTE — TELEPHONE ENCOUNTER
Refill Request     CONFIRM preferrred pharmacy with the patient. If Mail Order Rx - Pend for 90 day refill. Last Seen: Last Seen Department: 8/4/2023  Last Seen by PCP: 7/29/2022    Last Written: 2/10/23    If no future appointment scheduled, route STAFF MESSAGE with patient name to the [facility] for scheduling. Next Appointment:   Future Appointments   Date Time Provider [address] [place] [address]   9/13/2023  8:00 AM Natalie Dumont MD [facility] [place] - [place] M   2/8/2024  8:00 AM SCHEDULE, [facility] [place]       Message sent to [address] [place] [address] to schedule appt with patient?   N/A      Requested Prescriptions     Pending Prescriptions Disp Refills    BD VEO INSULIN SYRINGE U/F 31G X 15/64\" 0.3 ML MISC [Pharmacy Med Name: BD VEO INS SYRN 0.3 ML [id number]]  1     Sig: USE UP TO FIVE TIMES DAILY

## 2023-09-13 ENCOUNTER — OFFICE VISIT (OUTPATIENT)
Dept: FAMILY MEDICINE CLINIC | Age: 73
End: 2023-09-13

## 2023-09-13 ENCOUNTER — ANTI-COAG VISIT (OUTPATIENT)
Dept: FAMILY MEDICINE CLINIC | Age: 73
End: 2023-09-13

## 2023-09-13 VITALS
HEART RATE: 72 BPM | DIASTOLIC BLOOD PRESSURE: 80 MMHG | BODY MASS INDEX: 41.98 KG/M2 | WEIGHT: 237 LBS | OXYGEN SATURATION: 92 % | SYSTOLIC BLOOD PRESSURE: 133 MMHG

## 2023-09-13 DIAGNOSIS — I87.2 VENOUS INSUFFICIENCY: ICD-10-CM

## 2023-09-13 DIAGNOSIS — C83.53 LYMPHOBLASTIC DIFFUSE LYMPHOMA OF INTRA-ABDOMINAL LYMPH NODES (HCC): ICD-10-CM

## 2023-09-13 DIAGNOSIS — K21.9 GASTROESOPHAGEAL REFLUX DISEASE WITHOUT ESOPHAGITIS: ICD-10-CM

## 2023-09-13 DIAGNOSIS — Z79.01 LONG TERM CURRENT USE OF ANTICOAGULANT THERAPY: ICD-10-CM

## 2023-09-13 DIAGNOSIS — I48.20 CHRONIC ATRIAL FIBRILLATION (HCC): ICD-10-CM

## 2023-09-13 DIAGNOSIS — Z23 NEEDS FLU SHOT: ICD-10-CM

## 2023-09-13 DIAGNOSIS — E11.9 INSULIN-REQUIRING OR DEPENDENT TYPE II DIABETES MELLITUS (HCC): Primary | ICD-10-CM

## 2023-09-13 DIAGNOSIS — I10 BENIGN ESSENTIAL HTN: ICD-10-CM

## 2023-09-13 DIAGNOSIS — I50.42 CHRONIC COMBINED SYSTOLIC AND DIASTOLIC CONGESTIVE HEART FAILURE (HCC): ICD-10-CM

## 2023-09-13 DIAGNOSIS — M1A.09X0 CHRONIC GOUT OF MULTIPLE SITES, UNSPECIFIED CAUSE: ICD-10-CM

## 2023-09-13 DIAGNOSIS — Z79.4 INSULIN-REQUIRING OR DEPENDENT TYPE II DIABETES MELLITUS (HCC): Primary | ICD-10-CM

## 2023-09-13 LAB
INTERNATIONAL NORMALIZATION RATIO, POC: 3
PROTHROMBIN TIME, POC: 0

## 2023-09-13 RX ORDER — ALLOPURINOL 100 MG/1
200 TABLET ORAL DAILY
Qty: 180 TABLET | Refills: 0 | Status: SHIPPED | OUTPATIENT
Start: 2023-09-13

## 2023-09-13 RX ORDER — ALLOPURINOL 100 MG/1
200 TABLET ORAL DAILY
Qty: 180 TABLET | Refills: 0 | Status: SHIPPED | OUTPATIENT
Start: 2023-09-13 | End: 2023-09-13

## 2023-09-13 NOTE — PROGRESS NOTES
pentoxifylline (TRENTAL) 400 MG extended release tablet TAKE 1 TABLET BY MOUTH EVERY DAY Yes Js Pelayo MD   omeprazole (PRILOSEC) 40 MG delayed release capsule TAKE 1 CAPSULE BY MOUTH EVERY DAY Yes Js Pelayo MD   diclofenac sodium (VOLTAREN) 1 % GEL APPLY TO AFFECTED AREA TWICE A DAY Yes Janet Carlson MD   magnesium oxide (MAG-OX) 400 (240 Mg) MG tablet TAKE 1 TABLET BY MOUTH EVERY DAY Yes DEMI Hart CNP   ONETOUCH ULTRA strip USE TO TEST BLOOD SUGAR 3 TIMES A DAY Yes DEMI Hart CNP   warfarin (COUMADIN) 1 MG tablet TAKE 1 TABLET BY MOUTH EVERY DAY AS DIRECTED Yes DEMI Hart CNP   losartan (COZAAR) 50 MG tablet TAKE 1 TABLET BY MOUTH EVERY DAY IN THE MORNING  Patient taking differently: 0.5 tablets daily Yes Js Pelayo MD   dilTIAZem (CARDIZEM CD) 240 MG extended release capsule Take 1 capsule by mouth daily Yes Js Pelayo MD   promethazine-dextromethorphan (PROMETHAZINE-DM) 6.25-15 MG/5ML syrup Take 5 mLs by mouth 4 times daily as needed for Cough Yes DEMI Mcintyre CNP   levothyroxine (SYNTHROID) 100 MCG tablet TAKE 1 TABLET BY MOUTH EVERY DAY Yes Js Pelayo MD   warfarin (COUMADIN) 5 MG tablet TAKE 1 TAB DAILY EXCEPT 10 MG ON MONDAY Yes Js Pelayo MD   BD PEN NEEDLE ELDER 2ND GEN 32G X 4 MM MISC USE DAILY AS DIRECTED Yes DEMI Mcintyre CNP   fluticasone (FLONASE) 50 MCG/ACT nasal spray INHALE 1 SPRAY INTO EACH NOSTRIL EVERY DAY Yes Js Pelayo MD   albuterol sulfate  (90 Base) MCG/ACT inhaler INHALE 2 PUFFS BY MOUTH EVERY 4 HOURS AS NEEDED FOR WHEEZE OR FOR SHORTNESS OF BREATH Yes Js Pelayo MD   Cholecalciferol (VITAMIN D3) 50 MCG (2000 UT) CAPS Take by mouth Yes Historical Provider, MD   allopurinol (ZYLOPRIM) 100 MG tablet Take 1 tablet by mouth daily Yes Historical Provider, MD   pregabalin (LYRICA)

## 2023-09-14 DIAGNOSIS — E11.9 INSULIN-REQUIRING OR DEPENDENT TYPE II DIABETES MELLITUS (HCC): Primary | ICD-10-CM

## 2023-09-14 DIAGNOSIS — Z79.4 INSULIN-REQUIRING OR DEPENDENT TYPE II DIABETES MELLITUS (HCC): Primary | ICD-10-CM

## 2023-09-14 LAB
EST. AVERAGE GLUCOSE BLD GHB EST-MCNC: 191.5 MG/DL
HBA1C MFR BLD: 8.3 %
URATE SERPL-MCNC: 7.1 MG/DL (ref 2.6–6)

## 2023-09-14 RX ORDER — SEMAGLUTIDE 1.34 MG/ML
0.25 INJECTION, SOLUTION SUBCUTANEOUS WEEKLY
Qty: 4 ADJUSTABLE DOSE PRE-FILLED PEN SYRINGE | Refills: 1 | Status: SHIPPED | OUTPATIENT
Start: 2023-09-14

## 2023-09-25 RX ORDER — OMEPRAZOLE 40 MG/1
CAPSULE, DELAYED RELEASE ORAL
Qty: 90 CAPSULE | Refills: 0 | Status: SHIPPED | OUTPATIENT
Start: 2023-09-25

## 2023-09-25 RX ORDER — PENTOXIFYLLINE 400 MG/1
TABLET, EXTENDED RELEASE ORAL
Qty: 90 TABLET | Refills: 0 | Status: SHIPPED | OUTPATIENT
Start: 2023-09-25

## 2023-09-25 NOTE — TELEPHONE ENCOUNTER
Refill Request     CONFIRM preferrred pharmacy with the patient. If Mail Order Rx - Pend for 90 day refill. Last Seen: Last Seen Department: 9/13/2023  Last Seen by PCP: 9/13/2023    Last Written: 4/12/2023    If no future appointment scheduled, route STAFF MESSAGE with patient name to the [facility] for scheduling. Next Appointment:   Future Appointments   Date Time Provider [facility] [address]   9/27/2023  9:00 AM SCHEDULE, MHCX [facility] THOM[facility] [place]   10/25/2023  3:40 PM MD Ha Gurrola  [place]   12/13/2023 11:20 AM MD Ha Gurrola  [place]   2/8/2024  8:00 AM SCHEDULE, [facility]  [place]       Message sent to [address] [place] [address] to schedule appt with patient?   NO      Requested Prescriptions     Pending Prescriptions Disp Refills    pentoxifylline (TRENTAL) 400 MG extended release tablet [Pharmacy Med Name: PENTOXIFYLLINE  MG TAB] 90 tablet 0     Sig: TAKE 1 TABLET BY MOUTH EVERY DAY

## 2023-09-25 NOTE — TELEPHONE ENCOUNTER
Refill Request     CONFIRM preferrred pharmacy with the patient. If Mail Order Rx - Pend for 90 day refill. Last Seen: Last Seen Department: 9/13/2023  Last Seen by PCP: 9/13/2023    Last Written: 4/12/2023    If no future appointment scheduled, route STAFF MESSAGE with patient name to the [facility] for scheduling. Next Appointment:   Future Appointments   Date Time Provider [address]   9/27/2023  9:00 AM SCHEDULE, MHCX [facility] [place]   10/25/2023  3:40 PM Rocky Oneil MD [facility] [place]   12/13/2023 11:20 AM Rocky Oneil MD [facility] [place]   2/8/2024  8:00 AM SCHEDULE, [facility]X [facility] [place]       Message sent to [address] [place] [address] to schedule appt with patient?   NO      Requested Prescriptions     Pending Prescriptions Disp Refills    omeprazole (PRILOSEC) 40 MG delayed release capsule [Pharmacy Med Name: OMEPRAZOLE DR 40 MG CAPSULE] 90 capsule 0     Sig: TAKE 1 CAPSULE BY MOUTH EVERY DAY

## 2023-09-26 ENCOUNTER — NURSE ONLY (OUTPATIENT)
Dept: FAMILY MEDICINE CLINIC | Age: 73
End: 2023-09-26
Payer: MEDICARE

## 2023-09-26 ENCOUNTER — ANTI-COAG VISIT (OUTPATIENT)
Dept: FAMILY MEDICINE CLINIC | Age: 73
End: 2023-09-26

## 2023-09-26 DIAGNOSIS — Z79.01 LONG TERM CURRENT USE OF ANTICOAGULANT THERAPY: ICD-10-CM

## 2023-09-26 LAB
INTERNATIONAL NORMALIZATION RATIO, POC: 3
PROTHROMBIN TIME, POC: 0

## 2023-09-26 PROCEDURE — 85610 PROTHROMBIN TIME: CPT | Performed by: FAMILY MEDICINE

## 2023-10-02 RX ORDER — WARFARIN SODIUM 5 MG/1
TABLET ORAL
Qty: 102 TABLET | Refills: 2 | Status: SHIPPED | OUTPATIENT
Start: 2023-10-02

## 2023-10-02 NOTE — TELEPHONE ENCOUNTER
Refill Request     CONFIRM preferrred pharmacy with the patient. If Mail Order Rx - Pend for 90 day refill. Last Seen: Last Seen Department: 9/13/2023  Last Seen by PCP: 9/13/2023    Last Written: 2/10/23    If no future appointment scheduled, route STAFF MESSAGE with patient name to the [facility] for scheduling. Next Appointment:   Future Appointments   Date Time Provider [address] [place] [address]   10/25/2023  3:40 PM MD Ha De Los Santos  [place]   12/13/2023 11:20 AM Naomi Jacobson MD [facility]  [place]   2/8/2024  8:00 AM [facility] [place]       Message sent to [address] [place] [address] to schedule appt with patient?   YES      Requested Prescriptions     Pending Prescriptions Disp Refills    warfarin (COUMADIN) 5 MG tablet [Pharmacy Med Name: WARFARIN SODIUM 5 MG TABLET] 102 tablet 2     Sig: TAKE 1 TABLET BY MOUTH ONCE DAILY EXCEPT 10MG ON MONDAY

## 2023-10-17 ENCOUNTER — TELEPHONE (OUTPATIENT)
Dept: FAMILY MEDICINE CLINIC | Age: 73
End: 2023-10-17

## 2023-10-17 ENCOUNTER — ANTI-COAG VISIT (OUTPATIENT)
Dept: FAMILY MEDICINE CLINIC | Age: 73
End: 2023-10-17

## 2023-10-17 DIAGNOSIS — E11.9 INSULIN-REQUIRING OR DEPENDENT TYPE II DIABETES MELLITUS (HCC): ICD-10-CM

## 2023-10-17 DIAGNOSIS — Z79.4 INSULIN-REQUIRING OR DEPENDENT TYPE II DIABETES MELLITUS (HCC): ICD-10-CM

## 2023-10-17 LAB — INTERNATIONAL NORMALIZATION RATIO, POC: 2.6

## 2023-10-17 RX ORDER — SEMAGLUTIDE 1.34 MG/ML
0.5 INJECTION, SOLUTION SUBCUTANEOUS WEEKLY
Qty: 4 ADJUSTABLE DOSE PRE-FILLED PEN SYRINGE | Refills: 1 | Status: SHIPPED | OUTPATIENT
Start: 2023-10-17

## 2023-10-17 NOTE — TELEPHONE ENCOUNTER
Patient called and informed that her ozempic is being increased to 0.5mg weeky.  Script sent to her pharmacy and she has a VV for next Wednesday to discuss her sugars with the ozempic

## 2023-10-23 DIAGNOSIS — E11.9 INSULIN-REQUIRING OR DEPENDENT TYPE II DIABETES MELLITUS (HCC): ICD-10-CM

## 2023-10-23 DIAGNOSIS — Z79.4 INSULIN-REQUIRING OR DEPENDENT TYPE II DIABETES MELLITUS (HCC): ICD-10-CM

## 2023-10-23 RX ORDER — SEMAGLUTIDE 1.34 MG/ML
0.5 INJECTION, SOLUTION SUBCUTANEOUS WEEKLY
Qty: 4 ADJUSTABLE DOSE PRE-FILLED PEN SYRINGE | Refills: 1 | Status: SHIPPED | OUTPATIENT
Start: 2023-10-23

## 2023-10-23 NOTE — TELEPHONE ENCOUNTER
Refill Request     CONFIRM preferrred pharmacy with the patient. If Mail Order Rx - Pend for 90 day refill. Last Seen: Last Seen Department: 9/13/2023  Last Seen by PCP: 9/13/2023    Last Written: 10/17/23    If no future appointment scheduled, route STAFF MESSAGE with patient name to the [facility] for scheduling. Next Appointment:   Future Appointments   Date Time Provider [facility] [address]   10/25/2023  3:40 PM MD Ha Woods  [place]   12/13/2023 11:20 AM Frieda Umanzor MD [facility]  [place]   2/8/2024  8:00 AM SCHEDULE, MHCX [facility] [place]       Message sent to [employer] to schedule appt with patient? N/A      Requested Prescriptions     Pending Prescriptions Disp Refills    Semaglutide,0.25 or 0.5MG/DOS, (OZEMPIC, 0.25 OR 0.5 MG/DOSE,) 2 MG/1.5ML SOPN 4 Adjustable Dose Pre-filled Pen Syringe 1     Sig: Inject 0.5 mg into the skin once a week    Pt said that she will not have enough since it was increased.

## 2023-10-25 ENCOUNTER — TELEMEDICINE (OUTPATIENT)
Dept: FAMILY MEDICINE CLINIC | Age: 73
End: 2023-10-25

## 2023-10-25 DIAGNOSIS — E11.9 INSULIN-REQUIRING OR DEPENDENT TYPE II DIABETES MELLITUS (HCC): Primary | ICD-10-CM

## 2023-10-25 DIAGNOSIS — Z79.4 INSULIN-REQUIRING OR DEPENDENT TYPE II DIABETES MELLITUS (HCC): Primary | ICD-10-CM

## 2023-10-25 NOTE — PROGRESS NOTES
Last 3 Encounters:   09/13/23 107.5 kg (237 lb)   06/21/23 113.4 kg (250 lb)   05/30/23 110.2 kg (243 lb)     BP Readings from Last 3 Encounters:   09/13/23 133/80   06/21/23 (!) 108/57   05/30/23 122/72     Lab Results   Component Value Date    LABA1C 8.3 09/13/2023    LABA1C 7.1 06/21/2023    LABA1C 9.1 03/15/2023        ASSESSMENT PLAN      Diagnosis Orders   1. Insulin-requiring or dependent type II diabetes mellitus ([address])        She already has an appointment in December. At this point since there has been significant stress in her life and she is not eating as she should, I allow her to stay on 0.5 mg Ozempic weekly until we see her. At that time we will get an A1c in determine further disposition. Patient should call the office immediately with new or ongoing signs or symptoms or worsening, or proceed to the emergency room. No changes in past medical history, past surgical history, social history, or family history were noted during the patient encounter unless specifically listed above. All updates of past medical history, past surgical history, social history, or family history were reviewed personally by me during the office visit. All problems listed in the assessment are stable unless noted otherwise. Medication profile reviewed personally by me during the visit. Medication side effects and possible impairments from medications were discussed as applicable. Unless stated otherwise, we will continue current medications as listed in the medication review report contained in the patient's medical record. This document was prepared by a combination of typing and transcription through a voice recognition software.       [] Patient has completed an advance directive  [] Patient has NOT completed an advanced directive  [] Patient has a documented healthcare surrogate  [] Patient does NOT have a documented healthcare surrogate  [] Discussed the importance of establishing and updating an advanced

## 2023-10-27 RX ORDER — LEVOTHYROXINE SODIUM 0.1 MG/1
100 TABLET ORAL DAILY
Qty: 90 TABLET | Refills: 0 | Status: SHIPPED | OUTPATIENT
Start: 2023-10-27

## 2023-10-27 RX ORDER — OMEPRAZOLE 40 MG/1
40 CAPSULE, DELAYED RELEASE ORAL DAILY
Qty: 90 CAPSULE | Refills: 0 | Status: SHIPPED | OUTPATIENT
Start: 2023-10-27

## 2023-10-27 RX ORDER — SYRING-NEEDL,DISP,INSUL,0.3 ML 31GX15/64"
150 SYRINGE, EMPTY DISPOSABLE MISCELLANEOUS
Qty: 100 EACH | Refills: 2 | Status: SHIPPED | OUTPATIENT
Start: 2023-10-27

## 2023-10-27 NOTE — TELEPHONE ENCOUNTER
Refill Request     CONFIRM preferrred pharmacy with the patient. If Mail Order Rx - Pend for 90 day refill. Last Seen: Last Seen Department: 10/25/2023  Last Seen by PCP: 10/25/2023    Last Written: syringes 23, levothyroxine 22 and omeprazole 23    If no future appointment scheduled, route STAFF MESSAGE with patient name to the [facility] for scheduling. Next Appointment:   Future Appointments   Date Time Provider [address] [place] [address]   2023 11:20 AM [facility] [place]   2024  8:00 AM SCHEDULE, MHCX MT OR[facility] [place] - [place]       Message sent to [address] [place] [address] to schedule appt with patient?   N/A      Requested Prescriptions     Pending Prescriptions Disp Refills    levothyroxine (SYNTHROID) 100 MCG tablet 90 tablet 0     Sig: Take 1 tablet by mouth daily    omeprazole (PRILOSEC) 40 MG delayed release capsule 90 capsule 0     Sig: Take 1 capsule by mouth daily    Insulin Syringe-Needle U-100 (BD VEO INSULIN SYRINGE U/F) 31G X 15/\" 0.3 ML MISC 100 each 2     Si times daily

## 2023-10-30 ENCOUNTER — ANTI-COAG VISIT (OUTPATIENT)
Dept: FAMILY MEDICINE CLINIC | Age: 73
End: 2023-10-30

## 2023-10-30 ENCOUNTER — TELEPHONE (OUTPATIENT)
Dept: FAMILY MEDICINE CLINIC | Age: 73
End: 2023-10-30

## 2023-10-30 ENCOUNTER — NURSE ONLY (OUTPATIENT)
Dept: FAMILY MEDICINE CLINIC | Age: 73
End: 2023-10-30
Payer: MEDICARE

## 2023-10-30 DIAGNOSIS — Z79.01 LONG TERM CURRENT USE OF ANTICOAGULANT THERAPY: ICD-10-CM

## 2023-10-30 LAB
INTERNATIONAL NORMALIZATION RATIO, POC: 2.3
PROTHROMBIN TIME, POC: 0

## 2023-10-30 PROCEDURE — 85610 PROTHROMBIN TIME: CPT | Performed by: FAMILY MEDICINE

## 2023-10-30 NOTE — TELEPHONE ENCOUNTER
PCP staff asked RN to speak with patient regarding diabetic questions and education. Patient had a recent loss of friend who helped her with her medicines. She does check her glucose twice a day and takes her SS insulin as ordered. She is taking Levemir insulin 25 units at hs. She is taking Ozempic as ordered. Patient states she is not willing to check her sugar more than twice a day because it hurts. RN offered CGM for patient. She is interested in this and wants to discuss with her family. She will be moving in with Monalisa Jimenez and Chaitanya kaye and wants to include them in this decision. RN informed patient on both Free Style [employer] and Dexcom CGM and directed her to both web sites and provided written information.     Current Outpatient Medications on File Prior to Visit   Medication Sig Dispense Refill    levothyroxine (SYNTHROID) 100 MCG tablet Take 1 tablet by mouth daily 90 tablet 0    omeprazole (PRILOSEC) 40 MG delayed release capsule Take 1 capsule by mouth daily 90 capsule 0    Insulin Syringe-Needle U-100 (BD VEO INSULIN SYRINGE U/F) 31G X 15/64\" 0.3 ML MISC 150 Needles by In Vitro route 5 times daily 100 each 2    Semaglutide,0.25 or 0.5MG/DOS, (OZEMPIC, 0.25 OR 0.5 MG/DOSE,) 2 MG/1.5ML SOPN Inject 0.5 mg into the skin once a week 4 Adjustable Dose Pre-filled Pen Syringe 1    warfarin (COUMADIN) 5 MG tablet TAKE 1 TABLET BY MOUTH ONCE DAILY EXCEPT 10MG ON MONDAY 102 tablet 2    pentoxifylline (TRENTAL) 400 MG extended release tablet TAKE 1 TABLET BY MOUTH EVERY DAY 90 tablet 0    insulin detemir (LEVEMIR FLEXPEN) 100 UNIT/ML injection pen Inject 25 Units into the skin at bedtime 45 Adjustable Dose Pre-filled Pen Syringe 3    allopurinol (ZYLOPRIM) 100 MG tablet Take 2 tablets by mouth daily 180 tablet 0    furosemide (LASIX) 20 MG tablet TAKE 1 TABLET BY MOUTH EVERY DAY 90 tablet 0    atorvastatin (LIPITOR) 40 MG tablet TAKE 1 TABLET BY MOUTH EVERY DAY 90 tablet 3    colchicine (COLCRYS) 0.6 MG

## 2023-10-31 ENCOUNTER — TELEPHONE (OUTPATIENT)
Dept: FAMILY MEDICINE CLINIC | Age: 73
End: 2023-10-31

## 2023-10-31 RX ORDER — BLOOD-GLUCOSE SENSOR
1 EACH MISCELLANEOUS CONTINUOUS
Qty: 2 EACH | Refills: 5 | Status: SHIPPED | OUTPATIENT
Start: 2023-10-31

## 2023-10-31 NOTE — TELEPHONE ENCOUNTER
Information and written material given on meal building. Informed pt to contact RN for questions or concerns with DM, nutrition and support. GOALS:  Pt will improve A1C results .      Next appt 12/13/23 with PCP  Next A1C 12/1/23  Last A1C : 9/13/23    8.7  up from 7.1 on 6/21/23    PLAN:  RN will follow up with phone call in 2 weeks    Is this patient active with care coordination? no    Future Appointments   Date Time Provider [address] [place] [address]   11/14/2023 10:00 AM SCHEDULE, MHCX DILMA RN [facility]  [place]   12/13/2023 11:20 AM Jasbir Tamayo MD [facility]  [place]   2/8/2024  8:00 AM SCHEDULE, [facility]  [place]

## 2023-11-01 ENCOUNTER — ANTI-COAG VISIT (OUTPATIENT)
Dept: FAMILY MEDICINE CLINIC | Age: 73
End: 2023-11-01

## 2023-11-04 DIAGNOSIS — E11.9 INSULIN-REQUIRING OR DEPENDENT TYPE II DIABETES MELLITUS (HCC): ICD-10-CM

## 2023-11-04 DIAGNOSIS — Z79.4 INSULIN-REQUIRING OR DEPENDENT TYPE II DIABETES MELLITUS (HCC): ICD-10-CM

## 2023-11-06 NOTE — TELEPHONE ENCOUNTER
Refill Request     CONFIRM preferrred pharmacy with the patient. If Mail Order Rx - Pend for 90 day refill. Last Seen: Last Seen Department: 10/25/2023  Last Seen by PCP: 8/4/2023    Last Written: 7/3/2023    If no future appointment scheduled, route STAFF MESSAGE with patient name to the [facility] for scheduling. Next Appointment:   Future Appointments   Date Time Provider [address] [place] [address]   11/14/2023 10:00 AM SCHEDULE, [facility] RN [place] ORAB [place]  [place]   12/13/2023 11:20 AM MD Ha Jimenez  [place]   2/8/2024  8:00 AM SCHEDULE, [facility]  [place]       Message sent to [address] [place] [address] to schedule appt with patient?   NO      Requested Prescriptions     Pending Prescriptions Disp Refills    insulin regular (NOVOLIN R) 100 UNIT/ML injection [Pharmacy Med Name: NOVOLIN R 100 UNIT/ML VIAL] 20 mL 2     Sig: USE AS DIRECTED PER SLIDING SCALE UP TO 26 UNITS DAILY

## 2023-11-07 ENCOUNTER — TELEPHONE (OUTPATIENT)
Dept: FAMILY MEDICINE CLINIC | Age: 73
End: 2023-11-07

## 2023-11-07 NOTE — TELEPHONE ENCOUNTER
Patient called in and was having difficulty with her [employer]. It was no longer connecting to her phone sean. On further investigation the device had become dislodged from her arm. Her new shipment of sensors just arrived today. RN educated patient and grand daughter Migel Chandler on application of new sensor and website for support. Patient and caregiver felt competent in changing the [employer] sensor to her arm.     Plan:  RN will follow patient and call her tomorrow,

## 2023-11-09 ENCOUNTER — HOSPITAL ENCOUNTER (EMERGENCY)
Age: 73
Discharge: HOME | End: 2023-11-09
Payer: MEDICARE

## 2023-11-09 ENCOUNTER — APPOINTMENT (OUTPATIENT)
Dept: CT IMAGING | Age: 73
End: 2023-11-09
Payer: MEDICARE

## 2023-11-09 VITALS
DIASTOLIC BLOOD PRESSURE: 78 MMHG | TEMPERATURE: 98 F | BODY MASS INDEX: 39.86 KG/M2 | HEART RATE: 64 BPM | RESPIRATION RATE: 16 BRPM | OXYGEN SATURATION: 98 % | WEIGHT: 225 LBS | SYSTOLIC BLOOD PRESSURE: 117 MMHG

## 2023-11-09 DIAGNOSIS — Z79.01 ANTICOAGULATED ON COUMADIN: ICD-10-CM

## 2023-11-09 DIAGNOSIS — S20.211A CONTUSION OF RIB ON RIGHT SIDE, INITIAL ENCOUNTER: Primary | ICD-10-CM

## 2023-11-09 LAB
INR PPP: 2.05 (ref 0.84–1.16)
PROTHROMBIN TIME: 23 SEC (ref 11.5–14.8)

## 2023-11-09 PROCEDURE — 6370000000 HC RX 637 (ALT 250 FOR IP): Performed by: PHYSICIAN ASSISTANT

## 2023-11-09 PROCEDURE — 71250 CT THORAX DX C-: CPT

## 2023-11-09 PROCEDURE — 36415 COLL VENOUS BLD VENIPUNCTURE: CPT

## 2023-11-09 PROCEDURE — 85610 PROTHROMBIN TIME: CPT

## 2023-11-09 PROCEDURE — 99284 EMERGENCY DEPT VISIT MOD MDM: CPT

## 2023-11-09 RX ORDER — LIDOCAINE 4 G/G
1 PATCH TOPICAL DAILY
Qty: 30 PATCH | Refills: 0 | Status: SHIPPED | OUTPATIENT
Start: 2023-11-09 | End: 2023-12-09

## 2023-11-09 RX ORDER — OXYCODONE HYDROCHLORIDE 5 MG/1
5 TABLET ORAL ONCE
Status: COMPLETED | OUTPATIENT
Start: 2023-11-09 | End: 2023-11-09

## 2023-11-09 RX ORDER — LIDOCAINE 4 G/G
1 PATCH TOPICAL ONCE
Status: DISCONTINUED | OUTPATIENT
Start: 2023-11-09 | End: 2023-11-09 | Stop reason: HOSPADM

## 2023-11-09 RX ADMIN — OXYCODONE HYDROCHLORIDE 5 MG: 5 TABLET ORAL at 17:16

## 2023-11-09 ASSESSMENT — PAIN SCALES - GENERAL: PAINLEVEL_OUTOF10: 8

## 2023-11-09 ASSESSMENT — PAIN - FUNCTIONAL ASSESSMENT: PAIN_FUNCTIONAL_ASSESSMENT: 0-10

## 2023-11-09 NOTE — ED PROVIDER NOTES
[address] [address] ED  EMERGENCY DEPARTMENT ENCOUNTER        Pt Name: Julio C Li  MRN: [id number]  [address] [facility] 1950  Date of evaluation: 11/9/2023  Provider: ISSA Mccrary  PCP: Sayda Samuels MD  Note Started: 4:38 PM EST 11/9/23      FERNANDEZ. I have evaluated this patient. CHIEF COMPLAINT       Chief Complaint   Patient presents with    Fall     Pt fell out of her rollader on Sunday. Complains of right rib pain       HISTORY OF PRESENT ILLNESS: 1 or more Elements     History from : Patient    Limitations to history : None    Julio C Li is a 75 y.o. female with past medical history of chronic atrial fibrillation anticoagulated on Coumadin, gout, lymphoma, chronic kidney disease, diabetes, history of DVT, hypertension, hyperlipidemia, CML, congestive heart failure, type 2 diabetes on insulin who presents for evaluation of rib pain after a fall. Patient notes that on Sunday she was sitting on her wheelie walker when she accidentally rolled off a curb, she did not have the break in place and she fell on her right side. She denies hitting her head no loss of consciousness denies any neck pain or headache since the injury. She notes persistent pain to the right side of the rib. Nursing Notes were all reviewed and agreed with or any disagreements were addressed in the HPI. REVIEW OF SYSTEMS :      Review of Systems    Positives and Pertinent negatives as per HPI.      SURGICAL HISTORY     Past Surgical History:   Procedure Laterality Date    COLONOSCOPY      COLONOSCOPY  05/23/2018    colon polyps,diverticulosis    COLONOSCOPY N/A 1/4/2022    COLONOSCOPY POLYPECTOMY SNARE/COLD BIOPSY performed by Josué Adam MD at [facility] N/A 1/4/2022    COLONOSCOPY CONTROL HEMORRHAGE performed by Josué Adam MD at [address] [place] [address] Right 11/2/2020    EXCISION RIGHT RING FINGER MUCOUS CYST AND BONE SPUR performed by Chato Fermin PATIENT REFERRED TO:  No follow-up provider specified.     DISCHARGE MEDICATIONS:  New Prescriptions    No medications on file       DISCONTINUED MEDICATIONS:  Discontinued Medications    No medications on file              (Please note that portions of this note were completed with a voice recognition program.  Efforts were made to edit the dictations but occasionally words are mis-transcribed.)    ISSA Frank (electronically signed)

## 2023-11-14 ENCOUNTER — ANTI-COAG VISIT (OUTPATIENT)
Dept: FAMILY MEDICINE CLINIC | Age: 73
End: 2023-11-14

## 2023-11-15 DIAGNOSIS — Z79.4 INSULIN-REQUIRING OR DEPENDENT TYPE II DIABETES MELLITUS (HCC): Primary | ICD-10-CM

## 2023-11-15 DIAGNOSIS — E11.41 DIABETIC MONONEUROPATHY ASSOCIATED WITH TYPE 2 DIABETES MELLITUS (HCC): ICD-10-CM

## 2023-11-15 DIAGNOSIS — N18.31 STAGE 3A CHRONIC KIDNEY DISEASE (HCC): ICD-10-CM

## 2023-11-15 DIAGNOSIS — E11.9 INSULIN-REQUIRING OR DEPENDENT TYPE II DIABETES MELLITUS (HCC): Primary | ICD-10-CM

## 2023-11-24 RX ORDER — LEVOTHYROXINE SODIUM 0.1 MG/1
100 TABLET ORAL DAILY
Qty: 90 TABLET | Refills: 3 | Status: SHIPPED | OUTPATIENT
Start: 2023-11-24

## 2023-12-01 ENCOUNTER — ANTI-COAG VISIT (OUTPATIENT)
Dept: FAMILY MEDICINE CLINIC | Age: 73
End: 2023-12-01

## 2023-12-01 ENCOUNTER — NURSE ONLY (OUTPATIENT)
Dept: FAMILY MEDICINE CLINIC | Age: 73
End: 2023-12-01
Payer: MEDICAID

## 2023-12-01 DIAGNOSIS — Z79.01 LONG TERM CURRENT USE OF ANTICOAGULANT THERAPY: ICD-10-CM

## 2023-12-01 LAB
INTERNATIONAL NORMALIZATION RATIO, POC: 3.5
PROTHROMBIN TIME, POC: 0

## 2023-12-01 PROCEDURE — 85610 PROTHROMBIN TIME: CPT | Performed by: FAMILY MEDICINE

## 2023-12-04 ENCOUNTER — NURSE ONLY (OUTPATIENT)
Dept: FAMILY MEDICINE CLINIC | Age: 73
End: 2023-12-04
Payer: COMMERCIAL

## 2023-12-04 ENCOUNTER — ANTI-COAG VISIT (OUTPATIENT)
Dept: FAMILY MEDICINE CLINIC | Age: 73
End: 2023-12-04

## 2023-12-04 DIAGNOSIS — Z79.01 LONG TERM CURRENT USE OF ANTICOAGULANT THERAPY: ICD-10-CM

## 2023-12-04 LAB
INTERNATIONAL NORMALIZATION RATIO, POC: 3.7
PROTHROMBIN TIME, POC: 0

## 2023-12-04 PROCEDURE — 85610 PROTHROMBIN TIME: CPT | Performed by: FAMILY MEDICINE

## 2023-12-06 ENCOUNTER — ANTI-COAG VISIT (OUTPATIENT)
Dept: FAMILY MEDICINE CLINIC | Age: 73
End: 2023-12-06

## 2023-12-06 ENCOUNTER — NURSE ONLY (OUTPATIENT)
Dept: FAMILY MEDICINE CLINIC | Age: 73
End: 2023-12-06
Payer: MEDICAID

## 2023-12-06 DIAGNOSIS — M1A.09X0 CHRONIC GOUT OF MULTIPLE SITES, UNSPECIFIED CAUSE: ICD-10-CM

## 2023-12-06 DIAGNOSIS — Z79.01 LONG TERM CURRENT USE OF ANTICOAGULANT THERAPY: ICD-10-CM

## 2023-12-06 LAB
INTERNATIONAL NORMALIZATION RATIO, POC: 3
PROTHROMBIN TIME, POC: 0

## 2023-12-06 PROCEDURE — 85610 PROTHROMBIN TIME: CPT | Performed by: FAMILY MEDICINE

## 2023-12-06 RX ORDER — ALLOPURINOL 100 MG/1
200 TABLET ORAL DAILY
Qty: 180 TABLET | Refills: 0 | Status: SHIPPED | OUTPATIENT
Start: 2023-12-06

## 2023-12-06 NOTE — TELEPHONE ENCOUNTER
Refill Request     CONFIRM preferrred pharmacy with the patient. If Mail Order Rx - Pend for 90 day refill. Last Seen: Last Seen Department: 11/14/2023  Last Seen by PCP: 10/25/2023    Last Written: 9.13.23    If no future appointment scheduled, route STAFF MESSAGE with patient name to the [facility] for scheduling. Next Appointment:   Future Appointments   Date Time Provider [address] [place] [address]   12/13/2023 11:20 AM [facility] [place] [facility] [place] [facility]   2/8/2024  8:00 AM [facility]EDULE, [facility] [place]       Message sent to [employer] to schedule appt with patient? NO        allopurinol (ZYLOPRIM) 100 MG tablet [[id number]]      [id number] [place], [place] - [address] [address] [facility] [phone] - F [phone]

## 2023-12-07 ENCOUNTER — ANTI-COAG VISIT (OUTPATIENT)
Dept: FAMILY MEDICINE CLINIC | Age: 73
End: 2023-12-07

## 2023-12-09 DIAGNOSIS — M1A.09X0 CHRONIC GOUT OF MULTIPLE SITES, UNSPECIFIED CAUSE: ICD-10-CM

## 2023-12-11 RX ORDER — ALLOPURINOL 100 MG/1
200 TABLET ORAL DAILY
Qty: 180 TABLET | Refills: 0 | Status: SHIPPED | OUTPATIENT
Start: 2023-12-11

## 2023-12-13 ENCOUNTER — NURSE ONLY (OUTPATIENT)
Dept: FAMILY MEDICINE CLINIC | Age: 73
End: 2023-12-13
Payer: MEDICARE

## 2023-12-13 DIAGNOSIS — M1A.09X0 CHRONIC GOUT OF MULTIPLE SITES, UNSPECIFIED CAUSE: ICD-10-CM

## 2023-12-13 DIAGNOSIS — Z79.01 LONG TERM CURRENT USE OF ANTICOAGULANT THERAPY: ICD-10-CM

## 2023-12-13 LAB
INTERNATIONAL NORMALIZATION RATIO, POC: 3.5
PROTHROMBIN TIME, POC: 0

## 2023-12-13 PROCEDURE — 85610 PROTHROMBIN TIME: CPT | Performed by: NURSE PRACTITIONER

## 2023-12-14 RX ORDER — ALLOPURINOL 100 MG/1
200 TABLET ORAL DAILY
Qty: 180 TABLET | Refills: 0 | OUTPATIENT
Start: 2023-12-14

## 2023-12-14 NOTE — PATIENT INSTRUCTIONS
-- DO NOT REPLY / DO NOT REPLY ALL --  -- Message is from Engagement Center [employer] (ECO) --    General Patient Message: Patient was requesting a call back to know if Dr. Marquis handles installing baclofen pumps       Alternative phone number: work - [phone]    Can a detailed message be left? Yes    Message Turnaround: WINORTH:    Refer to site's KB page for routing instructions    Please give this turnaround time to the caller:   \"You can expect to receive a response 2-3 business days after your provider's clinical team reviews the message\"               Patient should call the office immediately with new or ongoing signs or symptoms or worsening, or proceed to the emergency room. If you are on medications which could impair your senses, you are at risk of weakness, falls, dizziness, or drowsiness. You should be careful during activities which could place you at risk of harm, such as climbing, using stairs, operating machinery, or driving vehicles. If you feel you cannot safely do these activities, you should request others to help you, or avoid the activities altogether. If you are drowsy for any other reason, you should use the same precautions as listed above.      Web Address for Advance Directive:    [email or web address]

## 2023-12-15 ENCOUNTER — NURSE ONLY (OUTPATIENT)
Dept: FAMILY MEDICINE CLINIC | Age: 73
End: 2023-12-15
Payer: MEDICARE

## 2023-12-15 DIAGNOSIS — Z79.01 LONG TERM CURRENT USE OF ANTICOAGULANT THERAPY: ICD-10-CM

## 2023-12-15 LAB
INTERNATIONAL NORMALIZATION RATIO, POC: 2.4
PROTHROMBIN TIME, POC: 0

## 2023-12-15 PROCEDURE — 85610 PROTHROMBIN TIME: CPT | Performed by: FAMILY MEDICINE

## 2023-12-15 NOTE — PROGRESS NOTES
Continue 5 mg coumadin every Tuesday and Saturday and 2.5 mg coumadin all other days and recheck inr on thursday

## 2023-12-21 ENCOUNTER — TELEPHONE (OUTPATIENT)
Dept: FAMILY MEDICINE CLINIC | Age: 73
End: 2023-12-21

## 2023-12-25 NOTE — TELEPHONE ENCOUNTER
Refill Request     CONFIRM preferrred pharmacy with the patient. If Mail Order Rx - Pend for 90 day refill. Last Seen: Last Seen Department: 11/14/2023  Last Seen by PCP: 10/25/2023    Last Written: 10-    If no future appointment scheduled, route STAFF MESSAGE with patient name to the [facility] for scheduling. Next Appointment:   Future Appointments   Date Time Provider [address]   12/26/2023  8:40 AM SCHEDULE, MHCX [facility] [place]   1/8/2024  9:00 AM SCHEDULE, MHCX [facility] [place]   2/5/2024  3:20 PM Natanael Foley MD [facility]  [place]   2/8/2024  8:00 AM SCHEDULE, MHCX [facility] [place]       Message sent to [address] [place] [address] to schedule appt with patient?   N/A      Requested Prescriptions     Pending Prescriptions Disp Refills    omeprazole (PRILOSEC) 40 MG delayed release capsule [Pharmacy Med Name: OMEPRAZOLE DR 40 MG CAPSULE] 90 capsule 0     Sig: TAKE 1 CAPSULE BY MOUTH EVERY DAY

## 2023-12-26 ENCOUNTER — NURSE ONLY (OUTPATIENT)
Dept: FAMILY MEDICINE CLINIC | Age: 73
End: 2023-12-26
Payer: MEDICARE

## 2023-12-26 DIAGNOSIS — I48.20 CHRONIC ATRIAL FIBRILLATION (HCC): Primary | ICD-10-CM

## 2023-12-26 LAB
INTERNATIONAL NORMALIZATION RATIO, POC: 2.7
PROTHROMBIN TIME, POC: 0

## 2023-12-26 PROCEDURE — 85610 PROTHROMBIN TIME: CPT | Performed by: FAMILY MEDICINE

## 2023-12-26 RX ORDER — OMEPRAZOLE 40 MG/1
CAPSULE, DELAYED RELEASE ORAL DAILY
Qty: 90 CAPSULE | Refills: 0 | Status: SHIPPED | OUTPATIENT
Start: 2023-12-26

## 2023-12-27 RX ORDER — OMEPRAZOLE 40 MG/1
CAPSULE, DELAYED RELEASE ORAL DAILY
Qty: 90 CAPSULE | Refills: 0 | OUTPATIENT
Start: 2023-12-27

## 2024-01-02 ENCOUNTER — NURSE ONLY (OUTPATIENT)
Dept: FAMILY MEDICINE CLINIC | Age: 74
End: 2024-01-02
Payer: MEDICARE

## 2024-01-02 DIAGNOSIS — Z79.01 LONG TERM CURRENT USE OF ANTICOAGULANT THERAPY: ICD-10-CM

## 2024-01-02 LAB
INTERNATIONAL NORMALIZATION RATIO, POC: 2.3
PROTHROMBIN TIME, POC: 0

## 2024-01-02 PROCEDURE — 85610 PROTHROMBIN TIME: CPT | Performed by: FAMILY MEDICINE

## 2024-01-04 DIAGNOSIS — I10 BENIGN ESSENTIAL HTN: ICD-10-CM

## 2024-01-04 RX ORDER — DILTIAZEM HYDROCHLORIDE 240 MG/1
CAPSULE, COATED, EXTENDED RELEASE ORAL DAILY
Qty: 90 CAPSULE | Refills: 3 | Status: SHIPPED | OUTPATIENT
Start: 2024-01-04

## 2024-01-04 NOTE — TELEPHONE ENCOUNTER
Refill Request     CONFIRM preferrred pharmacy with the patient.    If Mail Order Rx - Pend for 90 day refill.      Last Seen: Last Seen Department: 11/14/2023  Last Seen by PCP: 10/25/2023    Last Written: 1/18/2023    If no future appointment scheduled, route STAFF MESSAGE with patient name to the  Pool for scheduling.      Next Appointment:   Future Appointments   Date Time Provider Department Center   1/8/2024  9:00 AM [facility] [place] [facility] [place] [facility]   2/5/2024  3:20 PM Dominik Reed MD [facility] [place]   2/8/2024  8:00 AM SCHEDULE, [facility] [place]       Message sent to  to schedule appt with patient?  NO      Requested Prescriptions     Pending Prescriptions Disp Refills    dilTIAZem (CARDIZEM CD) 240 MG extended release capsule [Pharmacy Med Name: DILTIAZEM 24H ER(CD) 240 MG CP] 90 capsule 3     Sig: TAKE 1 CAPSULE BY MOUTH EVERY DAY

## 2024-01-07 DIAGNOSIS — E11.22 CKD STAGE 4 DUE TO TYPE 2 DIABETES MELLITUS (HCC): ICD-10-CM

## 2024-01-07 DIAGNOSIS — E11.9 INSULIN-REQUIRING OR DEPENDENT TYPE II DIABETES MELLITUS (HCC): ICD-10-CM

## 2024-01-07 DIAGNOSIS — Z79.4 INSULIN-REQUIRING OR DEPENDENT TYPE II DIABETES MELLITUS (HCC): ICD-10-CM

## 2024-01-07 DIAGNOSIS — N18.4 CKD STAGE 4 DUE TO TYPE 2 DIABETES MELLITUS (HCC): ICD-10-CM

## 2024-01-08 ENCOUNTER — NURSE ONLY (OUTPATIENT)
Dept: FAMILY MEDICINE CLINIC | Age: 74
End: 2024-01-08
Payer: MEDICARE

## 2024-01-08 DIAGNOSIS — E11.41 DIABETIC MONONEUROPATHY ASSOCIATED WITH TYPE 2 DIABETES MELLITUS (HCC): Primary | ICD-10-CM

## 2024-01-08 DIAGNOSIS — N18.4 CKD STAGE 4 DUE TO TYPE 2 DIABETES MELLITUS (HCC): ICD-10-CM

## 2024-01-08 DIAGNOSIS — E11.22 CKD STAGE 4 DUE TO TYPE 2 DIABETES MELLITUS (HCC): ICD-10-CM

## 2024-01-08 DIAGNOSIS — Z79.01 LONG TERM CURRENT USE OF ANTICOAGULANT THERAPY: ICD-10-CM

## 2024-01-08 LAB
INTERNATIONAL NORMALIZATION RATIO, POC: 3.2
PROTHROMBIN TIME, POC: 0

## 2024-01-08 PROCEDURE — 36415 COLL VENOUS BLD VENIPUNCTURE: CPT | Performed by: FAMILY MEDICINE

## 2024-01-08 PROCEDURE — 85610 PROTHROMBIN TIME: CPT | Performed by: FAMILY MEDICINE

## 2024-01-08 RX ORDER — BLOOD SUGAR DIAGNOSTIC
STRIP MISCELLANEOUS
Qty: 300 STRIP | Refills: 0 | Status: SHIPPED | OUTPATIENT
Start: 2024-01-08

## 2024-01-08 NOTE — TELEPHONE ENCOUNTER
Refill Request     CONFIRM preferrred pharmacy with the patient.    If Mail Order Rx - Pend for 90 day refill.      Last Seen: Last Seen Department: 11/14/2023  Last Seen by PCP: 7/29/2022    Last Written: 2/10/2023    If no future appointment scheduled, route STAFF MESSAGE with patient name to the  Pool for scheduling.      Next Appointment:   Future Appointments   Date Time Provider Department Center   2/5/2024  3:20 PM Dominik Reed MD [facility] [place]   2/8/2024  8:00 AM SCHEDULE, MHCX [facility] [place]       Message sent to  to schedule appt with patient?  NO      Requested Prescriptions     Pending Prescriptions Disp Refills    ONETOUCH ULTRA strip [Pharmacy Med Name: ONE TOUCH ULTRA BLUE TEST STRP] 300 strip 0     Sig: USE TO TEST BLOOD SUGAR 3 TIMES A DAY

## 2024-01-09 LAB
EST. AVERAGE GLUCOSE BLD GHB EST-MCNC: 162.8 MG/DL
HBA1C MFR BLD: 7.3 %

## 2024-01-18 ENCOUNTER — NURSE ONLY (OUTPATIENT)
Dept: FAMILY MEDICINE CLINIC | Age: 74
End: 2024-01-18
Payer: COMMERCIAL

## 2024-01-18 DIAGNOSIS — Z79.01 LONG TERM CURRENT USE OF ANTICOAGULANT THERAPY: ICD-10-CM

## 2024-01-18 LAB
INTERNATIONAL NORMALIZATION RATIO, POC: 2.1
PROTHROMBIN TIME, POC: 0

## 2024-01-18 PROCEDURE — 85610 PROTHROMBIN TIME: CPT | Performed by: FAMILY MEDICINE

## 2024-01-24 ENCOUNTER — ANTI-COAG VISIT (OUTPATIENT)
Dept: FAMILY MEDICINE CLINIC | Age: 74
End: 2024-01-24
Payer: MEDICARE

## 2024-01-24 ENCOUNTER — NURSE ONLY (OUTPATIENT)
Dept: FAMILY MEDICINE CLINIC | Age: 74
End: 2024-01-24

## 2024-01-24 LAB — INTERNATIONAL NORMALIZATION RATIO, POC: 2

## 2024-01-24 PROCEDURE — 85610 PROTHROMBIN TIME: CPT | Performed by: FAMILY MEDICINE

## 2024-01-29 ENCOUNTER — NURSE ONLY (OUTPATIENT)
Dept: FAMILY MEDICINE CLINIC | Age: 74
End: 2024-01-29
Payer: MEDICARE

## 2024-01-29 DIAGNOSIS — Z79.01 LONG TERM CURRENT USE OF ANTICOAGULANT THERAPY: ICD-10-CM

## 2024-01-29 LAB
INTERNATIONAL NORMALIZATION RATIO, POC: 2.3
PROTHROMBIN TIME, POC: 0

## 2024-01-29 PROCEDURE — 85610 PROTHROMBIN TIME: CPT | Performed by: FAMILY MEDICINE

## 2024-02-01 RX ORDER — SEMAGLUTIDE 0.68 MG/ML
0.5 INJECTION, SOLUTION SUBCUTANEOUS WEEKLY
Qty: 3 ML | Refills: 3 | Status: SHIPPED | OUTPATIENT
Start: 2024-02-01

## 2024-02-01 RX ORDER — OMEPRAZOLE 40 MG/1
CAPSULE, DELAYED RELEASE ORAL DAILY
Qty: 90 CAPSULE | Refills: 0 | Status: SHIPPED | OUTPATIENT
Start: 2024-02-01

## 2024-02-01 RX ORDER — PENTOXIFYLLINE 400 MG/1
400 TABLET, EXTENDED RELEASE ORAL DAILY
Qty: 90 TABLET | Refills: 0 | Status: SHIPPED | OUTPATIENT
Start: 2024-02-01

## 2024-02-01 NOTE — TELEPHONE ENCOUNTER
Refill Request     CONFIRM preferrred pharmacy with the patient.    If Mail Order Rx - Pend for 90 day refill.      Last Seen: Last Seen Department: 11/14/2023  Last Seen by PCP: 10/25/2023    Last Written: 12/26/2023    If no future appointment scheduled, route STAFF MESSAGE with patient name to the  Pool for scheduling.      Next Appointment:   Future Appointments   Date Time Provider Department Center   2/5/2024  3:20 PM Dominik Reed MD [facility] [place] - [place]   2/8/2024  8:00 AM SCHEDULE, [facility] [place] - [place]       Message sent to  to schedule appt with patient?  NO      Requested Prescriptions     Pending Prescriptions Disp Refills    OZEMPIC, 0.25 OR 0.5 MG/DOSE, 2 MG/3ML SOPN [Pharmacy Med Name: OZEMPIC 0.25-0.5 MG/DOSE PEN]  1     Sig: INJECT 0.5 MG INTO THE SKIN ONCE A WEEK    pentoxifylline (TRENTAL) 400 MG extended release tablet [Pharmacy Med Name: PENTOXIFYLLINE  MG TAB] 90 tablet 0     Sig: TAKE 1 TABLET BY MOUTH EVERY DAY    omeprazole (PRILOSEC) 40 MG delayed release capsule [Pharmacy Med Name: OMEPRAZOLE DR 40 MG CAPSULE] 90 capsule 0     Sig: TAKE 1 CAPSULE BY MOUTH EVERY DAY

## 2024-02-05 ENCOUNTER — OFFICE VISIT (OUTPATIENT)
Dept: FAMILY MEDICINE CLINIC | Age: 74
End: 2024-02-05
Payer: MEDICARE

## 2024-02-05 VITALS
DIASTOLIC BLOOD PRESSURE: 68 MMHG | HEIGHT: 62 IN | SYSTOLIC BLOOD PRESSURE: 120 MMHG | WEIGHT: 229 LBS | OXYGEN SATURATION: 97 % | BODY MASS INDEX: 42.14 KG/M2 | HEART RATE: 64 BPM

## 2024-02-05 DIAGNOSIS — N18.31 STAGE 3A CHRONIC KIDNEY DISEASE (HCC): ICD-10-CM

## 2024-02-05 DIAGNOSIS — E11.22 CKD STAGE 4 DUE TO TYPE 2 DIABETES MELLITUS (HCC): ICD-10-CM

## 2024-02-05 DIAGNOSIS — Z79.4 INSULIN-REQUIRING OR DEPENDENT TYPE II DIABETES MELLITUS (HCC): Primary | ICD-10-CM

## 2024-02-05 DIAGNOSIS — E11.41 DIABETIC MONONEUROPATHY ASSOCIATED WITH TYPE 2 DIABETES MELLITUS (HCC): ICD-10-CM

## 2024-02-05 DIAGNOSIS — Z79.01 LONG TERM CURRENT USE OF ANTICOAGULANT THERAPY: ICD-10-CM

## 2024-02-05 DIAGNOSIS — K21.9 GASTROESOPHAGEAL REFLUX DISEASE WITHOUT ESOPHAGITIS: ICD-10-CM

## 2024-02-05 DIAGNOSIS — E06.3 HYPOTHYROIDISM DUE TO HASHIMOTO'S THYROIDITIS: ICD-10-CM

## 2024-02-05 DIAGNOSIS — I50.42 CHRONIC COMBINED SYSTOLIC AND DIASTOLIC CONGESTIVE HEART FAILURE (HCC): ICD-10-CM

## 2024-02-05 DIAGNOSIS — I48.20 CHRONIC ATRIAL FIBRILLATION (HCC): ICD-10-CM

## 2024-02-05 DIAGNOSIS — E66.01 MORBID OBESITY DUE TO EXCESS CALORIES (HCC): ICD-10-CM

## 2024-02-05 DIAGNOSIS — E83.42 HYPOMAGNESEMIA: ICD-10-CM

## 2024-02-05 DIAGNOSIS — E11.51 PERIPHERAL VASCULAR DISEASE IN DIABETES MELLITUS (HCC): ICD-10-CM

## 2024-02-05 DIAGNOSIS — C83.53 LYMPHOBLASTIC DIFFUSE LYMPHOMA OF INTRA-ABDOMINAL LYMPH NODES (HCC): ICD-10-CM

## 2024-02-05 DIAGNOSIS — E11.9 INSULIN-REQUIRING OR DEPENDENT TYPE II DIABETES MELLITUS (HCC): Primary | ICD-10-CM

## 2024-02-05 DIAGNOSIS — N18.4 CKD STAGE 4 DUE TO TYPE 2 DIABETES MELLITUS (HCC): ICD-10-CM

## 2024-02-05 DIAGNOSIS — C92.10 CML (CHRONIC MYELOCYTIC LEUKEMIA) (HCC): ICD-10-CM

## 2024-02-05 DIAGNOSIS — I10 BENIGN ESSENTIAL HTN: ICD-10-CM

## 2024-02-05 DIAGNOSIS — I87.2 VENOUS INSUFFICIENCY: ICD-10-CM

## 2024-02-05 DIAGNOSIS — E03.8 HYPOTHYROIDISM DUE TO HASHIMOTO'S THYROIDITIS: ICD-10-CM

## 2024-02-05 LAB
INTERNATIONAL NORMALIZATION RATIO, POC: 2.2
PROTHROMBIN TIME, POC: 0

## 2024-02-05 PROCEDURE — 1123F ACP DISCUSS/DSCN MKR DOCD: CPT | Performed by: FAMILY MEDICINE

## 2024-02-05 PROCEDURE — 3078F DIAST BP <80 MM HG: CPT | Performed by: FAMILY MEDICINE

## 2024-02-05 PROCEDURE — 3017F COLORECTAL CA SCREEN DOC REV: CPT | Performed by: FAMILY MEDICINE

## 2024-02-05 PROCEDURE — 1036F TOBACCO NON-USER: CPT | Performed by: FAMILY MEDICINE

## 2024-02-05 PROCEDURE — 99214 OFFICE O/P EST MOD 30 MIN: CPT | Performed by: FAMILY MEDICINE

## 2024-02-05 PROCEDURE — G8484 FLU IMMUNIZE NO ADMIN: HCPCS | Performed by: FAMILY MEDICINE

## 2024-02-05 PROCEDURE — G8427 DOCREV CUR MEDS BY ELIG CLIN: HCPCS | Performed by: FAMILY MEDICINE

## 2024-02-05 PROCEDURE — G8417 CALC BMI ABV UP PARAM F/U: HCPCS | Performed by: FAMILY MEDICINE

## 2024-02-05 PROCEDURE — 1090F PRES/ABSN URINE INCON ASSESS: CPT | Performed by: FAMILY MEDICINE

## 2024-02-05 PROCEDURE — 3051F HG A1C>EQUAL 7.0%<8.0%: CPT | Performed by: FAMILY MEDICINE

## 2024-02-05 PROCEDURE — 3074F SYST BP LT 130 MM HG: CPT | Performed by: FAMILY MEDICINE

## 2024-02-05 PROCEDURE — 2022F DILAT RTA XM EVC RTNOPTHY: CPT | Performed by: FAMILY MEDICINE

## 2024-02-05 PROCEDURE — 85610 PROTHROMBIN TIME: CPT | Performed by: FAMILY MEDICINE

## 2024-02-05 PROCEDURE — G8399 PT W/DXA RESULTS DOCUMENT: HCPCS | Performed by: FAMILY MEDICINE

## 2024-02-05 RX ORDER — PEN NEEDLE, DIABETIC 32GX 5/32"
NEEDLE, DISPOSABLE MISCELLANEOUS
Qty: 100 EACH | Refills: 11 | Status: SHIPPED | OUTPATIENT
Start: 2024-02-05

## 2024-02-05 ASSESSMENT — PATIENT HEALTH QUESTIONNAIRE - PHQ9
3. TROUBLE FALLING OR STAYING ASLEEP: 0
1. LITTLE INTEREST OR PLEASURE IN DOING THINGS: 0
SUM OF ALL RESPONSES TO PHQ9 QUESTIONS 1 & 2: 0
10. IF YOU CHECKED OFF ANY PROBLEMS, HOW DIFFICULT HAVE THESE PROBLEMS MADE IT FOR YOU TO DO YOUR WORK, TAKE CARE OF THINGS AT HOME, OR GET ALONG WITH OTHER PEOPLE: 0
SUM OF ALL RESPONSES TO PHQ QUESTIONS 1-9: 0
6. FEELING BAD ABOUT YOURSELF - OR THAT YOU ARE A FAILURE OR HAVE LET YOURSELF OR YOUR FAMILY DOWN: 0
SUM OF ALL RESPONSES TO PHQ QUESTIONS 1-9: 0
9. THOUGHTS THAT YOU WOULD BE BETTER OFF DEAD, OR OF HURTING YOURSELF: 0
4. FEELING TIRED OR HAVING LITTLE ENERGY: 0
7. TROUBLE CONCENTRATING ON THINGS, SUCH AS READING THE NEWSPAPER OR WATCHING TELEVISION: 0
SUM OF ALL RESPONSES TO PHQ QUESTIONS 1-9: 0
5. POOR APPETITE OR OVEREATING: 0
8. MOVING OR SPEAKING SO SLOWLY THAT OTHER PEOPLE COULD HAVE NOTICED. OR THE OPPOSITE, BEING SO FIGETY OR RESTLESS THAT YOU HAVE BEEN MOVING AROUND A LOT MORE THAN USUAL: 0
2. FEELING DOWN, DEPRESSED OR HOPELESS: 0
SUM OF ALL RESPONSES TO PHQ QUESTIONS 1-9: 0

## 2024-02-05 NOTE — PROGRESS NOTES
LM informing pt  
tonsillar, preauricular, posterior auricular or occipital adenopathy.      Cervical: No cervical adenopathy.      Upper Body:      Right upper body: No supraclavicular or epitrochlear adenopathy.      Left upper body: No supraclavicular or epitrochlear adenopathy.   Skin:     General: Skin is warm and dry.      Coloration: Skin is not pale.      Nails: There is no clubbing.      Comments: Normal turgor   Neurological:      Cranial Nerves: No cranial nerve deficit.      Motor: No abnormal muscle tone.      Coordination: Coordination normal.      Deep Tendon Reflexes:      Reflex Scores:       Patellar reflexes are 2+ on the right side and 2+ on the left side.  Psychiatric:         Behavior: Behavior normal.         Thought Content: Thought content normal.         Judgment: Judgment normal.      Comments: Alert and oriented x 3       Results for POC orders placed in visit on 02/05/24   POCT INR   Result Value Ref Range    INR,(POC) 2.2     Prothrombin time,(POC) 0.0             ASSESSMENT PLAN      Diagnosis Orders   1. Insulin-requiring or dependent type II diabetes mellitus (HCC)        2. Chronic atrial fibrillation (HCC)  POCT INR      3. Stage 3a chronic kidney disease (HCC)        4. Benign essential HTN        5. Gastroesophageal reflux disease without esophagitis        6. Hypomagnesemia        7. Venous insufficiency        8. Peripheral vascular disease in diabetes mellitus (HCC)        9. Long term current use of anticoagulant therapy  POCT INR      10. CKD stage 4 due to type 2 diabetes mellitus (HCC)        11. Morbid obesity due to excess calories (HCC)        12. Hypothyroidism due to Hashimoto's thyroiditis        13. Diabetic mononeuropathy associated with type 2 diabetes mellitus (HCC)        14. 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 CML (chronic myelocytic leukemia) (HCC)        16. Chronic combined systolic and diastolic congestive heart failure (HCC)

## 2024-03-04 ENCOUNTER — NURSE ONLY (OUTPATIENT)
Dept: FAMILY MEDICINE CLINIC | Age: 74
End: 2024-03-04
Payer: MEDICARE

## 2024-03-04 DIAGNOSIS — I48.20 CHRONIC ATRIAL FIBRILLATION (HCC): Primary | ICD-10-CM

## 2024-03-04 DIAGNOSIS — E11.9 INSULIN-REQUIRING OR DEPENDENT TYPE II DIABETES MELLITUS (HCC): ICD-10-CM

## 2024-03-04 DIAGNOSIS — E11.41 DIABETIC MONONEUROPATHY ASSOCIATED WITH TYPE 2 DIABETES MELLITUS (HCC): ICD-10-CM

## 2024-03-04 DIAGNOSIS — Z79.4 INSULIN-REQUIRING OR DEPENDENT TYPE II DIABETES MELLITUS (HCC): ICD-10-CM

## 2024-03-04 DIAGNOSIS — N18.31 STAGE 3A CHRONIC KIDNEY DISEASE (HCC): ICD-10-CM

## 2024-03-04 LAB — INTERNATIONAL NORMALIZATION RATIO, POC: 2.7

## 2024-03-04 PROCEDURE — 85610 PROTHROMBIN TIME: CPT | Performed by: FAMILY MEDICINE

## 2024-03-04 RX ORDER — OMEPRAZOLE 40 MG/1
40 CAPSULE, DELAYED RELEASE ORAL DAILY
Qty: 90 CAPSULE | Refills: 2 | Status: SHIPPED | OUTPATIENT
Start: 2024-03-04

## 2024-03-04 RX ORDER — ATORVASTATIN CALCIUM 40 MG/1
40 TABLET, FILM COATED ORAL DAILY
Qty: 90 TABLET | Refills: 2 | Status: SHIPPED | OUTPATIENT
Start: 2024-03-04

## 2024-03-04 RX ORDER — PENTOXIFYLLINE 400 MG/1
400 TABLET, EXTENDED RELEASE ORAL DAILY
Qty: 90 TABLET | Refills: 2 | Status: SHIPPED | OUTPATIENT
Start: 2024-03-04

## 2024-03-04 NOTE — TELEPHONE ENCOUNTER
Refill Request     CONFIRM preferrred pharmacy with the patient.    If Mail Order Rx - Pend for 90 day refill.      Last Seen: Last Seen Department: 2/5/2024  Last Seen by PCP: 2/5/2024    Last Written: 2.1.24    If no future appointment scheduled, route STAFF MESSAGE with patient name to the  Pool for scheduling.      Next Appointment:   Future Appointments   Date Time Provider Department Center   6/12/2024  9:20 AM Dominik Reed MD [facility] [place]       Message sent to  to schedule appt with patient?  NO      omeprazole (PRILOSEC) 40 MG delayed release capsule [[id number]]     empagliflozin (JARDIANCE) 10 MG tablet [[id number]]     atorvastatin (LIPITOR) 40 MG tablet [[id number]     pentoxifylline (TRENTAL) 400 MG extended release tablet [[id number]]

## 2024-03-18 ENCOUNTER — NURSE ONLY (OUTPATIENT)
Dept: FAMILY MEDICINE CLINIC | Age: 74
End: 2024-03-18
Payer: MEDICARE

## 2024-03-18 ENCOUNTER — TELEPHONE (OUTPATIENT)
Dept: FAMILY MEDICINE CLINIC | Age: 74
End: 2024-03-18

## 2024-03-18 DIAGNOSIS — Z79.01 LONG TERM CURRENT USE OF ANTICOAGULANT THERAPY: ICD-10-CM

## 2024-03-18 DIAGNOSIS — Z79.4 INSULIN-REQUIRING OR DEPENDENT TYPE II DIABETES MELLITUS (HCC): ICD-10-CM

## 2024-03-18 DIAGNOSIS — E11.9 INSULIN-REQUIRING OR DEPENDENT TYPE II DIABETES MELLITUS (HCC): ICD-10-CM

## 2024-03-18 LAB — INTERNATIONAL NORMALIZATION RATIO, POC: 2.6

## 2024-03-18 PROCEDURE — 85610 PROTHROMBIN TIME: CPT | Performed by: FAMILY MEDICINE

## 2024-03-18 NOTE — TELEPHONE ENCOUNTER
Spoke with patient.  She states that she will think about seeing a gyno.  Will call back if she decides to -FYI

## 2024-03-18 NOTE — TELEPHONE ENCOUNTER
Pt came into the lab today for INR reading. Pt was concerned because she had started to bleed Vaginal yesterday..  INR was 2.6.  Writer educated her to go to ER if she fills a pad in a hour.       Do you have suggestions or advise for the pt?  Ashwini was wondering if she needs a appt?

## 2024-03-18 NOTE — TELEPHONE ENCOUNTER
Refill Request     CONFIRM preferrred pharmacy with the patient.    If Mail Order Rx - Pend for 90 day refill.      Last Seen: Last Seen Department: 2/5/2024  Last Seen by PCP: 2/5/2024    Last Written: 12/23    If no future appointment scheduled, route STAFF MESSAGE with patient name to the  Pool for scheduling.      Next Appointment:   Future Appointments   Date Time Provider Department Center   3/18/2024  1:00 PM SCHEDULE, [facility] [place]   6/12/2024  9:20 AM Dominik Reed MD [place]       Message sent to  to schedule appt with patient?  NO      Requested Prescriptions      No prescriptions requested or ordered in this encounter

## 2024-04-02 ENCOUNTER — ANTI-COAG VISIT (OUTPATIENT)
Dept: FAMILY MEDICINE CLINIC | Age: 74
End: 2024-04-02
Payer: MEDICARE

## 2024-04-02 LAB — INTERNATIONAL NORMALIZATION RATIO, POC: 1.7

## 2024-04-02 PROCEDURE — 85610 PROTHROMBIN TIME: CPT | Performed by: FAMILY MEDICINE

## 2024-04-16 ENCOUNTER — NURSE ONLY (OUTPATIENT)
Dept: FAMILY MEDICINE CLINIC | Age: 74
End: 2024-04-16
Payer: MEDICARE

## 2024-04-16 DIAGNOSIS — Z79.01 LONG TERM CURRENT USE OF ANTICOAGULANT THERAPY: ICD-10-CM

## 2024-04-16 LAB — INTERNATIONAL NORMALIZATION RATIO, POC: 2.5

## 2024-04-16 PROCEDURE — 85610 PROTHROMBIN TIME: CPT | Performed by: FAMILY MEDICINE

## 2024-04-16 NOTE — PROGRESS NOTES
Unable to reach patient, left detailed voicemail regarding dosing and to recheck her Inr on April 30th. Repeated instructions twice.

## 2024-04-30 ENCOUNTER — NURSE ONLY (OUTPATIENT)
Dept: FAMILY MEDICINE CLINIC | Age: 74
End: 2024-04-30
Payer: MEDICARE

## 2024-04-30 DIAGNOSIS — Z79.01 LONG TERM CURRENT USE OF ANTICOAGULANT THERAPY: ICD-10-CM

## 2024-04-30 LAB
INTERNATIONAL NORMALIZATION RATIO, POC: 2.1
PROTHROMBIN TIME, POC: 0

## 2024-04-30 PROCEDURE — 85610 PROTHROMBIN TIME: CPT | Performed by: FAMILY MEDICINE

## 2024-05-08 RX ORDER — SYRING-NEEDL,DISP,INSUL,0.3 ML 31GX15/64"
150 SYRINGE, EMPTY DISPOSABLE MISCELLANEOUS
Qty: 300 EACH | Refills: 2 | Status: SHIPPED | OUTPATIENT
Start: 2024-05-08

## 2024-05-08 NOTE — TELEPHONE ENCOUNTER
Refill Request     CONFIRM preferrred pharmacy with the patient.    If Mail Order Rx - Pend for 90 day refill.      Last Seen: Last Seen Department: 2024  Last Seen by PCP: 2024    Last Written: 10/27/23    If no future appointment scheduled, route STAFF MESSAGE with patient name to the  Pool for scheduling.      Next Appointment:   Future Appointments   Date Time Provider Department Center   5/15/2024  3:00 PM Dominik Reed MD [place]  [place]   2024  9:20 AM Dominik Reed MD [facility] [place] - [place]       Message sent to  to schedule appt with patient?  N/A      Requested Prescriptions     Pending Prescriptions Disp Refills    Insulin Syringe-Needle U-100 (BD VEO INSULIN SYRINGE U/F) 31G X 15/64\" 0.3 ML MISC 300 each 2     Si Needles by In Vitro route 5 times daily

## 2024-05-13 DIAGNOSIS — E11.9 INSULIN-REQUIRING OR DEPENDENT TYPE II DIABETES MELLITUS (HCC): Primary | ICD-10-CM

## 2024-05-13 DIAGNOSIS — Z79.4 INSULIN-REQUIRING OR DEPENDENT TYPE II DIABETES MELLITUS (HCC): Primary | ICD-10-CM

## 2024-05-13 RX ORDER — SYRING-NEEDL,DISP,INSUL,0.3 ML 31GX15/64"
SYRINGE, EMPTY DISPOSABLE MISCELLANEOUS
Qty: 100 EACH | Refills: 5 | Status: SHIPPED | OUTPATIENT
Start: 2024-05-13 | End: 2024-05-15 | Stop reason: SDUPTHER

## 2024-05-13 NOTE — TELEPHONE ENCOUNTER
Sent script clarification from [facility] and they states they needed new script for the insulin syringes. Pcp gave verbal for only sending 100 syringes for 5 refills

## 2024-05-15 ENCOUNTER — OFFICE VISIT (OUTPATIENT)
Dept: FAMILY MEDICINE CLINIC | Age: 74
End: 2024-05-15
Payer: MEDICARE

## 2024-05-15 ENCOUNTER — ANTI-COAG VISIT (OUTPATIENT)
Dept: FAMILY MEDICINE CLINIC | Age: 74
End: 2024-05-15

## 2024-05-15 VITALS
SYSTOLIC BLOOD PRESSURE: 107 MMHG | WEIGHT: 220 LBS | BODY MASS INDEX: 40.24 KG/M2 | OXYGEN SATURATION: 93 % | HEART RATE: 80 BPM | DIASTOLIC BLOOD PRESSURE: 71 MMHG

## 2024-05-15 DIAGNOSIS — I50.42 CHRONIC COMBINED SYSTOLIC AND DIASTOLIC CONGESTIVE HEART FAILURE (HCC): ICD-10-CM

## 2024-05-15 DIAGNOSIS — Z79.4 INSULIN-REQUIRING OR DEPENDENT TYPE II DIABETES MELLITUS (HCC): ICD-10-CM

## 2024-05-15 DIAGNOSIS — E11.22 CKD STAGE 4 DUE TO TYPE 2 DIABETES MELLITUS (HCC): ICD-10-CM

## 2024-05-15 DIAGNOSIS — E03.8 HYPOTHYROIDISM DUE TO HASHIMOTO'S THYROIDITIS: ICD-10-CM

## 2024-05-15 DIAGNOSIS — I82.4Z2 DEEP VEIN THROMBOSIS (DVT) OF DISTAL VEIN OF LEFT LOWER EXTREMITY, UNSPECIFIED CHRONICITY (HCC): ICD-10-CM

## 2024-05-15 DIAGNOSIS — E11.51 PERIPHERAL VASCULAR DISEASE IN DIABETES MELLITUS (HCC): ICD-10-CM

## 2024-05-15 DIAGNOSIS — N18.31 STAGE 3A CHRONIC KIDNEY DISEASE (HCC): ICD-10-CM

## 2024-05-15 DIAGNOSIS — E11.9 INSULIN-REQUIRING OR DEPENDENT TYPE II DIABETES MELLITUS (HCC): ICD-10-CM

## 2024-05-15 DIAGNOSIS — Z01.818 PRE-OP EXAM: Primary | ICD-10-CM

## 2024-05-15 DIAGNOSIS — I10 BENIGN ESSENTIAL HTN: ICD-10-CM

## 2024-05-15 DIAGNOSIS — E06.3 HYPOTHYROIDISM DUE TO HASHIMOTO'S THYROIDITIS: ICD-10-CM

## 2024-05-15 DIAGNOSIS — I48.20 CHRONIC ATRIAL FIBRILLATION (HCC): ICD-10-CM

## 2024-05-15 DIAGNOSIS — N18.4 CKD STAGE 4 DUE TO TYPE 2 DIABETES MELLITUS (HCC): ICD-10-CM

## 2024-05-15 DIAGNOSIS — Z79.01 LONG TERM CURRENT USE OF ANTICOAGULANT THERAPY: ICD-10-CM

## 2024-05-15 DIAGNOSIS — C83.53 LYMPHOBLASTIC DIFFUSE LYMPHOMA OF INTRA-ABDOMINAL LYMPH NODES (HCC): ICD-10-CM

## 2024-05-15 DIAGNOSIS — N93.9 VAGINAL BLEEDING: ICD-10-CM

## 2024-05-15 LAB
INTERNATIONAL NORMALIZATION RATIO, POC: 2.3
PROTHROMBIN TIME, POC: 0

## 2024-05-15 PROCEDURE — 3074F SYST BP LT 130 MM HG: CPT | Performed by: FAMILY MEDICINE

## 2024-05-15 PROCEDURE — 85610 PROTHROMBIN TIME: CPT | Performed by: FAMILY MEDICINE

## 2024-05-15 PROCEDURE — 93000 ELECTROCARDIOGRAM COMPLETE: CPT | Performed by: FAMILY MEDICINE

## 2024-05-15 PROCEDURE — 99215 OFFICE O/P EST HI 40 MIN: CPT | Performed by: FAMILY MEDICINE

## 2024-05-15 PROCEDURE — 3017F COLORECTAL CA SCREEN DOC REV: CPT | Performed by: FAMILY MEDICINE

## 2024-05-15 PROCEDURE — G8427 DOCREV CUR MEDS BY ELIG CLIN: HCPCS | Performed by: FAMILY MEDICINE

## 2024-05-15 PROCEDURE — G8399 PT W/DXA RESULTS DOCUMENT: HCPCS | Performed by: FAMILY MEDICINE

## 2024-05-15 PROCEDURE — 1090F PRES/ABSN URINE INCON ASSESS: CPT | Performed by: FAMILY MEDICINE

## 2024-05-15 PROCEDURE — 3051F HG A1C>EQUAL 7.0%<8.0%: CPT | Performed by: FAMILY MEDICINE

## 2024-05-15 PROCEDURE — 2022F DILAT RTA XM EVC RTNOPTHY: CPT | Performed by: FAMILY MEDICINE

## 2024-05-15 PROCEDURE — G8417 CALC BMI ABV UP PARAM F/U: HCPCS | Performed by: FAMILY MEDICINE

## 2024-05-15 PROCEDURE — 3078F DIAST BP <80 MM HG: CPT | Performed by: FAMILY MEDICINE

## 2024-05-15 PROCEDURE — 1123F ACP DISCUSS/DSCN MKR DOCD: CPT | Performed by: FAMILY MEDICINE

## 2024-05-15 PROCEDURE — 1036F TOBACCO NON-USER: CPT | Performed by: FAMILY MEDICINE

## 2024-05-15 RX ORDER — SYRING-NEEDL,DISP,INSUL,0.3 ML 31GX15/64"
SYRINGE, EMPTY DISPOSABLE MISCELLANEOUS
Qty: 300 EACH | Refills: 5 | Status: SHIPPED | OUTPATIENT
Start: 2024-05-15

## 2024-05-15 RX ORDER — ENOXAPARIN SODIUM 150 MG/ML
1.5 INJECTION SUBCUTANEOUS DAILY
Qty: 10 ML | Refills: 0 | Status: SHIPPED | OUTPATIENT
Start: 2024-05-15

## 2024-05-15 SDOH — ECONOMIC STABILITY: INCOME INSECURITY: HOW HARD IS IT FOR YOU TO PAY FOR THE VERY BASICS LIKE FOOD, HOUSING, MEDICAL CARE, AND HEATING?: NOT HARD AT ALL

## 2024-05-15 SDOH — ECONOMIC STABILITY: FOOD INSECURITY: WITHIN THE PAST 12 MONTHS, YOU WORRIED THAT YOUR FOOD WOULD RUN OUT BEFORE YOU GOT MONEY TO BUY MORE.: NEVER TRUE

## 2024-05-15 SDOH — ECONOMIC STABILITY: FOOD INSECURITY: WITHIN THE PAST 12 MONTHS, THE FOOD YOU BOUGHT JUST DIDN'T LAST AND YOU DIDN'T HAVE MONEY TO GET MORE.: NEVER TRUE

## 2024-05-15 NOTE — PATIENT INSTRUCTIONS
STOP   Coumdin - 5/26 until surgeon states you can resume. Recheck INR 5/29 and then 6/3    START   Lovenox 5/27, last lovenox injection will be Thursday before surgery and then restart with the coumadin when surgeon states you can resume.     12 units of levemir the night before surgery    Take diltiazem morning of surgery with smallest sip of water.     HOLD   Semaglutide/ ozempic 7 days prior  jardiance 3 days prior to surgery  Trentol 7 days prior to surgery

## 2024-05-15 NOTE — PROGRESS NOTES
THAI Reed MD, [facility], [facility]  [address] [address]  Phone: [phone]  Fax: [phone]  Preoperative Consultation      Coco Landeros  YOB: 1950    Date of Service:  5/15/2024     Internal Administration   First Dose COVID-19, PFIZER PURPLE top, DILUTE for use, (age 12 y+), 30mcg/0.3mL  01/23/2021   Second Dose COVID-19, PFIZER PURPLE top, DILUTE for use, (age 12 y+), 30mcg/0.3mL   02/13/2021       Last COVID Lab SARS-CoV-2 (no units)   Date Value   12/28/2021 Not Detected     SARS-CoV-2, PCR (no units)   Date Value   10/27/2020 Not Detected     SARS-COV-2, RdRp gene (no units)   Date Value   01/05/2023 Negative     POC Glucose (mg/dl)   Date Value   01/04/2022 115 (H)     INR,(POC) (no units)   Date Value   05/15/2024 2.3     Prothrombin time,(POC) (no units)   Date Value   05/15/2024 0.0            There were no vitals filed for this visit.   Wt Readings from Last 2 Encounters:   02/05/24 103.9 kg (229 lb)   11/09/23 102.1 kg (225 lb)     BP Readings from Last 3 Encounters:   02/05/24 120/68   11/09/23 117/78   09/13/23 133/80        Chief Complaint   Patient presents with    Pre-op Exam     DNC Dr. Castellanos 5/31/24     No Known Allergies  Outpatient Medications Marked as Taking for the 5/15/24 encounter (Office Visit) with Dominik Reed MD   Medication Sig Dispense Refill    Insulin Syringe-Needle U-100 (BD VEO INSULIN SYRINGE U/F) 31G X 15/64\" 0.3 ML MISC Inject with insulin as prescribed 100 each 5    insulin regular (NOVOLIN R) 100 UNIT/ML injection USE AS DIRECTED PER SLIDING SCALE UP TO 26 UNITS DAILY 20 mL 2    omeprazole (PRILOSEC) 40 MG delayed release capsule Take 1 capsule by mouth daily TAKE 1 CAPSULE BY MOUTH EVERY DAY 90 capsule 2    empagliflozin (JARDIANCE) 10 MG tablet Take 1 tablet by mouth daily 90 tablet 2    atorvastatin (LIPITOR) 40 MG tablet Take 1 tablet by mouth daily 90 tablet 2

## 2024-05-16 LAB
ANION GAP SERPL CALCULATED.3IONS-SCNC: 12 MMOL/L (ref 3–16)
ANISOCYTOSIS BLD QL SMEAR: ABNORMAL
BASOPHILS # BLD: 0 K/UL (ref 0–0.2)
BASOPHILS NFR BLD: 0 %
BUN SERPL-MCNC: 23 MG/DL (ref 7–20)
CALCIUM SERPL-MCNC: 9.6 MG/DL (ref 8.3–10.6)
CHLORIDE SERPL-SCNC: 100 MMOL/L (ref 99–110)
CO2 SERPL-SCNC: 28 MMOL/L (ref 21–32)
CREAT SERPL-MCNC: 1.4 MG/DL (ref 0.6–1.2)
DEPRECATED RDW RBC AUTO: 18 % (ref 12.4–15.4)
EOSINOPHIL # BLD: 0.1 K/UL (ref 0–0.6)
EOSINOPHIL NFR BLD: 1 %
GFR SERPLBLD CREATININE-BSD FMLA CKD-EPI: 39 ML/MIN/{1.73_M2}
GLUCOSE SERPL-MCNC: 169 MG/DL (ref 70–99)
HCT VFR BLD AUTO: 49.3 % (ref 36–48)
HGB BLD-MCNC: 16.1 G/DL (ref 12–16)
LYMPHOCYTES # BLD: 2.8 K/UL (ref 1–5.1)
LYMPHOCYTES NFR BLD: 27 %
MACROCYTES BLD QL SMEAR: ABNORMAL
MCH RBC QN AUTO: 31.9 PG (ref 26–34)
MCHC RBC AUTO-ENTMCNC: 32.6 G/DL (ref 31–36)
MCV RBC AUTO: 97.8 FL (ref 80–100)
MONOCYTES # BLD: 0.3 K/UL (ref 0–1.3)
MONOCYTES NFR BLD: 3 %
NEUTROPHILS # BLD: 7 K/UL (ref 1.7–7.7)
NEUTROPHILS NFR BLD: 69 %
OVALOCYTES BLD QL SMEAR: ABNORMAL
PLATELET # BLD AUTO: 246 K/UL (ref 135–450)
PMV BLD AUTO: 9 FL (ref 5–10.5)
POIKILOCYTOSIS BLD QL SMEAR: ABNORMAL
POTASSIUM SERPL-SCNC: 4.4 MMOL/L (ref 3.5–5.1)
RBC # BLD AUTO: 5.04 M/UL (ref 4–5.2)
SLIDE REVIEW: ABNORMAL
SODIUM SERPL-SCNC: 140 MMOL/L (ref 136–145)
WBC # BLD AUTO: 10.2 K/UL (ref 4–11)

## 2024-05-20 DIAGNOSIS — Z79.4 INSULIN-REQUIRING OR DEPENDENT TYPE II DIABETES MELLITUS (HCC): ICD-10-CM

## 2024-05-20 DIAGNOSIS — Z79.4 INSULIN-REQUIRING OR DEPENDENT TYPE II DIABETES MELLITUS (HCC): Primary | ICD-10-CM

## 2024-05-20 DIAGNOSIS — E11.9 INSULIN-REQUIRING OR DEPENDENT TYPE II DIABETES MELLITUS (HCC): Primary | ICD-10-CM

## 2024-05-20 DIAGNOSIS — E11.9 INSULIN-REQUIRING OR DEPENDENT TYPE II DIABETES MELLITUS (HCC): ICD-10-CM

## 2024-05-20 LAB
EST. AVERAGE GLUCOSE BLD GHB EST-MCNC: 162.8 MG/DL
HBA1C MFR BLD: 7.3 %

## 2024-05-29 ENCOUNTER — NURSE ONLY (OUTPATIENT)
Dept: FAMILY MEDICINE CLINIC | Age: 74
End: 2024-05-29
Payer: MEDICARE

## 2024-05-29 DIAGNOSIS — Z79.01 LONG TERM CURRENT USE OF ANTICOAGULANT THERAPY: ICD-10-CM

## 2024-05-29 LAB — INTERNATIONAL NORMALIZATION RATIO, POC: 1.4

## 2024-05-29 PROCEDURE — 85610 PROTHROMBIN TIME: CPT | Performed by: FAMILY MEDICINE

## 2024-05-29 NOTE — PROGRESS NOTES
Patient having surgery and is on Lovenox per GEGE Joel.  She is coming in on 06/03/2024 for INR recheck per GEGE Joel

## 2024-05-30 ENCOUNTER — ANESTHESIA EVENT (OUTPATIENT)
Dept: OPERATING ROOM | Age: 74
End: 2024-05-30
Payer: MEDICARE

## 2024-05-31 ENCOUNTER — ANESTHESIA (OUTPATIENT)
Dept: OPERATING ROOM | Age: 74
End: 2024-05-31
Payer: MEDICARE

## 2024-05-31 ENCOUNTER — HOSPITAL ENCOUNTER (OUTPATIENT)
Age: 74
Setting detail: OUTPATIENT SURGERY
Discharge: HOME | End: 2024-05-31
Attending: OBSTETRICS & GYNECOLOGY | Admitting: OBSTETRICS & GYNECOLOGY
Payer: MEDICARE

## 2024-05-31 VITALS
HEART RATE: 62 BPM | RESPIRATION RATE: 28 BRPM | BODY MASS INDEX: 40.48 KG/M2 | SYSTOLIC BLOOD PRESSURE: 135 MMHG | TEMPERATURE: 97.2 F | HEIGHT: 62 IN | WEIGHT: 220 LBS | OXYGEN SATURATION: 100 % | DIASTOLIC BLOOD PRESSURE: 78 MMHG

## 2024-05-31 DIAGNOSIS — N95.0 POSTMENOPAUSAL BLEEDING: ICD-10-CM

## 2024-05-31 LAB
GLUCOSE BLD-MCNC: 146 MG/DL (ref 70–99)
INR PPP: 1 (ref 0.85–1.15)
PERFORMED ON: ABNORMAL
PROTHROMBIN TIME: 13.4 SEC (ref 11.9–14.9)

## 2024-05-31 PROCEDURE — 85610 PROTHROMBIN TIME: CPT

## 2024-05-31 PROCEDURE — 7100000010 HC PHASE II RECOVERY - FIRST 15 MIN: Performed by: OBSTETRICS & GYNECOLOGY

## 2024-05-31 PROCEDURE — 3600000005 HC SURGERY LEVEL 5 BASE: Performed by: OBSTETRICS & GYNECOLOGY

## 2024-05-31 PROCEDURE — 3700000001 HC ADD 15 MINUTES (ANESTHESIA): Performed by: OBSTETRICS & GYNECOLOGY

## 2024-05-31 PROCEDURE — 2500000003 HC RX 250 WO HCPCS: Performed by: NURSE ANESTHETIST, CERTIFIED REGISTERED

## 2024-05-31 PROCEDURE — 2580000003 HC RX 258: Performed by: NURSE ANESTHETIST, CERTIFIED REGISTERED

## 2024-05-31 PROCEDURE — 7100000000 HC PACU RECOVERY - FIRST 15 MIN: Performed by: OBSTETRICS & GYNECOLOGY

## 2024-05-31 PROCEDURE — 6360000002 HC RX W HCPCS: Performed by: NURSE ANESTHETIST, CERTIFIED REGISTERED

## 2024-05-31 PROCEDURE — 3700000000 HC ANESTHESIA ATTENDED CARE: Performed by: OBSTETRICS & GYNECOLOGY

## 2024-05-31 PROCEDURE — 88305 TISSUE EXAM BY PATHOLOGIST: CPT

## 2024-05-31 PROCEDURE — 7100000001 HC PACU RECOVERY - ADDTL 15 MIN: Performed by: OBSTETRICS & GYNECOLOGY

## 2024-05-31 PROCEDURE — 2709999900 HC NON-CHARGEABLE SUPPLY: Performed by: OBSTETRICS & GYNECOLOGY

## 2024-05-31 PROCEDURE — 3600000015 HC SURGERY LEVEL 5 ADDTL 15MIN: Performed by: OBSTETRICS & GYNECOLOGY

## 2024-05-31 PROCEDURE — 7100000011 HC PHASE II RECOVERY - ADDTL 15 MIN: Performed by: OBSTETRICS & GYNECOLOGY

## 2024-05-31 RX ORDER — SODIUM CHLORIDE 9 MG/ML
INJECTION, SOLUTION INTRAVENOUS PRN
Status: DISCONTINUED | OUTPATIENT
Start: 2024-05-31 | End: 2024-05-31 | Stop reason: HOSPADM

## 2024-05-31 RX ORDER — ROCURONIUM BROMIDE 10 MG/ML
INJECTION, SOLUTION INTRAVENOUS PRN
Status: DISCONTINUED | OUTPATIENT
Start: 2024-05-31 | End: 2024-05-31 | Stop reason: SDUPTHER

## 2024-05-31 RX ORDER — OXYCODONE HYDROCHLORIDE 5 MG/1
10 TABLET ORAL PRN
Status: CANCELLED | OUTPATIENT
Start: 2024-05-31

## 2024-05-31 RX ORDER — FENTANYL CITRATE 50 UG/ML
INJECTION, SOLUTION INTRAMUSCULAR; INTRAVENOUS PRN
Status: DISCONTINUED | OUTPATIENT
Start: 2024-05-31 | End: 2024-05-31 | Stop reason: SDUPTHER

## 2024-05-31 RX ORDER — DIPHENHYDRAMINE HYDROCHLORIDE 50 MG/ML
6.25 INJECTION INTRAMUSCULAR; INTRAVENOUS
Status: CANCELLED | OUTPATIENT
Start: 2024-05-31 | End: 2024-06-01

## 2024-05-31 RX ORDER — LIDOCAINE HYDROCHLORIDE 20 MG/ML
INJECTION, SOLUTION INFILTRATION; PERINEURAL PRN
Status: DISCONTINUED | OUTPATIENT
Start: 2024-05-31 | End: 2024-05-31 | Stop reason: SDUPTHER

## 2024-05-31 RX ORDER — SODIUM CHLORIDE 9 MG/ML
INJECTION, SOLUTION INTRAVENOUS PRN
Status: CANCELLED | OUTPATIENT
Start: 2024-05-31

## 2024-05-31 RX ORDER — MEPERIDINE HYDROCHLORIDE 50 MG/ML
12.5 INJECTION INTRAMUSCULAR; INTRAVENOUS; SUBCUTANEOUS EVERY 5 MIN PRN
Status: CANCELLED | OUTPATIENT
Start: 2024-05-31

## 2024-05-31 RX ORDER — NALOXONE HYDROCHLORIDE 0.4 MG/ML
INJECTION, SOLUTION INTRAMUSCULAR; INTRAVENOUS; SUBCUTANEOUS PRN
Status: CANCELLED | OUTPATIENT
Start: 2024-05-31

## 2024-05-31 RX ORDER — OXYCODONE HYDROCHLORIDE 5 MG/1
5 TABLET ORAL PRN
Status: CANCELLED | OUTPATIENT
Start: 2024-05-31

## 2024-05-31 RX ORDER — MIDAZOLAM HYDROCHLORIDE 1 MG/ML
2 INJECTION INTRAMUSCULAR; INTRAVENOUS
Status: DISCONTINUED | OUTPATIENT
Start: 2024-05-31 | End: 2024-05-31 | Stop reason: HOSPADM

## 2024-05-31 RX ORDER — SODIUM CHLORIDE 0.9 % (FLUSH) 0.9 %
5-40 SYRINGE (ML) INJECTION EVERY 12 HOURS SCHEDULED
Status: CANCELLED | OUTPATIENT
Start: 2024-05-31

## 2024-05-31 RX ORDER — LIDOCAINE HYDROCHLORIDE 10 MG/ML
1 INJECTION, SOLUTION EPIDURAL; INFILTRATION; INTRACAUDAL; PERINEURAL
Status: DISCONTINUED | OUTPATIENT
Start: 2024-05-31 | End: 2024-05-31 | Stop reason: HOSPADM

## 2024-05-31 RX ORDER — ONDANSETRON 2 MG/ML
4 INJECTION INTRAMUSCULAR; INTRAVENOUS ONCE
Status: CANCELLED | OUTPATIENT
Start: 2024-05-31 | End: 2024-05-31

## 2024-05-31 RX ORDER — SODIUM CHLORIDE 0.9 % (FLUSH) 0.9 %
5-40 SYRINGE (ML) INJECTION PRN
Status: CANCELLED | OUTPATIENT
Start: 2024-05-31

## 2024-05-31 RX ORDER — MIDAZOLAM HYDROCHLORIDE 1 MG/ML
2 INJECTION INTRAMUSCULAR; INTRAVENOUS
Status: CANCELLED | OUTPATIENT
Start: 2024-05-31 | End: 2024-06-01

## 2024-05-31 RX ORDER — METOCLOPRAMIDE HYDROCHLORIDE 5 MG/ML
INJECTION INTRAMUSCULAR; INTRAVENOUS PRN
Status: DISCONTINUED | OUTPATIENT
Start: 2024-05-31 | End: 2024-05-31 | Stop reason: SDUPTHER

## 2024-05-31 RX ORDER — SODIUM CHLORIDE, SODIUM LACTATE, POTASSIUM CHLORIDE, CALCIUM CHLORIDE 600; 310; 30; 20 MG/100ML; MG/100ML; MG/100ML; MG/100ML
INJECTION, SOLUTION INTRAVENOUS CONTINUOUS
Status: DISCONTINUED | OUTPATIENT
Start: 2024-05-31 | End: 2024-05-31 | Stop reason: HOSPADM

## 2024-05-31 RX ORDER — SODIUM CHLORIDE 0.9 % (FLUSH) 0.9 %
5-40 SYRINGE (ML) INJECTION PRN
Status: DISCONTINUED | OUTPATIENT
Start: 2024-05-31 | End: 2024-05-31 | Stop reason: HOSPADM

## 2024-05-31 RX ORDER — ONDANSETRON 2 MG/ML
INJECTION INTRAMUSCULAR; INTRAVENOUS PRN
Status: DISCONTINUED | OUTPATIENT
Start: 2024-05-31 | End: 2024-05-31 | Stop reason: SDUPTHER

## 2024-05-31 RX ORDER — SODIUM CHLORIDE, SODIUM LACTATE, POTASSIUM CHLORIDE, CALCIUM CHLORIDE 600; 310; 30; 20 MG/100ML; MG/100ML; MG/100ML; MG/100ML
INJECTION, SOLUTION INTRAVENOUS CONTINUOUS PRN
Status: DISCONTINUED | OUTPATIENT
Start: 2024-05-31 | End: 2024-05-31 | Stop reason: SDUPTHER

## 2024-05-31 RX ORDER — PROPOFOL 10 MG/ML
INJECTION, EMULSION INTRAVENOUS PRN
Status: DISCONTINUED | OUTPATIENT
Start: 2024-05-31 | End: 2024-05-31 | Stop reason: SDUPTHER

## 2024-05-31 RX ORDER — SODIUM CHLORIDE 0.9 % (FLUSH) 0.9 %
5-40 SYRINGE (ML) INJECTION EVERY 12 HOURS SCHEDULED
Status: DISCONTINUED | OUTPATIENT
Start: 2024-05-31 | End: 2024-05-31 | Stop reason: HOSPADM

## 2024-05-31 RX ADMIN — SODIUM CHLORIDE, SODIUM LACTATE, POTASSIUM CHLORIDE, AND CALCIUM CHLORIDE: .6; .31; .03; .02 INJECTION, SOLUTION INTRAVENOUS at 09:27

## 2024-05-31 RX ADMIN — LIDOCAINE HYDROCHLORIDE 100 MG: 20 INJECTION, SOLUTION INFILTRATION; PERINEURAL at 09:32

## 2024-05-31 RX ADMIN — ONDANSETRON 4 MG: 2 INJECTION INTRAMUSCULAR; INTRAVENOUS at 09:49

## 2024-05-31 RX ADMIN — ROCURONIUM BROMIDE 50 MG: 50 INJECTION, SOLUTION INTRAVENOUS at 09:32

## 2024-05-31 RX ADMIN — METOCLOPRAMIDE HYDROCHLORIDE 10 MG: 5 INJECTION INTRAMUSCULAR; INTRAVENOUS at 09:49

## 2024-05-31 RX ADMIN — FENTANYL CITRATE 50 MCG: 50 INJECTION, SOLUTION INTRAMUSCULAR; INTRAVENOUS at 09:32

## 2024-05-31 RX ADMIN — SUGAMMADEX 200 MG: 100 INJECTION, SOLUTION INTRAVENOUS at 09:53

## 2024-05-31 RX ADMIN — PROPOFOL 120 MG: 10 INJECTION, EMULSION INTRAVENOUS at 09:32

## 2024-05-31 ASSESSMENT — PAIN - FUNCTIONAL ASSESSMENT
PAIN_FUNCTIONAL_ASSESSMENT: 0-10
PAIN_FUNCTIONAL_ASSESSMENT: 0-10

## 2024-05-31 NOTE — ANESTHESIA PRE PROCEDURE
DAY.  Hold 7 days prior to 5/31/24 surgery per MD. 3/4/24   Dominik Reed MD   Insulin Pen Needle (BD PEN NEEDLE ELDER 2ND GEN) 32G X 4 MM MISC USE DAILY AS DIRECTED 2/5/24   Dominik Reed MD   ONETOUCH ULTRA strip USE TO TEST BLOOD SUGAR 3 TIMES A DAY 1/8/24   Dominik Reed MD   dilTIAZem (CARDIZEM CD) 240 MG extended release capsule TAKE 1 CAPSULE BY MOUTH EVERY DAY  Patient taking differently: Take by mouth every morning Take AM of 5/31/24 surgery. 1/4/24   Dominik Reed MD   allopurinol (ZYLOPRIM) 100 MG tablet TAKE 2 TABLETS BY MOUTH EVERY DAY  Patient taking differently: Take 2 tablets by mouth 2 times daily 12/11/23   Dominik Reed MD   levothyroxine (SYNTHROID) 100 MCG tablet TAKE 1 TABLET BY MOUTH EVERY DAY  Patient taking differently: Take 1 tablet by mouth every morning 11/24/23   Dominik Reed MD   Continuous Blood Gluc Sensor (FREESTYLE IMELDA 3 SENSOR) MISC 1 Units by Does not apply route continuous 10/31/23   Dominik Reed MD   Semaglutide,0.25 or 0.5MG/DOS, (OZEMPIC, 0.25 OR 0.5 MG/DOSE,) 2 MG/1.5ML SOPN Inject 0.5 mg into the skin once a week  Patient taking differently: Inject 0.5 mg into the skin once a week Taken on Thursday. Hold 1 week prior to 5/31/24 surgery. 10/23/23   Dominik Reed MD   warfarin (COUMADIN) 5 MG tablet TAKE 1 TABLET BY MOUTH ONCE DAILY EXCEPT 10MG ON MONDAY  Patient taking differently: TAKE 1 TABLET BY MOUTH ONCE DAILY EXCEPT 2.5 mg ON MONDAY.  Last dose 5/26/24 for 5/31/24  surgery per MD. 10/2/23   Dominik Reed MD   insulin detemir (LEVEMIR FLEXPEN) 100 UNIT/ML injection pen Inject 25 Units into the skin at bedtime  Patient taking differently: Inject 25 Units into the skin at bedtime Patient to use 12 Units evening  prior to 5/31/24 surgery per MD. 9/14/23   Dominik Reed MD   furosemide (LASIX) 20 MG tablet TAKE 1 TABLET BY MOUTH EVERY DAY

## 2024-05-31 NOTE — H&P
Pt met and chart reviewed    H and P unchanged from prior    Will proceed with d and c, hysteroscopy

## 2024-05-31 NOTE — OP NOTE
[address] [address]                            OPERATIVE REPORT      PATIENT NAME: MARISA SANDERS             : 1950  MED REC NO: [id number]                      ROOM: [facility]  ACCOUNT NO: [id number]                       ADMIT DATE: 2024  PROVIDER: Steven Castellanos MD      DATE OF PROCEDURE:  2024    SURGEON:  Steven Castellanos MD    PREOPERATIVE DIAGNOSES:    1. Postmenopausal bleeding.  2. Thickened endometrial lining.    POSTOPERATIVE DIAGNOSES:    1. Postmenopausal bleeding.  2. Thickened endometrial lining.    PROCEDURE:    1. Diagnostic hysteroscopy.  2. Dilation and curettage.    ANESTHESIA:  General endotracheal anesthesia.    ESTIMATED BLOOD LOSS:  Less than 50 mL.    FINDINGS:  Normal endometrial cavity.    COMPLICATIONS:  None.    HISTORY:  The patient is a 73-year-old female who presents for surgical management of postmenopausal bleeding.  She had an endometrial biopsy attempted in the office, but was unable to because of cervical stenosis.  She also was noted to have thickened endometrial lining.  She was counseled about need for surgical evaluation of the endometrial cavity.    DESCRIPTION OF PROCEDURE:  After appropriate consent was obtained, the patient was taken to the operating room, where general endotracheal anesthesia was found to be adequate.  The patient was placed in candy cane stirrups.  Exam under anesthesia revealed normal size uterus.  The patient was prepped and draped in normal sterile fashion.  The cervix was grasped with single-tooth tenaculum.  The cervix was dilated to fit the hysteroscope.  Hysteroscopic examination revealed normal endometrial cavity.  A sharp curettage was performed and the curettings were sent to pathology.  Everything was removed from the vagina.  The patient tolerated the procedure well and was transferred to recovery room in stable  condition.          NILDA MARIE MD      D:  05/31/2024 10:12:50     T:  05/31/2024 10:32:14     JOSEPH/YUE  Job #:  [id number]     Doc#:  [id number]

## 2024-05-31 NOTE — ANESTHESIA POSTPROCEDURE EVALUATION
Department of Anesthesiology  Postprocedure Note    Patient: Coco Landeros  MRN: [id number]  YOB: 1950  Date of evaluation: 5/31/2024    Procedure Summary       Date: 05/31/24 Room / Location: [address]    Anesthesia Start: 0927 Anesthesia Stop: 1005    Procedure: VIDEO HYSTEROSCOPY, DILATATION AND CURETTAGE (Uterus) Diagnosis:       Postmenopausal bleeding      (Postmenopausal bleeding [N95.0])    Surgeons: Steven Castellanos MD Responsible Provider: Devaughn Vazquez MD    Anesthesia Type: general ASA Status: 3            Anesthesia Type: No value filed.    Pacheco Phase I: Pacheco Score: 9    Pacheco Phase II:      Anesthesia Post Evaluation    Patient location during evaluation: PACU  Patient participation: complete - patient participated  Level of consciousness: awake and alert  Airway patency: patent  Nausea & Vomiting: no vomiting and no nausea  Cardiovascular status: hemodynamically stable and blood pressure returned to baseline  Respiratory status: acceptable  Hydration status: euvolemic  Comments: --------------------            05/31/24               1050     --------------------   BP:       135/78     Pulse:      62       Resp:       28       Temp:                SpO2:      100%     --------------------    Pain management: adequate    No notable events documented.

## 2024-05-31 NOTE — BRIEF OP NOTE
Brief Postoperative Note      Patient: Coco Landeros  YOB: 1950  MRN: [id number]    Date of Procedure: 5/31/2024    Pre-Op Diagnosis Codes:     * Postmenopausal bleeding [N95.0]    Post-Op Diagnosis: Same       Procedure(s):  VIDEO HYSTEROSCOPY, DILATATION AND CURETTAGE    Surgeon(s):  Nilda Marie MD    Assistant:  Surgical Assistant: Kaelyn Dover    Anesthesia: General    Estimated Blood Loss (mL): less than 50     Complications: none    Specimens:   ID Type Source Tests Collected by Time Destination   A : ENDMETRIAL CURETTINGS Tissue Endometrium SURGICAL PATHOLOGY Nilda Marie MD 5/31/2024 0949        Implants:  * No implants in log *      Drains: * No LDAs found *    Findings:  Infection Present At Time Of Surgery (PATOS) (choose all levels that have infection present):  No infection present  Other Findings: endometrial curettings to pathology    Electronically signed by NILDA MARIE MD on 5/31/2024 at 10:07 AM

## 2024-05-31 NOTE — PROGRESS NOTES
Coco LUDWIG Foster    Age 73 y.o.    female    1950    MRN [id number]    5/31/2024  Arrival Time_____________  OR Time____________62 Min     Procedure(s):  VIDEO HYSTEROSCOPY, DILATATION AND CURETTAGE                      General   Surgeon(s):  Steven Castellanos, MD      DAY ADMIT ___  SDS/OP ___  OUTPT IN BED ___        Phone [phone] (home)                  PCP _____________________ Phone_________________ Epic ( ) Epic CE ( ) Appt ________    NOTES: _________________________________________ Consult/Cardio _______________    ____________________________________________________________________________    ____________________________________________________________________________  PAT APPT DATE:________ TIME: ________  FAXED QAD: _______  (__) H&P w/ Hospitalist    (__) PAT orders in EPIC    (__) Meet with PAT nurse  __________________________________________________________________________  Preop Nurse phone screen complete: _____________  (__) CBC     (__) W/ DIFF ___________  (__) CT CHEST  __________   (__) Hgb A1C    ___________  (__) CHEST X RAY   __________  (__) LIPID PROFILE  ___________  (__) EKG   __________  (__) PT-INR / APTT  ___________  (__) PFT's   __________  (__) BMP   ___________  (__) CAROTIDS  __________  (__) CMP   ___________  (__) VEIN MAPPING  __________  (__) U/A   ___________  (__) HISTORY & PHYSICAL __________  (__) URINE C & S  ___________  (__) CARDIAC CLEARANCE __________  (__) U/A W/ FLEX  ___________  (__) PULM. CLEARANCE __________  (__) SERUM PREGNANCY ___________  (__) Preop Orders in EPIC __________  (__) TYPE & SCREEN __________repeat ( ) (__)  __________________ __________  (__) Albumin   ___________  (__)  __________________ __________  (__) TRANSFERRIN  ___________  (__)  __________________ __________  (__) LIVER PROFILE  ___________  (__) URINE PREG DOS __________  (__) MRSA NASAL SWAB ___________  (__) BLOOD SUGAR DOS __________  (__) SED 
Patient admitted to [facility] room 3 in preparation for surgery, VSS. Consent confirmed. IV inserted into left FA, LR infusing. Belongings on [facility] cart. NPO since 1730. Family at bedside, phone number in system for text updates, call light within reach.    
surgeon.              -If you have a Living Will and Durable Power of  for Healthcare, please bring in a copy to be scanned at registration.              -Notify your Surgeon if you develop any illness between now and the surgery date, cough, cold, fever, sore throat, nausea, vomiting, etc.  Please notify your                surgeon if you experience dizziness, shortness of breath or blurred vision between now & the time of your surgery.              -DO NOT shave your operative site less than 4 days prior to surgery. For face & neck surgery, men may use an electric razor up to 2 days prior to surgery.   -To help prevent infection, change your sheets the night before surgery. Also, shower the night before & morning of surgery using an antibacterial     soap (Dial or Safeguard) or Hibiclens soap as instructed by your surgeon. Do not apply lotion after shower or day of surgery.              -To provide excellent care visitors will be limited to two per room at any given time.              -Please bring your picture ID and insurance card for registration prior to arriving to second floor surgery department.              -If you use a CPAP/BiPAP please bring with you on the day of the surgery. If you use oxygen, please bring portable tank with you.              -For your convenience [facility] has an outpatient pharmacy on site to fill your prescriptions prior to 5 pm.              -Bring a complete list of all your medications with name and dose including any supplements.              Visitor policy: May have 2-3 visitors in Surgery Waiting Room. Visiting hours are 8a-8p. Overnight visitors will be at the discretion of the unit nurse.    No visitors under the age of 14 permitted.     *Please call [facility] Preadmission Testing Department for any further questions  [phone]    [facility] - MAIN ENTRANCE   [address] [place] [address]   [id number]        Email sent: [email or web address]

## 2024-06-03 ENCOUNTER — NURSE ONLY (OUTPATIENT)
Dept: FAMILY MEDICINE CLINIC | Age: 74
End: 2024-06-03

## 2024-06-03 DIAGNOSIS — Z79.01 LONG TERM CURRENT USE OF ANTICOAGULANT THERAPY: Primary | ICD-10-CM

## 2024-06-03 LAB — INTERNATIONAL NORMALIZATION RATIO, POC: 1.2

## 2024-06-03 NOTE — PROGRESS NOTES
Pt called and informed of Roma MCCARTY instructions below to take 10mg of coumadin today and tomorrow and then to also take the lovenox injections today and tomorrow as well and to recheck her INR wedsnesday. Apt scheduled.

## 2024-06-03 NOTE — PROGRESS NOTES
Take Warfarin 10 mg today and tomorrow. Recheck INR on Wednesday.  Continue with Lovenox until INR results on Wednesday to determine further instructions.

## 2024-06-03 NOTE — PROGRESS NOTES
[address] [facility] [phone] Patient: Milagros Champion MRN: [id number] Visit Information Date & Time Provider Department Dept. Phone Encounter #  
 2/8/2018  3:30 PM Tresa Mcgregor MD Christina9 Mansi Roblero [id number] Upcoming Health Maintenance Date Due  
 GLAUCOMA SCREENING Q2Y 8/16/1996 MEDICARE YEARLY EXAM 4/27/2018 Pneumococcal 65+ Low/Medium Risk (2 of 2 - PPSV23) 10/4/2018 DTaP/Tdap/Td series (2 - Td) 10/4/2027 Allergies as of 2/8/2018  Review Complete On: 2/8/2018 By: Tresa Mcgregor MD  
 No Known Allergies Current Immunizations  Reviewed on 4/26/2017 No immunizations on file. Not reviewed this visit You Were Diagnosed With   
  
 Codes Comments Urine frequency    -  Primary ICD-10-CM: R35.0 ICD-9-CM: 788.41 Vitals BP Pulse Resp Height(growth percentile) Weight(growth percentile) SpO2  
 140/84 (BP 1 Location: Left arm, BP Patient Position: Sitting) 66 16 5' 1\" (1.549 m) 136 lb (61.7 kg) 97% BMI OB Status Smoking Status 25.7 kg/m2 Postmenopausal Never Smoker BMI and BSA Data Body Mass Index Body Surface Area 25.7 kg/m 2 1.63 m 2 Preferred Pharmacy Pharmacy Name Phone [id number], [address] [phone] Your Updated Medication List  
  
   
This list is accurate as of: 2/8/18  4:34 PM.  Always use your most recent med list.  
  
  
  
  
 bisoprolol-hydroCHLOROthiazide 2.5-6.25 mg per tablet Commonly known as:  [facility] Take 1 Tab by mouth daily. calcium-cholecalciferol (D3) tablet Commonly known as:  CALTRATE 600+D Take 1 Tab by mouth daily. mometasone 0.1 % topical cream  
Commonly known as:  Sydni Puga Apply thin layer to affected areas twice a day  
  
 nitrofurantoin 100 mg capsule I need more information.  When is her surgery.  What type of surgery is she having.  Has she been instructed on what she needs to do for her Warfarin and Lovenox already for her surgery?     Surgery was already completed on 5/31 for a hysteroscopy with dilation and curettage. She was informed of these instructions during pre op apt  STOP   Coumadin - 5/26 until surgeon states you can resume. Recheck INR 5/29 and then 6/3     START   Lovenox 5/27, last Lovenox injection will be Thursday before surgery and then restart with the coumadin and Lovenox when surgeon states you can resume.       Commonly known as:  MACRODANTIN Take 1 Cap by mouth two (2) times a day for 10 doses. therapeutic multivitamin tablet Commonly known as:  [facility] Take 1 Tab by mouth daily. TURMERIC (BULK)  
by Does Not Apply route. Prescriptions Sent to Pharmacy Refills  
 nitrofurantoin (MACRODANTIN) 100 mg capsule 0 Sig: Take 1 Cap by mouth two (2) times a day for 10 doses. Class: Normal  
 Pharmacy: [id number] Tristen Magallon, [address] [place] Ph #: [phone] Route: Oral  
  
We Performed the Following AMB POC URINALYSIS DIP STICK MANUAL W/O MICRO [71276 CPT(R)] Introducing [facility]! [employer] introduces Testive patient portal. Now you can access parts of your medical record, email your doctor's office, and request medication refills online. 1. In your internet browser, go to [email or web address] 2. Click on the First Time User? Click Here link in the Sign In box. You will see the New Member Sign Up page. 3. Enter your Hundot Access Code exactly as it appears below. You will not need to use this code after youve completed the sign-up process. If you do not sign up before the expiration date, you must request a new code. · [facility] Access Code: [facility] Expires: 5/9/2018  3:25 PM 
 
4. Enter the last four digits of your Social Security Number (xxxx) and Date of Birth (mm/dd/yyyy) as indicated and click Submit. You will be taken to the next sign-up page. 5. Create a Hundot ID. This will be your Testive login ID and cannot be changed, so think of one that is secure and easy to remember. 6. Create a Testive password. You can change your password at any time. 7. Enter your Password Reset Question and Answer. This can be used at a later time if you forget your password. 8. Enter your e-mail address. You will receive e-mail notification when new information is available in [address] 9. Click Sign Up. You can now view and download portions of your medical record. 10. Click the Download Summary menu link to download a portable copy of your medical information. If you have questions, please visit the Frequently Asked Questions section of the Nitch website. Remember, Nitch is NOT to be used for urgent needs. For medical emergencies, dial 911. Now available from your iPhone and Android! Please provide this summary of care documentation to your next provider. Your primary care clinician is listed as Lexie Melendez. If you have any questions after today's visit, please call [phone].

## 2024-06-05 ENCOUNTER — NURSE ONLY (OUTPATIENT)
Dept: FAMILY MEDICINE CLINIC | Age: 74
End: 2024-06-05

## 2024-06-05 DIAGNOSIS — I48.20 CHRONIC ATRIAL FIBRILLATION (HCC): Primary | ICD-10-CM

## 2024-06-05 LAB — INTERNATIONAL NORMALIZATION RATIO, POC: 1.9

## 2024-06-10 ENCOUNTER — NURSE ONLY (OUTPATIENT)
Dept: FAMILY MEDICINE CLINIC | Age: 74
End: 2024-06-10

## 2024-06-10 DIAGNOSIS — Z79.01 LONG TERM CURRENT USE OF ANTICOAGULANT THERAPY: Primary | ICD-10-CM

## 2024-06-10 LAB — INTERNATIONAL NORMALIZATION RATIO, POC: 2.1

## 2024-06-10 NOTE — PROGRESS NOTES
Unable to reach pt, left detailed voicemail informing to recheck her INR on 6/24/24 and to continue with 5mg daily of coumadin.

## 2024-06-12 ENCOUNTER — OFFICE VISIT (OUTPATIENT)
Dept: FAMILY MEDICINE CLINIC | Age: 74
End: 2024-06-12

## 2024-06-12 VITALS
BODY MASS INDEX: 39.69 KG/M2 | DIASTOLIC BLOOD PRESSURE: 63 MMHG | OXYGEN SATURATION: 94 % | HEART RATE: 78 BPM | WEIGHT: 217 LBS | SYSTOLIC BLOOD PRESSURE: 137 MMHG

## 2024-06-12 DIAGNOSIS — E06.3 HYPOTHYROIDISM DUE TO HASHIMOTO'S THYROIDITIS: ICD-10-CM

## 2024-06-12 DIAGNOSIS — I50.42 CHRONIC COMBINED SYSTOLIC AND DIASTOLIC CONGESTIVE HEART FAILURE (HCC): ICD-10-CM

## 2024-06-12 DIAGNOSIS — I10 BENIGN ESSENTIAL HTN: Primary | ICD-10-CM

## 2024-06-12 DIAGNOSIS — E03.8 HYPOTHYROIDISM DUE TO HASHIMOTO'S THYROIDITIS: ICD-10-CM

## 2024-06-12 DIAGNOSIS — Z13.29 SCREENING FOR THYROID DISORDER: ICD-10-CM

## 2024-06-12 DIAGNOSIS — Z79.4 INSULIN-REQUIRING OR DEPENDENT TYPE II DIABETES MELLITUS (HCC): ICD-10-CM

## 2024-06-12 DIAGNOSIS — I48.20 CHRONIC ATRIAL FIBRILLATION (HCC): ICD-10-CM

## 2024-06-12 DIAGNOSIS — N18.4 CKD STAGE 4 DUE TO TYPE 2 DIABETES MELLITUS (HCC): ICD-10-CM

## 2024-06-12 DIAGNOSIS — E11.9 INSULIN-REQUIRING OR DEPENDENT TYPE II DIABETES MELLITUS (HCC): ICD-10-CM

## 2024-06-12 DIAGNOSIS — I87.2 VENOUS INSUFFICIENCY: ICD-10-CM

## 2024-06-12 DIAGNOSIS — Z13.220 SCREENING FOR CHOLESTEROL LEVEL: ICD-10-CM

## 2024-06-12 DIAGNOSIS — D68.69 SECONDARY HYPERCOAGULABLE STATE (HCC): ICD-10-CM

## 2024-06-12 DIAGNOSIS — E11.22 CKD STAGE 4 DUE TO TYPE 2 DIABETES MELLITUS (HCC): ICD-10-CM

## 2024-06-12 PROBLEM — J81.0 ACUTE PULMONARY EDEMA (HCC): Status: RESOLVED | Noted: 2023-06-21 | Resolved: 2024-06-12

## 2024-06-12 NOTE — PROGRESS NOTES
described in this documentation, as scribed by the above signed scribe in my presence, and it is both accurate and complete. I agree with the ROS and Past Histories independently gathered by the clinical support staff and the remaining scribed note accurately describes my personal service to the patient.      6/12/2024    1:13 PM

## 2024-06-16 DIAGNOSIS — E11.9 INSULIN-REQUIRING OR DEPENDENT TYPE II DIABETES MELLITUS (HCC): ICD-10-CM

## 2024-06-16 DIAGNOSIS — Z79.4 INSULIN-REQUIRING OR DEPENDENT TYPE II DIABETES MELLITUS (HCC): ICD-10-CM

## 2024-06-24 ENCOUNTER — NURSE ONLY (OUTPATIENT)
Dept: FAMILY MEDICINE CLINIC | Age: 74
End: 2024-06-24
Payer: MEDICARE

## 2024-06-24 DIAGNOSIS — Z13.220 SCREENING FOR CHOLESTEROL LEVEL: ICD-10-CM

## 2024-06-24 DIAGNOSIS — I87.2 CHRONIC VENOUS STASIS DERMATITIS OF LEFT LOWER EXTREMITY: Primary | ICD-10-CM

## 2024-06-24 DIAGNOSIS — Z13.29 SCREENING FOR THYROID DISORDER: ICD-10-CM

## 2024-06-24 LAB — INTERNATIONAL NORMALIZATION RATIO, POC: 2.3

## 2024-06-24 PROCEDURE — 85610 PROTHROMBIN TIME: CPT | Performed by: FAMILY MEDICINE

## 2024-06-24 PROCEDURE — 36415 COLL VENOUS BLD VENIPUNCTURE: CPT | Performed by: FAMILY MEDICINE

## 2024-06-25 LAB
CHOLEST SERPL-MCNC: 165 MG/DL (ref 0–199)
HDLC SERPL-MCNC: 44 MG/DL (ref 40–60)
LDLC SERPL CALC-MCNC: 85 MG/DL
TRIGL SERPL-MCNC: 179 MG/DL (ref 0–150)
TSH SERPL DL<=0.005 MIU/L-ACNC: 1.03 UIU/ML (ref 0.27–4.2)
VLDLC SERPL CALC-MCNC: 36 MG/DL

## 2024-06-26 RX ORDER — SEMAGLUTIDE 0.68 MG/ML
INJECTION, SOLUTION SUBCUTANEOUS
Qty: 3 ML | Refills: 3 | Status: SHIPPED | OUTPATIENT
Start: 2024-06-26

## 2024-06-28 RX ORDER — WARFARIN SODIUM 5 MG/1
TABLET ORAL
Qty: 102 TABLET | Refills: 1 | Status: SHIPPED | OUTPATIENT
Start: 2024-06-28

## 2024-06-28 NOTE — TELEPHONE ENCOUNTER
Refill Request     CONFIRM preferrred pharmacy with the patient.    If Mail Order Rx - Pend for 90 day refill.      Last Seen: Last Seen Department: 6/12/2024  Last Seen by PCP: 6/12/2024    Last Written: 3/30/2024    If no future appointment scheduled, route STAFF MESSAGE with patient name to the  Pool for scheduling.      Next Appointment:   Future Appointments   Date Time Provider Department Center   7/16/2024  9:20 AM SCHEDULE, [facility]  [place] [facility] [place] [place]   10/7/2024  9:20 AM Dominik Reed MD [facility] [place] - [place]       Message sent to  to schedule appt with patient?  NO      Requested Prescriptions     Pending Prescriptions Disp Refills    warfarin (COUMADIN) 5 MG tablet [Pharmacy Med Name: WARFARIN SODIUM 5 MG TABLET] 102 tablet 1     Sig: TAKE 1 TABLET BY MOUTH ONCE DAILY EXCEPT 10MG ON MONDAY

## 2024-07-03 ENCOUNTER — ANTI-COAG VISIT (OUTPATIENT)
Dept: FAMILY MEDICINE CLINIC | Age: 74
End: 2024-07-03

## 2024-07-03 RX ORDER — LEVOTHYROXINE SODIUM 0.1 MG/1
100 TABLET ORAL DAILY
Qty: 90 TABLET | Refills: 3 | Status: SHIPPED | OUTPATIENT
Start: 2024-07-03

## 2024-07-08 ENCOUNTER — TELEPHONE (OUTPATIENT)
Dept: FAMILY MEDICINE CLINIC | Age: 74
End: 2024-07-08

## 2024-07-08 RX ORDER — FLUCONAZOLE 150 MG/1
150 TABLET ORAL ONCE
Qty: 1 TABLET | Refills: 0 | Status: SHIPPED | OUTPATIENT
Start: 2024-07-08 | End: 2024-07-08

## 2024-07-08 NOTE — TELEPHONE ENCOUNTER
Pt states that she has a yeast infection and was wanting to see if she could get the [facility] [place].

## 2024-07-09 ENCOUNTER — COMMUNITY OUTREACH (OUTPATIENT)
Dept: FAMILY MEDICINE CLINIC | Age: 74
End: 2024-07-09

## 2024-07-16 ENCOUNTER — NURSE ONLY (OUTPATIENT)
Dept: FAMILY MEDICINE CLINIC | Age: 74
End: 2024-07-16
Payer: MEDICARE

## 2024-07-16 DIAGNOSIS — I48.20 CHRONIC ATRIAL FIBRILLATION (HCC): Primary | ICD-10-CM

## 2024-07-16 LAB
INTERNATIONAL NORMALIZATION RATIO, POC: 3.3
PROTHROMBIN TIME, POC: 0

## 2024-07-16 PROCEDURE — 85610 PROTHROMBIN TIME: CPT | Performed by: FAMILY MEDICINE

## 2024-07-17 ENCOUNTER — ANTI-COAG VISIT (OUTPATIENT)
Dept: FAMILY MEDICINE CLINIC | Age: 74
End: 2024-07-17

## 2024-07-18 ENCOUNTER — NURSE ONLY (OUTPATIENT)
Dept: FAMILY MEDICINE CLINIC | Age: 74
End: 2024-07-18
Payer: MEDICARE

## 2024-07-18 DIAGNOSIS — I48.20 CHRONIC ATRIAL FIBRILLATION (HCC): Primary | ICD-10-CM

## 2024-07-18 LAB — INTERNATIONAL NORMALIZATION RATIO, POC: 3.1

## 2024-07-18 PROCEDURE — 85610 PROTHROMBIN TIME: CPT | Performed by: FAMILY MEDICINE

## 2024-07-18 NOTE — PROGRESS NOTES
INR is slightly elevated. Hold Warfarin today and then resume normal home Warfarin regimen.  Recheck on Wednesday.

## 2024-07-24 ENCOUNTER — NURSE ONLY (OUTPATIENT)
Dept: FAMILY MEDICINE CLINIC | Age: 74
End: 2024-07-24
Payer: MEDICARE

## 2024-07-24 ENCOUNTER — ANTI-COAG VISIT (OUTPATIENT)
Dept: FAMILY MEDICINE CLINIC | Age: 74
End: 2024-07-24

## 2024-07-24 DIAGNOSIS — I48.20 CHRONIC ATRIAL FIBRILLATION (HCC): Primary | ICD-10-CM

## 2024-07-24 LAB — INTERNATIONAL NORMALIZATION RATIO, POC: 2.4

## 2024-07-24 PROCEDURE — 85610 PROTHROMBIN TIME: CPT | Performed by: FAMILY MEDICINE

## 2024-08-07 ENCOUNTER — NURSE ONLY (OUTPATIENT)
Dept: FAMILY MEDICINE CLINIC | Age: 74
End: 2024-08-07

## 2024-08-07 ENCOUNTER — TELEPHONE (OUTPATIENT)
Dept: FAMILY MEDICINE CLINIC | Age: 74
End: 2024-08-07

## 2024-08-07 DIAGNOSIS — I48.20 CHRONIC ATRIAL FIBRILLATION (HCC): Primary | ICD-10-CM

## 2024-08-07 LAB — INTERNATIONAL NORMALIZATION RATIO, POC: 2.4

## 2024-08-07 RX ORDER — NYSTATIN 100000 U/G
CREAM TOPICAL
Qty: 3 G | Refills: 2
Start: 2024-08-07 | End: 2024-08-08 | Stop reason: SDUPTHER

## 2024-08-07 NOTE — TELEPHONE ENCOUNTER
Pt called and requested a medication for a yeast infection. She states she has itching in vaginal area.   [facility] [place] [facility]

## 2024-08-08 ENCOUNTER — ANTI-COAG VISIT (OUTPATIENT)
Dept: FAMILY MEDICINE CLINIC | Age: 74
End: 2024-08-08

## 2024-08-08 RX ORDER — NYSTATIN 100000 U/G
CREAM TOPICAL
Qty: 3 G | Refills: 2 | Status: SHIPPED | OUTPATIENT
Start: 2024-08-08

## 2024-08-14 RX ORDER — OMEPRAZOLE 40 MG/1
40 CAPSULE, DELAYED RELEASE ORAL EVERY MORNING
Qty: 30 CAPSULE | Refills: 5 | Status: SHIPPED | OUTPATIENT
Start: 2024-08-14 | End: 2024-08-15 | Stop reason: SDUPTHER

## 2024-08-15 DIAGNOSIS — K21.9 GASTROESOPHAGEAL REFLUX DISEASE WITHOUT ESOPHAGITIS: Primary | ICD-10-CM

## 2024-08-15 RX ORDER — OMEPRAZOLE 40 MG/1
40 CAPSULE, DELAYED RELEASE ORAL EVERY MORNING
Qty: 30 CAPSULE | Refills: 5
Start: 2024-08-15

## 2024-08-15 RX ORDER — OMEPRAZOLE 40 MG/1
40 CAPSULE, DELAYED RELEASE ORAL EVERY MORNING
Qty: 30 CAPSULE | Refills: 5 | Status: SHIPPED | OUTPATIENT
Start: 2024-08-15 | End: 2024-08-15 | Stop reason: SDUPTHER

## 2024-08-15 NOTE — TELEPHONE ENCOUNTER
Omeprazole was not sent for some reason [facility] [place]? Rx and pharm pended   Septic shock

## 2024-08-26 ENCOUNTER — NURSE ONLY (OUTPATIENT)
Dept: FAMILY MEDICINE CLINIC | Age: 74
End: 2024-08-26
Payer: MEDICARE

## 2024-08-26 DIAGNOSIS — R39.9 UTI SYMPTOMS: Primary | ICD-10-CM

## 2024-08-26 LAB
BILIRUBIN, POC: ABNORMAL
BLOOD URINE, POC: ABNORMAL
CLARITY, POC: CLEAR
COLOR, POC: YELLOW
GLUCOSE URINE, POC: >=1000
KETONES, POC: ABNORMAL
LEUKOCYTE EST, POC: ABNORMAL
NITRITE, POC: ABNORMAL
PH, POC: 5
PROTEIN, POC: ABNORMAL
SPECIFIC GRAVITY, POC: 1.01
UROBILINOGEN, POC: 0.2

## 2024-08-26 PROCEDURE — 81002 URINALYSIS NONAUTO W/O SCOPE: CPT | Performed by: FAMILY MEDICINE

## 2024-08-28 ENCOUNTER — NURSE ONLY (OUTPATIENT)
Dept: FAMILY MEDICINE CLINIC | Age: 74
End: 2024-08-28

## 2024-08-28 DIAGNOSIS — I48.20 CHRONIC ATRIAL FIBRILLATION (HCC): Primary | ICD-10-CM

## 2024-08-28 LAB
BACTERIA UR CULT: NORMAL
INTERNATIONAL NORMALIZATION RATIO, POC: 3

## 2024-09-12 ENCOUNTER — ANTI-COAG VISIT (OUTPATIENT)
Dept: FAMILY MEDICINE CLINIC | Age: 74
End: 2024-09-12

## 2024-09-12 ENCOUNTER — LAB (OUTPATIENT)
Dept: FAMILY MEDICINE CLINIC | Age: 74
End: 2024-09-12
Payer: MEDICARE

## 2024-09-12 ENCOUNTER — OFFICE VISIT (OUTPATIENT)
Dept: FAMILY MEDICINE CLINIC | Age: 74
End: 2024-09-12
Payer: MEDICARE

## 2024-09-12 ENCOUNTER — TELEPHONE (OUTPATIENT)
Dept: FAMILY MEDICINE CLINIC | Age: 74
End: 2024-09-12

## 2024-09-12 VITALS
BODY MASS INDEX: 39.56 KG/M2 | WEIGHT: 215 LBS | OXYGEN SATURATION: 95 % | HEART RATE: 74 BPM | SYSTOLIC BLOOD PRESSURE: 124 MMHG | HEIGHT: 62 IN | DIASTOLIC BLOOD PRESSURE: 60 MMHG

## 2024-09-12 DIAGNOSIS — Z79.4 TYPE 2 DIABETES MELLITUS WITH STAGE 4 CHRONIC KIDNEY DISEASE, WITH LONG-TERM CURRENT USE OF INSULIN (HCC): ICD-10-CM

## 2024-09-12 DIAGNOSIS — Z79.01 LONG TERM CURRENT USE OF ANTICOAGULANT THERAPY: ICD-10-CM

## 2024-09-12 DIAGNOSIS — N18.4 TYPE 2 DIABETES MELLITUS WITH STAGE 4 CHRONIC KIDNEY DISEASE, WITH LONG-TERM CURRENT USE OF INSULIN (HCC): ICD-10-CM

## 2024-09-12 DIAGNOSIS — E11.22 TYPE 2 DIABETES MELLITUS WITH STAGE 4 CHRONIC KIDNEY DISEASE, WITH LONG-TERM CURRENT USE OF INSULIN (HCC): ICD-10-CM

## 2024-09-12 DIAGNOSIS — B37.31 VAGINAL YEAST INFECTION: Primary | ICD-10-CM

## 2024-09-12 DIAGNOSIS — I48.20 CHRONIC ATRIAL FIBRILLATION (HCC): Primary | ICD-10-CM

## 2024-09-12 LAB
INTERNATIONAL NORMALIZATION RATIO, POC: 2.8
PROTHROMBIN TIME, POC: 0

## 2024-09-12 PROCEDURE — G8417 CALC BMI ABV UP PARAM F/U: HCPCS | Performed by: NURSE PRACTITIONER

## 2024-09-12 PROCEDURE — 99214 OFFICE O/P EST MOD 30 MIN: CPT | Performed by: NURSE PRACTITIONER

## 2024-09-12 PROCEDURE — 3078F DIAST BP <80 MM HG: CPT | Performed by: NURSE PRACTITIONER

## 2024-09-12 PROCEDURE — 1123F ACP DISCUSS/DSCN MKR DOCD: CPT | Performed by: NURSE PRACTITIONER

## 2024-09-12 PROCEDURE — 3074F SYST BP LT 130 MM HG: CPT | Performed by: NURSE PRACTITIONER

## 2024-09-12 PROCEDURE — 2022F DILAT RTA XM EVC RTNOPTHY: CPT | Performed by: NURSE PRACTITIONER

## 2024-09-12 PROCEDURE — 3051F HG A1C>EQUAL 7.0%<8.0%: CPT | Performed by: NURSE PRACTITIONER

## 2024-09-12 PROCEDURE — 3017F COLORECTAL CA SCREEN DOC REV: CPT | Performed by: NURSE PRACTITIONER

## 2024-09-12 PROCEDURE — 1036F TOBACCO NON-USER: CPT | Performed by: NURSE PRACTITIONER

## 2024-09-12 PROCEDURE — G8427 DOCREV CUR MEDS BY ELIG CLIN: HCPCS | Performed by: NURSE PRACTITIONER

## 2024-09-12 PROCEDURE — 1090F PRES/ABSN URINE INCON ASSESS: CPT | Performed by: NURSE PRACTITIONER

## 2024-09-12 PROCEDURE — G8399 PT W/DXA RESULTS DOCUMENT: HCPCS | Performed by: NURSE PRACTITIONER

## 2024-09-12 PROCEDURE — 85610 PROTHROMBIN TIME: CPT | Performed by: FAMILY MEDICINE

## 2024-09-12 RX ORDER — FLUCONAZOLE 150 MG/1
150 TABLET ORAL ONCE
Qty: 1 TABLET | Refills: 0 | Status: SHIPPED | OUTPATIENT
Start: 2024-09-12 | End: 2024-09-12

## 2024-09-13 PROBLEM — E11.22 TYPE 2 DIABETES MELLITUS WITH CHRONIC KIDNEY DISEASE (HCC): Status: ACTIVE | Noted: 2024-09-13

## 2024-09-13 ASSESSMENT — ENCOUNTER SYMPTOMS: RESPIRATORY NEGATIVE: 1

## 2024-09-19 ENCOUNTER — NURSE ONLY (OUTPATIENT)
Dept: FAMILY MEDICINE CLINIC | Age: 74
End: 2024-09-19

## 2024-09-19 ENCOUNTER — ANTI-COAG VISIT (OUTPATIENT)
Dept: FAMILY MEDICINE CLINIC | Age: 74
End: 2024-09-19

## 2024-09-19 DIAGNOSIS — Z79.01 LONG TERM CURRENT USE OF ANTICOAGULANT THERAPY: Primary | ICD-10-CM

## 2024-09-19 LAB
INTERNATIONAL NORMALIZATION RATIO, POC: 3.3
PROTHROMBIN TIME, POC: 0

## 2024-09-26 ENCOUNTER — ANTI-COAG VISIT (OUTPATIENT)
Dept: FAMILY MEDICINE CLINIC | Age: 74
End: 2024-09-26

## 2024-09-26 ENCOUNTER — LAB (OUTPATIENT)
Dept: FAMILY MEDICINE CLINIC | Age: 74
End: 2024-09-26
Payer: MEDICARE

## 2024-09-26 DIAGNOSIS — Z79.01 LONG TERM CURRENT USE OF ANTICOAGULANT THERAPY: Primary | ICD-10-CM

## 2024-09-26 LAB
INTERNATIONAL NORMALIZATION RATIO, POC: 2.2
PROTHROMBIN TIME, POC: 0

## 2024-09-26 PROCEDURE — 85610 PROTHROMBIN TIME: CPT | Performed by: NURSE PRACTITIONER

## 2024-10-07 ENCOUNTER — OFFICE VISIT (OUTPATIENT)
Dept: FAMILY MEDICINE CLINIC | Age: 74
End: 2024-10-07
Payer: MEDICARE

## 2024-10-07 VITALS
DIASTOLIC BLOOD PRESSURE: 67 MMHG | BODY MASS INDEX: 39.32 KG/M2 | SYSTOLIC BLOOD PRESSURE: 104 MMHG | HEART RATE: 75 BPM | OXYGEN SATURATION: 97 % | WEIGHT: 215 LBS

## 2024-10-07 DIAGNOSIS — I48.20 CHRONIC ATRIAL FIBRILLATION (HCC): ICD-10-CM

## 2024-10-07 DIAGNOSIS — I87.2 CHRONIC VENOUS STASIS DERMATITIS OF LEFT LOWER EXTREMITY: ICD-10-CM

## 2024-10-07 DIAGNOSIS — I10 BENIGN ESSENTIAL HTN: ICD-10-CM

## 2024-10-07 DIAGNOSIS — E11.41 DIABETIC MONONEUROPATHY ASSOCIATED WITH TYPE 2 DIABETES MELLITUS (HCC): ICD-10-CM

## 2024-10-07 DIAGNOSIS — N18.4 TYPE 2 DIABETES MELLITUS WITH STAGE 4 CHRONIC KIDNEY DISEASE, WITH LONG-TERM CURRENT USE OF INSULIN (HCC): Primary | ICD-10-CM

## 2024-10-07 DIAGNOSIS — E11.22 TYPE 2 DIABETES MELLITUS WITH STAGE 4 CHRONIC KIDNEY DISEASE, WITH LONG-TERM CURRENT USE OF INSULIN (HCC): Primary | ICD-10-CM

## 2024-10-07 DIAGNOSIS — I50.42 CHRONIC COMBINED SYSTOLIC AND DIASTOLIC CONGESTIVE HEART FAILURE (HCC): ICD-10-CM

## 2024-10-07 DIAGNOSIS — E06.3 HYPOTHYROIDISM DUE TO HASHIMOTO'S THYROIDITIS: ICD-10-CM

## 2024-10-07 DIAGNOSIS — Z79.4 INSULIN-REQUIRING OR DEPENDENT TYPE II DIABETES MELLITUS (HCC): ICD-10-CM

## 2024-10-07 DIAGNOSIS — K21.9 GASTROESOPHAGEAL REFLUX DISEASE WITHOUT ESOPHAGITIS: ICD-10-CM

## 2024-10-07 DIAGNOSIS — Z79.01 LONG TERM CURRENT USE OF ANTICOAGULANT THERAPY: ICD-10-CM

## 2024-10-07 DIAGNOSIS — E11.9 INSULIN-REQUIRING OR DEPENDENT TYPE II DIABETES MELLITUS (HCC): ICD-10-CM

## 2024-10-07 DIAGNOSIS — Z79.4 TYPE 2 DIABETES MELLITUS WITH STAGE 4 CHRONIC KIDNEY DISEASE, WITH LONG-TERM CURRENT USE OF INSULIN (HCC): Primary | ICD-10-CM

## 2024-10-07 LAB
INTERNATIONAL NORMALIZATION RATIO, POC: 2.3
PROTHROMBIN TIME, POC: 0

## 2024-10-07 PROCEDURE — G8417 CALC BMI ABV UP PARAM F/U: HCPCS | Performed by: FAMILY MEDICINE

## 2024-10-07 PROCEDURE — 3051F HG A1C>EQUAL 7.0%<8.0%: CPT | Performed by: FAMILY MEDICINE

## 2024-10-07 PROCEDURE — G8399 PT W/DXA RESULTS DOCUMENT: HCPCS | Performed by: FAMILY MEDICINE

## 2024-10-07 PROCEDURE — 1036F TOBACCO NON-USER: CPT | Performed by: FAMILY MEDICINE

## 2024-10-07 PROCEDURE — G2211 COMPLEX E/M VISIT ADD ON: HCPCS | Performed by: FAMILY MEDICINE

## 2024-10-07 PROCEDURE — 3078F DIAST BP <80 MM HG: CPT | Performed by: FAMILY MEDICINE

## 2024-10-07 PROCEDURE — 1123F ACP DISCUSS/DSCN MKR DOCD: CPT | Performed by: FAMILY MEDICINE

## 2024-10-07 PROCEDURE — 1090F PRES/ABSN URINE INCON ASSESS: CPT | Performed by: FAMILY MEDICINE

## 2024-10-07 PROCEDURE — 99214 OFFICE O/P EST MOD 30 MIN: CPT | Performed by: FAMILY MEDICINE

## 2024-10-07 PROCEDURE — G8484 FLU IMMUNIZE NO ADMIN: HCPCS | Performed by: FAMILY MEDICINE

## 2024-10-07 PROCEDURE — 85610 PROTHROMBIN TIME: CPT | Performed by: FAMILY MEDICINE

## 2024-10-07 PROCEDURE — 3017F COLORECTAL CA SCREEN DOC REV: CPT | Performed by: FAMILY MEDICINE

## 2024-10-07 PROCEDURE — 3074F SYST BP LT 130 MM HG: CPT | Performed by: FAMILY MEDICINE

## 2024-10-07 PROCEDURE — G8427 DOCREV CUR MEDS BY ELIG CLIN: HCPCS | Performed by: FAMILY MEDICINE

## 2024-10-07 PROCEDURE — 2022F DILAT RTA XM EVC RTNOPTHY: CPT | Performed by: FAMILY MEDICINE

## 2024-10-07 NOTE — PATIENT INSTRUCTIONS
Not feeling your best?  Where to go for the right care at the right time.    Dear Coco Landeros   I wanted to provide you with some information that might help you seek care for your condition when your primary care provider or specialist is unavailable. If you have a need outside of normal business hours, you should first contact your primary care office or specialist caring for your condition. They may have on-call providers that could assist with your care. During office hours, you may request a virtual or same day appointment.   But what if your primary care provider is not in the office that day and you can't wait until the  next day for care? In that situation, your next option is to visit an urgent care facility.          [facility] now has urgent care sites open to support our community.   [facility] is a great alternative when you need immediate medical care that is not a serious threat to your health or your doctor's office is closed or unable to get you in for an appointment. The urgent care centers offer fast access to [facility] doctors for minor illnesses and injuries for patients of all ages. There are other medical services available including lab testing, X-rays, EKGs, and IV fluids.  Locations are open daily from 8 a.m. - 8 p.m.     [facility]  [address], [place], [place] [address]  [phone]    [address], [place], [place] [address]  [phone]    Local Urgent Care     [facility] 8:30 am - 7:70 pm   [address] [place] [address]  [phone]    [facility]     8 am - 8 pm   [address] [place] [address]  [phone]    [facility] 7 am - 7:30 pm  [address] [place] [address]  [phone]     [facility] [place] 7 am - 7:30 pm  [address] [place] [address]  [phone]    Reid

## 2024-10-07 NOTE — PROGRESS NOTES
Called patient and informed her of coumadin dosing and need to repeat INR on 11/4/24  
unless specifically listed above.  All updates of past medical history, past surgical history, social history, or family history were reviewed personally by me during the office visit.  All problems listed in the assessment are stable unless noted otherwise.  Medication profile reviewed personally by me during the visit.  Medication side effects and possible impairments from medications were discussed as applicable.    Unless stated otherwise, we will continue current medications as listed in the medication review report contained in the patient's medical record.    This document was prepared by a combination of typing and transcription through a voice recognition software.                Scribe attestation: Frida PICKARD RN, am scribing for and in the presence of [place] REED MD. Electronically signed by Frida Gonzalez RN on 10/7/2024 at 9:42 AM      Provider attestation:     NEERAJ, Dr. ELVIN Reed, personally performed the services described in this documentation, as scribed by the above signed scribe in my presence, and it is both accurate and complete. I agree with the ROS and Past Histories independently gathered by the clinical support staff and the remaining scribed note accurately describes my personal service to the patient.      10/7/2024    10:17 AM

## 2024-10-08 LAB
EST. AVERAGE GLUCOSE BLD GHB EST-MCNC: 165.7 MG/DL
HBA1C MFR BLD: 7.4 %

## 2024-10-25 RX ORDER — SEMAGLUTIDE 0.68 MG/ML
INJECTION, SOLUTION SUBCUTANEOUS
Qty: 3 ML | Refills: 3 | Status: SHIPPED | OUTPATIENT
Start: 2024-10-25

## 2024-10-25 NOTE — TELEPHONE ENCOUNTER
Refill Request     CONFIRM preferrred pharmacy with the patient.    If Mail Order Rx - Pend for 90 day refill.      Last Seen: Last Seen Department: 10/7/2024  Last Seen by PCP: 10/7/2024    Last Written: 9/27/2024    If no future appointment scheduled, route STAFF MESSAGE with patient name to the  Pool for scheduling.      Next Appointment:   Future Appointments   Date Time Provider Department Center   2/10/2025  9:30 AM [facility]       Message sent to  to schedule appt with patient?  NO      Requested Prescriptions     Pending Prescriptions Disp Refills    OZEMPIC, 0.25 OR 0.5 MG/DOSE, 2 MG/3ML SOPN [Pharmacy Med Name: OZEMPIC 0.25-0.5 MG/DOSE PEN]  3     Sig: INJECT 0.5 MG INTO THE SKIN ONE TIME PER WEEK

## 2024-11-06 ENCOUNTER — LAB (OUTPATIENT)
Dept: FAMILY MEDICINE CLINIC | Age: 74
End: 2024-11-06
Payer: MEDICARE

## 2024-11-06 DIAGNOSIS — Z79.01 LONG TERM CURRENT USE OF ANTICOAGULANT THERAPY: ICD-10-CM

## 2024-11-06 DIAGNOSIS — Z23 NEEDS FLU SHOT: Primary | ICD-10-CM

## 2024-11-06 LAB
INTERNATIONAL NORMALIZATION RATIO, POC: 3
PROTHROMBIN TIME, POC: 0

## 2024-11-06 PROCEDURE — 90653 IIV ADJUVANT VACCINE IM: CPT | Performed by: FAMILY MEDICINE

## 2024-11-06 PROCEDURE — 85610 PROTHROMBIN TIME: CPT | Performed by: FAMILY MEDICINE

## 2024-11-06 PROCEDURE — G0008 ADMIN INFLUENZA VIRUS VAC: HCPCS | Performed by: FAMILY MEDICINE

## 2024-11-06 NOTE — PROGRESS NOTES
Patient called and informed of coumadin dosing and need for repeat INR on Friday 11/8/24, apt scheduled

## 2024-11-08 ENCOUNTER — NURSE ONLY (OUTPATIENT)
Dept: FAMILY MEDICINE CLINIC | Age: 74
End: 2024-11-08

## 2024-11-08 DIAGNOSIS — I82.90 DEEP VEIN THROMBOSIS (DVT) OF NON-EXTREMITY VEIN, UNSPECIFIED CHRONICITY: Primary | ICD-10-CM

## 2024-11-08 LAB — INTERNATIONAL NORMALIZATION RATIO, POC: 2.7

## 2024-11-11 RX ORDER — ATORVASTATIN CALCIUM 40 MG/1
40 TABLET, FILM COATED ORAL DAILY
Qty: 90 TABLET | Refills: 2 | Status: SHIPPED | OUTPATIENT
Start: 2024-11-11

## 2024-11-12 ENCOUNTER — NURSE ONLY (OUTPATIENT)
Dept: FAMILY MEDICINE CLINIC | Age: 74
End: 2024-11-12
Payer: MEDICARE

## 2024-11-12 DIAGNOSIS — Z79.01 LONG TERM CURRENT USE OF ANTICOAGULANT THERAPY: Primary | ICD-10-CM

## 2024-11-12 LAB
INTERNATIONAL NORMALIZATION RATIO, POC: 3.3
PROTHROMBIN TIME, POC: 0

## 2024-11-12 PROCEDURE — 85610 PROTHROMBIN TIME: CPT | Performed by: FAMILY MEDICINE

## 2024-11-12 NOTE — PROGRESS NOTES
Unable to reach pt, left detailed voicemail informing of her new coumadin dosing based on INR  tracker and informed of need for repeat INR on 11/20/24

## 2024-11-14 ENCOUNTER — OFFICE VISIT (OUTPATIENT)
Dept: FAMILY MEDICINE CLINIC | Age: 74
End: 2024-11-14
Payer: MEDICARE

## 2024-11-14 VITALS
SYSTOLIC BLOOD PRESSURE: 144 MMHG | HEART RATE: 73 BPM | DIASTOLIC BLOOD PRESSURE: 79 MMHG | HEIGHT: 62 IN | BODY MASS INDEX: 40.3 KG/M2 | WEIGHT: 219 LBS | OXYGEN SATURATION: 96 %

## 2024-11-14 DIAGNOSIS — L03.012 CELLULITIS OF FINGER OF LEFT HAND: Primary | ICD-10-CM

## 2024-11-14 DIAGNOSIS — I48.20 CHRONIC ATRIAL FIBRILLATION (HCC): ICD-10-CM

## 2024-11-14 LAB
INTERNATIONAL NORMALIZATION RATIO, POC: 2
PROTHROMBIN TIME, POC: 0

## 2024-11-14 PROCEDURE — G8417 CALC BMI ABV UP PARAM F/U: HCPCS | Performed by: NURSE PRACTITIONER

## 2024-11-14 PROCEDURE — G8399 PT W/DXA RESULTS DOCUMENT: HCPCS | Performed by: NURSE PRACTITIONER

## 2024-11-14 PROCEDURE — 3078F DIAST BP <80 MM HG: CPT | Performed by: NURSE PRACTITIONER

## 2024-11-14 PROCEDURE — 85610 PROTHROMBIN TIME: CPT | Performed by: NURSE PRACTITIONER

## 2024-11-14 PROCEDURE — G8428 CUR MEDS NOT DOCUMENT: HCPCS | Performed by: NURSE PRACTITIONER

## 2024-11-14 PROCEDURE — 3077F SYST BP >= 140 MM HG: CPT | Performed by: NURSE PRACTITIONER

## 2024-11-14 PROCEDURE — 1090F PRES/ABSN URINE INCON ASSESS: CPT | Performed by: NURSE PRACTITIONER

## 2024-11-14 PROCEDURE — 3017F COLORECTAL CA SCREEN DOC REV: CPT | Performed by: NURSE PRACTITIONER

## 2024-11-14 PROCEDURE — 99213 OFFICE O/P EST LOW 20 MIN: CPT | Performed by: NURSE PRACTITIONER

## 2024-11-14 PROCEDURE — 1036F TOBACCO NON-USER: CPT | Performed by: NURSE PRACTITIONER

## 2024-11-14 PROCEDURE — G8482 FLU IMMUNIZE ORDER/ADMIN: HCPCS | Performed by: NURSE PRACTITIONER

## 2024-11-14 PROCEDURE — 1123F ACP DISCUSS/DSCN MKR DOCD: CPT | Performed by: NURSE PRACTITIONER

## 2024-11-14 RX ORDER — CEPHALEXIN 500 MG/1
500 CAPSULE ORAL 3 TIMES DAILY
Qty: 21 CAPSULE | Refills: 0 | Status: SHIPPED | OUTPATIENT
Start: 2024-11-14 | End: 2024-11-21

## 2024-11-14 ASSESSMENT — ENCOUNTER SYMPTOMS
RESPIRATORY NEGATIVE: 1
COLOR CHANGE: 0

## 2024-11-14 NOTE — PATIENT INSTRUCTIONS
Not feeling your best?  Where to go for the right care at the right time.    Dear Coco Landeros   I wanted to provide you with some information that might help you seek care for your condition when your primary care provider or specialist is unavailable. If you have a need outside of normal business hours, you should first contact your primary care office or specialist caring for your condition. They may have on-call providers that could assist with your care. During office hours, you may request a virtual or same day appointment.   But what if your primary care provider is not in the office that day and you can't wait until the  next day for care? In that situation, your next option is to visit an urgent care facility.          [facility] now has urgent care sites open to support our community.   [facility] is a great alternative when you need immediate medical care that is not a serious threat to your health or your doctor's office is closed or unable to get you in for an appointment. The urgent care centers offer fast access to [facility] doctors for minor illnesses and injuries for patients of all ages. There are other medical services available including lab testing, X-rays, EKGs, and IV fluids.  Locations are open daily from 8 a.m. - 8 p.m.     [facility]  [address], [place], [place] [address]  [phone]    [address], [place], [place] [address]  [phone]    Local Urgent Care     [facility] 8:30 am - 7:70 pm   [address] [place] [address]  [phone]    [facility]     8 am - 8 pm   [address] [place] [address]  [phone]    [facility] 7 am - 7:30 pm  [address] [place] [address]  [phone]     [facility] [place] 7 am - 7:30 pm  [address] [place] [address]  [phone]    Reid

## 2024-11-14 NOTE — PROGRESS NOTES
INR is WNL, but with her being on antibiotic, recommend INR on Monday.  No changes to Warfarin at this time.   
Left message for patient to call office  
Pt was seen today  
infection including streaking up her arm, fever, fatigue and malaise.  Discussed any significantly worsening symptoms she is to go to the ER.    Diagnoses and all orders for this visit:    Cellulitis of finger of left hand  -     XR HAND LEFT (MIN 3 VIEWS); Future  -     cephALEXin (KEFLEX) 500 MG capsule; Take 1 capsule by mouth 3 times daily for 7 days    Chronic atrial fibrillation (HCC)  -     POCT INR      Prior to Visit Medications    Medication Sig Taking? Authorizing Provider   cephALEXin (KEFLEX) 500 MG capsule Take 1 capsule by mouth 3 times daily for 7 days Yes Roma Salinas, APRN - CNP   atorvastatin (LIPITOR) 40 MG tablet TAKE 1 TABLET BY MOUTH EVERY DAY  Dominik Reed MD   Semaglutide,0.25 or 0.5MG/DOS, (OZEMPIC, 0.25 OR 0.5 MG/DOSE,) 2 MG/3ML SOPN INJECT 0.5 MG INTO THE SKIN ONE TIME PER WEEK  Dominik Reed MD   omeprazole (PRILOSEC) 40 MG delayed release capsule Take 1 capsule by mouth every morning Take 1 every morning  Dominik Reed MD   nystatin (MYCOSTATIN) 919529 UNIT/GM cream Apply topically 2 times daily for 10 days  Dominik Reed MD   levothyroxine (SYNTHROID) 100 MCG tablet TAKE 1 TABLET BY MOUTH EVERY DAY  Dominik Reed MD   warfarin (COUMADIN) 5 MG tablet TAKE 1 TABLET BY MOUTH ONCE DAILY EXCEPT 10MG ON MONDAY  Dominik Reed MD   insulin regular (NOVOLIN R) 100 UNIT/ML injection USE AS DIRECTED PER SLIDING SCALE UP TO 26 UNITS DAILY  Dominik Reed MD   Insulin Syringe-Needle U-100 (BD VEO INSULIN SYRINGE U/F) 31G X 15/64\" 0.3 ML MISC Inject with insulin as prescribed  Dominik Reed MD   enoxaparin (LOVENOX) 150 MG/ML SOSY injection Inject 1 mL into the skin daily  Patient taking differently: Inject 1.5 mg/kg into the skin daily Last dose 5/27/24 for 5/31/24 surgery for MD.  Dominik Reed MD   pentoxifylline (TRENTAL) 400 MG extended release tablet Take 1 tablet by

## 2024-11-15 DIAGNOSIS — I10 BENIGN ESSENTIAL HTN: ICD-10-CM

## 2024-11-15 RX ORDER — PENTOXIFYLLINE 400 MG/1
400 TABLET, EXTENDED RELEASE ORAL DAILY
Qty: 90 TABLET | Refills: 0 | Status: SHIPPED | OUTPATIENT
Start: 2024-11-15

## 2024-11-15 RX ORDER — DILTIAZEM HYDROCHLORIDE 240 MG/1
CAPSULE, COATED, EXTENDED RELEASE ORAL DAILY
Qty: 90 CAPSULE | Refills: 0 | Status: SHIPPED | OUTPATIENT
Start: 2024-11-15

## 2024-11-20 ENCOUNTER — NURSE ONLY (OUTPATIENT)
Dept: FAMILY MEDICINE CLINIC | Age: 74
End: 2024-11-20

## 2024-11-20 DIAGNOSIS — I82.5Y9 CHRONIC DEEP VEIN THROMBOSIS (DVT) OF PROXIMAL VEIN OF LOWER EXTREMITY, UNSPECIFIED LATERALITY (HCC): Primary | ICD-10-CM

## 2024-11-20 LAB — INTERNATIONAL NORMALIZATION RATIO, POC: 2.4

## 2024-11-27 ENCOUNTER — OFFICE VISIT (OUTPATIENT)
Dept: FAMILY MEDICINE CLINIC | Age: 74
End: 2024-11-27
Payer: MEDICARE

## 2024-11-27 VITALS
WEIGHT: 218 LBS | DIASTOLIC BLOOD PRESSURE: 57 MMHG | RESPIRATION RATE: 16 BRPM | OXYGEN SATURATION: 99 % | HEART RATE: 78 BPM | SYSTOLIC BLOOD PRESSURE: 100 MMHG | BODY MASS INDEX: 39.87 KG/M2

## 2024-11-27 DIAGNOSIS — L02.512 ABSCESS OF LEFT THUMB: Primary | ICD-10-CM

## 2024-11-27 DIAGNOSIS — I48.20 CHRONIC ATRIAL FIBRILLATION (HCC): ICD-10-CM

## 2024-11-27 PROCEDURE — 1090F PRES/ABSN URINE INCON ASSESS: CPT

## 2024-11-27 PROCEDURE — G8427 DOCREV CUR MEDS BY ELIG CLIN: HCPCS

## 2024-11-27 PROCEDURE — G8399 PT W/DXA RESULTS DOCUMENT: HCPCS

## 2024-11-27 PROCEDURE — 99214 OFFICE O/P EST MOD 30 MIN: CPT

## 2024-11-27 PROCEDURE — 3078F DIAST BP <80 MM HG: CPT

## 2024-11-27 PROCEDURE — 1123F ACP DISCUSS/DSCN MKR DOCD: CPT

## 2024-11-27 PROCEDURE — G8482 FLU IMMUNIZE ORDER/ADMIN: HCPCS

## 2024-11-27 PROCEDURE — 1036F TOBACCO NON-USER: CPT

## 2024-11-27 PROCEDURE — 3074F SYST BP LT 130 MM HG: CPT

## 2024-11-27 PROCEDURE — G8417 CALC BMI ABV UP PARAM F/U: HCPCS

## 2024-11-27 PROCEDURE — 3017F COLORECTAL CA SCREEN DOC REV: CPT

## 2024-11-27 PROCEDURE — 1159F MED LIST DOCD IN RCRD: CPT

## 2024-11-27 RX ORDER — DOXYCYCLINE HYCLATE 100 MG
100 TABLET ORAL 2 TIMES DAILY
Qty: 14 TABLET | Refills: 0 | Status: SHIPPED | OUTPATIENT
Start: 2024-11-27 | End: 2024-11-29 | Stop reason: ALTCHOICE

## 2024-11-27 NOTE — PATIENT INSTRUCTIONS
Your left thumb abscess was drained in office today.  We have obtained a wound culture, which will help guide us in terms of antibiotic treatment.  Please leave this exposed when you can and avoid submerging your finger in water.      New antibiotics have been sent to your pharmacy called doxycycline for now.  Please take this every 12 hours for 7 days.    Symptoms to monitor include worsening redness, pain or swelling, fevers or chills.  With the upcoming Thanksgiving weekend, if any of these develop, I would advise that you go to urgent care or the emergency department.    Please keep your INR appointment to recheck your level.

## 2024-11-27 NOTE — PROGRESS NOTES
aspect of her left thumb abscess.  No complications or bleeding. Patient tolerated procedure well.  She was provided with home care instructions, in addition to monitoring for signs and symptoms of worsening infection.        ASSESSMENT/ PLAN  Assessment & Plan  Abscess of left thumb   Acute condition, new, no improvement following initial antibiotic treatment with Keflex.  Notable abscess to the distal aspect of her left thumb.  Patient agreed to have this drained in office.  Patient tolerated the procedure well, with notable drainage that was white.  This drainage was cultured to help with antibiotic management however in the meantime we will start patient on doxycycline 100 mg twice daily for 7 days.  Discussed that if she develops any worsening symptoms, to go to the emergency department.  Advised her to keep her INR appointment for early next week.    Orders:    Culture, Wound    doxycycline hyclate (VIBRA-TABS) 100 MG tablet; Take 1 tablet by mouth 2 times daily for 7 days    Chronic atrial fibrillation (HCC)    No bleeding noted during procedure. Advised keeping INR recheck early next week as antibiotics can affect her INR.              Return if symptoms worsen or fail to improve.    Anne Marie Badillo MD    This dictation was generated by voice recognition computer software.  Although all attempts are made to edit the dictation for accuracy, there may be errors in the transcription that are not intended.

## 2024-11-28 NOTE — ASSESSMENT & PLAN NOTE
No bleeding noted during procedure. Advised keeping INR recheck early next week as antibiotics can affect her INR.

## 2024-11-29 DIAGNOSIS — L02.512 ABSCESS OF LEFT THUMB: Primary | ICD-10-CM

## 2024-11-29 RX ORDER — ONDANSETRON 4 MG/1
4 TABLET, ORALLY DISINTEGRATING ORAL 3 TIMES DAILY PRN
Qty: 21 TABLET | Refills: 0 | Status: SHIPPED | OUTPATIENT
Start: 2024-11-29

## 2024-11-29 NOTE — PROGRESS NOTES
Patient's daughter called on-call provider stating her mother woke up with nausea, vomiting, and diarrhea since starting on Doxycycline. She admits to her mother's thumb not having any red streaking going up her arm.  Her mother does not have any fever.  Her mother feels like her  thumb feels better.  Discussed stopping Doxycyline with preliminary culture showing no growth of bacteria.  Start on Augmentin.  Start daily probiotic or zero sugar/no-sugar added yogurt.  Eat with antibiotic.  Zofran ODT sent to pharmacy.  Discussed signs and symptoms of worsening infection including red streaking, fever, foul smell from wound. Any worsening symptoms go to the ER.  Call provider back if no improvement in 12 hours with switching antibiotic and starting Zofran.

## 2024-11-30 LAB
BACTERIA SPEC AEROBE CULT: NORMAL
GRAM STN SPEC: NORMAL

## 2024-12-03 ENCOUNTER — OFFICE VISIT (OUTPATIENT)
Dept: FAMILY MEDICINE CLINIC | Age: 74
End: 2024-12-03
Payer: MEDICARE

## 2024-12-03 VITALS
HEIGHT: 62 IN | BODY MASS INDEX: 40.12 KG/M2 | HEART RATE: 55 BPM | OXYGEN SATURATION: 98 % | DIASTOLIC BLOOD PRESSURE: 60 MMHG | WEIGHT: 218 LBS | SYSTOLIC BLOOD PRESSURE: 110 MMHG

## 2024-12-03 DIAGNOSIS — L03.012 CELLULITIS OF FINGER OF LEFT HAND: Primary | ICD-10-CM

## 2024-12-03 DIAGNOSIS — I48.20 CHRONIC ATRIAL FIBRILLATION (HCC): ICD-10-CM

## 2024-12-03 LAB
INTERNATIONAL NORMALIZATION RATIO, POC: 2.8
PROTHROMBIN TIME, POC: 0

## 2024-12-03 PROCEDURE — 3078F DIAST BP <80 MM HG: CPT | Performed by: NURSE PRACTITIONER

## 2024-12-03 PROCEDURE — G8417 CALC BMI ABV UP PARAM F/U: HCPCS | Performed by: NURSE PRACTITIONER

## 2024-12-03 PROCEDURE — G8482 FLU IMMUNIZE ORDER/ADMIN: HCPCS | Performed by: NURSE PRACTITIONER

## 2024-12-03 PROCEDURE — 3074F SYST BP LT 130 MM HG: CPT | Performed by: NURSE PRACTITIONER

## 2024-12-03 PROCEDURE — 99213 OFFICE O/P EST LOW 20 MIN: CPT | Performed by: NURSE PRACTITIONER

## 2024-12-03 PROCEDURE — 1036F TOBACCO NON-USER: CPT | Performed by: NURSE PRACTITIONER

## 2024-12-03 PROCEDURE — G8399 PT W/DXA RESULTS DOCUMENT: HCPCS | Performed by: NURSE PRACTITIONER

## 2024-12-03 PROCEDURE — 3017F COLORECTAL CA SCREEN DOC REV: CPT | Performed by: NURSE PRACTITIONER

## 2024-12-03 PROCEDURE — 1159F MED LIST DOCD IN RCRD: CPT | Performed by: NURSE PRACTITIONER

## 2024-12-03 PROCEDURE — G8427 DOCREV CUR MEDS BY ELIG CLIN: HCPCS | Performed by: NURSE PRACTITIONER

## 2024-12-03 PROCEDURE — 1090F PRES/ABSN URINE INCON ASSESS: CPT | Performed by: NURSE PRACTITIONER

## 2024-12-03 PROCEDURE — 85610 PROTHROMBIN TIME: CPT | Performed by: NURSE PRACTITIONER

## 2024-12-03 PROCEDURE — 1123F ACP DISCUSS/DSCN MKR DOCD: CPT | Performed by: NURSE PRACTITIONER

## 2024-12-03 ASSESSMENT — ENCOUNTER SYMPTOMS
RESPIRATORY NEGATIVE: 1
COLOR CHANGE: 0

## 2024-12-03 NOTE — PATIENT INSTRUCTIONS
- 7:30 pm  [address], [place] [address]  [phone]     [facility] 7 am - 7:30 pm  [address] [facility] [place] [address]  [phone]    [facility] 7 am - 7:30 pm  [address] [address]  [phone]    [facility] 8 am - 8 pm  [address] [facility]  [address]  [phone]

## 2024-12-03 NOTE — PROGRESS NOTES
[facility]  [facility]  [address] [place] [address]  Dept: [phone]  Dept Fax: [phone]  Loc: [phone]    Coco Landeros is a 74 y.o. female who presents today for her medical conditions/complaints as noted below.  Coco Landeros is c/o of Other (Pt is here for follow up on left thumb wound. )        HPI:     Chief Complaint   Patient presents with    Other     Pt is here for follow up on left thumb wound.        HPI    Coco presents to the office today to follow up on her left hand thumb cellulitis.  She was originally given Keflex on 11/14/2024 and she followed up on 11/27/2024 as she was not getting any better.  She was given Doxycycline, but called as it was giving her nausea and vomiting.  She was switched to Augmentin and states she is doing much better.  She admits that her left thumb at her visit on 11/27/2024 was incised and drained.  She states she developed a blood blister after having the incision and drainage.  She states she continue with warm compresses and was getting purulent drainage from her left thumb for several days.  She states now she is not having any more purulent drainage in her left thumb is no longer tender.  She has not noticed a blood blister any longer.  She still performing the warm compresses.  She is not having any fever.  She is not having any streaking going up her arm.    Past Medical History:   Diagnosis Date    Arthritis     Atrial fibrillation (HCC)     Cancer (HCC)     lymphoma    CKD (chronic kidney disease)     Diabetes mellitus (HCC)     DVT (deep venous thrombosis) (HCC)     Full dentures     Gout 12/02/2019    Hx of blood clots     Hyperlipidemia     Hypertension     Hypotension     Wears glasses       Past Surgical History:   Procedure Laterality Date    COLONOSCOPY      COLONOSCOPY  05/23/2018    colon polyps,diverticulosis    COLONOSCOPY N/A 01/04/2022    COLONOSCOPY POLYPECTOMY SNARE/COLD BIOPSY performed by

## 2024-12-10 ENCOUNTER — NURSE ONLY (OUTPATIENT)
Dept: FAMILY MEDICINE CLINIC | Age: 74
End: 2024-12-10

## 2024-12-10 DIAGNOSIS — I48.20 CHRONIC ATRIAL FIBRILLATION (HCC): Primary | ICD-10-CM

## 2024-12-10 LAB — INTERNATIONAL NORMALIZATION RATIO, POC: 2.8

## 2024-12-19 RX ORDER — LOSARTAN POTASSIUM 25 MG/1
25 TABLET ORAL DAILY
Qty: 90 TABLET | Refills: 0 | Status: SHIPPED | OUTPATIENT
Start: 2024-12-19

## 2024-12-19 NOTE — TELEPHONE ENCOUNTER
Refill Request     CONFIRM preferrred pharmacy with the patient.    If Mail Order Rx - Pend for 90 day refill.      Last Seen: Last Seen Department: 12/3/2024  Last Seen by PCP: 10/7/2024    Last Written: 9/18/2024    If no future appointment scheduled, route STAFF MESSAGE with patient name to the  Pool for scheduling.      Next Appointment:   Future Appointments   Date Time Provider Department Center   12/23/2024  9:20 AM SCHEDULE, [facility] PERRY THOMAS [facility] DEP   2/10/2025  9:30 AM Anne Marie Badillo MD [facility] DEP       Message sent to  to schedule appt with patient?  NO      Requested Prescriptions     Pending Prescriptions Disp Refills    losartan (COZAAR) 25 MG tablet [Pharmacy Med Name: LOSARTAN POTASSIUM 25 MG TAB] 90 tablet 2     Sig: TAKE 1 TABLET BY MOUTH EVERY DAY

## 2024-12-23 ENCOUNTER — TELEPHONE (OUTPATIENT)
Dept: FAMILY MEDICINE CLINIC | Age: 74
End: 2024-12-23

## 2024-12-23 NOTE — TELEPHONE ENCOUNTER
Patient came in stating Dr. Huang, gynecologist oncologist, started her on Megestrol 40 mg 2 tablets twice daily about a week ago. States since then she has noticed her BS staying between 250 and 350. Fasting its also been over 200. Prior to starting this medication her BS was around 100. She was also taken off of her Jardiance a few months back due to it causing yeast infections. However, she was never given anything to replace it. She is currently taking Levemir 25 units nightly, Ozempic 5mg weekly, and Novolin 12 units before each meal. What do you recommend she do?

## 2025-01-03 ENCOUNTER — TELEPHONE (OUTPATIENT)
Dept: FAMILY MEDICINE CLINIC | Age: 75
End: 2025-01-03

## 2025-01-04 ENCOUNTER — APPOINTMENT (OUTPATIENT)
Dept: GENERAL RADIOLOGY | Age: 75
DRG: 866 | End: 2025-01-04
Payer: MEDICARE

## 2025-01-04 ENCOUNTER — HOSPITAL ENCOUNTER (INPATIENT)
Age: 75
LOS: 4 days | Discharge: HOME | DRG: 866 | End: 2025-01-08
Attending: STUDENT IN AN ORGANIZED HEALTH CARE EDUCATION/TRAINING PROGRAM | Admitting: INTERNAL MEDICINE
Payer: MEDICARE

## 2025-01-04 DIAGNOSIS — G47.39 SLEEP APNEA-LIKE BEHAVIOR: ICD-10-CM

## 2025-01-04 DIAGNOSIS — B33.8 RESPIRATORY SYNCYTIAL VIRUS (RSV): ICD-10-CM

## 2025-01-04 DIAGNOSIS — I87.2 VENOUS INSUFFICIENCY: ICD-10-CM

## 2025-01-04 DIAGNOSIS — J44.9 CHRONIC OBSTRUCTIVE PULMONARY DISEASE, UNSPECIFIED COPD TYPE (HCC): ICD-10-CM

## 2025-01-04 DIAGNOSIS — I48.91 ATRIAL FIBRILLATION WITH RVR (HCC): Primary | ICD-10-CM

## 2025-01-04 DIAGNOSIS — J40 TRACHEOBRONCHITIS: ICD-10-CM

## 2025-01-04 DIAGNOSIS — J18.9 PNEUMONIA DUE TO INFECTIOUS ORGANISM, UNSPECIFIED LATERALITY, UNSPECIFIED PART OF LUNG: ICD-10-CM

## 2025-01-04 LAB
ALBUMIN SERPL-MCNC: 4 G/DL (ref 3.4–5)
ALBUMIN/GLOB SERPL: 1.2 {RATIO} (ref 1.1–2.2)
ALP SERPL-CCNC: 121 U/L (ref 40–129)
ALT SERPL-CCNC: 21 U/L (ref 10–40)
ANION GAP SERPL CALCULATED.3IONS-SCNC: 13 MMOL/L (ref 3–16)
AST SERPL-CCNC: 22 U/L (ref 15–37)
BASE EXCESS BLDV CALC-SCNC: -1.8 MMOL/L (ref -3–3)
BASOPHILS # BLD: 0.1 K/UL (ref 0–0.2)
BASOPHILS NFR BLD: 0.6 %
BILIRUB SERPL-MCNC: 0.5 MG/DL (ref 0–1)
BILIRUB UR QL STRIP.AUTO: NEGATIVE
BUN SERPL-MCNC: 20 MG/DL (ref 7–20)
CALCIUM SERPL-MCNC: 9.1 MG/DL (ref 8.3–10.6)
CHLORIDE SERPL-SCNC: 95 MMOL/L (ref 99–110)
CLARITY UR: CLEAR
CO2 BLDV-SCNC: 25 MMOL/L
CO2 SERPL-SCNC: 24 MMOL/L (ref 21–32)
COHGB MFR BLDV: 1.1 % (ref 0–1.5)
COLOR UR: YELLOW
CREAT SERPL-MCNC: 1.3 MG/DL (ref 0.6–1.2)
DEPRECATED RDW RBC AUTO: 16.4 % (ref 12.4–15.4)
EKG DIAGNOSIS: NORMAL
EKG Q-T INTERVAL: 282 MS
EKG QRS DURATION: 76 MS
EKG QTC CALCULATION (BAZETT): 395 MS
EKG R AXIS: 74 DEGREES
EKG T AXIS: 15 DEGREES
EKG VENTRICULAR RATE: 118 BPM
EOSINOPHIL # BLD: 0.1 K/UL (ref 0–0.6)
EOSINOPHIL NFR BLD: 0.5 %
EPI CELLS #/AREA URNS HPF: ABNORMAL /HPF (ref 0–5)
FLUAV RNA RESP QL NAA+PROBE: NOT DETECTED
FLUBV RNA RESP QL NAA+PROBE: NOT DETECTED
GFR SERPLBLD CREATININE-BSD FMLA CKD-EPI: 43 ML/MIN/{1.73_M2}
GLUCOSE BLD-MCNC: 390 MG/DL (ref 70–99)
GLUCOSE SERPL-MCNC: 237 MG/DL (ref 70–99)
GLUCOSE UR STRIP.AUTO-MCNC: >=1000 MG/DL
HCO3 BLDV-SCNC: 23.9 MMOL/L (ref 23–29)
HCT VFR BLD AUTO: 49.2 % (ref 36–48)
HGB BLD-MCNC: 16.4 G/DL (ref 12–16)
HGB UR QL STRIP.AUTO: ABNORMAL
HYALINE CASTS #/AREA URNS LPF: ABNORMAL /LPF (ref 0–2)
INR PPP: 2.04 (ref 0.85–1.15)
KETONES UR STRIP.AUTO-MCNC: NEGATIVE MG/DL
LACTATE BLDV-SCNC: 2.1 MMOL/L (ref 0.4–1.9)
LACTATE BLDV-SCNC: 2.3 MMOL/L (ref 0.4–1.9)
LACTATE BLDV-SCNC: 2.4 MMOL/L (ref 0.4–2)
LEUKOCYTE ESTERASE UR QL STRIP.AUTO: NEGATIVE
LYMPHOCYTES # BLD: 2 K/UL (ref 1–5.1)
LYMPHOCYTES NFR BLD: 15.8 %
MAGNESIUM SERPL-MCNC: 1.48 MG/DL (ref 1.8–2.4)
MCH RBC QN AUTO: 33.4 PG (ref 26–34)
MCHC RBC AUTO-ENTMCNC: 33.3 G/DL (ref 31–36)
MCV RBC AUTO: 100.6 FL (ref 80–100)
METHGB MFR BLDV: 0.3 %
MONOCYTES # BLD: 1.2 K/UL (ref 0–1.3)
MONOCYTES NFR BLD: 9.5 %
MUCOUS THREADS #/AREA URNS LPF: ABNORMAL /LPF
NEUTROPHILS # BLD: 9.4 K/UL (ref 1.7–7.7)
NEUTROPHILS NFR BLD: 73.6 %
NITRITE UR QL STRIP.AUTO: NEGATIVE
NT-PROBNP SERPL-MCNC: 1353 PG/ML (ref 0–449)
O2 CT VFR BLDV CALC: 12 VOL %
O2 THERAPY: NORMAL
PCO2 BLDV: 43.7 MMHG (ref 40–50)
PERFORMED ON: ABNORMAL
PH BLDV: 7.36 [PH] (ref 7.35–7.45)
PH UR STRIP.AUTO: 6 [PH] (ref 5–8)
PLATELET # BLD AUTO: 243 K/UL (ref 135–450)
PMV BLD AUTO: 9 FL (ref 5–10.5)
PO2 BLDV: 27.8 MMHG (ref 25–40)
POTASSIUM SERPL-SCNC: 4.4 MMOL/L (ref 3.5–5.1)
PROCALCITONIN SERPL IA-MCNC: 0.15 NG/ML (ref 0–0.15)
PROT SERPL-MCNC: 7.4 G/DL (ref 6.4–8.2)
PROT UR STRIP.AUTO-MCNC: 100 MG/DL
PROTHROMBIN TIME: 23.1 SEC (ref 11.9–14.9)
RBC # BLD AUTO: 4.89 M/UL (ref 4–5.2)
RBC #/AREA URNS HPF: ABNORMAL /HPF (ref 0–4)
RSV AG NOSE QL: POSITIVE
SAO2 % BLDV: 49 %
SARS-COV-2 RNA RESP QL NAA+PROBE: NOT DETECTED
SODIUM SERPL-SCNC: 132 MMOL/L (ref 136–145)
SP GR UR STRIP.AUTO: 1.02 (ref 1–1.03)
TROPONIN, HIGH SENSITIVITY: 18 NG/L (ref 0–14)
TROPONIN, HIGH SENSITIVITY: 18 NG/L (ref 0–14)
TSH SERPL DL<=0.005 MIU/L-ACNC: 0.99 UIU/ML (ref 0.27–4.2)
UA COMPLETE W REFLEX CULTURE PNL UR: ABNORMAL
UA DIPSTICK W REFLEX MICRO PNL UR: YES
URN SPEC COLLECT METH UR: ABNORMAL
UROBILINOGEN UR STRIP-ACNC: 0.2 E.U./DL
WBC # BLD AUTO: 12.8 K/UL (ref 4–11)
WBC #/AREA URNS HPF: ABNORMAL /HPF (ref 0–5)

## 2025-01-04 PROCEDURE — 83735 ASSAY OF MAGNESIUM: CPT

## 2025-01-04 PROCEDURE — 87040 BLOOD CULTURE FOR BACTERIA: CPT

## 2025-01-04 PROCEDURE — 2500000003 HC RX 250 WO HCPCS: Performed by: NURSE PRACTITIONER

## 2025-01-04 PROCEDURE — 6360000002 HC RX W HCPCS: Performed by: NURSE PRACTITIONER

## 2025-01-04 PROCEDURE — 96365 THER/PROPH/DIAG IV INF INIT: CPT

## 2025-01-04 PROCEDURE — 81001 URINALYSIS AUTO W/SCOPE: CPT

## 2025-01-04 PROCEDURE — 83880 ASSAY OF NATRIURETIC PEPTIDE: CPT

## 2025-01-04 PROCEDURE — 83605 ASSAY OF LACTIC ACID: CPT

## 2025-01-04 PROCEDURE — 96375 TX/PRO/DX INJ NEW DRUG ADDON: CPT

## 2025-01-04 PROCEDURE — 84443 ASSAY THYROID STIM HORMONE: CPT

## 2025-01-04 PROCEDURE — 93005 ELECTROCARDIOGRAM TRACING: CPT | Performed by: NURSE PRACTITIONER

## 2025-01-04 PROCEDURE — 2060000000 HC ICU INTERMEDIATE R&B

## 2025-01-04 PROCEDURE — 82803 BLOOD GASES ANY COMBINATION: CPT

## 2025-01-04 PROCEDURE — 87636 SARSCOV2 & INF A&B AMP PRB: CPT

## 2025-01-04 PROCEDURE — 93010 ELECTROCARDIOGRAM REPORT: CPT | Performed by: INTERNAL MEDICINE

## 2025-01-04 PROCEDURE — 96374 THER/PROPH/DIAG INJ IV PUSH: CPT

## 2025-01-04 PROCEDURE — 87807 RSV ASSAY W/OPTIC: CPT

## 2025-01-04 PROCEDURE — 6370000000 HC RX 637 (ALT 250 FOR IP): Performed by: NURSE PRACTITIONER

## 2025-01-04 PROCEDURE — 83036 HEMOGLOBIN GLYCOSYLATED A1C: CPT

## 2025-01-04 PROCEDURE — 84484 ASSAY OF TROPONIN QUANT: CPT

## 2025-01-04 PROCEDURE — 80053 COMPREHEN METABOLIC PANEL: CPT

## 2025-01-04 PROCEDURE — 2580000003 HC RX 258: Performed by: NURSE PRACTITIONER

## 2025-01-04 PROCEDURE — 84145 PROCALCITONIN (PCT): CPT

## 2025-01-04 PROCEDURE — 71045 X-RAY EXAM CHEST 1 VIEW: CPT

## 2025-01-04 PROCEDURE — 85610 PROTHROMBIN TIME: CPT

## 2025-01-04 PROCEDURE — 36415 COLL VENOUS BLD VENIPUNCTURE: CPT

## 2025-01-04 PROCEDURE — 85025 COMPLETE CBC W/AUTO DIFF WBC: CPT

## 2025-01-04 PROCEDURE — 99285 EMERGENCY DEPT VISIT HI MDM: CPT

## 2025-01-04 RX ORDER — MAGNESIUM SULFATE IN WATER 40 MG/ML
2000 INJECTION, SOLUTION INTRAVENOUS PRN
Status: DISCONTINUED | OUTPATIENT
Start: 2025-01-04 | End: 2025-01-08 | Stop reason: HOSPADM

## 2025-01-04 RX ORDER — FUROSEMIDE 20 MG/1
20 TABLET ORAL 2 TIMES DAILY
Status: DISCONTINUED | OUTPATIENT
Start: 2025-01-05 | End: 2025-01-08 | Stop reason: HOSPADM

## 2025-01-04 RX ORDER — PENTOXIFYLLINE 400 MG/1
400 TABLET, EXTENDED RELEASE ORAL DAILY
Status: DISCONTINUED | OUTPATIENT
Start: 2025-01-04 | End: 2025-01-08 | Stop reason: HOSPADM

## 2025-01-04 RX ORDER — POTASSIUM CHLORIDE 7.45 MG/ML
10 INJECTION INTRAVENOUS PRN
Status: DISCONTINUED | OUTPATIENT
Start: 2025-01-04 | End: 2025-01-08 | Stop reason: HOSPADM

## 2025-01-04 RX ORDER — WARFARIN SODIUM 5 MG/1
5 TABLET ORAL
Status: COMPLETED | OUTPATIENT
Start: 2025-01-04 | End: 2025-01-04

## 2025-01-04 RX ORDER — LANOLIN ALCOHOL/MO/W.PET/CERES
400 CREAM (GRAM) TOPICAL EVERY MORNING
Status: DISCONTINUED | OUTPATIENT
Start: 2025-01-05 | End: 2025-01-08 | Stop reason: HOSPADM

## 2025-01-04 RX ORDER — SODIUM CHLORIDE 0.9 % (FLUSH) 0.9 %
5-40 SYRINGE (ML) INJECTION PRN
Status: DISCONTINUED | OUTPATIENT
Start: 2025-01-04 | End: 2025-01-08 | Stop reason: HOSPADM

## 2025-01-04 RX ORDER — PREDNISONE 20 MG/1
20 TABLET ORAL DAILY
Status: DISCONTINUED | OUTPATIENT
Start: 2025-01-05 | End: 2025-01-08 | Stop reason: HOSPADM

## 2025-01-04 RX ORDER — IPRATROPIUM BROMIDE AND ALBUTEROL SULFATE 2.5; .5 MG/3ML; MG/3ML
1 SOLUTION RESPIRATORY (INHALATION) ONCE
Status: DISCONTINUED | OUTPATIENT
Start: 2025-01-04 | End: 2025-01-04 | Stop reason: ALTCHOICE

## 2025-01-04 RX ORDER — ATORVASTATIN CALCIUM 40 MG/1
40 TABLET, FILM COATED ORAL DAILY
Status: DISCONTINUED | OUTPATIENT
Start: 2025-01-04 | End: 2025-01-08 | Stop reason: HOSPADM

## 2025-01-04 RX ORDER — SODIUM CHLORIDE 0.9 % (FLUSH) 0.9 %
5-40 SYRINGE (ML) INJECTION EVERY 12 HOURS SCHEDULED
Status: DISCONTINUED | OUTPATIENT
Start: 2025-01-04 | End: 2025-01-08 | Stop reason: HOSPADM

## 2025-01-04 RX ORDER — ACETAMINOPHEN 325 MG/1
650 TABLET ORAL EVERY 6 HOURS PRN
Status: DISCONTINUED | OUTPATIENT
Start: 2025-01-04 | End: 2025-01-08 | Stop reason: HOSPADM

## 2025-01-04 RX ORDER — 0.9 % SODIUM CHLORIDE 0.9 %
1000 INTRAVENOUS SOLUTION INTRAVENOUS ONCE
Status: DISCONTINUED | OUTPATIENT
Start: 2025-01-04 | End: 2025-01-04 | Stop reason: ALTCHOICE

## 2025-01-04 RX ORDER — SODIUM CHLORIDE 9 MG/ML
INJECTION, SOLUTION INTRAVENOUS PRN
Status: DISCONTINUED | OUTPATIENT
Start: 2025-01-04 | End: 2025-01-08 | Stop reason: HOSPADM

## 2025-01-04 RX ORDER — POLYETHYLENE GLYCOL 3350 17 G/17G
17 POWDER, FOR SOLUTION ORAL DAILY PRN
Status: DISCONTINUED | OUTPATIENT
Start: 2025-01-04 | End: 2025-01-08 | Stop reason: HOSPADM

## 2025-01-04 RX ORDER — GLUCAGON 1 MG/ML
1 KIT INJECTION PRN
Status: DISCONTINUED | OUTPATIENT
Start: 2025-01-04 | End: 2025-01-08 | Stop reason: HOSPADM

## 2025-01-04 RX ORDER — ACETAMINOPHEN 650 MG/1
650 SUPPOSITORY RECTAL EVERY 6 HOURS PRN
Status: DISCONTINUED | OUTPATIENT
Start: 2025-01-04 | End: 2025-01-08 | Stop reason: HOSPADM

## 2025-01-04 RX ORDER — DEXTROSE MONOHYDRATE 100 MG/ML
INJECTION, SOLUTION INTRAVENOUS CONTINUOUS PRN
Status: DISCONTINUED | OUTPATIENT
Start: 2025-01-04 | End: 2025-01-08 | Stop reason: HOSPADM

## 2025-01-04 RX ORDER — MEGESTROL ACETATE 40 MG/1
80 TABLET ORAL 2 TIMES DAILY
COMMUNITY

## 2025-01-04 RX ORDER — POTASSIUM CHLORIDE 1500 MG/1
40 TABLET, EXTENDED RELEASE ORAL PRN
Status: DISCONTINUED | OUTPATIENT
Start: 2025-01-04 | End: 2025-01-08 | Stop reason: HOSPADM

## 2025-01-04 RX ORDER — ONDANSETRON 4 MG/1
4 TABLET, ORALLY DISINTEGRATING ORAL EVERY 8 HOURS PRN
Status: DISCONTINUED | OUTPATIENT
Start: 2025-01-04 | End: 2025-01-08 | Stop reason: HOSPADM

## 2025-01-04 RX ORDER — LEVOTHYROXINE SODIUM 100 UG/1
100 TABLET ORAL
Status: DISCONTINUED | OUTPATIENT
Start: 2025-01-05 | End: 2025-01-08 | Stop reason: HOSPADM

## 2025-01-04 RX ORDER — INSULIN GLARGINE 100 [IU]/ML
50 INJECTION, SOLUTION SUBCUTANEOUS NIGHTLY
Status: DISCONTINUED | OUTPATIENT
Start: 2025-01-04 | End: 2025-01-06

## 2025-01-04 RX ORDER — PANTOPRAZOLE SODIUM 40 MG/1
40 TABLET, DELAYED RELEASE ORAL
Status: DISCONTINUED | OUTPATIENT
Start: 2025-01-05 | End: 2025-01-08 | Stop reason: HOSPADM

## 2025-01-04 RX ORDER — ONDANSETRON 2 MG/ML
4 INJECTION INTRAMUSCULAR; INTRAVENOUS EVERY 6 HOURS PRN
Status: DISCONTINUED | OUTPATIENT
Start: 2025-01-04 | End: 2025-01-08 | Stop reason: HOSPADM

## 2025-01-04 RX ORDER — LOSARTAN POTASSIUM 25 MG/1
25 TABLET ORAL DAILY
Status: DISCONTINUED | OUTPATIENT
Start: 2025-01-04 | End: 2025-01-08 | Stop reason: HOSPADM

## 2025-01-04 RX ORDER — DILTIAZEM HYDROCHLORIDE 5 MG/ML
10 INJECTION INTRAVENOUS ONCE
Status: COMPLETED | OUTPATIENT
Start: 2025-01-04 | End: 2025-01-04

## 2025-01-04 RX ORDER — MAGNESIUM SULFATE IN WATER 40 MG/ML
4000 INJECTION, SOLUTION INTRAVENOUS ONCE
Status: COMPLETED | OUTPATIENT
Start: 2025-01-04 | End: 2025-01-05

## 2025-01-04 RX ORDER — ALLOPURINOL 100 MG/1
100 TABLET ORAL 2 TIMES DAILY
Status: DISCONTINUED | OUTPATIENT
Start: 2025-01-04 | End: 2025-01-08 | Stop reason: HOSPADM

## 2025-01-04 RX ORDER — MEGESTROL ACETATE 40 MG/1
80 TABLET ORAL 2 TIMES DAILY
Status: DISCONTINUED | OUTPATIENT
Start: 2025-01-04 | End: 2025-01-08 | Stop reason: HOSPADM

## 2025-01-04 RX ORDER — ACETAMINOPHEN 325 MG/1
650 TABLET ORAL ONCE
Status: DISCONTINUED | OUTPATIENT
Start: 2025-01-04 | End: 2025-01-04 | Stop reason: ALTCHOICE

## 2025-01-04 RX ADMIN — ALLOPURINOL 100 MG: 100 TABLET ORAL at 21:13

## 2025-01-04 RX ADMIN — Medication 10 ML: at 21:16

## 2025-01-04 RX ADMIN — MEGESTROL ACETATE 80 MG: 40 TABLET ORAL at 21:14

## 2025-01-04 RX ADMIN — PENTOXIFYLLINE 400 MG: 400 TABLET, EXTENDED RELEASE ORAL at 21:14

## 2025-01-04 RX ADMIN — ATORVASTATIN CALCIUM 40 MG: 40 TABLET, FILM COATED ORAL at 21:16

## 2025-01-04 RX ADMIN — WATER 125 MG: 1 INJECTION INTRAMUSCULAR; INTRAVENOUS; SUBCUTANEOUS at 14:41

## 2025-01-04 RX ADMIN — ACETAMINOPHEN 650 MG: 325 TABLET ORAL at 16:11

## 2025-01-04 RX ADMIN — DILTIAZEM HYDROCHLORIDE 5 MG/HR: 5 INJECTION, SOLUTION INTRAVENOUS at 15:48

## 2025-01-04 RX ADMIN — DILTIAZEM HYDROCHLORIDE 10 MG: 5 INJECTION, SOLUTION INTRAVENOUS at 15:45

## 2025-01-04 RX ADMIN — MAGNESIUM SULFATE HEPTAHYDRATE 4000 MG: 40 INJECTION, SOLUTION INTRAVENOUS at 21:11

## 2025-01-04 RX ADMIN — IPRATROPIUM BROMIDE AND ALBUTEROL SULFATE 1 DOSE: 2.5; .5 SOLUTION RESPIRATORY (INHALATION) at 14:44

## 2025-01-04 RX ADMIN — AZITHROMYCIN MONOHYDRATE 500 MG: 500 INJECTION, POWDER, LYOPHILIZED, FOR SOLUTION INTRAVENOUS at 17:45

## 2025-01-04 RX ADMIN — INSULIN GLARGINE 50 UNITS: 100 INJECTION, SOLUTION SUBCUTANEOUS at 21:13

## 2025-01-04 RX ADMIN — SODIUM CHLORIDE 1000 MG: 900 INJECTION INTRAVENOUS at 17:12

## 2025-01-04 RX ADMIN — WARFARIN SODIUM 5 MG: 5 TABLET ORAL at 21:13

## 2025-01-04 RX ADMIN — DILTIAZEM HYDROCHLORIDE 10 MG/HR: 5 INJECTION, SOLUTION INTRAVENOUS at 19:01

## 2025-01-04 RX ADMIN — SODIUM CHLORIDE 1000 ML: 9 INJECTION, SOLUTION INTRAVENOUS at 16:12

## 2025-01-04 ASSESSMENT — LIFESTYLE VARIABLES
HOW OFTEN DO YOU HAVE A DRINK CONTAINING ALCOHOL: NEVER
HOW MANY STANDARD DRINKS CONTAINING ALCOHOL DO YOU HAVE ON A TYPICAL DAY: PATIENT DOES NOT DRINK
HOW OFTEN DO YOU HAVE A DRINK CONTAINING ALCOHOL: NEVER
HOW MANY STANDARD DRINKS CONTAINING ALCOHOL DO YOU HAVE ON A TYPICAL DAY: PATIENT DOES NOT DRINK

## 2025-01-04 ASSESSMENT — PAIN SCALES - GENERAL
PAINLEVEL_OUTOF10: 0
PAINLEVEL_OUTOF10: 0

## 2025-01-04 ASSESSMENT — PAIN - FUNCTIONAL ASSESSMENT: PAIN_FUNCTIONAL_ASSESSMENT: 0-10

## 2025-01-04 NOTE — PLAN OF CARE
Admit to PCU    Hx of afib now Atrial fibrillation with RVR  On Cardizem gtt  Febrile  RSV +  Rocephin and Zithromax  Prednisone   Pulmonary and cardiology consulted  Given IVF in ED, defer to assessing provider to continue- BNP elevated  Echo ordered   Pharmacy to dose coumadin      Amy Mccracken, APRN - CNP

## 2025-01-04 NOTE — ED PROVIDER NOTES
THIS IS MY FERNANDEZ SUPERVISORY AND SHARED VISIT NOTE:    I personally saw the patient and made/approved the management plan and take responsibility for the patient management.    Labs Reviewed   RSV DETECTION - Abnormal; Notable for the following components:       Result Value    RSV Rapid Ag POSITIVE (*)     All other components within normal limits   CBC WITH AUTO DIFFERENTIAL - Abnormal; Notable for the following components:    WBC 12.8 (*)     Hemoglobin 16.4 (*)     Hematocrit 49.2 (*)     .6 (*)     RDW 16.4 (*)     Neutrophils Absolute 9.4 (*)     All other components within normal limits   COMPREHENSIVE METABOLIC PANEL W/ REFLEX TO MG FOR LOW K - Abnormal; Notable for the following components:    Sodium 132 (*)     Chloride 95 (*)     Glucose 237 (*)     Creatinine 1.3 (*)     Est, Glom Filt Rate 43 (*)     All other components within normal limits   LACTATE, SEPSIS - Abnormal; Notable for the following components:    Lactic Acid, Sepsis 2.1 (*)     All other components within normal limits   LACTATE, SEPSIS - Abnormal; Notable for the following components:    Lactic Acid, Sepsis 2.3 (*)     All other components within normal limits   TROPONIN - Abnormal; Notable for the following components:    Troponin, High Sensitivity 18 (*)     All other components within normal limits   TROPONIN - Abnormal; Notable for the following components:    Troponin, High Sensitivity 18 (*)     All other components within normal limits   BRAIN NATRIURETIC PEPTIDE - Abnormal; Notable for the following components:    NT Pro-BNP 1,353 (*)     All other components within normal limits   URINALYSIS WITH REFLEX TO CULTURE - Abnormal; Notable for the following components:    Glucose, Ur >=1000 (*)     Blood, Urine SMALL (*)     Protein,  (*)     All other components within normal limits   PROTIME-INR - Abnormal; Notable for the following components:    Protime 23.1 (*)     INR 2.04 (*)     All other components within normal

## 2025-01-04 NOTE — ED PROVIDER NOTES
[facility] TELEMETRY  EMERGENCY DEPARTMENT ENCOUNTER        Pt Name: Coco Landeros  MRN: [id number]  Birthdate 1950  Date of evaluation: 1/4/2025  Provider: DEMI Quintanilla - ALESHIA  PCP: Anne Marie Badillo MD  Note Started: 2:15 PM EST 1/4/25      FERNANDEZ. I have evaluated this patient.        CHIEF COMPLAINT       Chief Complaint   Patient presents with    Shortness of Breath     Short of breath; congestion; cough; unable to lay flat       HISTORY OF PRESENT ILLNESS: 1 or more Elements     History From: Patient     Limitations to history : None    Social Determinants Significantly Affecting Health : None    Chief Complaint: Cough, wheezing, shortness of breath     Coco Landeros is a 74 y.o. female who presents to the Emergency Department today with symptoms of cough, wheezing, shortness of breath.  Patient had reported symptoms began approximately 1 week ago.  States that she has been sleeping in recliner due to the symptoms of cough and wheezing.  States that she feels short of breath with exertion.  Normally takes care of herself but does live with her daughter.  States that she today was short of breath and could not make her bed.    Nursing Notes were all reviewed and agreed with or any disagreements were addressed in the HPI.    REVIEW OF SYSTEMS :      Review of Systems    Positives and Pertinent negatives as per HPI.     SURGICAL HISTORY     Past Surgical History:   Procedure Laterality Date    COLONOSCOPY      COLONOSCOPY  05/23/2018    colon polyps,diverticulosis    COLONOSCOPY N/A 01/04/2022    COLONOSCOPY POLYPECTOMY SNARE/COLD BIOPSY performed by Ethan Steen MD at [facility] ENDOSCOPY    COLONOSCOPY N/A 01/04/2022    COLONOSCOPY CONTROL HEMORRHAGE performed by Ethan Steen MD at [facility] ENDOSCOPY    DILATION AND CURETTAGE OF UTERUS N/A 5/31/2024    VIDEO HYSTEROSCOPY, DILATATION AND CURETTAGE performed by Steven Castellanos MD at [facility] OR    EYE SURGERY      HAND SURGERY Right  (COUMADIN) tablet 5 mg (has no administration in time range)   dilTIAZem (CARDIZEM CD) extended release capsule 180 mg (has no administration in time range)   dilTIAZem (CARDIZEM) tablet 30 mg (30 mg Oral Given 1/6/25 1221)   magnesium sulfate 4000 mg in 100 mL IVPB premix (0 mg IntraVENous Stopped 1/5/25 0142)   warfarin (COUMADIN) tablet 5 mg (5 mg Oral Given 1/4/25 2113)   warfarin (COUMADIN) tablet 5 mg (5 mg Oral Given 1/5/25 1755)             Is this patient to be included in the SEP-1 Core Measure due to severe sepsis or septic shock?   No   Exclusion criteria - the patient is NOT to be included for SEP-1 Core Measure due to:  May have criteria for sepsis, but does not meet criteria for severe sepsis or septic shock      Chronic Conditions affecting care:    has a past medical history of Arthritis, Atrial fibrillation (HCC), Cancer (HCC), CKD (chronic kidney disease), Diabetes mellitus (HCC), DVT (deep venous thrombosis) (HCC), Full dentures, Gout (12/02/2019), blood clots, Hyperlipidemia, Hypertension, Hypotension, and Wears glasses.    CONSULTS: (Who and What was discussed)  IP CONSULT TO PHARMACY  IP CONSULT TO CARDIOLOGY  IP CONSULT TO PULMONOLOGY      Records Reviewed (External and Source)     CC/HPI Summary, DDx, ED Course, and Reassessment:   Request admission for pain acquired pneumonia, and A-fib RVR.  Patient has A-fib RVR with ventricular rate in the 130s and 40s.  She has a history of known A-fib.  She is on Coumadin for history of A-fib.  She is currently therapeutic with an INR of 2.  Chest x-ray was normal that she does have symptoms of wheezing.  She has temp of 100.2.  I have a suspicion she likely has community-acquired pneumonia diagnosed clinically.  She was provided DuoNeb and Solu-Medrol as well as Rocephin and azithromycin for symptoms of wheezing and community-acquired pneumonia.  She is oxygenating well at 9 8% on room air.  She denies any chest pain.  Denies any back pain.  No

## 2025-01-05 PROBLEM — B33.8 RESPIRATORY SYNCYTIAL VIRUS (RSV): Status: ACTIVE | Noted: 2025-01-05

## 2025-01-05 PROBLEM — E11.9 DIABETES MELLITUS (HCC): Status: ACTIVE | Noted: 2025-01-05

## 2025-01-05 PROBLEM — J44.1 COPD EXACERBATION (HCC): Status: ACTIVE | Noted: 2025-01-05

## 2025-01-05 PROBLEM — J40 TRACHEOBRONCHITIS: Status: ACTIVE | Noted: 2025-01-05

## 2025-01-05 LAB
ANION GAP SERPL CALCULATED.3IONS-SCNC: 13 MMOL/L (ref 3–16)
BASOPHILS # BLD: 0 K/UL (ref 0–0.2)
BASOPHILS NFR BLD: 0.1 %
BUN SERPL-MCNC: 22 MG/DL (ref 7–20)
CALCIUM SERPL-MCNC: 8.6 MG/DL (ref 8.3–10.6)
CHLORIDE SERPL-SCNC: 99 MMOL/L (ref 99–110)
CO2 SERPL-SCNC: 20 MMOL/L (ref 21–32)
CREAT SERPL-MCNC: 1.2 MG/DL (ref 0.6–1.2)
DEPRECATED RDW RBC AUTO: 16.4 % (ref 12.4–15.4)
EKG ATRIAL RATE: 208 BPM
EKG DIAGNOSIS: NORMAL
EKG Q-T INTERVAL: 370 MS
EKG QRS DURATION: 86 MS
EKG QTC CALCULATION (BAZETT): 472 MS
EKG R AXIS: 60 DEGREES
EKG T AXIS: 14 DEGREES
EKG VENTRICULAR RATE: 98 BPM
EOSINOPHIL # BLD: 0 K/UL (ref 0–0.6)
EOSINOPHIL NFR BLD: 0 %
GFR SERPLBLD CREATININE-BSD FMLA CKD-EPI: 47 ML/MIN/{1.73_M2}
GLUCOSE BLD-MCNC: 302 MG/DL (ref 70–99)
GLUCOSE BLD-MCNC: 339 MG/DL (ref 70–99)
GLUCOSE BLD-MCNC: 339 MG/DL (ref 70–99)
GLUCOSE BLD-MCNC: 350 MG/DL (ref 70–99)
GLUCOSE BLD-MCNC: 390 MG/DL (ref 70–99)
GLUCOSE SERPL-MCNC: 334 MG/DL (ref 70–99)
HCT VFR BLD AUTO: 45.8 % (ref 36–48)
HGB BLD-MCNC: 15.1 G/DL (ref 12–16)
INR PPP: 1.94 (ref 0.85–1.15)
LACTATE BLDV-SCNC: 1.6 MMOL/L (ref 0.4–2)
LACTATE BLDV-SCNC: 2.5 MMOL/L (ref 0.4–2)
LYMPHOCYTES # BLD: 1.1 K/UL (ref 1–5.1)
LYMPHOCYTES NFR BLD: 9.2 %
MAGNESIUM SERPL-MCNC: 2.69 MG/DL (ref 1.8–2.4)
MCH RBC QN AUTO: 33.3 PG (ref 26–34)
MCHC RBC AUTO-ENTMCNC: 32.9 G/DL (ref 31–36)
MCV RBC AUTO: 101 FL (ref 80–100)
MONOCYTES # BLD: 0.5 K/UL (ref 0–1.3)
MONOCYTES NFR BLD: 4.5 %
NEUTROPHILS # BLD: 10.4 K/UL (ref 1.7–7.7)
NEUTROPHILS NFR BLD: 86.2 %
PERFORMED ON: ABNORMAL
PLATELET # BLD AUTO: 236 K/UL (ref 135–450)
PMV BLD AUTO: 8.3 FL (ref 5–10.5)
POTASSIUM SERPL-SCNC: 4.6 MMOL/L (ref 3.5–5.1)
PROTHROMBIN TIME: 22.2 SEC (ref 11.9–14.9)
RBC # BLD AUTO: 4.53 M/UL (ref 4–5.2)
SODIUM SERPL-SCNC: 132 MMOL/L (ref 136–145)
WBC # BLD AUTO: 12.1 K/UL (ref 4–11)

## 2025-01-05 PROCEDURE — 6370000000 HC RX 637 (ALT 250 FOR IP): Performed by: INTERNAL MEDICINE

## 2025-01-05 PROCEDURE — 99223 1ST HOSP IP/OBS HIGH 75: CPT | Performed by: INTERNAL MEDICINE

## 2025-01-05 PROCEDURE — 85025 COMPLETE CBC W/AUTO DIFF WBC: CPT

## 2025-01-05 PROCEDURE — 99233 SBSQ HOSP IP/OBS HIGH 50: CPT | Performed by: INTERNAL MEDICINE

## 2025-01-05 PROCEDURE — 94761 N-INVAS EAR/PLS OXIMETRY MLT: CPT

## 2025-01-05 PROCEDURE — 2580000003 HC RX 258: Performed by: NURSE PRACTITIONER

## 2025-01-05 PROCEDURE — 94669 MECHANICAL CHEST WALL OSCILL: CPT

## 2025-01-05 PROCEDURE — 94640 AIRWAY INHALATION TREATMENT: CPT

## 2025-01-05 PROCEDURE — 83735 ASSAY OF MAGNESIUM: CPT

## 2025-01-05 PROCEDURE — 36415 COLL VENOUS BLD VENIPUNCTURE: CPT

## 2025-01-05 PROCEDURE — 83605 ASSAY OF LACTIC ACID: CPT

## 2025-01-05 PROCEDURE — 93005 ELECTROCARDIOGRAM TRACING: CPT | Performed by: NURSE PRACTITIONER

## 2025-01-05 PROCEDURE — 93010 ELECTROCARDIOGRAM REPORT: CPT | Performed by: INTERNAL MEDICINE

## 2025-01-05 PROCEDURE — 80048 BASIC METABOLIC PNL TOTAL CA: CPT

## 2025-01-05 PROCEDURE — 2060000000 HC ICU INTERMEDIATE R&B

## 2025-01-05 PROCEDURE — 6370000000 HC RX 637 (ALT 250 FOR IP): Performed by: NURSE PRACTITIONER

## 2025-01-05 PROCEDURE — 6370000000 HC RX 637 (ALT 250 FOR IP): Performed by: STUDENT IN AN ORGANIZED HEALTH CARE EDUCATION/TRAINING PROGRAM

## 2025-01-05 PROCEDURE — 85610 PROTHROMBIN TIME: CPT

## 2025-01-05 PROCEDURE — 6360000002 HC RX W HCPCS: Performed by: NURSE PRACTITIONER

## 2025-01-05 RX ORDER — WARFARIN SODIUM 5 MG/1
5 TABLET ORAL
Status: COMPLETED | OUTPATIENT
Start: 2025-01-05 | End: 2025-01-05

## 2025-01-05 RX ORDER — DILTIAZEM HYDROCHLORIDE 120 MG/1
120 CAPSULE, COATED, EXTENDED RELEASE ORAL DAILY
Status: DISCONTINUED | OUTPATIENT
Start: 2025-01-05 | End: 2025-01-06

## 2025-01-05 RX ORDER — GUAIFENESIN 600 MG/1
1200 TABLET, EXTENDED RELEASE ORAL 2 TIMES DAILY
Status: DISCONTINUED | OUTPATIENT
Start: 2025-01-05 | End: 2025-01-05

## 2025-01-05 RX ORDER — IPRATROPIUM BROMIDE AND ALBUTEROL SULFATE 2.5; .5 MG/3ML; MG/3ML
1 SOLUTION RESPIRATORY (INHALATION)
Status: DISCONTINUED | OUTPATIENT
Start: 2025-01-05 | End: 2025-01-08 | Stop reason: HOSPADM

## 2025-01-05 RX ORDER — DIGOXIN 125 MCG
125 TABLET ORAL DAILY
Status: DISCONTINUED | OUTPATIENT
Start: 2025-01-05 | End: 2025-01-08 | Stop reason: HOSPADM

## 2025-01-05 RX ORDER — IPRATROPIUM BROMIDE AND ALBUTEROL SULFATE 2.5; .5 MG/3ML; MG/3ML
1 SOLUTION RESPIRATORY (INHALATION)
Status: DISCONTINUED | OUTPATIENT
Start: 2025-01-05 | End: 2025-01-05

## 2025-01-05 RX ORDER — BENZONATATE 100 MG/1
100 CAPSULE ORAL 3 TIMES DAILY PRN
Status: DISCONTINUED | OUTPATIENT
Start: 2025-01-05 | End: 2025-01-08 | Stop reason: HOSPADM

## 2025-01-05 RX ORDER — GUAIFENESIN 600 MG/1
600 TABLET, EXTENDED RELEASE ORAL 2 TIMES DAILY
Status: DISCONTINUED | OUTPATIENT
Start: 2025-01-05 | End: 2025-01-08 | Stop reason: HOSPADM

## 2025-01-05 RX ORDER — IPRATROPIUM BROMIDE AND ALBUTEROL SULFATE 2.5; .5 MG/3ML; MG/3ML
1 SOLUTION RESPIRATORY (INHALATION)
Status: DISCONTINUED | OUTPATIENT
Start: 2025-01-06 | End: 2025-01-05

## 2025-01-05 RX ORDER — IPRATROPIUM BROMIDE AND ALBUTEROL SULFATE 2.5; .5 MG/3ML; MG/3ML
1 SOLUTION RESPIRATORY (INHALATION) EVERY 4 HOURS PRN
Status: DISCONTINUED | OUTPATIENT
Start: 2025-01-05 | End: 2025-01-08 | Stop reason: HOSPADM

## 2025-01-05 RX ADMIN — ALLOPURINOL 100 MG: 100 TABLET ORAL at 21:15

## 2025-01-05 RX ADMIN — GUAIFENESIN 600 MG: 600 TABLET, EXTENDED RELEASE ORAL at 21:14

## 2025-01-05 RX ADMIN — PREDNISONE 20 MG: 20 TABLET ORAL at 08:34

## 2025-01-05 RX ADMIN — INSULIN HUMAN 12 UNITS: 100 INJECTION, SOLUTION PARENTERAL at 08:41

## 2025-01-05 RX ADMIN — WARFARIN SODIUM 5 MG: 5 TABLET ORAL at 17:55

## 2025-01-05 RX ADMIN — BENZONATATE 100 MG: 100 CAPSULE ORAL at 14:03

## 2025-01-05 RX ADMIN — DIGOXIN 125 MCG: 125 TABLET ORAL at 11:00

## 2025-01-05 RX ADMIN — PENTOXIFYLLINE 400 MG: 400 TABLET, EXTENDED RELEASE ORAL at 08:35

## 2025-01-05 RX ADMIN — ALLOPURINOL 100 MG: 100 TABLET ORAL at 08:34

## 2025-01-05 RX ADMIN — AZITHROMYCIN MONOHYDRATE 500 MG: 500 INJECTION, POWDER, LYOPHILIZED, FOR SOLUTION INTRAVENOUS at 14:10

## 2025-01-05 RX ADMIN — INSULIN HUMAN 12 UNITS: 100 INJECTION, SOLUTION PARENTERAL at 16:16

## 2025-01-05 RX ADMIN — INSULIN GLARGINE 50 UNITS: 100 INJECTION, SOLUTION SUBCUTANEOUS at 21:15

## 2025-01-05 RX ADMIN — MEGESTROL ACETATE 80 MG: 40 TABLET ORAL at 08:33

## 2025-01-05 RX ADMIN — DILTIAZEM HYDROCHLORIDE 120 MG: 120 CAPSULE, COATED, EXTENDED RELEASE ORAL at 10:19

## 2025-01-05 RX ADMIN — GUAIFENESIN 1200 MG: 600 TABLET, EXTENDED RELEASE ORAL at 08:34

## 2025-01-05 RX ADMIN — Medication 400 MG: at 08:34

## 2025-01-05 RX ADMIN — PANTOPRAZOLE SODIUM 40 MG: 40 TABLET, DELAYED RELEASE ORAL at 05:49

## 2025-01-05 RX ADMIN — LEVOTHYROXINE SODIUM 100 MCG: 100 TABLET ORAL at 05:49

## 2025-01-05 RX ADMIN — ATORVASTATIN CALCIUM 40 MG: 40 TABLET, FILM COATED ORAL at 08:34

## 2025-01-05 RX ADMIN — INSULIN HUMAN 12 UNITS: 100 INJECTION, SOLUTION PARENTERAL at 10:57

## 2025-01-05 RX ADMIN — MEGESTROL ACETATE 80 MG: 40 TABLET ORAL at 22:02

## 2025-01-05 RX ADMIN — IPRATROPIUM BROMIDE AND ALBUTEROL SULFATE 1 DOSE: 2.5; .5 SOLUTION RESPIRATORY (INHALATION) at 15:10

## 2025-01-05 RX ADMIN — IPRATROPIUM BROMIDE AND ALBUTEROL SULFATE 1 DOSE: 2.5; .5 SOLUTION RESPIRATORY (INHALATION) at 18:31

## 2025-01-05 NOTE — CONSULTS
Pulmonary & Critical Care Consultation Note    Patient is being seen at the request of DEMI Burch CNP for a consultation for RSV shortness of breath    HISTORY OF PRESENT ILLNESS:   74 years old with history of A-fib and DVT presented with shortness of breath.  Associated with productive cough.  Duration for 3 days.  Green sputum production. No hemoptysis. No fever. No N/V. 2 ppd for age 15-35. No IBD. No O2 at home. No sick contact.     PAST MEDICAL HISTORY:  Past Medical History:   Diagnosis Date    Arthritis     Atrial fibrillation (HCC)     Cancer (HCC)     lymphoma    CKD (chronic kidney disease)     Diabetes mellitus (HCC)     DVT (deep venous thrombosis) (HCC)     Full dentures     Gout 12/02/2019    Hx of blood clots     Hyperlipidemia     Hypertension     Hypotension     Wears glasses      PAST SURGICAL HISTORY:  Past Surgical History:   Procedure Laterality Date    COLONOSCOPY      COLONOSCOPY  05/23/2018    colon polyps,diverticulosis    COLONOSCOPY N/A 01/04/2022    COLONOSCOPY POLYPECTOMY SNARE/COLD BIOPSY performed by Ethan Steen MD at [facility] ENDOSCOPY    COLONOSCOPY N/A 01/04/2022    COLONOSCOPY CONTROL HEMORRHAGE performed by Ethan Steen MD at [facility] ENDOSCOPY    DILATION AND CURETTAGE OF UTERUS N/A 5/31/2024    VIDEO HYSTEROSCOPY, DILATATION AND CURETTAGE performed by Steven Castellanos MD at [facility] OR    EYE SURGERY      HAND SURGERY Right 11/02/2020    EXCISION RIGHT RING FINGER MUCOUS CYST AND BONE SPUR performed by Ingacio Rashid MD at [facility] OR    LYMPH NODE BIOPSY      OTHER SURGICAL HISTORY      vocal cord bx    OTHER SURGICAL HISTORY  01/04/2022      colonoscopy with biopsy       FAMILY HISTORY:  family history includes Cancer in her brother, father, and sister; Heart Disease in her mother.    SOCIAL HISTORY:   reports that she quit smoking about 24 years ago. Her smoking use included cigarettes. She started smoking about 59 years ago. She has a 35 pack-year  smoking history. She has never used smokeless tobacco.    Scheduled Meds:   guaiFENesin  1,200 mg Oral BID    allopurinol  100 mg Oral BID    atorvastatin  40 mg Oral Daily    [Held by provider] furosemide  20 mg Oral BID    insulin regular  12 Units SubCUTAneous TID AC    levothyroxine  100 mcg Oral QAM AC    [Held by provider] losartan  25 mg Oral Daily    magnesium oxide  400 mg Oral QAM    megestrol  80 mg Oral BID    pantoprazole  40 mg Oral QAM AC    pentoxifylline  400 mg Oral Daily    sodium chloride flush  5-40 mL IntraVENous 2 times per day    predniSONE  20 mg Oral Daily    cefTRIAXone (ROCEPHIN) IV  1,000 mg IntraVENous Q24H    azithromycin  500 mg IntraVENous Q24H    insulin glargine  50 Units SubCUTAneous Nightly    warfarin placeholder: dosing by pharmacy   Oral RX Placeholder     Continuous Infusions:   dilTIAZem 5 mg/hr (01/04/25 1035)    sodium chloride      dextrose       PRN Meds:  perflutren lipid microspheres, sodium chloride flush, sodium chloride, potassium chloride **OR** potassium alternative oral replacement **OR** potassium chloride, magnesium sulfate, ondansetron **OR** ondansetron, polyethylene glycol, acetaminophen **OR** acetaminophen, glucose, dextrose bolus **OR** dextrose bolus, glucagon (rDNA), dextrose    ALLERGIES:  Patient has No Known Allergies.    REVIEW OF SYSTEMS:  Constitutional: Negative for fever  HENT: Negative for sore throat  Eyes: Negative for redness   Respiratory: +  dyspnea, cough  Cardiovascular: Negative for chest pain  Gastrointestinal: Negative for vomiting, diarrhea   Genitourinary: Negative for hematuria   Musculoskeletal: Negative for arthralgias   Skin: Negative for rash  Neurological: Negative for syncope  Hematological: Negative for adenopathy  Psychiatric/Behavorial: Negative for anxiety    PHYSICAL EXAM:  Blood pressure (!) 172/67, pulse 85, temperature 98.3 °F (36.8 °C), temperature source Oral, resp. rate 20, height 1.6 m (5' 3\"), weight 96 kg (211

## 2025-01-05 NOTE — PROGRESS NOTES
Admit: 2025    Name:  Coco Landeros  Room:  /0315-01  MRN:    [id number]    Daily Progress Note for 2025   Admitted with ethan Piedra with RVR and RSV bronchitis  Interval History:     HR is better   C/o cough  Scheduled Meds:   guaiFENesin  1,200 mg Oral BID    warfarin  5 mg Oral Once    allopurinol  100 mg Oral BID    atorvastatin  40 mg Oral Daily    [Held by provider] furosemide  20 mg Oral BID    insulin regular  12 Units SubCUTAneous TID AC    levothyroxine  100 mcg Oral QAM AC    [Held by provider] losartan  25 mg Oral Daily    magnesium oxide  400 mg Oral QAM    megestrol  80 mg Oral BID    pantoprazole  40 mg Oral QAM AC    pentoxifylline  400 mg Oral Daily    sodium chloride flush  5-40 mL IntraVENous 2 times per day    predniSONE  20 mg Oral Daily    azithromycin  500 mg IntraVENous Q24H    insulin glargine  50 Units SubCUTAneous Nightly    warfarin placeholder: dosing by pharmacy   Oral RX Placeholder       Continuous Infusions:   dilTIAZem 2.5 mg/hr (25 0844)    sodium chloride      dextrose         PRN Meds:  perflutren lipid microspheres, sodium chloride flush, sodium chloride, potassium chloride **OR** potassium alternative oral replacement **OR** potassium chloride, magnesium sulfate, ondansetron **OR** ondansetron, polyethylene glycol, acetaminophen **OR** acetaminophen, glucose, dextrose bolus **OR** dextrose bolus, glucagon (rDNA), dextrose                  Objective:     Temp  Av.8 °F (37.1 °C)  Min: 97.8 °F (36.6 °C)  Max: 100.2 °F (37.9 °C)  Pulse  Av.2  Min: 80  Max: 132  BP  Min: 100/60  Max: 186/68  SpO2  Av.3 %  Min: 91 %  Max: 98 %  Patient Vitals for the past 4 hrs:   BP Temp Temp src Pulse Resp SpO2   25 0720 105/72 98.3 °F (36.8 °C) Oral 85 20 91 %         Intake/Output Summary (Last 24 hours) at 2025 0845  Last data filed at 2025 0720  Gross per 24 hour   Intake 1300 ml   Output 820 ml   Net 480 ml       Physical Exam:  General:  Awake,  Internal and external hemorrhoids without complication     Multiple polyps of sigmoid colon     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/2021    CML (chronic myelocytic leukemia) (HCC) 04/13/2021    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    Chronic combined systolic and diastolic congestive heart failure (HCC) 01/13/2021    Arthritis of finger of both hands 10/01/2020    Mucous cyst of digit of right hand 10/01/2020    Synovial cyst of hand 09/22/2020    Acute gout involving toe of left foot 12/03/2019    Plantar fasciitis 05/07/2019    Leg cramps 11/02/2018    Sigmoid polyp 05/23/2018    Polyp of ascending colon 05/23/2018    History of colon polyps 05/09/2018    Diabetic mononeuropathy associated with type 2 diabetes mellitus (HCC) 10/31/2017    Trigger finger of left thumb 04/27/2017    Trigger finger, left middle finger 04/27/2017    History of herpes zoster 04/10/2017    Morbid obesity due to excess calories 10/07/2016    Hypothyroidism due to Hashimoto's thyroiditis 10/07/2016    Medial meniscus tear 08/22/2016    CKD stage 4 due to type 2 diabetes mellitus ([facility]) 06/26/2015    Long term current use of anticoagulant therapy 07/09/2014    Insulin-requiring or dependent type II diabetes mellitus (HCC) 03/07/2014    Peripheral vascular disease in diabetes mellitus (HCC) 03/07/2014    Hypomagnesemia 03/07/2014    Venous insufficiency 03/07/2014    Hypotension 04/22/2013    Hematochezia 04/22/2013    Chronic venous stasis dermatitis of left lower extremity 04/22/2013    GERD (gastroesophageal reflux disease) 04/22/2013    Fracture of left ankle 04/22/2013    Insomnia 04/22/2013    Benign essential HTN     Personal history of lymphoma     DVT (deep venous thrombosis) ([facility])        Afib with RVR  -hx of chronic afib; in setting of RSV   -diltiazem gtt, coumadin   Cardiology consult     RSV   Tracheobronchitis  Azithromycin  Low dose prednisone   -supportive care       Type 2 diabetes  - Lantus, AC

## 2025-01-05 NOTE — PLAN OF CARE
Problem: Chronic Conditions and Co-morbidities  Goal: Patient's chronic conditions and co-morbidity symptoms are monitored and maintained or improved  Outcome: Progressing  Flowsheets (Taken 1/5/2025 0836)  Care Plan - Patient's Chronic Conditions and Co-Morbidity Symptoms are Monitored and Maintained or Improved: Monitor and assess patient's chronic conditions and comorbid symptoms for stability, deterioration, or improvement     Problem: Discharge Planning  Goal: Discharge to home or other facility with appropriate resources  Outcome: Progressing  Flowsheets  Taken 1/5/2025 0836 by Crissy Whyte RN  Discharge to home or other facility with appropriate resources:   Identify barriers to discharge with patient and caregiver   Arrange for needed discharge resources and transportation as appropriate   Identify discharge learning needs (meds, wound care, etc)  Taken 1/4/2025 2002 by Angie Yoon RN  Discharge to home or other facility with appropriate resources:   Identify barriers to discharge with patient and caregiver   Arrange for needed discharge resources and transportation as appropriate   Identify discharge learning needs (meds, wound care, etc)   Arrange for interpreters to assist at discharge as needed   Refer to discharge planning if patient needs post-hospital services based on physician order or complex needs related to functional status, cognitive ability or social support system     Problem: Pain  Goal: Verbalizes/displays adequate comfort level or baseline comfort level  Outcome: Progressing     Problem: Safety - Adult  Goal: Free from fall injury  Outcome: Progressing     Problem: ABCDS Injury Assessment  Goal: Absence of physical injury  Outcome: Progressing

## 2025-01-05 NOTE — FLOWSHEET NOTE
01/05/25 1015   Vital Signs   Temp 97 °F (36.1 °C)   Temp Source Oral   Pulse 82   Heart Rate Source Monitor   Respirations 18   BP 97/70   MAP (Calculated) 79   BP Location Right upper arm   BP Method Automatic   Patient Position Sitting   Oxygen Therapy   SpO2 94 %   O2 Device None (Room air)     Vitals and reassessment completed. No significant changes. No further needs at this time. Cardizem gtt discontinued at this time per cardiology note.     Crissy Whyte RN

## 2025-01-05 NOTE — H&P
Hospital Medicine History & Physical      Date of Admission: 1/4/2025    Date of Service:  Pt seen/examined on 1/4/2025     [x]Admitted to Inpatient with expected LOS greater than two midnights due to medical therapy.  []Placed in Observation status.    Chief Admission Complaint:  dyspnea, productive cough    Presenting Admission History:      74 y.o. female  with past medical history of gout, HLD, T2DM, hypothyroidism, GERD, PAD, HTN presented to ED with chief complaint of dyspnea and productive cough. Patient endorses significant exertional dyspnea over the last 3 days.  She also states that she has had a productive cough with green sputum.  Denies chest pain. Denies HA, dizziness.  Denies GI or  symptoms.       Assessment/Plan:      Current Principal Problem:  Atrial fibrillation with RVR (HCC)    Afib with RVR  -hx of chronic afib; in setting of RSV + CAP   -diltiazem gtt, coumadin     RSV   -supportive care    CAP   -rocephin, azithromycin, Mucinex    Gout  - Allopurinol    Hyperlipidemia  - Lipitor    Type 2 diabetes  - Lantus, AC regular insulin, carb controlled diet, BG ACHS    Hypothyroidism  - Synthroid    GERD  - Protonix    Discussed management and the need for Hospitalization of the patient w/ the Emergency Department Provider: Timbo     CXR: I have reviewed the CXR with the following interpretation: No acute process  EKG:  I have reviewed the EKG with the following interpretation: A-fib with RVR    Physical Exam Performed:      BP (!) 172/67   Pulse 80   Temp 97.8 °F (36.6 °C) (Oral)   Resp 20   Ht 1.6 m (5' 3\")   Wt 96 kg (211 lb 9.6 oz)   LMP  (LMP Unknown)   SpO2 94%   BMI 37.48 kg/m²     General appearance:  No apparent distress, appears stated age and cooperative.  HEENT:  Pupils equal, round, and reactive to light. Conjunctivae/corneas clear.  Respiratory: Diminished bilaterally  Cardiovascular: Fast, regular, no murmurs  Abdomen: Mildly tender to palpation, bowel sounds x  BREATH  Patient not taking: Reported on 1/4/2025 5/11/22   Dominik Reed MD       Labs: Personally reviewed and interpreted for clinical significance.   Recent Labs     01/04/25  1437   WBC 12.8*   HGB 16.4*   HCT 49.2*        Recent Labs     01/04/25 1437 01/04/25  1543   *  --    K 4.4  --    CL 95*  --    CO2 24  --    BUN 20  --    CREATININE 1.3*  --    CALCIUM 9.1  --    MG  --  1.48*     Recent Labs     01/04/25  1437 01/04/25  1543   PROBNP 1,353*  --    TROPHS 18* 18*     No results for input(s): \"LABA1C\" in the last 72 hours.  Recent Labs     01/04/25  1437   AST 22   ALT 21   BILITOT 0.5   ALKPHOS 121     Recent Labs     01/04/25 1437 01/04/25 2004 01/05/25  0124   INR 2.04*  --   --    LACTA  --  2.4* 2.5*        Arvind Busch DO

## 2025-01-05 NOTE — PROGRESS NOTES
RT Inhaler-Nebulizer Bronchodilator Protocol Note    There is a bronchodilator order in the chart from a provider indicating to follow the RT Bronchodilator Protocol and there is an “Initiate RT Inhaler-Nebulizer Bronchodilator Protocol” order as well (see protocol at bottom of note).    CXR Findings:  XR CHEST PORTABLE    Result Date: 1/4/2025  No acute process.       The findings from the last RT Protocol Assessment were as follows:   History Pulmonary Disease: Smoker 15 pack years or more  Respiratory Pattern: Regular pattern and RR 12-20 bpm  Breath Sounds: Intermittent or unilateral wheezes  Cough: Strong, spontaneous, non-productive  Indication for Bronchodilator Therapy: Decreased or absent breath sounds  Bronchodilator Assessment Score: 5    Aerosolized bronchodilator medication orders have been revised according to the RT Inhaler-Nebulizer Bronchodilator Protocol below.    Respiratory Therapist to perform RT Therapy Protocol Assessment initially then follow the protocol.  Repeat RT Therapy Protocol Assessment PRN for score 0-3 or on second treatment, BID, and PRN for scores above 3.    No Indications - adjust the frequency to every 6 hours PRN wheezing or bronchospasm, if no treatments needed after 48 hours then discontinue using Per Protocol order mode.     If indication present, adjust the RT bronchodilator orders based on the Bronchodilator Assessment Score as indicated below.  Use Inhaler orders unless patient has one or more of the following: on home nebulizer, not able to hold breath for 10 seconds, is not alert and oriented, cannot activate and use MDI correctly, or respiratory rate 25 breaths per minute or more, then use the equivalent nebulizer order(s) with same Frequency and PRN reasons based on the score.  If a patient is on this medication at home then do not decrease Frequency below that used at home.    0-3 - enter or revise RT bronchodilator order(s) to equivalent RT Bronchodilator order

## 2025-01-05 NOTE — PROGRESS NOTES
Patient arrived to PCU, alert and oriented x4.  Tele: Afib.  Cardizem gtt at 10 mL/h. PIV x2. Short of breath with exertion, POX 92% RA.  Patient oriented to room, call light, bed operation. She verbalizes comfort. She was given a box lunch. Visitor at bedside.  Patient denies needs at this time.

## 2025-01-05 NOTE — PROGRESS NOTES
Bedside report and transfer of care given to Avis Tate. Pt currently resting in bed with the call light within reach. Pt denies any other care needs at this time. Pt stable at this time.

## 2025-01-05 NOTE — FLOWSHEET NOTE
01/04/25 2325   Vital Signs   Temp 98.4 °F (36.9 °C)   Temp Source Oral   Pulse 88   Heart Rate Source Monitor   Respirations 20   /60   MAP (Calculated) 73   BP Method Automatic   Patient Position Semi fowlers   Oxygen Therapy   SpO2 94 %   O2 Device None (Room air)   Rhythm Interpretation   Cardiac Rhythm Atrial fib     Patient resting in bed. No S/S of acute distress noted. Call light in easy reach.

## 2025-01-05 NOTE — PROGRESS NOTES
Patient admitted to room 15 from er. Patient oriented to room, call light, bed rails, phone, lights and bathroom. Patient instructed about the schedule of the day including: vital sign frequency, lab draws, possible tests, frequency of MD and staff rounds, daily weights, I &O's and prescribed diet. 15 Telemetry box in place, patient aware of placement and reason. Bed locked, in lowest position, side rails up 2/4, call light within reach.        Recliner Assessment  Patient is able to demonstrate the ability to move from a reclining position to an upright position within the recliner.       4 Eyes Skin Assessment     NAME:  Coco Landeros  YOB: 1950  MEDICAL RECORD NUMBER:  [id number]    The patient is being assessed for  Admission    I agree that at least one RN has performed a thorough Head to Toe Skin Assessment on the patient. ALL assessment sites listed below have been assessed.      Areas assessed by both nurses:    Head, Face, Ears, Shoulders, Back, Chest, Arms, Elbows, Hands, Sacrum. Buttock, Coccyx, Ischium, Legs. Feet and Heels, and Under Medical Devices       Blanchable redness to coccyx, an old scar to left leg.  Does the Patient have a Wound? No noted wound(s)       Sang Prevention initiated by RN: No  Wound Care Orders initiated by RN: No    Pressure Injury (Stage 3,4, Unstageable, DTI, NWPT, and Complex wounds) if present, place Wound referral order by RN under : No    New Ostomies, if present place, Ostomy referral order under : No     Nurse 1 eSignature: Electronically signed by Angie Yoon RN on 1/4/25 at 10:41 PM EST    **SHARE this note so that the co-signing nurse can place an eSignature**    Nurse 2 eSignature: Electronically signed by Dahlia Fountain RN on 1/4/25 at 11:58 PM EST

## 2025-01-05 NOTE — CONSULTS
Pharmacy Note  Warfarin Consult  Dx:  A-fib/flutter  Goal INR range 2-3   Home Warfarin dose:       10 mg Monday       5 mg all other days    Date  INR  Warfarin  1/4                   2.04                 5 mg    Recommend Warfarin 5 mg tonight x1.  Daily INR ordered.  Rx will continue to manage therapy per consult order.    NOE Vega.Ph.1/4/20257:56 PM

## 2025-01-05 NOTE — PROGRESS NOTES
RT Inhaler-Nebulizer Bronchodilator Protocol Note    There is a bronchodilator order in the chart from a provider indicating to follow the RT Bronchodilator Protocol and there is an “Initiate RT Inhaler-Nebulizer Bronchodilator Protocol” order as well (see protocol at bottom of note).    CXR Findings:  XR CHEST PORTABLE    Result Date: 1/4/2025  No acute process.       The findings from the last RT Protocol Assessment were as follows:   History Pulmonary Disease: (P) Smoker 15 pack years or more  Respiratory Pattern: (P) Regular pattern and RR 12-20 bpm  Breath Sounds: (P) Intermittent or unilateral wheezes  Cough: (P) Strong, spontaneous, non-productive  Indication for Bronchodilator Therapy: (P) Decreased or absent breath sounds, Wheezing associated with pulm disorder  Bronchodilator Assessment Score: (P) 5    Aerosolized bronchodilator medication orders have been revised according to the RT Inhaler-Nebulizer Bronchodilator Protocol below.    Respiratory Therapist to perform RT Therapy Protocol Assessment initially then follow the protocol.  Repeat RT Therapy Protocol Assessment PRN for score 0-3 or on second treatment, BID, and PRN for scores above 3.    No Indications - adjust the frequency to every 6 hours PRN wheezing or bronchospasm, if no treatments needed after 48 hours then discontinue using Per Protocol order mode.     If indication present, adjust the RT bronchodilator orders based on the Bronchodilator Assessment Score as indicated below.  Use Inhaler orders unless patient has one or more of the following: on home nebulizer, not able to hold breath for 10 seconds, is not alert and oriented, cannot activate and use MDI correctly, or respiratory rate 25 breaths per minute or more, then use the equivalent nebulizer order(s) with same Frequency and PRN reasons based on the score.  If a patient is on this medication at home then do not decrease Frequency below that used at home.    0-3 - enter or revise RT

## 2025-01-05 NOTE — FLOWSHEET NOTE
01/05/25 0720   Vital Signs   Temp 98.3 °F (36.8 °C)   Temp Source Oral   Pulse 85   Heart Rate Source Monitor   Respirations 20   /72   MAP (Calculated) 83   BP Location Left upper arm   BP Method Automatic   Patient Position Semi fowlers   Oxygen Therapy   SpO2 91 %   O2 Device None (Room air)     Vitals and assessment completed. No s/s of distress. Medications given per MAR without complication. No further needs at this time.     Crissy Whyte RN

## 2025-01-05 NOTE — PROGRESS NOTES
Bedside report and transfer of care given to ROBINSON Adams. Pt currently resting in bed with the call light within reach. Pt denies any other care needs at this time. Pt stable at this time.

## 2025-01-05 NOTE — FLOWSHEET NOTE
01/05/25 1606   Vital Signs   Temp 98.3 °F (36.8 °C)   Temp Source Oral   Pulse 87   Heart Rate Source Monitor   Respirations 17   BP 98/62   MAP (Calculated) 74   BP Location Right lower arm   BP Method Automatic   Oxygen Therapy   SpO2 94 %   O2 Device None (Room air)     Vitals and reassessment completed. No significant changes. No further needs at this time.     Crissy Whyte RN

## 2025-01-05 NOTE — CONSULTS
Electrophysiology Consultation   Date: 1/5/2025  Admit Date:  1/4/2025  Reason for Consultation: Chronic atrial fibrillation  Consult Requesting Physician: Olga Lidia Sol MD     Chief Complaint   Patient presents with    Shortness of Breath     Short of breath; congestion; cough; unable to lay flat     HPI:   Mrs. Landeros is a pleasant 74 year old female with a medical history significant for chronic atrial fibrillation (lost to cardiology follow up), hypertension, hyperlipidemia, coronary artery disease (CT 2023), chronic renal insufficiency, history of DVT, history of CML status post chemotherapy and history of heart failure with preserved ejection fraction who presents from home with shortness of breath and was found to be RSV positive and in atrial fibrillation with RVR.  According to patient she was in her usual state of health until last Tuesday when she began to suffer progressively worsening shortness of breath.  Her shortness of breath associated with intractable salvos of coughing.  Her cough got so severe patient was unable to catch her breath especially when laying flat and social presented to [facility] for further evaluation.    Patient's primary care provider, who had taking care of most of her chronic illnesses just recently retired.  She is somewhat hesitant to see providers however is open to seeing a cardiologist at [facility] as she does not believe she will travel to [facility] given her age and limitations when it comes to transportation.    Patient denies fevers, chest pain, orthopnea, PND, lower extremity edema, abdominal swelling, chills, visual changes, headaches, sore throat, cough, abdominal pain, nausea, vomiting, bleeding, bruising, dysuria, muscle/joint pain, confusion, depression, anxiety, skin lesions, etc.    Emergency Room/Hospital Course:  Patient was evaluated emergency room on 01/04/2025.  She was found to be positive for RSV.  CMP was reassuring outside of  continue to follow along with you.    2. Acute respiratory distress.  Patient presented to [facility] with acute respiratory distress is likely multifactorial largely driven by her RSV infection.  Currently not requiring oxygen support.  Defer management to primary team.  - Per primary team.    Due to complex nature of patient's disease process, medication adjustments and education I spent 77 minutes with patient care.    Thank you for allowing me to participate in the care of Coco Landeros . If you have any questions/comments, please do not hesitate to contact us.    James Lawton MD  Cardiac Electrophysiology  [facility]  [phone] [place] Office

## 2025-01-05 NOTE — PROGRESS NOTES
Pharmacy Note  Warfarin Consult  Dx:  A-fib/flutter  Goal INR range 2-3   Home Warfarin dose:       10 mg Monday       5 mg all other days    Date  INR  Warfarin  1/4                   2.04                 5 mg  1/5                   1.94                 5 mg    Recommend Warfarin 5 mg tonight x1.  Daily INR ordered.  Rx will continue to manage therapy per consult order.  Alexis Sen RPH PharmD 1/5/2025 7:39 AM

## 2025-01-06 ENCOUNTER — TELEPHONE (OUTPATIENT)
Dept: CARDIOLOGY CLINIC | Age: 75
End: 2025-01-06

## 2025-01-06 ENCOUNTER — APPOINTMENT (OUTPATIENT)
Age: 75
DRG: 866 | End: 2025-01-06
Payer: MEDICARE

## 2025-01-06 LAB
ANION GAP SERPL CALCULATED.3IONS-SCNC: 11 MMOL/L (ref 3–16)
BUN SERPL-MCNC: 35 MG/DL (ref 7–20)
CALCIUM SERPL-MCNC: 8.6 MG/DL (ref 8.3–10.6)
CHLORIDE SERPL-SCNC: 102 MMOL/L (ref 99–110)
CO2 SERPL-SCNC: 23 MMOL/L (ref 21–32)
CREAT SERPL-MCNC: 1.3 MG/DL (ref 0.6–1.2)
DEPRECATED RDW RBC AUTO: 16.2 % (ref 12.4–15.4)
ECHO AO ROOT DIAM: 3 CM
ECHO AO ROOT INDEX: 1.52 CM/M2
ECHO AV CUSP MM: 1.6 CM
ECHO BSA: 2.06 M2
ECHO EST RA PRESSURE: 8 MMHG
ECHO LA AREA 2C: 28 CM2
ECHO LA AREA 4C: 35.5 CM2
ECHO LA DIAMETER INDEX: 2.53 CM/M2
ECHO LA DIAMETER: 5 CM
ECHO LA MAJOR AXIS: 7.5 CM
ECHO LA MINOR AXIS: 6.5 CM
ECHO LA TO AORTIC ROOT RATIO: 1.67
ECHO LA VOL BP: 122 ML (ref 22–52)
ECHO LA VOL MOD A2C: 99 ML (ref 22–52)
ECHO LA VOL MOD A4C: 131 ML (ref 22–52)
ECHO LA VOL/BSA BIPLANE: 62 ML/M2 (ref 16–34)
ECHO LA VOLUME INDEX MOD A2C: 50 ML/M2 (ref 16–34)
ECHO LA VOLUME INDEX MOD A4C: 66 ML/M2 (ref 16–34)
ECHO LV EF PHYSICIAN: 55 %
ECHO LV FRACTIONAL SHORTENING: 43 % (ref 28–44)
ECHO LV INTERNAL DIMENSION DIASTOLE INDEX: 2.73 CM/M2
ECHO LV INTERNAL DIMENSION DIASTOLIC: 5.4 CM (ref 3.9–5.3)
ECHO LV INTERNAL DIMENSION SYSTOLIC INDEX: 1.57 CM/M2
ECHO LV INTERNAL DIMENSION SYSTOLIC: 3.1 CM
ECHO LV IVSD: 1.2 CM (ref 0.6–0.9)
ECHO LV MASS 2D: 279.8 G (ref 67–162)
ECHO LV MASS INDEX 2D: 141.3 G/M2 (ref 43–95)
ECHO LV POSTERIOR WALL DIASTOLIC: 1.3 CM (ref 0.6–0.9)
ECHO LV RELATIVE WALL THICKNESS RATIO: 0.48
ECHO PV MAX VELOCITY: 1.1 M/S
ECHO PV PEAK GRADIENT: 5 MMHG
ECHO RA AREA 4C: 26.2 CM2
ECHO RA END SYSTOLIC VOLUME APICAL 4 CHAMBER INDEX BSA: 43 ML/M2
ECHO RA VOLUME: 86 ML
ECHO RIGHT VENTRICULAR SYSTOLIC PRESSURE (RVSP): 36 MMHG
ECHO RV BASAL DIMENSION: 5.1 CM
ECHO RV FREE WALL PEAK S': 23.1 CM/S
ECHO RV LONGITUDINAL DIMENSION: 7.1 CM
ECHO RV MID DIMENSION: 4 CM
ECHO RV TAPSE: 2.3 CM (ref 1.7–?)
ECHO TV PEAK GRADIENT: 5 MMHG
ECHO TV REGURGITANT MAX VELOCITY: 2.64 M/S
ECHO TV REGURGITANT PEAK GRADIENT: 28 MMHG
EST. AVERAGE GLUCOSE BLD GHB EST-MCNC: 177.2 MG/DL
GFR SERPLBLD CREATININE-BSD FMLA CKD-EPI: 43 ML/MIN/{1.73_M2}
GLUCOSE BLD-MCNC: 252 MG/DL (ref 70–99)
GLUCOSE BLD-MCNC: 325 MG/DL (ref 70–99)
GLUCOSE BLD-MCNC: 381 MG/DL (ref 70–99)
GLUCOSE BLD-MCNC: 398 MG/DL (ref 70–99)
GLUCOSE SERPL-MCNC: 288 MG/DL (ref 70–99)
HBA1C MFR BLD: 7.8 %
HCT VFR BLD AUTO: 45.1 % (ref 36–48)
HGB BLD-MCNC: 14.5 G/DL (ref 12–16)
INR PPP: 2.45 (ref 0.85–1.15)
MCH RBC QN AUTO: 32.2 PG (ref 26–34)
MCHC RBC AUTO-ENTMCNC: 32.2 G/DL (ref 31–36)
MCV RBC AUTO: 99.9 FL (ref 80–100)
PERFORMED ON: ABNORMAL
PLATELET # BLD AUTO: 273 K/UL (ref 135–450)
PMV BLD AUTO: 9 FL (ref 5–10.5)
POTASSIUM SERPL-SCNC: 4.7 MMOL/L (ref 3.5–5.1)
PROTHROMBIN TIME: 26.6 SEC (ref 11.9–14.9)
RBC # BLD AUTO: 4.51 M/UL (ref 4–5.2)
SODIUM SERPL-SCNC: 136 MMOL/L (ref 136–145)
WBC # BLD AUTO: 19.9 K/UL (ref 4–11)

## 2025-01-06 PROCEDURE — 99232 SBSQ HOSP IP/OBS MODERATE 35: CPT | Performed by: STUDENT IN AN ORGANIZED HEALTH CARE EDUCATION/TRAINING PROGRAM

## 2025-01-06 PROCEDURE — 6370000000 HC RX 637 (ALT 250 FOR IP): Performed by: INTERNAL MEDICINE

## 2025-01-06 PROCEDURE — 6370000000 HC RX 637 (ALT 250 FOR IP): Performed by: NURSE PRACTITIONER

## 2025-01-06 PROCEDURE — 6360000002 HC RX W HCPCS: Performed by: NURSE PRACTITIONER

## 2025-01-06 PROCEDURE — 85027 COMPLETE CBC AUTOMATED: CPT

## 2025-01-06 PROCEDURE — 2580000003 HC RX 258: Performed by: NURSE PRACTITIONER

## 2025-01-06 PROCEDURE — 93306 TTE W/DOPPLER COMPLETE: CPT

## 2025-01-06 PROCEDURE — 85610 PROTHROMBIN TIME: CPT

## 2025-01-06 PROCEDURE — 94669 MECHANICAL CHEST WALL OSCILL: CPT

## 2025-01-06 PROCEDURE — 93306 TTE W/DOPPLER COMPLETE: CPT | Performed by: STUDENT IN AN ORGANIZED HEALTH CARE EDUCATION/TRAINING PROGRAM

## 2025-01-06 PROCEDURE — 6370000000 HC RX 637 (ALT 250 FOR IP): Performed by: STUDENT IN AN ORGANIZED HEALTH CARE EDUCATION/TRAINING PROGRAM

## 2025-01-06 PROCEDURE — 99232 SBSQ HOSP IP/OBS MODERATE 35: CPT | Performed by: INTERNAL MEDICINE

## 2025-01-06 PROCEDURE — 94640 AIRWAY INHALATION TREATMENT: CPT

## 2025-01-06 PROCEDURE — 36415 COLL VENOUS BLD VENIPUNCTURE: CPT

## 2025-01-06 PROCEDURE — 2060000000 HC ICU INTERMEDIATE R&B

## 2025-01-06 PROCEDURE — 80048 BASIC METABOLIC PNL TOTAL CA: CPT

## 2025-01-06 PROCEDURE — 2500000003 HC RX 250 WO HCPCS: Performed by: NURSE PRACTITIONER

## 2025-01-06 PROCEDURE — 94761 N-INVAS EAR/PLS OXIMETRY MLT: CPT

## 2025-01-06 RX ORDER — WARFARIN SODIUM 5 MG/1
5 TABLET ORAL
Status: COMPLETED | OUTPATIENT
Start: 2025-01-06 | End: 2025-01-06

## 2025-01-06 RX ORDER — INSULIN LISPRO 100 [IU]/ML
5 INJECTION, SOLUTION INTRAVENOUS; SUBCUTANEOUS ONCE
Status: COMPLETED | OUTPATIENT
Start: 2025-01-06 | End: 2025-01-06

## 2025-01-06 RX ORDER — DILTIAZEM HCL 60 MG
30 TABLET ORAL EVERY 6 HOURS SCHEDULED
Status: COMPLETED | OUTPATIENT
Start: 2025-01-06 | End: 2025-01-07

## 2025-01-06 RX ORDER — DILTIAZEM HYDROCHLORIDE 180 MG/1
180 CAPSULE, COATED, EXTENDED RELEASE ORAL DAILY
Status: DISCONTINUED | OUTPATIENT
Start: 2025-01-07 | End: 2025-01-08 | Stop reason: HOSPADM

## 2025-01-06 RX ORDER — INSULIN GLARGINE 100 [IU]/ML
60 INJECTION, SOLUTION SUBCUTANEOUS NIGHTLY
Status: DISCONTINUED | OUTPATIENT
Start: 2025-01-06 | End: 2025-01-08 | Stop reason: HOSPADM

## 2025-01-06 RX ADMIN — ALLOPURINOL 100 MG: 100 TABLET ORAL at 09:08

## 2025-01-06 RX ADMIN — WARFARIN SODIUM 5 MG: 5 TABLET ORAL at 17:12

## 2025-01-06 RX ADMIN — LEVOTHYROXINE SODIUM 100 MCG: 100 TABLET ORAL at 04:28

## 2025-01-06 RX ADMIN — INSULIN HUMAN 12 UNITS: 100 INJECTION, SOLUTION PARENTERAL at 09:08

## 2025-01-06 RX ADMIN — IPRATROPIUM BROMIDE AND ALBUTEROL SULFATE 1 DOSE: 2.5; .5 SOLUTION RESPIRATORY (INHALATION) at 20:30

## 2025-01-06 RX ADMIN — DILTIAZEM HYDROCHLORIDE 30 MG: 60 TABLET ORAL at 12:21

## 2025-01-06 RX ADMIN — AZITHROMYCIN MONOHYDRATE 500 MG: 500 INJECTION, POWDER, LYOPHILIZED, FOR SOLUTION INTRAVENOUS at 14:18

## 2025-01-06 RX ADMIN — INSULIN HUMAN 12 UNITS: 100 INJECTION, SOLUTION PARENTERAL at 16:20

## 2025-01-06 RX ADMIN — DILTIAZEM HYDROCHLORIDE 120 MG: 120 CAPSULE, COATED, EXTENDED RELEASE ORAL at 09:08

## 2025-01-06 RX ADMIN — ALLOPURINOL 100 MG: 100 TABLET ORAL at 20:53

## 2025-01-06 RX ADMIN — PREDNISONE 20 MG: 20 TABLET ORAL at 09:08

## 2025-01-06 RX ADMIN — Medication 400 MG: at 09:08

## 2025-01-06 RX ADMIN — DILTIAZEM HYDROCHLORIDE 30 MG: 60 TABLET ORAL at 17:12

## 2025-01-06 RX ADMIN — MEGESTROL ACETATE 80 MG: 40 TABLET ORAL at 09:12

## 2025-01-06 RX ADMIN — GUAIFENESIN 600 MG: 600 TABLET, EXTENDED RELEASE ORAL at 20:53

## 2025-01-06 RX ADMIN — GUAIFENESIN 600 MG: 600 TABLET, EXTENDED RELEASE ORAL at 09:07

## 2025-01-06 RX ADMIN — DIGOXIN 125 MCG: 125 TABLET ORAL at 09:07

## 2025-01-06 RX ADMIN — MEGESTROL ACETATE 80 MG: 40 TABLET ORAL at 21:29

## 2025-01-06 RX ADMIN — INSULIN LISPRO 5 UNITS: 100 INJECTION, SOLUTION INTRAVENOUS; SUBCUTANEOUS at 17:11

## 2025-01-06 RX ADMIN — IPRATROPIUM BROMIDE AND ALBUTEROL SULFATE 1 DOSE: 2.5; .5 SOLUTION RESPIRATORY (INHALATION) at 11:33

## 2025-01-06 RX ADMIN — PENTOXIFYLLINE 400 MG: 400 TABLET, EXTENDED RELEASE ORAL at 09:12

## 2025-01-06 RX ADMIN — INSULIN HUMAN 12 UNITS: 100 INJECTION, SOLUTION PARENTERAL at 12:21

## 2025-01-06 RX ADMIN — Medication 10 ML: at 20:54

## 2025-01-06 RX ADMIN — ATORVASTATIN CALCIUM 40 MG: 40 TABLET, FILM COATED ORAL at 09:08

## 2025-01-06 RX ADMIN — IPRATROPIUM BROMIDE AND ALBUTEROL SULFATE 1 DOSE: 2.5; .5 SOLUTION RESPIRATORY (INHALATION) at 07:32

## 2025-01-06 RX ADMIN — Medication 10 ML: at 09:14

## 2025-01-06 RX ADMIN — PANTOPRAZOLE SODIUM 40 MG: 40 TABLET, DELAYED RELEASE ORAL at 09:08

## 2025-01-06 RX ADMIN — IPRATROPIUM BROMIDE AND ALBUTEROL SULFATE 1 DOSE: 2.5; .5 SOLUTION RESPIRATORY (INHALATION) at 15:20

## 2025-01-06 RX ADMIN — INSULIN GLARGINE 60 UNITS: 100 INJECTION, SOLUTION SUBCUTANEOUS at 20:52

## 2025-01-06 ASSESSMENT — PAIN SCALES - GENERAL
PAINLEVEL_OUTOF10: 0

## 2025-01-06 NOTE — PROGRESS NOTES
Pharmacy Note  Warfarin Consult  Dx:  A-fib/flutter  [id number] Score = 7 (CHF, HTN, Age x2, DM, Vascular disease, Sex)  Goal INR range: 2-3   Home Warfarin dose: No dose on Tuesday, 5 mg all other days     Date                 INR                  Warfarin  1/4                   2.04                 5 mg  1/5                   1.94                 5 mg  1/6                   2.45                 5 mg     Recommend Warfarin 5 mg tonight x1.  Daily INR ordered.  Rx will continue to manage therapy per consult order.    Chel Valdes PharmD, [facility], 1/6/2025 6:00 AM

## 2025-01-06 NOTE — TELEPHONE ENCOUNTER
Refill Request     CONFIRM preferrred pharmacy with the patient.    If Mail Order Rx - Pend for 90 day refill.      Last Seen: Last Seen Department: 12/3/2024  Last Seen by PCP: 10/7/2024    Last Written:     If no future appointment scheduled, route STAFF MESSAGE with patient name to the  Pool for scheduling.      Next Appointment:   Future Appointments   Date Time Provider Department Center   1/10/2025  9:00 [facility]   1/21/2025  9:00 AM SCHEDULE, [facility] DEP   2/10/2025  9:30 AM Anne Marie Badillo MD [facility] DEP       Message sent to  to schedule appt with patient?  N/A      Requested Prescriptions     Pending Prescriptions Disp Refills    warfarin (COUMADIN) 5 MG tablet [Pharmacy Med Name: WARFARIN SODIUM 5 MG TABLET] 102 tablet 1     Sig: TAKE 1 TABLET BY MOUTH ONCE DAILY EXCEPT 10MG ON MONDAY

## 2025-01-06 NOTE — TELEPHONE ENCOUNTER
----- Message from Dr. RICHARD Lawton MD sent at 1/5/2025  9:27 AM EST -----  Patient needs follow up with Dr. RICHARDS or Dr. MABRY at [place].  I think she will really get along well with them.  If another provider sees tomorrow, ok for them to continue to follow with her as outpatient if they would like.

## 2025-01-06 NOTE — PROGRESS NOTES
RT Inhaler-Nebulizer Bronchodilator Protocol Note    There is a bronchodilator order in the chart from a provider indicating to follow the RT Bronchodilator Protocol and there is an “Initiate RT Inhaler-Nebulizer Bronchodilator Protocol” order as well (see protocol at bottom of note).    CXR Findings:  XR CHEST PORTABLE    Result Date: 1/4/2025  No acute process.       The findings from the last RT Protocol Assessment were as follows:   History Pulmonary Disease: (P) Smoker 15 pack years or more  Respiratory Pattern: (P) Regular pattern and RR 12-20 bpm  Breath Sounds: (P) Intermittent or unilateral wheezes  Cough: (P) Strong, spontaneous, non-productive  Indication for Bronchodilator Therapy: (P) Wheezing associated with pulm disorder  Bronchodilator Assessment Score: (P) 5    Aerosolized bronchodilator medication orders have been revised according to the RT Inhaler-Nebulizer Bronchodilator Protocol below.    Respiratory Therapist to perform RT Therapy Protocol Assessment initially then follow the protocol.  Repeat RT Therapy Protocol Assessment PRN for score 0-3 or on second treatment, BID, and PRN for scores above 3.    No Indications - adjust the frequency to every 6 hours PRN wheezing or bronchospasm, if no treatments needed after 48 hours then discontinue using Per Protocol order mode.     If indication present, adjust the RT bronchodilator orders based on the Bronchodilator Assessment Score as indicated below.  Use Inhaler orders unless patient has one or more of the following: on home nebulizer, not able to hold breath for 10 seconds, is not alert and oriented, cannot activate and use MDI correctly, or respiratory rate 25 breaths per minute or more, then use the equivalent nebulizer order(s) with same Frequency and PRN reasons based on the score.  If a patient is on this medication at home then do not decrease Frequency below that used at home.    0-3 - enter or revise RT bronchodilator order(s) to

## 2025-01-06 NOTE — PROGRESS NOTES
Bedside report and transfer of care given to ROBINSON Patino. Pt currently resting in bed with the call light within reach. Pt denies any other care needs at this time. Pt stable at this time.    Crissy Whyte RN

## 2025-01-06 NOTE — CARE COORDINATION
Case Management Assessment  Initial Evaluation    Date/Time of Evaluation: 1/6/2025 9:59 AM  Assessment Completed by: Del Kaplan RN    If patient is discharged prior to next notation, then this note serves as note for discharge by case management.    Patient Name: Coco Landeros                   YOB: 1950  Diagnosis: Respiratory syncytial virus (RSV) [B33.8]  Atrial fibrillation with RVR (HCC) [I48.91]  Pneumonia due to infectious organism, unspecified laterality, unspecified part of lung [J18.9]                   Date / Time: 1/4/2025  2:08 PM    Patient Admission Status: Inpatient   Readmission Risk (Low < 19, Mod (19-27), High > 27): Readmission Risk Score: 12.8    Current PCP: Anne Marie Badillo MD  PCP verified by CM? Yes    Chart Reviewed: Yes      History Provided by: Patient  Patient Orientation: Alert and Oriented    Patient Cognition: Alert    Hospitalization in the last 30 days (Readmission):  No    If yes, Readmission Assessment in  Navigator will be completed.    Advance Directives:      Code Status: Full Code   Patient's Primary Decision Maker is: Legal Next of Kin      Discharge Planning:    Patient lives with: Friends, Family Members Type of Home: House  Primary Care Giver: Self  Patient Support Systems include: Family Members   Current Financial resources: Medicare, Medicaid  Current community resources: None  Current services prior to admission: Durable Medical Equipment (rollator)            Current DME: Other (Comment) (rollator)            Type of Home Care services:  None    ADLS  Prior functional level: Independent in ADLs/IADLs  Current functional level: Independent in ADLs/IADLs    PT AM-PAC:   /24  OT AM-PAC:   /24    Family can provide assistance at DC: Yes  Would you like Case Management to discuss the discharge plan with any other family members/significant others, and if so, who? No  Plans to Return to Present Housing: Yes  Other Identified Issues/Barriers to RETURNING

## 2025-01-06 NOTE — PROGRESS NOTES
Cardiology Daily Progress Note  Date: 1/6/2025  Admit Date:  1/4/2025  Reason for Consultation: Chronic atrial fibrillation  Consult Requesting Physician: Olga Lidia Sol MD     Chief Complaint   Patient presents with    Shortness of Breath     Short of breath; congestion; cough; unable to lay flat     Subjective: Pt being seen for follow chronic AF with RVR. No acute events overnight; telemetry reviewed, Afib rates 90s-110s.  She has been off diltiazem gtt for >24 hours.      Patient reports she is breathing better. Endorses wheezing that improves with breathing treatments. Denies CP. No other complaints. Reports she did have the RSV vaccine.       HPI:   Mrs. Landeros is a pleasant 74 year old female with a medical history significant for chronic atrial fibrillation (lost to cardiology follow up), hypertension, hyperlipidemia, coronary artery disease (CT 2023), chronic renal insufficiency, history of DVT, history of CML status post chemotherapy and history of heart failure with preserved ejection fraction who presents from home with shortness of breath and was found to be RSV positive and in atrial fibrillation with RVR.  According to patient she was in her usual state of health until last Tuesday when she began to suffer progressively worsening shortness of breath.  Her shortness of breath associated with intractable salvos of coughing.  Her cough got so severe patient was unable to catch her breath especially when laying flat and social presented to [facility] for further evaluation.    Patient's primary care provider, who had taking care of most of her chronic illnesses just recently retired.  She is somewhat hesitant to see providers however is open to seeing a cardiologist at [facility] as she does not believe she will travel to [facility] given her age and limitations when it comes to transportation.    Patient denies fevers, chest pain, orthopnea, PND, lower extremity edema, abdominal swelling,     Chronic venous stasis dermatitis of left lower extremity 04/22/2013    GERD (gastroesophageal reflux disease) 04/22/2013    Fracture of left ankle 04/22/2013    Insomnia 04/22/2013    Benign essential HTN     Personal history of lymphoma     DVT (deep venous thrombosis) ([facility])       Active Hospital Problems    Diagnosis Date Noted    Respiratory syncytial virus (RSV) [B33.8] 01/05/2025    Tracheobronchitis [J40] 01/05/2025    Diabetes mellitus ([facility]) [E11.9] 01/05/2025    COPD exacerbation ([facility]) [J44.1] 01/05/2025    Atrial fibrillation with RVR ([facility]) [I48.91] 01/04/2025     Mrs. Landeros is a pleasant 74 year old female with a medical history significant for chronic atrial fibrillation (lost to cardiology follow up), hypertension, hyperlipidemia, coronary artery disease (CT 2023), chronic renal insufficiency, history of DVT, history of CML status post chemotherapy and history of heart failure with preserved ejection fraction who presents from home with shortness of breath and was found to be RSV positive and in atrial fibrillation with RVR.      Problem List:  1. Chronic atrial fibrillation ([id number] score 6).  2. Acute respiratory distress.  3. RSV infection  4. CKD III A   5. Hx of DVT following acute severe illness   6. Hx of CML s/p chemotherapy   7. Essential HTN, uncontrolled  8. HLD  9. HFpEF. Appears warm/dry     Assessment and Plan:  1. Chronic atrial fibrillation ([id number] score 6).  Patient is a pleasant 74-year-old female with a medical history significant for chronic atrial fibrillation, hypertension, hyperlipidemia, coronary artery disease by CT scan, history of DVT, and heart failure preserved ejection fraction who presents from home with acute respiratory distress likely secondary to RSV infection.  Suspect her RVR is driven by her acute infection from RSV.    -echo pending   - Continue digoxin 125 mcg daily.   - Has been weaned off diltiazem gtt.   - Increase PO diltiazem to 180mg daily  - pt

## 2025-01-06 NOTE — PLAN OF CARE
Problem: Discharge Planning  Goal: Discharge to home or other facility with appropriate resources  1/6/2025 1339 by Jelena Ceballos, RN  Outcome: Progressing  1/6/2025 1339 by Jelena Ceballos, RN  Outcome: Progressing  1/6/2025 0002 by Carey Darnell, RN  Outcome: Progressing

## 2025-01-06 NOTE — FLOWSHEET NOTE
01/06/25 0653   Vital Signs   Temp 98.8 °F (37.1 °C)   Temp Source Oral   Pulse 92   Heart Rate Source Monitor   Respirations 17   BP (!) 158/79   MAP (Calculated) 105   BP Location Left upper arm   BP Method Automatic   Patient Position High fowlers   Oxygen Therapy   SpO2 94 %   O2 Device None (Room air)       Shift assessment completed see flow sheet. Patient in bed alert and oriented x4. Patient on RA, showing no signs of distress. Morning medications given per order. Patient has no other needs at this time. Standard safety measures in place.

## 2025-01-06 NOTE — FLOWSHEET NOTE
01/05/25 2002   Assessment   Charting Type Shift assessment   Psychosocial   Psychosocial (WDL) WDL   Neurological   Neuro (WDL) WDL   Level of Consciousness 0   [place] Coma Scale   Eye Opening 4   Best Verbal Response 5   Best Motor Response 6   Reji Coma Scale Score 15   Respiratory   Respiratory (WDL) X   Respiratory Pattern Regular   Respiratory Depth Normal   Respiratory Quality/Effort Dyspnea with exertion   Chest Assessment Chest expansion symmetrical;Trachea midline   L Breath Sounds Diminished;Expiratory Wheezes   R Breath Sounds Diminished;Expiratory Wheezes   Cough/Sputum   Cough Productive;Congested   Sputum Amount Small   Cardiac   Cardiac (WDL) X   Cardiac Regularity Irregular   Heart Sounds S1, S2   Cardiac Rhythm Atrial fib   Cardiac Monitor   Telemetry Box Number 15   Alarm Audible Yes   Gastrointestinal   Abdominal (WDL) WDL   Genitourinary   Genitourinary (WDL) WDL   Skin Integumentary    Skin Integumentary (WDL) X   Skin Color Pale;Pink   Skin Condition/Temp Dry;Warm   Skin Integrity Redness   Location coccyx   Wound Prevention and Protection Methods   Location of Wound Prevention Buttocks;Coccyx;Sacrum   Orientation of Wound Prevention Mid;Posterior   Wound Offloading (Prevention Methods) Repositioning;Pillows   Musculoskeletal   Musculoskeletal (WDL) WDL

## 2025-01-06 NOTE — PROGRESS NOTES
Pulmonary Progress Note  CC: RSV shortness of breath     Subjective:  still some SOB      EXAM: /72   Pulse (!) 113   Temp 98.9 °F (37.2 °C) (Oral)   Resp 18   Ht 1.6 m (5' 3\")   Wt 95.7 kg (211 lb)   LMP  (LMP Unknown)   SpO2 96%   BMI 37.38 kg/m²  on RA  Constitutional:  No acute distress.   Eyes: PERRL. Conjunctivae anicteric.   ENT: Normal nose. Normal tongue.    Neck:  Trachea is midline.   Respiratory: No accessory muscle usage.  + wheezes. No rales. No Rhonchi.  Cardiovascular: Normal S1S2. No digit clubbing. No digit cyanosis. No LE edema.   Psychiatric: No anxiety or Agitation. Alert and Oriented to person, place and time.    Scheduled Meds:   warfarin  5 mg Oral Once    [START ON 1/7/2025] dilTIAZem  180 mg Oral Daily    dilTIAZem  30 mg Oral 4 times per day    digoxin  125 mcg Oral Daily    guaiFENesin  600 mg Oral BID    ipratropium 0.5 mg-albuterol 2.5 mg  1 Dose Inhalation 4x Daily RT    allopurinol  100 mg Oral BID    atorvastatin  40 mg Oral Daily    furosemide  20 mg Oral BID    insulin regular  12 Units SubCUTAneous TID AC    levothyroxine  100 mcg Oral QAM AC    losartan  25 mg Oral Daily    magnesium oxide  400 mg Oral QAM    megestrol  80 mg Oral BID    pantoprazole  40 mg Oral QAM AC    pentoxifylline  400 mg Oral Daily    sodium chloride flush  5-40 mL IntraVENous 2 times per day    predniSONE  20 mg Oral Daily    insulin glargine  50 Units SubCUTAneous Nightly    warfarin placeholder: dosing by pharmacy   Oral RX Placeholder     Continuous Infusions:   dilTIAZem Stopped (01/05/25 1100)    sodium chloride      dextrose       PRN Meds:  benzonatate, ipratropium 0.5 mg-albuterol 2.5 mg, perflutren lipid microspheres, sodium chloride flush, sodium chloride, potassium chloride **OR** potassium alternative oral replacement **OR** potassium chloride, magnesium sulfate, ondansetron **OR** ondansetron, polyethylene glycol, acetaminophen **OR** acetaminophen, glucose, dextrose bolus  **OR** dextrose bolus, glucagon (rDNA), dextrose    Labs:  CBC:   Recent Labs     01/04/25  1437 01/05/25  0717 01/06/25  0455   WBC 12.8* 12.1* 19.9*   HGB 16.4* 15.1 14.5   HCT 49.2* 45.8 45.1   .6* 101.0* 99.9    236 273     BMP:   Recent Labs     01/04/25  1437 01/05/25  0717 01/06/25  0455   * 132* 136   K 4.4 4.6 4.7   CL 95* 99 102   CO2 24 20* 23   BUN 20 22* 35*   CREATININE 1.3* 1.2 1.3*     Microbiology:  1/4 RSV detected  1/4 BC NGTD  1/4 COVID-19 and influenza not detected     Imaging:  Chest x-ray 1/4 images independently reviewed by me and showed:  No acute cardiopulmonary disease        ASSESSMENT:  RSV infection  Tracheobronchitis   Probable underlying COPD with mild exacerbation  A-fib on Coumadin  Former smoker 2 ppd for age 15-35     PLAN:  Supplemental oxygen to maintain SaO2 >92%; wean as tolerated  Inhaled bronchodilators  Quick prednisone taper  Zithromax for 5 days  Sputum culture  Acapella and Mucinex  Cardiology following  On Warfarin  Consider dc tomorrow

## 2025-01-06 NOTE — PROGRESS NOTES
Admit: 2025    Name:  Coco Landeros  Room:  /0315-01  MRN:    [id number]    Daily Progress Note for 2025   Admitted with ethan Piedra with RVR and RSV bronchitis    Interval History:     HR is better   +cough and wheezing but she does feel better today     Scheduled Meds:   warfarin  5 mg Oral Once    [START ON 2025] dilTIAZem  180 mg Oral Daily    dilTIAZem  30 mg Oral 4 times per day    digoxin  125 mcg Oral Daily    guaiFENesin  600 mg Oral BID    ipratropium 0.5 mg-albuterol 2.5 mg  1 Dose Inhalation 4x Daily RT    allopurinol  100 mg Oral BID    atorvastatin  40 mg Oral Daily    furosemide  20 mg Oral BID    insulin regular  12 Units SubCUTAneous TID AC    levothyroxine  100 mcg Oral QAM AC    losartan  25 mg Oral Daily    magnesium oxide  400 mg Oral QAM    megestrol  80 mg Oral BID    pantoprazole  40 mg Oral QAM AC    pentoxifylline  400 mg Oral Daily    sodium chloride flush  5-40 mL IntraVENous 2 times per day    predniSONE  20 mg Oral Daily    azithromycin  500 mg IntraVENous Q24H    insulin glargine  50 Units SubCUTAneous Nightly    warfarin placeholder: dosing by pharmacy   Oral RX Placeholder       Continuous Infusions:   dilTIAZem Stopped (25 1100)    sodium chloride      dextrose         PRN Meds:  benzonatate, ipratropium 0.5 mg-albuterol 2.5 mg, perflutren lipid microspheres, sodium chloride flush, sodium chloride, potassium chloride **OR** potassium alternative oral replacement **OR** potassium chloride, magnesium sulfate, ondansetron **OR** ondansetron, polyethylene glycol, acetaminophen **OR** acetaminophen, glucose, dextrose bolus **OR** dextrose bolus, glucagon (rDNA), dextrose           Objective:     Temp  Av.6 °F (37 °C)  Min: 98.3 °F (36.8 °C)  Max: 98.9 °F (37.2 °C)  Pulse  Av.7  Min: 87  Max: 113  BP  Min: 98/62  Max: 170/72  SpO2  Av %  Min: 94 %  Max: 96 %  Patient Vitals for the past 4 hrs:   BP Temp Temp src Pulse Resp SpO2   25 1224 113/72 --

## 2025-01-06 NOTE — PLAN OF CARE
Problem: Discharge Planning  Goal: Discharge to home or other facility with appropriate resources  1/6/2025 1339 by Jelena Ceballos, RN  Outcome: Progressing  1/6/2025 0002 by Carey Darnell RN  Outcome: Progressing     Problem: Chronic Conditions and Co-morbidities  Goal: Patient's chronic conditions and co-morbidity symptoms are monitored and maintained or improved  1/6/2025 0002 by Carey Darnell, RN  Outcome: Progressing

## 2025-01-07 LAB
DEPRECATED RDW RBC AUTO: 16.3 % (ref 12.4–15.4)
GLUCOSE BLD-MCNC: 132 MG/DL (ref 70–99)
GLUCOSE BLD-MCNC: 186 MG/DL (ref 70–99)
GLUCOSE BLD-MCNC: 238 MG/DL (ref 70–99)
GLUCOSE BLD-MCNC: 272 MG/DL (ref 70–99)
GLUCOSE BLD-MCNC: 359 MG/DL (ref 70–99)
GLUCOSE BLD-MCNC: 461 MG/DL (ref 70–99)
HCT VFR BLD AUTO: 45.3 % (ref 36–48)
HGB BLD-MCNC: 14.8 G/DL (ref 12–16)
INR PPP: 2.84 (ref 0.85–1.15)
MCH RBC QN AUTO: 32.7 PG (ref 26–34)
MCHC RBC AUTO-ENTMCNC: 32.7 G/DL (ref 31–36)
MCV RBC AUTO: 100 FL (ref 80–100)
PERFORMED ON: ABNORMAL
PLATELET # BLD AUTO: 280 K/UL (ref 135–450)
PMV BLD AUTO: 8.5 FL (ref 5–10.5)
PROTHROMBIN TIME: 29.7 SEC (ref 11.9–14.9)
RBC # BLD AUTO: 4.53 M/UL (ref 4–5.2)
WBC # BLD AUTO: 18.7 K/UL (ref 4–11)

## 2025-01-07 PROCEDURE — 94640 AIRWAY INHALATION TREATMENT: CPT

## 2025-01-07 PROCEDURE — 94761 N-INVAS EAR/PLS OXIMETRY MLT: CPT

## 2025-01-07 PROCEDURE — 94669 MECHANICAL CHEST WALL OSCILL: CPT

## 2025-01-07 PROCEDURE — 85610 PROTHROMBIN TIME: CPT

## 2025-01-07 PROCEDURE — 6370000000 HC RX 637 (ALT 250 FOR IP): Performed by: INTERNAL MEDICINE

## 2025-01-07 PROCEDURE — 6370000000 HC RX 637 (ALT 250 FOR IP): Performed by: STUDENT IN AN ORGANIZED HEALTH CARE EDUCATION/TRAINING PROGRAM

## 2025-01-07 PROCEDURE — 85027 COMPLETE CBC AUTOMATED: CPT

## 2025-01-07 PROCEDURE — 2060000000 HC ICU INTERMEDIATE R&B

## 2025-01-07 PROCEDURE — 36415 COLL VENOUS BLD VENIPUNCTURE: CPT

## 2025-01-07 PROCEDURE — 2500000003 HC RX 250 WO HCPCS: Performed by: NURSE PRACTITIONER

## 2025-01-07 PROCEDURE — 6370000000 HC RX 637 (ALT 250 FOR IP): Performed by: NURSE PRACTITIONER

## 2025-01-07 PROCEDURE — 99233 SBSQ HOSP IP/OBS HIGH 50: CPT | Performed by: INTERNAL MEDICINE

## 2025-01-07 RX ADMIN — ATORVASTATIN CALCIUM 40 MG: 40 TABLET, FILM COATED ORAL at 07:53

## 2025-01-07 RX ADMIN — IPRATROPIUM BROMIDE AND ALBUTEROL SULFATE 1 DOSE: 2.5; .5 SOLUTION RESPIRATORY (INHALATION) at 11:40

## 2025-01-07 RX ADMIN — ALLOPURINOL 100 MG: 100 TABLET ORAL at 07:53

## 2025-01-07 RX ADMIN — PREDNISONE 20 MG: 20 TABLET ORAL at 07:54

## 2025-01-07 RX ADMIN — PENTOXIFYLLINE 400 MG: 400 TABLET, EXTENDED RELEASE ORAL at 07:54

## 2025-01-07 RX ADMIN — FUROSEMIDE 20 MG: 20 TABLET ORAL at 07:54

## 2025-01-07 RX ADMIN — FUROSEMIDE 20 MG: 20 TABLET ORAL at 15:56

## 2025-01-07 RX ADMIN — INSULIN HUMAN 12 UNITS: 100 INJECTION, SOLUTION PARENTERAL at 15:53

## 2025-01-07 RX ADMIN — MEGESTROL ACETATE 80 MG: 40 TABLET ORAL at 07:54

## 2025-01-07 RX ADMIN — LOSARTAN POTASSIUM 25 MG: 25 TABLET, FILM COATED ORAL at 07:54

## 2025-01-07 RX ADMIN — DILTIAZEM HYDROCHLORIDE 30 MG: 60 TABLET ORAL at 00:31

## 2025-01-07 RX ADMIN — IPRATROPIUM BROMIDE AND ALBUTEROL SULFATE 1 DOSE: 2.5; .5 SOLUTION RESPIRATORY (INHALATION) at 21:39

## 2025-01-07 RX ADMIN — Medication 10 ML: at 07:53

## 2025-01-07 RX ADMIN — Medication 10 ML: at 21:12

## 2025-01-07 RX ADMIN — GUAIFENESIN 600 MG: 600 TABLET, EXTENDED RELEASE ORAL at 07:53

## 2025-01-07 RX ADMIN — MEGESTROL ACETATE 80 MG: 40 TABLET ORAL at 21:18

## 2025-01-07 RX ADMIN — INSULIN GLARGINE 60 UNITS: 100 INJECTION, SOLUTION SUBCUTANEOUS at 21:11

## 2025-01-07 RX ADMIN — Medication 400 MG: at 07:54

## 2025-01-07 RX ADMIN — DILTIAZEM HYDROCHLORIDE 180 MG: 180 CAPSULE, COATED, EXTENDED RELEASE ORAL at 07:53

## 2025-01-07 RX ADMIN — IPRATROPIUM BROMIDE AND ALBUTEROL SULFATE 1 DOSE: 2.5; .5 SOLUTION RESPIRATORY (INHALATION) at 08:07

## 2025-01-07 RX ADMIN — IPRATROPIUM BROMIDE AND ALBUTEROL SULFATE 1 DOSE: 2.5; .5 SOLUTION RESPIRATORY (INHALATION) at 15:50

## 2025-01-07 RX ADMIN — PANTOPRAZOLE SODIUM 40 MG: 40 TABLET, DELAYED RELEASE ORAL at 05:27

## 2025-01-07 RX ADMIN — INSULIN HUMAN 12 UNITS: 100 INJECTION, SOLUTION PARENTERAL at 07:54

## 2025-01-07 RX ADMIN — LEVOTHYROXINE SODIUM 100 MCG: 100 TABLET ORAL at 05:27

## 2025-01-07 RX ADMIN — ALLOPURINOL 100 MG: 100 TABLET ORAL at 21:11

## 2025-01-07 RX ADMIN — DIGOXIN 125 MCG: 125 TABLET ORAL at 07:54

## 2025-01-07 RX ADMIN — GUAIFENESIN 600 MG: 600 TABLET, EXTENDED RELEASE ORAL at 21:11

## 2025-01-07 RX ADMIN — INSULIN HUMAN 12 UNITS: 100 INJECTION, SOLUTION PARENTERAL at 12:25

## 2025-01-07 ASSESSMENT — PAIN SCALES - GENERAL: PAINLEVEL_OUTOF10: 0

## 2025-01-07 NOTE — PROGRESS NOTES
Cardiology peripherally following:  Telemetry reviewed, A-fib rate controlled 80s to 90s.  Echocardiogram yesterday showed preserved LV function 55 to 60%.  Mild RV dilation with normal function, moderate MR, mild TR, moderate biatrial enlargement The mild RV dilation may be related to underlying RSV lung infection versus KAMI.  Recommend outpatient sleep medicine referral.  Continue p.o. digoxin 125 mcg  Continued diltiazem 180 mg daily  Continue home Lasix  Continue home losartan; currently on 25 mg daily, can increase back to 50 mg daily given uncontrolled hypertension.  Continue statin  2-week cardiac event monitor at discharge; call the office if patient is being discharged today so we can place  Discussed with medicine that from a cardiology perspective, patient okay for discharge.  Will arrange follow-up with me at [place].     Russell Hollins DO  Cardiology

## 2025-01-07 NOTE — CARE COORDINATION
Pt had walk test. Pt will need home O2. Pt would like to use [employer] for her home O2 and nebulizer. Call placed to [facility] with [employer]. Awaiting return call

## 2025-01-07 NOTE — PROGRESS NOTES
Pharmacy Note  Warfarin Consult  Dx:  A-fib/flutter  [id number] Score = 7 (CHF, HTN, Age x2, DM, Vascular disease, Sex)  Goal INR range: 2-3   Home Warfarin dose: No dose on Tuesday, 5 mg all other days     Date                 INR                  Warfarin  1/4                   2.04                 5 mg  1/5                   1.94                 5 mg  1/6                   2.45                 5 mg  1/7                   2.84                 no dose     Recommend no dose of Warfarin tonight x1 per home schedule.  Daily INR ordered.  Rx will continue to manage therapy per consult order.    Chel Valdes, EzD, [facility], 1/7/2025 6:58 AM

## 2025-01-07 NOTE — PROGRESS NOTES
Pulmonary Progress Note  CC: RSV shortness of breath     Subjective:  still VIEYRA  Needed O2 on exertion      EXAM: BP (!) 144/66   Pulse (!) 103   Temp 98.2 °F (36.8 °C) (Oral)   Resp 18   Ht 1.6 m (5' 3\")   Wt 96.4 kg (212 lb 9 oz)   LMP  (LMP Unknown)   SpO2 96%   BMI 37.65 kg/m²  on RA  Constitutional:  No acute distress.   Eyes: PERRL. Conjunctivae anicteric.   ENT: Normal nose. Normal tongue.    Neck:  Trachea is midline.   Respiratory: No accessory muscle usage.  + wheezes. No rales. No Rhonchi.  Cardiovascular: Normal S1S2. No digit clubbing. No digit cyanosis. No LE edema.   Psychiatric: No anxiety or Agitation. Alert and Oriented to person, place and time.    Scheduled Meds:   dilTIAZem  180 mg Oral Daily    insulin glargine  60 Units SubCUTAneous Nightly    digoxin  125 mcg Oral Daily    guaiFENesin  600 mg Oral BID    ipratropium 0.5 mg-albuterol 2.5 mg  1 Dose Inhalation 4x Daily RT    allopurinol  100 mg Oral BID    atorvastatin  40 mg Oral Daily    furosemide  20 mg Oral BID    insulin regular  12 Units SubCUTAneous TID AC    levothyroxine  100 mcg Oral QAM AC    losartan  25 mg Oral Daily    magnesium oxide  400 mg Oral QAM    megestrol  80 mg Oral BID    pantoprazole  40 mg Oral QAM AC    pentoxifylline  400 mg Oral Daily    sodium chloride flush  5-40 mL IntraVENous 2 times per day    predniSONE  20 mg Oral Daily    warfarin placeholder: dosing by pharmacy   Oral RX Placeholder     Continuous Infusions:   sodium chloride      dextrose       PRN Meds:  benzonatate, ipratropium 0.5 mg-albuterol 2.5 mg, perflutren lipid microspheres, sodium chloride flush, sodium chloride, potassium chloride **OR** potassium alternative oral replacement **OR** potassium chloride, magnesium sulfate, ondansetron **OR** ondansetron, polyethylene glycol, acetaminophen **OR** acetaminophen, glucose, dextrose bolus **OR** dextrose bolus, glucagon (rDNA), dextrose    Labs:  CBC:   Recent Labs     01/05/25  0717

## 2025-01-07 NOTE — PROGRESS NOTES
Hand off report given to  ROBINSON Alves.   Patient is stable showing no signs of distress and has no current needs at this time.   Call light is in reach and bed is in lowest position.    Care is transferred at this time.

## 2025-01-07 NOTE — PROGRESS NOTES
Blood pressure (!) 145/99, pulse 80, temperature 98.4 °F (36.9 °C), temperature source Oral, resp. rate 20, height 1.6 m (5' 3\"), weight 96.4 kg (212 lb 9 oz), SpO2 94%, not currently breastfeeding.    Shift assessment completed see flow sheet. Patient in bed alert and oriented x4. Patient on RA, showing no signs of distress. Morning medications given per order. Patient has no other needs at this time.call light in reach. Bsc selfer.

## 2025-01-08 ENCOUNTER — TELEPHONE (OUTPATIENT)
Dept: CARDIOLOGY CLINIC | Age: 75
End: 2025-01-08

## 2025-01-08 ENCOUNTER — ANCILLARY PROCEDURE (OUTPATIENT)
Dept: CARDIOLOGY CLINIC | Age: 75
End: 2025-01-08
Payer: MEDICARE

## 2025-01-08 VITALS
BODY MASS INDEX: 37.3 KG/M2 | WEIGHT: 210.5 LBS | DIASTOLIC BLOOD PRESSURE: 91 MMHG | SYSTOLIC BLOOD PRESSURE: 158 MMHG | TEMPERATURE: 97.6 F | HEIGHT: 63 IN | OXYGEN SATURATION: 95 % | RESPIRATION RATE: 18 BRPM | HEART RATE: 105 BPM

## 2025-01-08 DIAGNOSIS — I48.91 ATRIAL FIBRILLATION WITH RVR (HCC): ICD-10-CM

## 2025-01-08 LAB
DEPRECATED RDW RBC AUTO: 15.9 % (ref 12.4–15.4)
GLUCOSE BLD-MCNC: 107 MG/DL (ref 70–99)
GLUCOSE BLD-MCNC: 128 MG/DL (ref 70–99)
GLUCOSE BLD-MCNC: 219 MG/DL (ref 70–99)
HCT VFR BLD AUTO: 43.7 % (ref 36–48)
HGB BLD-MCNC: 14.4 G/DL (ref 12–16)
INR PPP: 2.59 (ref 0.85–1.15)
MCH RBC QN AUTO: 32.7 PG (ref 26–34)
MCHC RBC AUTO-ENTMCNC: 32.9 G/DL (ref 31–36)
MCV RBC AUTO: 99.3 FL (ref 80–100)
PERFORMED ON: ABNORMAL
PLATELET # BLD AUTO: 301 K/UL (ref 135–450)
PMV BLD AUTO: 8.7 FL (ref 5–10.5)
PROTHROMBIN TIME: 27.7 SEC (ref 11.9–14.9)
RBC # BLD AUTO: 4.4 M/UL (ref 4–5.2)
WBC # BLD AUTO: 16.1 K/UL (ref 4–11)

## 2025-01-08 PROCEDURE — 6370000000 HC RX 637 (ALT 250 FOR IP): Performed by: NURSE PRACTITIONER

## 2025-01-08 PROCEDURE — 94761 N-INVAS EAR/PLS OXIMETRY MLT: CPT

## 2025-01-08 PROCEDURE — 6370000000 HC RX 637 (ALT 250 FOR IP): Performed by: INTERNAL MEDICINE

## 2025-01-08 PROCEDURE — 85610 PROTHROMBIN TIME: CPT

## 2025-01-08 PROCEDURE — 94640 AIRWAY INHALATION TREATMENT: CPT

## 2025-01-08 PROCEDURE — 2500000003 HC RX 250 WO HCPCS: Performed by: NURSE PRACTITIONER

## 2025-01-08 PROCEDURE — 99238 HOSP IP/OBS DSCHRG MGMT 30/<: CPT | Performed by: INTERNAL MEDICINE

## 2025-01-08 PROCEDURE — 36415 COLL VENOUS BLD VENIPUNCTURE: CPT

## 2025-01-08 PROCEDURE — 94669 MECHANICAL CHEST WALL OSCILL: CPT

## 2025-01-08 PROCEDURE — 93228 REMOTE 30 DAY ECG REV/REPORT: CPT | Performed by: INTERNAL MEDICINE

## 2025-01-08 PROCEDURE — 85027 COMPLETE CBC AUTOMATED: CPT

## 2025-01-08 PROCEDURE — 6370000000 HC RX 637 (ALT 250 FOR IP): Performed by: STUDENT IN AN ORGANIZED HEALTH CARE EDUCATION/TRAINING PROGRAM

## 2025-01-08 RX ORDER — IPRATROPIUM BROMIDE AND ALBUTEROL SULFATE 2.5; .5 MG/3ML; MG/3ML
3 SOLUTION RESPIRATORY (INHALATION) EVERY 4 HOURS PRN
Qty: 360 ML | Refills: 0 | Status: SHIPPED | OUTPATIENT
Start: 2025-01-08 | End: 2025-01-08

## 2025-01-08 RX ORDER — WARFARIN SODIUM 5 MG/1
5 TABLET ORAL
Status: DISCONTINUED | OUTPATIENT
Start: 2025-01-08 | End: 2025-01-08 | Stop reason: HOSPADM

## 2025-01-08 RX ORDER — FUROSEMIDE 20 MG/1
20 TABLET ORAL 2 TIMES DAILY
Qty: 60 TABLET | Refills: 1 | Status: SHIPPED | OUTPATIENT
Start: 2025-01-08

## 2025-01-08 RX ORDER — DIGOXIN 125 MCG
125 TABLET ORAL DAILY
Qty: 30 TABLET | Refills: 1 | Status: SHIPPED | OUTPATIENT
Start: 2025-01-09

## 2025-01-08 RX ORDER — PREDNISONE 10 MG/1
TABLET ORAL
Qty: 2 TABLET | Refills: 0 | Status: SHIPPED | OUTPATIENT
Start: 2025-01-09 | End: 2025-01-18

## 2025-01-08 RX ORDER — DILTIAZEM HYDROCHLORIDE 180 MG/1
180 CAPSULE, COATED, EXTENDED RELEASE ORAL DAILY
Qty: 30 CAPSULE | Refills: 1 | Status: SHIPPED | OUTPATIENT
Start: 2025-01-09

## 2025-01-08 RX ORDER — IPRATROPIUM BROMIDE AND ALBUTEROL SULFATE 2.5; .5 MG/3ML; MG/3ML
3 SOLUTION RESPIRATORY (INHALATION) EVERY 4 HOURS PRN
Qty: 360 ML | Refills: 0 | Status: SHIPPED | OUTPATIENT
Start: 2025-01-08

## 2025-01-08 RX ORDER — WARFARIN SODIUM 5 MG/1
5 TABLET ORAL DAILY
Qty: 102 TABLET | Refills: 0 | Status: SHIPPED | OUTPATIENT
Start: 2025-01-08

## 2025-01-08 RX ORDER — BENZONATATE 100 MG/1
100 CAPSULE ORAL 3 TIMES DAILY PRN
Qty: 20 CAPSULE | Refills: 0 | Status: SHIPPED | OUTPATIENT
Start: 2025-01-08 | End: 2025-01-15

## 2025-01-08 RX ADMIN — PREDNISONE 20 MG: 20 TABLET ORAL at 08:42

## 2025-01-08 RX ADMIN — Medication 10 ML: at 08:39

## 2025-01-08 RX ADMIN — IPRATROPIUM BROMIDE AND ALBUTEROL SULFATE 1 DOSE: 2.5; .5 SOLUTION RESPIRATORY (INHALATION) at 08:42

## 2025-01-08 RX ADMIN — PANTOPRAZOLE SODIUM 40 MG: 40 TABLET, DELAYED RELEASE ORAL at 05:17

## 2025-01-08 RX ADMIN — ATORVASTATIN CALCIUM 40 MG: 40 TABLET, FILM COATED ORAL at 08:42

## 2025-01-08 RX ADMIN — LOSARTAN POTASSIUM 25 MG: 25 TABLET, FILM COATED ORAL at 08:42

## 2025-01-08 RX ADMIN — PENTOXIFYLLINE 400 MG: 400 TABLET, EXTENDED RELEASE ORAL at 08:40

## 2025-01-08 RX ADMIN — MEGESTROL ACETATE 80 MG: 40 TABLET ORAL at 08:39

## 2025-01-08 RX ADMIN — GUAIFENESIN 600 MG: 600 TABLET, EXTENDED RELEASE ORAL at 08:42

## 2025-01-08 RX ADMIN — DILTIAZEM HYDROCHLORIDE 180 MG: 180 CAPSULE, COATED, EXTENDED RELEASE ORAL at 08:42

## 2025-01-08 RX ADMIN — LEVOTHYROXINE SODIUM 100 MCG: 100 TABLET ORAL at 05:17

## 2025-01-08 RX ADMIN — ALLOPURINOL 100 MG: 100 TABLET ORAL at 08:42

## 2025-01-08 RX ADMIN — Medication 400 MG: at 08:42

## 2025-01-08 RX ADMIN — FUROSEMIDE 20 MG: 20 TABLET ORAL at 08:42

## 2025-01-08 RX ADMIN — INSULIN HUMAN 12 UNITS: 100 INJECTION, SOLUTION PARENTERAL at 12:00

## 2025-01-08 RX ADMIN — IPRATROPIUM BROMIDE AND ALBUTEROL SULFATE 1 DOSE: 2.5; .5 SOLUTION RESPIRATORY (INHALATION) at 11:47

## 2025-01-08 RX ADMIN — DIGOXIN 125 MCG: 125 TABLET ORAL at 08:42

## 2025-01-08 RX ADMIN — INSULIN HUMAN 12 UNITS: 100 INJECTION, SOLUTION PARENTERAL at 08:45

## 2025-01-08 NOTE — PROGRESS NOTES
Physician Progress Note      PATIENT:               MARISA SANDERS  CSN #:                  [id number]  :                       1950  ADMIT DATE:       2025 2:08 PM  DISCH DATE:  RESPONDING  PROVIDER #:        Lena Elaine DO          QUERY TEXT:    Patient admitted with cough and Afib. Documentation reflects \"CAP\" per H/P but   not on subsequent notes.  If possible, please document in the progress notes   and discharge summary if pneumonia was:    The medical record reflects the following:  Risk Factors: Afib, + RSV, DM2, productive cough green sputum    Clinical Indicators: per H/P- \"CAP -rocephin, azithromycin, Mucinex\"  CXR-No acute process  + Rapid RSV antigen    Treatment: labs, droplet isolation, Pulmonary consult, IV Azithromycin,   Rocephin discontinued on   Options provided:  -- PNA ruled out after study  -- Bacterial pneumonia confirmed after study  -- Other - I will add my own diagnosis  -- Disagree - Not applicable / Not valid  -- Disagree - Clinically unable to determine / Unknown  -- Refer to Clinical Documentation Reviewer    PROVIDER RESPONSE TEXT:    PNA ruled out after study.    Query created by: Noris Murphy on 2025 3:37 PM      Electronically signed by:  Lena Elaine DO 2025 8:46 AM

## 2025-01-08 NOTE — PROGRESS NOTES
Admit: 2025    Name:  Coco Landeros  Room:  /0315-01  MRN:    [id number]    Daily Progress Note for 2025   Admitted with ethan Piedra with RVR and RSV bronchitis    Interval History:     Patient seen up trial of ambulation. Her sats dropped in 70s, she says she feels ill, short of breath. +cough. No new symptoms. Still feels much more short of breath than her usual. She does say at home she also gets short of breath but not this severe.     Scheduled Meds:   warfarin  5 mg Oral Once    dilTIAZem  180 mg Oral Daily    insulin glargine  60 Units SubCUTAneous Nightly    digoxin  125 mcg Oral Daily    guaiFENesin  600 mg Oral BID    ipratropium 0.5 mg-albuterol 2.5 mg  1 Dose Inhalation 4x Daily RT    allopurinol  100 mg Oral BID    atorvastatin  40 mg Oral Daily    furosemide  20 mg Oral BID    insulin regular  12 Units SubCUTAneous TID AC    levothyroxine  100 mcg Oral QAM AC    losartan  25 mg Oral Daily    magnesium oxide  400 mg Oral QAM    megestrol  80 mg Oral BID    pantoprazole  40 mg Oral QAM AC    pentoxifylline  400 mg Oral Daily    sodium chloride flush  5-40 mL IntraVENous 2 times per day    predniSONE  20 mg Oral Daily    warfarin placeholder: dosing by pharmacy   Oral RX Placeholder       Continuous Infusions:   sodium chloride      dextrose         PRN Meds:  benzonatate, ipratropium 0.5 mg-albuterol 2.5 mg, perflutren lipid microspheres, sodium chloride flush, sodium chloride, potassium chloride **OR** potassium alternative oral replacement **OR** potassium chloride, magnesium sulfate, ondansetron **OR** ondansetron, polyethylene glycol, acetaminophen **OR** acetaminophen, glucose, dextrose bolus **OR** dextrose bolus, glucagon (rDNA), dextrose           Objective:     Temp  Av.1 °F (36.7 °C)  Min: 97.3 °F (36.3 °C)  Max: 98.5 °F (36.9 °C)  Pulse  Av.2  Min: 90  Max: 108  BP  Min: 144/66  Max: 184/79  SpO2  Av %  Min: 75 %  Max: 96 %  Patient Vitals for the past 4 hrs:   BP Temp  atrial fibrillation (HCC) 10/16/2015    Acute diverticulitis 10/16/2015    Sensorineural hearing loss, bilateral 01/24/2023    Chronic renal disease, stage III (formerly [facility]) [623975] 08/01/2022    Tubular adenoma of colon 06/14/2022    Respiratory syncytial virus (RSV) 01/05/2025    Tracheobronchitis 01/05/2025    Diabetes mellitus (formerly [facility]) 01/05/2025    COPD exacerbation (formerly [facility]) 01/05/2025    Atrial fibrillation with RVR (formerly [facility]) 01/04/2025    Type 2 diabetes mellitus with chronic kidney disease 09/13/2024    Secondary hypercoagulable state (formerly [facility]) 06/12/2024    Vaginal bleeding 05/15/2024    Myoclonic jerking 06/21/2023    Internal and external hemorrhoids without complication     Multiple polyps of sigmoid colon     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/2021    CML (chronic myelocytic leukemia) (formerly [facility]) 04/13/2021    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 (formerly [facility]) 01/13/2021    Chronic combined systolic and diastolic congestive heart failure (formerly [facility]) 01/13/2021    Arthritis of finger of both hands 10/01/2020    Mucous cyst of digit of right hand 10/01/2020    Synovial cyst of hand 09/22/2020    Acute gout involving toe of left foot 12/03/2019    Plantar fasciitis 05/07/2019    Leg cramps 11/02/2018    Sigmoid polyp 05/23/2018    Polyp of ascending colon 05/23/2018    History of colon polyps 05/09/2018    Diabetic mononeuropathy associated with type 2 diabetes mellitus (formerly [facility]) 10/31/2017    Trigger finger of left thumb 04/27/2017    Trigger finger, left middle finger 04/27/2017    History of herpes zoster 04/10/2017    Morbid obesity due to excess calories 10/07/2016    Hypothyroidism due to Hashimoto's thyroiditis 10/07/2016    Medial meniscus tear 08/22/2016    CKD stage 4 due to type 2 diabetes mellitus (formerly [facility]) 06/26/2015    Long term current use of anticoagulant therapy 07/09/2014    Insulin-requiring or dependent type II diabetes mellitus (formerly [facility]) 03/07/2014    Peripheral vascular disease in diabetes mellitus (formerly [facility]) 03/07/2014

## 2025-01-08 NOTE — TELEPHONE ENCOUNTER
Monitor placed by BERNIE Cuellar RN  Monitor company [employer]  Length of monitor 14 days  Monitor ordered by Leighann  Patch ID [id number]  Patch ID [id number]    Phone ID [id number]  Activation successful prior to pt leaving office? Yes

## 2025-01-08 NOTE — FLOWSHEET NOTE
01/08/25 0734   Vital Signs   Temp 98 °F (36.7 °C)   Temp Source Oral   Pulse 90   Heart Rate Source Monitor   Respirations 18   BP (!) 155/63   MAP (Calculated) 94   BP Location Left upper arm   BP Method Automatic   Patient Position Semi fowlers     Assessment & vital signs completed. Morning meds given per MAR. Pt denies any further needs at this time. Call light within reach, pt is stable.

## 2025-01-08 NOTE — PLAN OF CARE
Problem: Chronic Conditions and Co-morbidities  Goal: Patient's chronic conditions and co-morbidity symptoms are monitored and maintained or improved  Outcome: Progressing     Problem: Discharge Planning  Goal: Discharge to home or other facility with appropriate resources  Outcome: Progressing     Problem: Pain  Goal: Verbalizes/displays adequate comfort level or baseline comfort level  Outcome: Progressing     Problem: Safety - Adult  Goal: Free from fall injury  Outcome: Progressing     Problem: ABCDS Injury Assessment  Goal: Absence of physical injury  Outcome: Progressing     Problem: Respiratory - Adult  Goal: Achieves optimal ventilation and oxygenation  Outcome: Progressing     Problem: Cardiovascular - Adult  Goal: Maintains optimal cardiac output and hemodynamic stability  Outcome: Progressing     Problem: Skin/Tissue Integrity - Adult  Goal: Skin integrity remains intact  Outcome: Progressing     Problem: Metabolic/Fluid and Electrolytes - Adult  Goal: Electrolytes maintained within normal limits  Outcome: Progressing

## 2025-01-08 NOTE — CARE COORDINATION
Met with pt at bedside. Pt is agreeable to DC home with family/friends. Pt does not qualify for home O2 at this time. Pt VU. Family to transport home.     IMM 1/8    O2 O2 Sat at rest on room air is 96 %.  O2 Sat with activity on room air is 94 %.     Pt did not require any O2 to supplement oxygen.

## 2025-01-08 NOTE — PROGRESS NOTES
Pt in bed, awake, A/O X4. Shift assessment complete, night meds given. No C/o pain at this time.  . No other needs expressed. Call light in reach. Will monitor. Caroline Duke RN

## 2025-01-08 NOTE — DISCHARGE INSTRUCTIONS
Follow-up with cardiology for heart  monitor results  Follow-up with primary care for hospital follow-up  Closely monitor your blood sugars. Decrease carbohydrates while on steroids.       Heart Failure Resources:  Heart Failure Interactive Workbook:  Go to https://ThoughtSpot.[employer] (Proprietary)/publication/?w=768566 for a Free Heart Failure Interactive Workbook provided by The American [employer] Association. This interactive workbook will provide information on Healthier Living with Heart Failure. Please copy and paste link into search bar. Use your mouse to scroll through the pages.    HF [place] sean:   Heart Failure Free smart phone sean available for iPhone and Android download. Use your phone to track sodium intake, fluid intake, symptoms, and weight.     Low Sodium Diet / Recipes:  Go to www.[employer] website for “renal” diet which is Low Sodium! [employer] is a dialysis company, but this website offers free seasonal cookbooks. Each quarter, they will release 25-30 new recipes with a breakdown of calories, sodium, and glucose. You can also go to www.Linkurious/recipes website for free recipes.     Discharge Instruction Video:  Scan the QR code below with your camera and click the canva.com link to open the video and watch educational information on Heart Failure and Medications from one of our nurses.   https://www.[employer]ktogo/design/DAFZnsH_JRk/8CcuaikTIGOmqNKupA7mgy/edit    Home Exercise Program:   Identification of Green/Yellow/Red zones:  You should be able to identify when you feel good (green zone), if you have 1-2 symptoms of HF (yellow zone), or if you are in need of medical attention (red zone).  In your CHF education folder you were provided a “stop light tool” to outline this information.     We want to you to rate your exertion levels:    Our therapy team has discussed means of identification with you such as the \"Jerome scale.\"  The Jerome rating scale ranges from 6 to 20, where 6 means \"no exertion at all\" and

## 2025-01-08 NOTE — PROGRESS NOTES
[facility]   COPD PROGRAM      NAME:  Coco Landeros  AGE: 74 y.o.   GENDER: female  : 1950  TODAY'S DATE:  2025    Subjective:     VISIT TYPE: Education    ADMIT DATE: 2025    PAST MEDICAL HISTORY:      Diagnosis Date    Arthritis     Atrial fibrillation (HCC)     Cancer (HCC)     lymphoma    CKD (chronic kidney disease)     Diabetes mellitus (HCC)     DVT (deep venous thrombosis) (HCC)     Full dentures     Gout 2019    Hx of blood clots     Hyperlipidemia     Hypertension     Hypotension     Wears glasses      HOME MEDICATIONS:  Prior to Admission medications    Medication Sig Start Date End Date Taking? Authorizing Provider   dilTIAZem (CARDIZEM CD) 180 MG extended release capsule Take 1 capsule by mouth daily 25  Yes Lena Elaine DO   digoxin (LANOXIN) 125 MCG tablet Take 1 tablet by mouth daily 25  Yes Lena Elaine DO   furosemide (LASIX) 20 MG tablet Take 1 tablet by mouth 2 times daily 25  Yes Lena Elaine DO   insulin detemir (LEVEMIR FLEXPEN) 100 UNIT/ML injection pen Inject 50 Units into the skin at bedtime 25  Yes Lena Elaine DO   benzonatate (TESSALON) 100 MG capsule Take 1 capsule by mouth 3 times daily as needed for Cough 1/8/25 1/15/25 Yes Lena Elaine DO   ipratropium 0.5 mg-albuterol 2.5 mg (DUONEB) 0.5-2.5 (3) MG/3ML SOLN nebulizer solution Inhale 3 mLs into the lungs every 4 hours as needed for Shortness of Breath 25  Yes Lena Elaine DO   predniSONE (DELTASONE) 10 MG tablet Take 4 tablets by mouth once daily for 5 days 25 Yes Lena Elaine DO   megestrol (MEGACE) 40 MG tablet Take 2 tablets by mouth 2 times daily   Yes Provider, MD Ginger   losartan (COZAAR) 25 MG tablet TAKE 1 TABLET BY MOUTH EVERY DAY 24  Yes Dominik Reed MD   pentoxifylline (TRENTAL) 400 MG extended release tablet TAKE 1 TABLET BY MOUTH EVERY DAY 11/15/24  Yes Lucie Mathis, APRN - CNP   atorvastatin (LIPITOR) 40 MG  tablet TAKE 1 TABLET BY MOUTH EVERY DAY 11/11/24  Yes Dominik Reed MD   Semaglutide,0.25 or 0.5MG/DOS, (OZEMPIC, 0.25 OR 0.5 MG/DOSE,) 2 MG/3ML SOPN INJECT 0.5 MG INTO THE SKIN ONE TIME PER WEEK  Patient taking differently: Every Thursday 10/25/24  Yes Dominik Reed MD   omeprazole (PRILOSEC) 40 MG delayed release capsule Take 1 capsule by mouth every morning Take 1 every morning 8/15/24  Yes Dominik Reed MD   levothyroxine (SYNTHROID) 100 MCG tablet TAKE 1 TABLET BY MOUTH EVERY DAY 7/3/24  Yes Dominik Reed MD   insulin regular (NOVOLIN R) 100 UNIT/ML injection USE AS DIRECTED PER SLIDING SCALE UP TO 26 UNITS DAILY  Patient taking differently: Inject 12 Units into the skin 3 times daily (before meals) 6/17/24  Yes Dominik Reed MD   allopurinol (ZYLOPRIM) 100 MG tablet TAKE 2 TABLETS BY MOUTH EVERY DAY  Patient taking differently: Take 1 tablet by mouth 2 times daily 12/11/23  Yes Dominik Reed MD   magnesium oxide (MAG-OX) 400 (240 Mg) MG tablet TAKE 1 TABLET BY MOUTH EVERY DAY  Patient taking differently: every morning 3/22/23  Yes Lucie Mathis APRN - CNP   fluticasone (FLONASE) 50 MCG/ACT nasal spray INHALE 1 SPRAY INTO EACH NOSTRIL EVERY DAY  Patient taking differently: daily as needed INHALE 1 SPRAY INTO EACH NOSTRIL EVERY DAY 5/11/22  Yes Dominik Reed MD   warfarin (COUMADIN) 5 MG tablet Take 1 tablet by mouth daily Every day except Tuesday; none on Tuesday 1/8/25   Anne Marie Badillo MD   ondansetron (ZOFRAN-ODT) 4 MG disintegrating tablet Take 1 tablet by mouth 3 times daily as needed for Nausea or Vomiting  Patient not taking: Reported on 1/4/2025 11/29/24   Roam Salinas APRN - CNP   nystatin (MYCOSTATIN) 358964 UNIT/GM cream Apply topically 2 times daily for 10 days  Patient not taking: Reported on 1/4/2025 8/8/24   Dominik Reed MD   Insulin Syringe-Needle U-100 (BD VEO INSULIN

## 2025-01-08 NOTE — PROGRESS NOTES
O2 Sat at rest on room air is 96 %.  O2 Sat with activity on room air is 94 %.    Pt did not require any O2 to supplement oxygen.

## 2025-01-08 NOTE — PROGRESS NOTES
Pt up to scale, very SOB. Back to bed and recovered. Denies needs at this time  . Caroline Duke, RN

## 2025-01-08 NOTE — PROGRESS NOTES
Hand off report given to  ROBINSON Velazquez.   Patient is stable showing no signs of distress and has no current needs at this time.   Call light is in reach and bed is in lowest position.    Care is transferred at this time.

## 2025-01-08 NOTE — PROGRESS NOTES
01/08/25 0843   RT Protocol   History Pulmonary Disease 2   Respiratory pattern 0   Breath sounds 4   Cough 1   Indications for Bronchodilator Therapy On home bronchodilators   Bronchodilator Assessment Score 7

## 2025-01-08 NOTE — DISCHARGE SUMMARY
Hospital Medicine Discharge Summary    Patient: Coco Landeros     Gender: female  : 1950   Age: 74 y.o.  MRN: [id number]    Admitting Physician: Olga Lidia Sol MD  Discharge Physician: Lena Elaine,     Code Status: Full Code     Admit Date: 2025   Discharge Date: 2025      Discharge Diagnoses:    Active Hospital Problems    Diagnosis Date Noted    Respiratory syncytial virus (RSV) [B33.8] 2025    Tracheobronchitis [J40] 2025    Diabetes mellitus (HCC) [E11.9] 2025    COPD exacerbation (HCC) [J44.1] 2025    Atrial fibrillation with RVR ([facility]) [I48.91] 2025     Condition at Discharge: Stable    Hospital Course:     Afib with RVR  -hx of chronic afib; in setting of RSV   -diltiazem gtt, coumadin   Cardiology consulted  -plan is for 2 week monitor at the time of discharge  -continue digoxin, diltiazem, warfarin     RSV   Tracheobronchitis  Acute hypoxemic resp failure. O2 sat low 70s on room air with ambulation, tachypnea, increased wob - resolved  Azithromycin  Low dose prednisone   -supportive care  -Continue steroids and breathing treatments  -walk test before dc - not needing oxygen with ambulation       Type 2 diabetes  - Lantus, AC regular insulin, carb controlled diet, BG ACHS     Hypothyroidism  - Synthroid     GERD  - Protonix      Additional findings or notes to primary provider:  None at this time    Discharge Medications:   Current Discharge Medication List        START taking these medications    Details   digoxin (LANOXIN) 125 MCG tablet Take 1 tablet by mouth daily  Qty: 30 tablet, Refills: 1      benzonatate (TESSALON) 100 MG capsule Take 1 capsule by mouth 3 times daily as needed for Cough  Qty: 20 capsule, Refills: 0      ipratropium 0.5 mg-albuterol 2.5 mg (DUONEB) 0.5-2.5 (3) MG/3ML SOLN nebulizer solution Inhale 3 mLs into the lungs every 4 hours as needed for Shortness of Breath  Qty: 360 mL, Refills: 0      predniSONE (DELTASONE) 10 MG tablet Take    allopurinol (ZYLOPRIM) 100 MG tablet TAKE 2 TABLETS BY MOUTH EVERY DAY  Qty: 180 tablet, Refills: 0    Associated Diagnoses: Chronic gout of multiple sites, unspecified cause      magnesium oxide (MAG-OX) 400 (240 Mg) MG tablet TAKE 1 TABLET BY MOUTH EVERY DAY  Qty: 90 tablet, Refills: 0      fluticasone (FLONASE) 50 MCG/ACT nasal spray INHALE 1 SPRAY INTO EACH NOSTRIL EVERY DAY  Qty: 48 g, Refills: 1    Associated Diagnoses: Acute ZACKARY (middle ear effusion), bilateral      ondansetron (ZOFRAN-ODT) 4 MG disintegrating tablet Take 1 tablet by mouth 3 times daily as needed for Nausea or Vomiting  Qty: 21 tablet, Refills: 0    Associated Diagnoses: Abscess of left thumb      nystatin (MYCOSTATIN) 821729 UNIT/GM cream Apply topically 2 times daily for 10 days  Qty: 3 g, Refills: 2      Insulin Syringe-Needle U-100 (BD VEO INSULIN SYRINGE U/F) 31G X 15/64\" 0.3 ML MISC Inject with insulin as prescribed  Qty: 300 each, Refills: 5    Associated Diagnoses: Insulin-requiring or dependent type II diabetes mellitus (HCC)      Insulin Pen Needle (BD PEN NEEDLE ELDER 2ND GEN) 32G X 4 MM MISC USE DAILY AS DIRECTED  Qty: 100 each, Refills: 11      ONETOUCH ULTRA strip USE TO TEST BLOOD SUGAR 3 TIMES A DAY  Qty: 300 strip, Refills: 0    Associated Diagnoses: Insulin-requiring or dependent type II diabetes mellitus (HCC); CKD stage 4 due to type 2 diabetes mellitus (HCC)      Continuous Blood Gluc Sensor (FREESTYLE IMELDA 3 SENSOR) MISC 1 Units by Does not apply route continuous  Qty: 2 each, Refills: 5      colchicine (COLCRYS) 0.6 MG tablet Take 1 p.o. twice daily may cut down to 1 a day or discontinue if diarrhea develops.  Qty: 20 tablet, Refills: 0    Associated Diagnoses: Exacerbation of gout      Lancets (ONETOUCH DELICA PLUS [id number]) MISC TEST 3 TIMES DAILY AS NEEDED  Qty: 100 each, Refills: 5    Associated Diagnoses: CKD stage 4 due to type 2 diabetes mellitus (HCC); Insulin-requiring or dependent type II diabetes mellitus

## 2025-01-08 NOTE — PROGRESS NOTES
[facility]   HEART FAILURE PROGRAM      NAME:  Coco Landeros  AGE: 74 y.o.   GENDER: female  : 1950  TODAY'S DATE:  2025    Subjective:     VISIT TYPE: Education    ADMIT DATE: 2025    PAST MEDICAL HISTORY:      Diagnosis Date    Arthritis     Atrial fibrillation (HCC)     Cancer (HCC)     lymphoma    CKD (chronic kidney disease)     Diabetes mellitus (HCC)     DVT (deep venous thrombosis) (HCC)     Full dentures     Gout 2019    Hx of blood clots     Hyperlipidemia     Hypertension     Hypotension     Wears glasses      HOME MEDICATIONS:  Prior to Admission medications    Medication Sig Start Date End Date Taking? Authorizing Provider   dilTIAZem (CARDIZEM CD) 180 MG extended release capsule Take 1 capsule by mouth daily 25  Yes Lena Elaine DO   digoxin (LANOXIN) 125 MCG tablet Take 1 tablet by mouth daily 25  Yes Lena Elaine DO   furosemide (LASIX) 20 MG tablet Take 1 tablet by mouth 2 times daily 25  Yes Lena Elaine DO   insulin detemir (LEVEMIR FLEXPEN) 100 UNIT/ML injection pen Inject 50 Units into the skin at bedtime 25  Yes Lena Elaine DO   benzonatate (TESSALON) 100 MG capsule Take 1 capsule by mouth 3 times daily as needed for Cough 1/8/25 1/15/25 Yes Lena Elaine DO   ipratropium 0.5 mg-albuterol 2.5 mg (DUONEB) 0.5-2.5 (3) MG/3ML SOLN nebulizer solution Inhale 3 mLs into the lungs every 4 hours as needed for Shortness of Breath 25  Yes Lena Elaine DO   predniSONE (DELTASONE) 10 MG tablet Take 4 tablets by mouth once daily for 5 days 25 Yes Lena Elaine DO   megestrol (MEGACE) 40 MG tablet Take 2 tablets by mouth 2 times daily   Yes Provider, MD Ginger   losartan (COZAAR) 25 MG tablet TAKE 1 TABLET BY MOUTH EVERY DAY 24  Yes Dominik Reed MD   pentoxifylline (TRENTAL) 400 MG extended release tablet TAKE 1 TABLET BY MOUTH EVERY DAY 11/15/24  Yes Lucie Mathis, APRN - CNP   atorvastatin (LIPITOR)  monitor, medications, daily weights, low sodium diet, 2000 ml fluid restriction, and activity. Reviewed HF Zones (green/yellow/red) and importance of reporting yellow symptoms on Magnet provided. Reinforced the importance of daily weights and to report weight gain of 3 lbs in one day and 5 lbs in one week to Provider. Provided patient with a paper copy weight log to keep track of daily weights. Patient was also provided a handout from the [facility] on an sean \"HF [place]\" to electronically record weights. Reviewed low sodium diet and fluid restrictions. Provided 32 oz labeled water pitcher to monitor intake of fluids.     Scheduled follow-up appointment with Dr. Badillo on 1/17/2025 at 0850am.    Provided Heart Failure Nurse resource number at [phone] for any further questions or assistance with resources.     HEART FAILURE MEDICATIONS:  Patient taking an ACEI/ARB/ARNI: Yes- Cozaar      Patient taking a BETA BLOCKER: No     Patient taking MRA: No     Patient taking SGLT2: No     Patient taking Diuretic: Yes- lasix      SCALE AVAILABLE: Yes Scale was provided to the patient for daily weight tracking.    CURRENT DIET: ADULT DIET; Regular; 4 carb choices (60 gm/meal); Low Sodium (2 gm)    Education:     EDUCATION STATUS: Patient receptive to education.  [x]  Provided both written and verbal education on Heart Failure signs & symptoms  [x]  Received verbal acknowledgment/understanding of Heart Failure related causes  [x]  Provided instructions on daily medications  [x]  Provided instructions to monitor and record weight daily  [x]  Provided instructions to call if weight increases 3 lbs in one day or 5 lbs in one week  [x]  Provided instructions on how to maintain a low sodium diet  [x]  Provided 32 oz labeled water pitcher to monitor intake of fluids  [x]  Provided recommendations on activity and exercise    []  Provided recommendations for smoking cessation programs      Former smoker-quit in 2001    EDUCATIONAL MATERIALS

## 2025-01-08 NOTE — PROGRESS NOTES
Pharmacy Note  Warfarin Consult  Dx:  A-fib/flutter  [id number] Score = 7 (CHF, HTN, Age x2, DM, Vascular disease, Sex)  Goal INR range: 2-3   Home Warfarin dose: No dose on Tuesday, 5 mg all other days    Recent Labs     01/06/25  0455 01/07/25  0503 01/08/25  0445   HGB 14.5 14.8 14.4   HCT 45.1 45.3 43.7    280 301     Date                 INR                  Warfarin  1/4                   2.04                 5 mg  1/5                   1.94                 5 mg  1/6                   2.45                 5 mg  1/7                   2.84                 no dose  1/8                   2.59                 5 mg     Recommend Warfarin 5 mg tonight x1.  Daily INR ordered.  Rx will continue to manage therapy per consult order.

## 2025-01-09 ENCOUNTER — TELEPHONE (OUTPATIENT)
Dept: FAMILY MEDICINE CLINIC | Age: 75
End: 2025-01-09

## 2025-01-09 LAB
BACTERIA BLD CULT ORG #2: NORMAL
BACTERIA BLD CULT: NORMAL

## 2025-01-09 NOTE — TELEPHONE ENCOUNTER
[facility] Transitions Initial Follow Up Call  Outreach made within 2 business days of discharge: Yes  Patient: Coco Landeros Patient : 1950   MRN: [id number]  Reason for Admission: There are no discharge diagnoses documented for the most recent discharge.  Discharge Date: 25     Spoke with: Edda  Discharge department/facility: [facility]  Non-face-to-face services provided:  Scheduled appointment with PCP-   Obtained and reviewed discharge summary and/or continuity of care documents    TCM Interactive Patient Contact:  Was patient able to fill all prescriptions: Yes  Was patient instructed to bring all medications to the follow-up visit: Yes  Is patient taking all medications as directed in the discharge summary? Yes  Does patient understand their discharge instructions: Yes  Does patient have questions or concerns that need addressed prior to 7-14 day follow up office visit: no    Scheduled appointment with PCP within 7-14 days    [facility]  .    Follow Up  Future Appointments   Date Time Provider Department Center   1/10/2025  9:00 AM SCHEDULE, [facility], LPN [facility]   2025  8:50 AM Anne Marie Badillo MD [facility]   2025  9:00 AM SCHEDULE, [facility]   2/10/2025  9:30 AM Anne Marie Badillo MD [facility]   2025  3:30 PM Russell Hollins DO [facility] PATEL RN  
None

## 2025-01-10 ENCOUNTER — TELEMEDICINE (OUTPATIENT)
Dept: FAMILY MEDICINE CLINIC | Age: 75
End: 2025-01-10

## 2025-01-10 DIAGNOSIS — Z00.00 MEDICARE ANNUAL WELLNESS VISIT, SUBSEQUENT: Primary | ICD-10-CM

## 2025-01-10 ASSESSMENT — PATIENT HEALTH QUESTIONNAIRE - PHQ9
1. LITTLE INTEREST OR PLEASURE IN DOING THINGS: NOT AT ALL
2. FEELING DOWN, DEPRESSED OR HOPELESS: NOT AT ALL
SUM OF ALL RESPONSES TO PHQ QUESTIONS 1-9: 0
SUM OF ALL RESPONSES TO PHQ9 QUESTIONS 1 & 2: 0
SUM OF ALL RESPONSES TO PHQ QUESTIONS 1-9: 0

## 2025-01-10 ASSESSMENT — LIFESTYLE VARIABLES
HOW OFTEN DO YOU HAVE A DRINK CONTAINING ALCOHOL: NEVER
HOW MANY STANDARD DRINKS CONTAINING ALCOHOL DO YOU HAVE ON A TYPICAL DAY: PATIENT DOES NOT DRINK

## 2025-01-10 NOTE — PROGRESS NOTES
Medicare Annual Wellness Visit    Coco Landeros is here for Medicare AWV    Assessment & Plan   Medicare annual wellness visit, subsequent     Return in 1 year (on 1/10/2026) for Medicare AWV.     Subjective       Patient's complete Health Risk Assessment and screening values have been reviewed and are found in Flowsheets. The following problems were reviewed today and where indicated follow up appointments were made and/or referrals ordered.    Positive Risk Factor Screenings with Interventions:             General HRA Questions:  Select all that apply: (!) New or Increased Fatigue  Interventions Fatigue:  Patient comments: stated that is was due to having RSV  See AVS for additional education material        Abnormal BMI (obese):  There is no height or weight on file to calculate BMI. (!) Abnormal  Interventions:  See AVS for additional education material         Hearing Screen:  Do you or your family notice any trouble with your hearing that hasn't been managed with hearing aids?: (!) Yes    Interventions:  Patient comments: stated that hearing aids did not do her any good  See AVS for additional education material      ADL's:   Patient reports needing help with:  Select all that apply: (!) Laundry, Housekeeping, Banking/Finances, Shopping, Food Preparation, Transportation  Interventions:  Lives with her daughter and she helps with her needs  See AVS for additional education material                  Objective    Patient-Reported Vitals  No data recorded             No Known Allergies  Prior to Visit Medications    Medication Sig Taking? Authorizing Provider   warfarin (COUMADIN) 5 MG tablet Take 1 tablet by mouth daily Every day except Tuesday; none on Tuesday Yes Anne Marie Badillo MD   dilTIAZem (CARDIZEM CD) 180 MG extended release capsule Take 1 capsule by mouth daily Yes Chele Lena, DO   digoxin (LANOXIN) 125 MCG tablet Take 1 tablet by mouth daily Yes Chele Lena, DO   furosemide (LASIX) 20 MG tablet Take

## 2025-01-16 NOTE — PROGRESS NOTES
Post-Discharge Transitional Care  Follow Up      Coco Landeros   YOB: 1950    Date of Office Visit:  1/17/2025  Date of Hospital Admission: 1/4/25  Date of Hospital Discharge: 1/8/25  Risk of hospital readmission (high >=14%. Medium >=10%) :Readmission Risk Score: 13.8      Care management risk score Rising risk (score 2-5) and Complex Care (Scores >=6): No Risk Score On File     Non face to face  following discharge, date last encounter closed (first attempt may have been earlier): 01/09/2025    Call initiated 2 business days of discharge: Yes    ASSESSMENT/PLAN:   Type 2 diabetes mellitus with stage 4 chronic kidney disease, with long-term current use of insulin (HCC)  -     insulin detemir (LEVEMIR FLEXPEN) 100 UNIT/ML injection pen; Inject 25 Units into the skin at bedtime, Disp-3 Adjustable Dose Pre-filled Pen Syringe, R-3Normal  Chronic atrial fibrillation (HCC)  -     POCT INR  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  Completed course of prednisone.  She is monitoring her weight daily and has been stable around 215 pounds since being home.  She takes Lasix once daily.  She has follow-up with cardiology in 1 month.  Patient's blood pressure 102/74 today, with reported lightheadedness at time when getting up as of recently.  Will adjust antihypertensive regimen to decrease losartan 25 mg dose to half a tablet daily instead, therefore 12.5 mg daily.  Advised patient that if she is unable to cut this medication in half, to stop it completely and monitor her blood pressure readings at home and notify us if they are

## 2025-01-17 ENCOUNTER — ANTI-COAG VISIT (OUTPATIENT)
Dept: FAMILY MEDICINE CLINIC | Age: 75
End: 2025-01-17

## 2025-01-17 ENCOUNTER — OFFICE VISIT (OUTPATIENT)
Dept: FAMILY MEDICINE CLINIC | Age: 75
End: 2025-01-17

## 2025-01-17 VITALS
HEART RATE: 79 BPM | BODY MASS INDEX: 38.01 KG/M2 | WEIGHT: 214.6 LBS | SYSTOLIC BLOOD PRESSURE: 102 MMHG | DIASTOLIC BLOOD PRESSURE: 74 MMHG | OXYGEN SATURATION: 93 %

## 2025-01-17 DIAGNOSIS — I50.42 CHRONIC COMBINED SYSTOLIC AND DIASTOLIC CONGESTIVE HEART FAILURE (HCC): ICD-10-CM

## 2025-01-17 DIAGNOSIS — Z09 HOSPITAL DISCHARGE FOLLOW-UP: ICD-10-CM

## 2025-01-17 DIAGNOSIS — C83.53 LYMPHOBLASTIC DIFFUSE LYMPHOMA OF INTRA-ABDOMINAL LYMPH NODES (HCC): ICD-10-CM

## 2025-01-17 DIAGNOSIS — E66.01 MORBID (SEVERE) OBESITY DUE TO EXCESS CALORIES: ICD-10-CM

## 2025-01-17 DIAGNOSIS — I48.20 CHRONIC ATRIAL FIBRILLATION (HCC): ICD-10-CM

## 2025-01-17 DIAGNOSIS — N18.4 TYPE 2 DIABETES MELLITUS WITH STAGE 4 CHRONIC KIDNEY DISEASE, WITH LONG-TERM CURRENT USE OF INSULIN (HCC): Primary | ICD-10-CM

## 2025-01-17 DIAGNOSIS — E11.22 TYPE 2 DIABETES MELLITUS WITH STAGE 4 CHRONIC KIDNEY DISEASE, WITH LONG-TERM CURRENT USE OF INSULIN (HCC): Primary | ICD-10-CM

## 2025-01-17 DIAGNOSIS — Z79.4 TYPE 2 DIABETES MELLITUS WITH STAGE 4 CHRONIC KIDNEY DISEASE, WITH LONG-TERM CURRENT USE OF INSULIN (HCC): Primary | ICD-10-CM

## 2025-01-17 DIAGNOSIS — I10 BENIGN ESSENTIAL HTN: ICD-10-CM

## 2025-01-17 LAB — INTERNATIONAL NORMALIZATION RATIO, POC: 2.9

## 2025-01-17 RX ORDER — LOSARTAN POTASSIUM 25 MG/1
12.5 TABLET ORAL DAILY
Qty: 90 TABLET | Refills: 0
Start: 2025-01-17

## 2025-01-17 RX ORDER — ONDANSETRON 4 MG/1
4 TABLET, ORALLY DISINTEGRATING ORAL 3 TIMES DAILY PRN
Qty: 21 TABLET | Refills: 0 | Status: SHIPPED | OUTPATIENT
Start: 2025-01-17

## 2025-01-17 RX ORDER — FLUTICASONE PROPIONATE 50 MCG
SPRAY, SUSPENSION (ML) NASAL
Qty: 48 G | Refills: 1 | Status: SHIPPED | OUTPATIENT
Start: 2025-01-17

## 2025-01-17 NOTE — PATIENT INSTRUCTIONS
Your blood pressure in office was 102/74 today.  If you can, cut your losartan 25 mg tablet into half to take 12.5 mg daily.  If you are unable to cut this medication in half, you may stop this medication and monitor your blood pressures regardless.  Notify us if your blood pressures are consistently over 150/90 if stopping this medication.

## 2025-01-20 ENCOUNTER — TELEPHONE (OUTPATIENT)
Dept: FAMILY MEDICINE CLINIC | Age: 75
End: 2025-01-20

## 2025-01-20 ENCOUNTER — TELEPHONE (OUTPATIENT)
Dept: ADMINISTRATIVE | Age: 75
End: 2025-01-20

## 2025-01-20 DIAGNOSIS — N18.4 TYPE 2 DIABETES MELLITUS WITH STAGE 4 CHRONIC KIDNEY DISEASE, WITH LONG-TERM CURRENT USE OF INSULIN (HCC): Primary | ICD-10-CM

## 2025-01-20 DIAGNOSIS — Z79.4 TYPE 2 DIABETES MELLITUS WITH STAGE 4 CHRONIC KIDNEY DISEASE, WITH LONG-TERM CURRENT USE OF INSULIN (HCC): Primary | ICD-10-CM

## 2025-01-20 DIAGNOSIS — E11.22 TYPE 2 DIABETES MELLITUS WITH STAGE 4 CHRONIC KIDNEY DISEASE, WITH LONG-TERM CURRENT USE OF INSULIN (HCC): Primary | ICD-10-CM

## 2025-01-20 RX ORDER — SEMAGLUTIDE 0.68 MG/ML
INJECTION, SOLUTION SUBCUTANEOUS
Qty: 3 ML | Refills: 3 | Status: CANCELLED | OUTPATIENT
Start: 2025-01-20

## 2025-01-20 RX ORDER — SEMAGLUTIDE 0.68 MG/ML
INJECTION, SOLUTION SUBCUTANEOUS
Qty: 3 ML | Refills: 3 | Status: SHIPPED | OUTPATIENT
Start: 2025-01-20

## 2025-01-20 NOTE — TELEPHONE ENCOUNTER
Submitted PA for Ozempic (0.25 or 0.5 MG/DOSE) 2MG/3ML pen-injectors  Via [facility]MM [id number] STATUS: NOT SENT    RX is under Dr.Charles Godinez. Will need updated RX from active provider to proceed.    If this requires a response please respond to the pool ( P [facility] MEDICATION PRE-AUTH).      Thank you please advise patient.

## 2025-01-20 NOTE — TELEPHONE ENCOUNTER
Pt called stating that the pharmacy called her and informed her that they can no longer get the levemir they are no longer making it. And she will need something different sent to [facility] in [place].

## 2025-01-21 NOTE — TELEPHONE ENCOUNTER
Submitted PA for Ozempic (0.25 or 0.5 MG/DOSE) 2MG/3ML pen-injectors  Via [facility] [id number] STATUS:     Information regarding your request  Your PA has been resolved, no additional PA is required. For further inquiries please contact the number on the back of the member prescription card. (Message [phone])     If this requires a response please respond to the pool ( P [facility] MEDICATION PRE-AUTH).      Thank you please advise patient.

## 2025-01-22 ENCOUNTER — TELEPHONE (OUTPATIENT)
Dept: FAMILY MEDICINE CLINIC | Age: 75
End: 2025-01-22

## 2025-01-22 NOTE — TELEPHONE ENCOUNTER
Pt calling again stating that we sent in another script for the levemir flexpen and they have discontinued that medication. She is needing a script for something else per the pharmacy. Please advise.

## 2025-01-23 ENCOUNTER — TELEPHONE (OUTPATIENT)
Dept: FAMILY MEDICINE CLINIC | Age: 75
End: 2025-01-23

## 2025-01-23 DIAGNOSIS — N18.4 TYPE 2 DIABETES MELLITUS WITH STAGE 4 CHRONIC KIDNEY DISEASE, WITH LONG-TERM CURRENT USE OF INSULIN (HCC): Primary | ICD-10-CM

## 2025-01-23 DIAGNOSIS — E11.22 TYPE 2 DIABETES MELLITUS WITH STAGE 4 CHRONIC KIDNEY DISEASE, WITH LONG-TERM CURRENT USE OF INSULIN (HCC): Primary | ICD-10-CM

## 2025-01-23 DIAGNOSIS — Z79.4 TYPE 2 DIABETES MELLITUS WITH STAGE 4 CHRONIC KIDNEY DISEASE, WITH LONG-TERM CURRENT USE OF INSULIN (HCC): Primary | ICD-10-CM

## 2025-01-23 RX ORDER — INSULIN GLARGINE 100 [IU]/ML
50 INJECTION, SOLUTION SUBCUTANEOUS NIGHTLY
Qty: 15 ML | Refills: 0 | Status: SHIPPED | OUTPATIENT
Start: 2025-01-23 | End: 2025-02-22

## 2025-01-23 NOTE — TELEPHONE ENCOUNTER
RN called [facility] and confirmed they are able to fill RX and insurance will cover. [facility]  switched to generic glargine inulin and it is now covered.   RN called patient to inform her that glargine inulin RX was sent to [facility] and it is now covered by her insurance.    RN will call patient in 1 week to discuss blood sugar results.

## 2025-01-23 NOTE — TELEPHONE ENCOUNTER
Pt has called a couple of times to get something different called in to replace her levemir, they have discontinued it. And she states that she is going to be out after today and needs something ASAP to be sent to [facility] in [place].

## 2025-01-23 NOTE — TELEPHONE ENCOUNTER
RN called and spoke to Jameson and Debbie pharmacist at [facility].  Levemir insulin is discontinued nationwide.  Lantus is not on formulary when they tried to send it.  Listed as PA needed.    Debbie pharmacist suggested PCP office or patient call Insurance company to find out what long acting insulin is covered.    RN called [facility] [phone] and spoke to Rajesh.  RN requested long tern acting insulin that is on formulary to replace Levemir insulin.  Happy stated that,  glargine (Abasaglar, Lantus, Semglee and Toujeo)   or degludec (Tresiba) are on formulary in flexpen.    RN confirmed with patient what dose of insulin she is taking.  Patient states she is taking Levemir flexpen 50 units a night.  Chart states :Levemir flexpen 25 units a night.    Patient states that she stopped taking Jardiance in September 2024.    And she just assumed that since Dr. Reed had decreased her Levemir flexpen from 50 units a night to 25 units a night when he put her on the Jardiance. She assumed that she should go back to the Levemir flexpen 50 units a night. She has been taking 50 units since September 2024.    RN instructed patient that she should always discuss any dose changes with the PCP because they need to make that decision and the records need to be correct and updated. This will also leave her with only half the amount of medication because the pharmacy is filling it based on the prescription sent.     RN asked what BS has been.    Patient is on the [employer] Parth. RN reviewed Parth View.   The range is :  47% in target (70 - 180)  20% high ( 181 -250)  32% very high ( >250)  1% low ( 54 -69)  Libreview report printed and added to patient media.     RN will contact patient every 1 -2 weeks for 2 months and then monthly to discuss insulin and BS with focus on diabetic education.

## 2025-01-23 NOTE — TELEPHONE ENCOUNTER
RN confirmed with patient what dose of insulin she is taking.  Patient states she is taking Levemir flexpen 50 units a night.  Chart states :Levemir flexpen 25 units a night.     Patient states that she stopped taking Jardiance in September 2024.     And she just assumed that since Dr. Reed had decreased her Levemir flexpen from 50 units a night to 25 units a night when he put her on the Jardiance. She assumed that she should go back to the Levemir flexpen 50 units a night. She has been taking 50 units since September 2024.     RN instructed patient that she should always discuss any dose changes with the PCP because they need to make that decision and the records need to be correct and updated. This will also leave her with only half the amount of medication because the pharmacy is filling it based on the prescription sent.      RN asked what BS has been.    Patient is on the [employer] Parth. RN reviewed Parth View.   The range is :  47% in target (70 - 180)  20% high ( 181 -250)  32% very high ( >250)  1% low ( 54 -69)  Libreview report printed and added to patient media.      RN will contact patient every 1 -2 weeks for 2 months and then monthly to discuss insulin and BS with focus on diabetic education

## 2025-01-27 ENCOUNTER — NURSE ONLY (OUTPATIENT)
Dept: FAMILY MEDICINE CLINIC | Age: 75
End: 2025-01-27

## 2025-01-27 DIAGNOSIS — I48.20 CHRONIC ATRIAL FIBRILLATION (HCC): Primary | ICD-10-CM

## 2025-01-27 LAB — INTERNATIONAL NORMALIZATION RATIO, POC: 3.8

## 2025-01-27 NOTE — PROGRESS NOTES
Physician Progress Note      PATIENT:               MARISA SANDERS  [facility] #:                  [id number]  :                       1950  ADMIT DATE:       2025 2:08 PM  DISCH DATE:        2025 4:40 PM  RESPONDING  PROVIDER #:        Lena Elaine DO          QUERY TEXT:    Pt admitted with Afib RVR. Pt noted to have WBC 12.8, RR 22-27, lactic acid   2.3. If possible, please document in the progress notes and discharge summary   if you are evaluating and /or treating any of the following:  The medical record reflects the following:    Risk Factors: AFib RVR, RSV tracheobronchitis,    Clinical Indicators: on arrival to ED - WBC 12.8, lactic acid   2.1->2.3->2.4->2.5, RR 22-27, -132  Per query response progress note  -\"PNA ruled out after study.\"  Per DCS- \"RSV tracheobronchitis\"    Treatment: Azithromycin, solumedrol, 1L NS IVF bolus, prednisone, no growth to   blood cultures, DuoNeb's  Options provided:  -- RSV bronchitis only, no sepsis  -- Sepsis due to RSV bronchitis  -- Other - I will add my own diagnosis  -- Disagree - Not applicable / Not valid  -- Disagree - Clinically unable to determine / Unknown  -- Refer to Clinical Documentation Reviewer    PROVIDER RESPONSE TEXT:    Sepsis due to RSV bronchitis.    Query created by: Noris Murphy on 2025 8:37 AM      QUERY TEXT:    Patient admitted with RSV infection. Noted documentation of acute respiratory   failure in IM note  and no O2 used during admission. In order to support   the diagnosis of acute respiratory failure, please include additional clinical   indicators in your documentation.  Or please document if the diagnosis of   acute respiratory failure has been ruled out after further study.    The medical record reflects the following:  Risk Factors: RSV infection, tracheobronchitis    Clinical Indicators: Per progress note -\"Acute hypoxemic resp failure. O2   sat low 70s on room air with ambulation, tachypnea, increased

## 2025-01-27 NOTE — PROGRESS NOTES
She is currently taking 5 mg every day besides Tuesday she doesn't take any.     Also states her BP has been running low at home. Top number in the 80s but does not remember the bottom number. In office today it was 99/66.

## 2025-01-29 ENCOUNTER — TELEPHONE (OUTPATIENT)
Dept: CARDIAC REHAB | Age: 75
End: 2025-01-29

## 2025-01-29 ENCOUNTER — NURSE ONLY (OUTPATIENT)
Dept: FAMILY MEDICINE CLINIC | Age: 75
End: 2025-01-29

## 2025-01-29 DIAGNOSIS — I48.20 CHRONIC ATRIAL FIBRILLATION (HCC): Primary | ICD-10-CM

## 2025-01-29 LAB — INTERNATIONAL NORMALIZATION RATIO, POC: 4.6

## 2025-01-29 NOTE — PROGRESS NOTES
Patient misunderstood the message left for her on Monday regarding her warfarin instructions. The message said take 2 Monday and nothing Tuesday recheck today. She thought we meant two 5 mg warfarin pills Monday not 2 mg. So she took 10 mg Monday and none yesterday. If she needs to take something other then 5 or 2.5 mg this will need to be sent to [facility] tristan.     Who ever gets this and calls the patient she is likely going to let it go to voicemail so please make sure you say the exact milligram she is to take and not the number of pills.

## 2025-01-30 RX ORDER — DIGOXIN 125 MCG
125 TABLET ORAL DAILY
Qty: 90 TABLET | Refills: 1 | OUTPATIENT
Start: 2025-01-30

## 2025-01-31 ENCOUNTER — NURSE ONLY (OUTPATIENT)
Dept: FAMILY MEDICINE CLINIC | Age: 75
End: 2025-01-31

## 2025-01-31 ENCOUNTER — TELEPHONE (OUTPATIENT)
Dept: FAMILY MEDICINE CLINIC | Age: 75
End: 2025-01-31

## 2025-01-31 DIAGNOSIS — I48.20 CHRONIC ATRIAL FIBRILLATION (HCC): Primary | ICD-10-CM

## 2025-01-31 LAB — INTERNATIONAL NORMALIZATION RATIO, POC: 2

## 2025-01-31 NOTE — TELEPHONE ENCOUNTER
Pt called the office back and states that she stopped taking her losartain 12.5 mg on Monday per the direction of the office. (Unsure who she spoke to)     She is taking the diltiazem 180 mg and she says her bp was 79/53 the day she stopped taking her medication.     They have been running 190-79 and lower since she stopped taking medication.

## 2025-01-31 NOTE — TELEPHONE ENCOUNTER
When pt came into get her INR she asked that we check her BP as well   Below are the readings of the BP check.    155/84  73  98%    151/77  75  97%

## 2025-02-04 DIAGNOSIS — J44.9 CHRONIC OBSTRUCTIVE PULMONARY DISEASE, UNSPECIFIED COPD TYPE (HCC): ICD-10-CM

## 2025-02-04 DIAGNOSIS — J40 TRACHEOBRONCHITIS: ICD-10-CM

## 2025-02-04 RX ORDER — IPRATROPIUM BROMIDE AND ALBUTEROL SULFATE 2.5; .5 MG/3ML; MG/3ML
SOLUTION RESPIRATORY (INHALATION)
Qty: 360 ML | Refills: 0 | OUTPATIENT
Start: 2025-02-04

## 2025-02-04 ASSESSMENT — EJECTION FRACTION: EF_VALUE: 55

## 2025-02-05 ENCOUNTER — HOSPITAL ENCOUNTER (OUTPATIENT)
Dept: CARDIAC REHAB | Age: 75
Setting detail: THERAPIES SERIES
Discharge: HOME | End: 2025-02-05
Attending: INTERNAL MEDICINE
Payer: MEDICARE

## 2025-02-05 VITALS
BODY MASS INDEX: 38.56 KG/M2 | RESPIRATION RATE: 18 BRPM | OXYGEN SATURATION: 98 % | SYSTOLIC BLOOD PRESSURE: 123 MMHG | DIASTOLIC BLOOD PRESSURE: 81 MMHG | WEIGHT: 217.7 LBS | HEART RATE: 85 BPM

## 2025-02-05 PROCEDURE — G0239 OTH RESP PROC, GROUP: HCPCS

## 2025-02-05 RX ORDER — CHOLECALCIFEROL (VITAMIN D3) 50 MCG
1 TABLET ORAL DAILY
COMMUNITY

## 2025-02-05 ASSESSMENT — PATIENT HEALTH QUESTIONNAIRE - PHQ9
1. LITTLE INTEREST OR PLEASURE IN DOING THINGS: NOT AT ALL
9. THOUGHTS THAT YOU WOULD BE BETTER OFF DEAD, OR OF HURTING YOURSELF: NOT AT ALL
2. FEELING DOWN, DEPRESSED OR HOPELESS: NOT AT ALL
5. POOR APPETITE OR OVEREATING: NOT AT ALL
6. FEELING BAD ABOUT YOURSELF - OR THAT YOU ARE A FAILURE OR HAVE LET YOURSELF OR YOUR FAMILY DOWN: NOT AT ALL
SUM OF ALL RESPONSES TO PHQ QUESTIONS 1-9: 5
SUM OF ALL RESPONSES TO PHQ9 QUESTIONS 1 & 2: 0
SUM OF ALL RESPONSES TO PHQ QUESTIONS 1-9: 5
4. FEELING TIRED OR HAVING LITTLE ENERGY: MORE THAN HALF THE DAYS
10. IF YOU CHECKED OFF ANY PROBLEMS, HOW DIFFICULT HAVE THESE PROBLEMS MADE IT FOR YOU TO DO YOUR WORK, TAKE CARE OF THINGS AT HOME, OR GET ALONG WITH OTHER PEOPLE: NOT DIFFICULT AT ALL
SUM OF ALL RESPONSES TO PHQ QUESTIONS 1-9: 5
8. MOVING OR SPEAKING SO SLOWLY THAT OTHER PEOPLE COULD HAVE NOTICED. OR THE OPPOSITE, BEING SO FIGETY OR RESTLESS THAT YOU HAVE BEEN MOVING AROUND A LOT MORE THAN USUAL: NOT AT ALL
3. TROUBLE FALLING OR STAYING ASLEEP: SEVERAL DAYS
SUM OF ALL RESPONSES TO PHQ QUESTIONS 1-9: 5
7. TROUBLE CONCENTRATING ON THINGS, SUCH AS READING THE NEWSPAPER OR WATCHING TELEVISION: MORE THAN HALF THE DAYS

## 2025-02-05 ASSESSMENT — SOCIAL DETERMINANTS OF HEALTH (SDOH)
WITHIN THE LAST YEAR, HAVE YOU BEEN HUMILIATED OR EMOTIONALLY ABUSED IN OTHER WAYS BY YOUR PARTNER OR EX-PARTNER?: NO
WITHIN THE LAST YEAR, HAVE YOU BEEN AFRAID OF YOUR PARTNER OR EX-PARTNER?: NO
WITHIN THE LAST YEAR, HAVE TO BEEN RAPED OR FORCED TO HAVE ANY KIND OF SEXUAL ACTIVITY BY YOUR PARTNER OR EX-PARTNER?: NO
HOW OFTEN DO YOU GET TOGETHER WITH FRIENDS OR RELATIVES?: MORE THAN THREE TIMES A WEEK
DO YOU BELONG TO ANY CLUBS OR ORGANIZATIONS SUCH AS CHURCH GROUPS UNIONS, FRATERNAL OR ATHLETIC GROUPS, OR SCHOOL GROUPS?: NO
HOW OFTEN DO YOU ATTENT MEETINGS OF THE CLUB OR ORGANIZATION YOU BELONG TO?: NEVER
HOW HARD IS IT FOR YOU TO PAY FOR THE VERY BASICS LIKE FOOD, HOUSING, MEDICAL CARE, AND HEATING?: NOT HARD AT ALL
WITHIN THE LAST YEAR, HAVE YOU BEEN KICKED, HIT, SLAPPED, OR OTHERWISE PHYSICALLY HURT BY YOUR PARTNER OR EX-PARTNER?: NO
IN A TYPICAL WEEK, HOW MANY TIMES DO YOU TALK ON THE PHONE WITH FAMILY, FRIENDS, OR NEIGHBORS?: MORE THAN THREE TIMES A WEEK
HOW OFTEN DO YOU ATTEND CHURCH OR RELIGIOUS SERVICES?: NEVER
ARE YOU MARRIED, WIDOWED, DIVORCED, SEPARATED, NEVER MARRIED, OR LIVING WITH A PARTNER?: NEVER MARRIED

## 2025-02-05 ASSESSMENT — LIFESTYLE VARIABLES
ALCOHOL_USE: NO
SMOKELESS_TOBACCO: NO

## 2025-02-05 NOTE — PLAN OF CARE
Pulmonary Rehab Treatment Plan   Name: Coco Landeros Assessment Date: 2025   : 1950 Primary Diagnosis: COPD   Age: 74 y.o. Referring Physician: Olga Lidia Sol   MRN: [id number] Primary Care Physician: Anne Marie Badillo MD       Treatment Diagnosis  Treatment Diagnosis 1: COPD  Referral Date: 25    Oxygen Saturation / Titration   Stages of Change : Action    Oxygen Assessment  Stages of Change : Action  Oxygen Type : -- (pt does not use 02 at home)  O2 Flow Rate (l/min) - Rest: 0 l/min  O2 Flow Rate (l/min) - Exercise: 0 l/min  O2 Flow Rate (l/min) - Sleep: 0 l/min  O2 Sat Greater Than 90%: Yes    Individual Treatment Plan  ITP Visit Type: Initial assessment  Visit #/Total Visits:   EF%: 55 % (25)  FVC (Liters): 0 liters (No PFT ordered or completed.)  Pulmonary ITP: Exercise; Psychosocial; Nutrition; Education    COPD Assessment Test (CAT)  Cough : 2  Phlegm (mucus): 2  Chest Tightness: 0  Breathless When Walking Up a Hill or Flight of Steps: 5  Activities at Home: 0  Confident Leaving Home Due to Lung Condition: 0  Sleep Soundly: 0  Energy Level: 3  CAT Total Score : 12     Dyspnea (Shortness of Breath) Evaluation  Resting, Lying Down: 1  Walking on a Level at Your Own Pace: 1  Walking on a Level with Others Your Age: 1  Walking Up a Hill: 3  Walking Up Stairs: 3  While Eatin  Standing Up From a Chair: 4  Brushing Teeth: 1  Shaving and/or Brushing Hair: 1  Showering/Bathin  Dressin  Picking Up and Straightenin  Doing Dishes: 1  Sweeping/Vacuumin  Making Bed: 2  Shoppin  Doing Laundry: 1  Washing Car: 0  Mowing Lawn: 0  Watering Lawn: 0  Sexual Activities: 0  How Much Does Shortness of Breath Limit You In Your Daily Life: 2  How Much Does the Fear of \"Hurting Myself\" by Overexerting Limit You in Your Daily Life: 2  How Much does the Fear of Future Shortness of Breath Limit You in Your Daily Life: 2  Dyspnea (Shortness of Breath) Evaluation Total

## 2025-02-06 ENCOUNTER — NURSE ONLY (OUTPATIENT)
Dept: FAMILY MEDICINE CLINIC | Age: 75
End: 2025-02-06

## 2025-02-06 ENCOUNTER — TELEPHONE (OUTPATIENT)
Dept: FAMILY MEDICINE CLINIC | Age: 75
End: 2025-02-06

## 2025-02-06 DIAGNOSIS — I48.20 CHRONIC ATRIAL FIBRILLATION (HCC): Primary | ICD-10-CM

## 2025-02-06 LAB — INTERNATIONAL NORMALIZATION RATIO, POC: 2

## 2025-02-06 NOTE — TELEPHONE ENCOUNTER
Pt came In for bp check she does monitor at home but cannot remember the readings. In office today it was 147/55

## 2025-02-10 LAB — ECHO BSA: 2.06 M2

## 2025-02-11 ENCOUNTER — HOSPITAL ENCOUNTER (OUTPATIENT)
Dept: CARDIAC REHAB | Age: 75
Setting detail: THERAPIES SERIES
Discharge: HOME | End: 2025-02-11
Attending: INTERNAL MEDICINE
Payer: MEDICARE

## 2025-02-12 NOTE — PROGRESS NOTES
CARDIOLOGY HOSPITAL FOLLOW UP         Patient Name: Coco Landeros  Primary Care physician: Anne Marie Badillo MD    Reason for Referral/Chief Complaint: Coco Landeros is a 74 y.o. patient who presents today for a hospital follow up.    History of Present Illness:   Coco Landeros is a 74 y.o. female with a pmhx of chronic atrial fibrillation, hypertension, hyperlipidemia, coronary artery disease (CT 2023), chronic renal insufficiency, history of DVT, history of CML status post chemotherapy and history of heart failure with preserved ejection fraction, mild arterial disease in the right upper extremity. Admitted 1/4/25 with shortness of breath. RSV positive. Echo EF of 55-60%, moderate MR. Mild TR. D/C on digozin 125mcg. Patient wore a cardiac event monitor from 1/8 to 1/22/25 which demonstrated predominately rate controlled atrial fibrillation with an average HR of 80 () BPM.       Today, she is here with friend. She reports increased shortness of breath with exertion. She states today at pulmonary rehab her oxygen was low with exertion. She has a history of mild arterial disease in the right upper extremity with known unequal blood pressures with right brachial pressures lower than left brachial. Follows with vascular-medical mgmt given no symptoms.      The patient denies chest pain, shortness of breath at rest. Denies palpitations, dizziness, near-syncope or susan syncope. Denies paroxysmal nocturnal dyspnea, orthopnea, bendopnea, increasing lower extremity edema or weight gain.  She is currently having postmenopausal bleeding, urology is working this up.     Patient has been on coumadin since diagnosed with Afib. Hx of DVT per patient sounds like it was provoked.     Former smoker.     Past Medical History:   has a past medical history of Arthritis, Atrial fibrillation (HCC), Cancer (HCC), CKD (chronic kidney disease), COPD (chronic obstructive pulmonary disease) (HCC), Diabetes mellitus (HCC),

## 2025-02-13 ENCOUNTER — NURSE ONLY (OUTPATIENT)
Dept: FAMILY MEDICINE CLINIC | Age: 75
End: 2025-02-13

## 2025-02-13 ENCOUNTER — HOSPITAL ENCOUNTER (OUTPATIENT)
Dept: CARDIAC REHAB | Age: 75
Setting detail: THERAPIES SERIES
Discharge: HOME | End: 2025-02-13
Attending: INTERNAL MEDICINE
Payer: MEDICARE

## 2025-02-13 ENCOUNTER — OFFICE VISIT (OUTPATIENT)
Dept: CARDIOLOGY CLINIC | Age: 75
End: 2025-02-13
Payer: MEDICARE

## 2025-02-13 VITALS
SYSTOLIC BLOOD PRESSURE: 116 MMHG | DIASTOLIC BLOOD PRESSURE: 68 MMHG | HEIGHT: 63 IN | BODY MASS INDEX: 38.95 KG/M2 | WEIGHT: 219.8 LBS | OXYGEN SATURATION: 98 % | HEART RATE: 67 BPM

## 2025-02-13 VITALS — WEIGHT: 216.9 LBS | BODY MASS INDEX: 38.42 KG/M2

## 2025-02-13 DIAGNOSIS — I10 BENIGN ESSENTIAL HTN: Primary | ICD-10-CM

## 2025-02-13 DIAGNOSIS — Z86.718 HISTORY OF DVT (DEEP VEIN THROMBOSIS): ICD-10-CM

## 2025-02-13 DIAGNOSIS — I48.20 CHRONIC ATRIAL FIBRILLATION (HCC): ICD-10-CM

## 2025-02-13 DIAGNOSIS — Z79.01 LONG TERM CURRENT USE OF ANTICOAGULANT THERAPY: ICD-10-CM

## 2025-02-13 DIAGNOSIS — I50.42 CHRONIC COMBINED SYSTOLIC AND DIASTOLIC CONGESTIVE HEART FAILURE (HCC): ICD-10-CM

## 2025-02-13 DIAGNOSIS — I87.2 CHRONIC VENOUS STASIS DERMATITIS OF LEFT LOWER EXTREMITY: ICD-10-CM

## 2025-02-13 DIAGNOSIS — C92.10 CML (CHRONIC MYELOCYTIC LEUKEMIA) (HCC): ICD-10-CM

## 2025-02-13 DIAGNOSIS — I48.20 CHRONIC ATRIAL FIBRILLATION (HCC): Primary | ICD-10-CM

## 2025-02-13 DIAGNOSIS — Z85.72 PERSONAL HISTORY OF LYMPHOMA: ICD-10-CM

## 2025-02-13 DIAGNOSIS — E11.51 PERIPHERAL VASCULAR DISEASE IN DIABETES MELLITUS (HCC): ICD-10-CM

## 2025-02-13 DIAGNOSIS — E11.22 CKD STAGE 4 DUE TO TYPE 2 DIABETES MELLITUS (HCC): ICD-10-CM

## 2025-02-13 DIAGNOSIS — N18.4 CKD STAGE 4 DUE TO TYPE 2 DIABETES MELLITUS (HCC): ICD-10-CM

## 2025-02-13 PROBLEM — N25.0 ROD (RENAL OSTEODYSTROPHY): Status: ACTIVE | Noted: 2020-07-24

## 2025-02-13 PROBLEM — N85.02 ENDOMETRIAL INTRAEPITHELIAL NEOPLASIA (EIN): Status: ACTIVE | Noted: 2025-02-13

## 2025-02-13 LAB — INTERNATIONAL NORMALIZATION RATIO, POC: 3.1

## 2025-02-13 PROCEDURE — 1036F TOBACCO NON-USER: CPT | Performed by: STUDENT IN AN ORGANIZED HEALTH CARE EDUCATION/TRAINING PROGRAM

## 2025-02-13 PROCEDURE — G8399 PT W/DXA RESULTS DOCUMENT: HCPCS | Performed by: STUDENT IN AN ORGANIZED HEALTH CARE EDUCATION/TRAINING PROGRAM

## 2025-02-13 PROCEDURE — 94625 PHY/QHP OP PULM RHB W/O MNTR: CPT

## 2025-02-13 PROCEDURE — 3017F COLORECTAL CA SCREEN DOC REV: CPT | Performed by: STUDENT IN AN ORGANIZED HEALTH CARE EDUCATION/TRAINING PROGRAM

## 2025-02-13 PROCEDURE — 2022F DILAT RTA XM EVC RTNOPTHY: CPT | Performed by: STUDENT IN AN ORGANIZED HEALTH CARE EDUCATION/TRAINING PROGRAM

## 2025-02-13 PROCEDURE — 1090F PRES/ABSN URINE INCON ASSESS: CPT | Performed by: STUDENT IN AN ORGANIZED HEALTH CARE EDUCATION/TRAINING PROGRAM

## 2025-02-13 PROCEDURE — 3051F HG A1C>EQUAL 7.0%<8.0%: CPT | Performed by: STUDENT IN AN ORGANIZED HEALTH CARE EDUCATION/TRAINING PROGRAM

## 2025-02-13 PROCEDURE — 3078F DIAST BP <80 MM HG: CPT | Performed by: STUDENT IN AN ORGANIZED HEALTH CARE EDUCATION/TRAINING PROGRAM

## 2025-02-13 PROCEDURE — G8417 CALC BMI ABV UP PARAM F/U: HCPCS | Performed by: STUDENT IN AN ORGANIZED HEALTH CARE EDUCATION/TRAINING PROGRAM

## 2025-02-13 PROCEDURE — 1123F ACP DISCUSS/DSCN MKR DOCD: CPT | Performed by: STUDENT IN AN ORGANIZED HEALTH CARE EDUCATION/TRAINING PROGRAM

## 2025-02-13 PROCEDURE — G8427 DOCREV CUR MEDS BY ELIG CLIN: HCPCS | Performed by: STUDENT IN AN ORGANIZED HEALTH CARE EDUCATION/TRAINING PROGRAM

## 2025-02-13 PROCEDURE — 99204 OFFICE O/P NEW MOD 45 MIN: CPT | Performed by: STUDENT IN AN ORGANIZED HEALTH CARE EDUCATION/TRAINING PROGRAM

## 2025-02-13 PROCEDURE — 1159F MED LIST DOCD IN RCRD: CPT | Performed by: STUDENT IN AN ORGANIZED HEALTH CARE EDUCATION/TRAINING PROGRAM

## 2025-02-13 PROCEDURE — 3074F SYST BP LT 130 MM HG: CPT | Performed by: STUDENT IN AN ORGANIZED HEALTH CARE EDUCATION/TRAINING PROGRAM

## 2025-02-13 RX ORDER — LOSARTAN POTASSIUM 25 MG/1
25 TABLET ORAL DAILY
Qty: 90 TABLET | Refills: 3 | Status: SHIPPED | OUTPATIENT
Start: 2025-02-13

## 2025-02-13 NOTE — PATIENT INSTRUCTIONS
Continue taking atorvastatin 40mg daily, digoxin 125mcg daily, diltiazem 180mg daily, lasix 20mg twice a day,  Increase losartan to 25mg daily. You will start taking the full tablet now.   Discuss with your family members about switching to eliquis and or having the watchman procedure.   We sent in eliquis 5mg twice a day. Please let us know if you decide to start this. Please let us know if it is too expensive.   Make an appointment with Dr. Badillo to discuss the need for home O2.

## 2025-02-13 NOTE — PROGRESS NOTES
INR is slightly elevated.  Hold Warfarin today and then resume normal home Warfarin regimen and recheck on Monday.

## 2025-02-14 ENCOUNTER — TELEPHONE (OUTPATIENT)
Dept: FAMILY MEDICINE CLINIC | Age: 75
End: 2025-02-14

## 2025-02-14 NOTE — TELEPHONE ENCOUNTER
4/29/2025 10:30 AM SCHEDULE, [facility] [place] [facility]   5/1/2025 10:30 AM SCHEDULE, [facility] [place] [facility]   5/5/2025  2:00 PM Russell Hollins, DO [facility]   5/6/2025 10:30 AM SCHEDULE, [facility] [place] [facility]   5/7/2025  9:50 AM [facility]   5/8/2025 10:30 AM SCHEDULE, [facility] [place] [facility]   5/13/2025 10:30 AM SCHEDULE, [facility] [place] [facility]   5/15/2025 10:30 AM SCHEDULE, [facility] [place] [facility]   5/20/2025 10:30 AM SCHEDULE, [facility] [place] [facility]   5/22/2025 10:30 AM SCHEDULE, [facility] [place] [facility]   5/27/2025 10:30 AM SCHEDULE, [facility] [place] [facility]   5/29/2025 10:30 AM SCHEDULE, [facility] [place] [facility]   6/3/2025 10:30 AM SCHEDULE, [facility] [place] [facility]   6/5/2025 10:30 AM SCHEDULE, [facility] [place] [facility]   6/10/2025 10:30 AM SCHEDULE, [facility] [place] [facility]   6/12/2025 10:30 AM SCHEDULE, [facility] [place] [facility]   6/17/2025 10:30 AM SCHEDULE, [facility] [place] [facility]   6/19/2025 10:30 AM SCHEDULE, [facility] [place] [facility]   6/24/2025 10:30 AM SCHEDULE, [facility] [place] [facility]   6/26/2025 10:30 AM SCHEDULE, [facility] [place] [facility]   7/1/2025 10:30 AM SCHEDULE, [facility] [place] [facility]   7/3/2025 10:30 AM SCHEDULE, [facility] [place]

## 2025-02-16 DIAGNOSIS — E11.22 TYPE 2 DIABETES MELLITUS WITH STAGE 4 CHRONIC KIDNEY DISEASE, WITH LONG-TERM CURRENT USE OF INSULIN (HCC): ICD-10-CM

## 2025-02-16 DIAGNOSIS — Z79.4 TYPE 2 DIABETES MELLITUS WITH STAGE 4 CHRONIC KIDNEY DISEASE, WITH LONG-TERM CURRENT USE OF INSULIN (HCC): ICD-10-CM

## 2025-02-16 DIAGNOSIS — N18.4 TYPE 2 DIABETES MELLITUS WITH STAGE 4 CHRONIC KIDNEY DISEASE, WITH LONG-TERM CURRENT USE OF INSULIN (HCC): ICD-10-CM

## 2025-02-17 ENCOUNTER — LAB (OUTPATIENT)
Dept: FAMILY MEDICINE CLINIC | Age: 75
End: 2025-02-17

## 2025-02-17 ENCOUNTER — TELEPHONE (OUTPATIENT)
Dept: CARDIOLOGY CLINIC | Age: 75
End: 2025-02-17

## 2025-02-17 DIAGNOSIS — I82.4Y9 DEEP VEIN THROMBOSIS (DVT) OF PROXIMAL LOWER EXTREMITY, UNSPECIFIED CHRONICITY, UNSPECIFIED LATERALITY (HCC): Primary | ICD-10-CM

## 2025-02-17 LAB — INTERNATIONAL NORMALIZATION RATIO, POC: 3

## 2025-02-17 RX ORDER — DIGOXIN 125 MCG
125 TABLET ORAL DAILY
Qty: 30 TABLET | Refills: 1 | Status: CANCELLED | OUTPATIENT
Start: 2025-02-17

## 2025-02-17 RX ORDER — INSULIN GLARGINE 100 [IU]/ML
50 INJECTION, SOLUTION SUBCUTANEOUS NIGHTLY
Qty: 45 ADJUSTABLE DOSE PRE-FILLED PEN SYRINGE | Refills: 1 | Status: SHIPPED | OUTPATIENT
Start: 2025-02-17 | End: 2025-02-19 | Stop reason: SDUPTHER

## 2025-02-17 RX ORDER — ADHESIVE BANDAGE 3/4"
1 BANDAGE TOPICAL DAILY
Qty: 1 EACH | Refills: 0 | Status: CANCELLED
Start: 2025-02-17

## 2025-02-17 NOTE — PROGRESS NOTES
Spoke with patient.  She will contact Dr. Hollins to get clarification on when to start the Eliquis and when she will need her next INR done.

## 2025-02-17 NOTE — PROGRESS NOTES
INR is 3.0.  She is no longer on Warfarin and is taking Eliquis.  She is to let office know when she needs repeat INR if requested by cardiologist

## 2025-02-17 NOTE — PROGRESS NOTES
Patient is starting eliquis from her cardiologist so she said she will not need new coumadin dosing instructions. She did not know when they wanted her to have it rechecked after starting eliquis. She will let us know. She did want a order for bp cuff, pulse ox, and digoxin sent to [facility]. I have pended those orders

## 2025-02-17 NOTE — TELEPHONE ENCOUNTER
Pt seen AMP on 2.13.25.   She was told to call office is she decided to switch from Coumadin to Eliquis.    Per pt she picked up the Eliquis from pharmacy today.  She is needing to know when she is to stop   Coumadin and start the Eliquis?    Per pt she has not taken her coumadin today yet.    Also, pt is wanting to know when she is to have her next INR done?

## 2025-02-17 NOTE — TELEPHONE ENCOUNTER
Pt called back message/results given. Pt verbalized understanding.  She will have INR re-done on Weds morning @ her PCP office

## 2025-02-18 ENCOUNTER — HOSPITAL ENCOUNTER (OUTPATIENT)
Dept: CARDIAC REHAB | Age: 75
Setting detail: THERAPIES SERIES
Discharge: HOME | End: 2025-02-18
Attending: INTERNAL MEDICINE
Payer: MEDICARE

## 2025-02-18 VITALS — WEIGHT: 218.5 LBS | BODY MASS INDEX: 38.72 KG/M2

## 2025-02-18 PROCEDURE — 94625 PHY/QHP OP PULM RHB W/O MNTR: CPT

## 2025-02-19 ENCOUNTER — NURSE ONLY (OUTPATIENT)
Dept: FAMILY MEDICINE CLINIC | Age: 75
End: 2025-02-19
Payer: MEDICARE

## 2025-02-19 DIAGNOSIS — Z79.4 TYPE 2 DIABETES MELLITUS WITH STAGE 4 CHRONIC KIDNEY DISEASE, WITH LONG-TERM CURRENT USE OF INSULIN (HCC): ICD-10-CM

## 2025-02-19 DIAGNOSIS — E11.22 TYPE 2 DIABETES MELLITUS WITH STAGE 4 CHRONIC KIDNEY DISEASE, WITH LONG-TERM CURRENT USE OF INSULIN (HCC): ICD-10-CM

## 2025-02-19 DIAGNOSIS — N18.4 TYPE 2 DIABETES MELLITUS WITH STAGE 4 CHRONIC KIDNEY DISEASE, WITH LONG-TERM CURRENT USE OF INSULIN (HCC): ICD-10-CM

## 2025-02-19 DIAGNOSIS — I48.20 CHRONIC ATRIAL FIBRILLATION (HCC): Primary | ICD-10-CM

## 2025-02-19 LAB — INTERNATIONAL NORMALIZATION RATIO, POC: 2.1

## 2025-02-19 PROCEDURE — 85610 PROTHROMBIN TIME: CPT

## 2025-02-19 RX ORDER — INSULIN GLARGINE 100 [IU]/ML
50 INJECTION, SOLUTION SUBCUTANEOUS NIGHTLY
Qty: 45 ADJUSTABLE DOSE PRE-FILLED PEN SYRINGE | Refills: 1 | Status: SHIPPED | OUTPATIENT
Start: 2025-02-19 | End: 2025-02-21 | Stop reason: SDUPTHER

## 2025-02-19 NOTE — TELEPHONE ENCOUNTER
Refill Request     CONFIRM preferrred pharmacy with the patient.    If Mail Order Rx - Pend for 90 day refill.      Last Seen: Last Seen Department: 1/17/2025  Last Seen by PCP: 1/17/2025    Last Written: 2/17/25 but [facility] does not have it and wanted it sent to [facility]    If no future appointment scheduled, route STAFF MESSAGE with patient name to the  Pool for scheduling.      Next Appointment:   Future Appointments   Date Time Provider Department Center   2/20/2025 10:30 AM SCHEDULE, [facility] [place] [facility]   2/25/2025 10:30 AM SCHEDULE, [facility] [place] [facility]   2/27/2025 10:30 AM SCHEDULE, [facility] [place] [facility]   3/4/2025 10:30 AM SCHEDULE, [facility] [place] [facility]   3/6/2025 10:30 AM SCHEDULE, [facility] [place] [facility]   3/11/2025 10:30 AM SCHEDULE, [facility] RM [facility] [place] [facility]   3/13/2025 10:30 AM SCHEDULE, [facility] RM [facility] [place] [facility]   3/18/2025 10:30 AM SCHEDULE, [facility] RM [facility] [place] [facility]   3/20/2025 10:30 AM SCHEDULE, [facility] RM [facility] [place] [facility]   3/25/2025 10:30 AM SCHEDULE, [facility] RM [facility] [place] [facility]   3/27/2025 10:30 AM SCHEDULE, [facility] [place] [facility]   4/1/2025 10:30 AM SCHEDULE, [facility] [place] [facility]   4/3/2025 10:30 AM SCHEDULE, [facility] RM [facility] [place] [facility]   4/8/2025 10:30 AM SCHEDULE, [facility] [place] [facility]   4/10/2025 10:30 AM SCHEDULE, [facility] [place] [facility]   4/15/2025 10:30 AM SCHEDULE, [facility] RM [facility] [place] [facility]   4/17/2025 10:30 AM SCHEDULE, [facility]UL[facility] RM [facility]   4/22/2025 10:30 AM SCHEDULE, [facility] PUL[facility] RM [facility] CP [facility]   4/24/2025 10:30 AM SCHEDULE, [facility]UL[facility] RM [facility]   4/29/2025 10:30 AM SCHEDULE, [facility] PUL[facility] RM [facility] CP

## 2025-02-19 NOTE — PROGRESS NOTES
Patient came in for INR check as you are trying to get INR below 2 so she can begin Eliquis. In office today it was 2.1. INR tracker updated to reflect that reading. Please contact patient with further INR instructions

## 2025-02-19 NOTE — PROGRESS NOTES
Previous message     Russell Hollins, DO   to Dawna Ramos  [facility]     2/17/25  2:55 PM  It appears her INR is 3.0 today.  So hold Coumadin and repeat INR tomorrow/Wednesday.  Once INR is below 2.0, do not take any more Coumadin and start taking Eliquis. Thanks.

## 2025-02-20 ENCOUNTER — HOSPITAL ENCOUNTER (OUTPATIENT)
Dept: CARDIAC REHAB | Age: 75
Setting detail: THERAPIES SERIES
Discharge: HOME | End: 2025-02-20
Attending: INTERNAL MEDICINE
Payer: MEDICARE

## 2025-02-20 ENCOUNTER — NURSE ONLY (OUTPATIENT)
Dept: FAMILY MEDICINE CLINIC | Age: 75
End: 2025-02-20

## 2025-02-20 VITALS — BODY MASS INDEX: 38.73 KG/M2 | WEIGHT: 218.6 LBS

## 2025-02-20 DIAGNOSIS — I48.20 CHRONIC ATRIAL FIBRILLATION (HCC): Primary | ICD-10-CM

## 2025-02-20 LAB — INTERNATIONAL NORMALIZATION RATIO, POC: 1.5

## 2025-02-20 PROCEDURE — 94625 PHY/QHP OP PULM RHB W/O MNTR: CPT

## 2025-02-20 NOTE — TELEPHONE ENCOUNTER
ROSEMARY    Pt's INR was 1.5 today.    She was reminded per AMP message on 2.17.25. Once INR is below 2.0, do not take any more Coumadin and start taking Eliquis.    Pt is starting Eliquis today.

## 2025-02-20 NOTE — PROGRESS NOTES
Pt came in today for another INR check states she did not hear from cardiologist office yesterday with warfarin instructions. Informed her per Dr. Augustin note on 2/17 once INR is below 2 she is to start Eliquis. INR today was 1.5 she would like to know what dose she is to start on today. Please call patient at [phone].

## 2025-02-21 DIAGNOSIS — E11.22 TYPE 2 DIABETES MELLITUS WITH STAGE 4 CHRONIC KIDNEY DISEASE, WITH LONG-TERM CURRENT USE OF INSULIN (HCC): Primary | ICD-10-CM

## 2025-02-21 DIAGNOSIS — N18.4 TYPE 2 DIABETES MELLITUS WITH STAGE 4 CHRONIC KIDNEY DISEASE, WITH LONG-TERM CURRENT USE OF INSULIN (HCC): Primary | ICD-10-CM

## 2025-02-21 DIAGNOSIS — Z79.4 TYPE 2 DIABETES MELLITUS WITH STAGE 4 CHRONIC KIDNEY DISEASE, WITH LONG-TERM CURRENT USE OF INSULIN (HCC): Primary | ICD-10-CM

## 2025-02-21 RX ORDER — INSULIN GLARGINE 100 [IU]/ML
30 INJECTION, SOLUTION SUBCUTANEOUS 2 TIMES DAILY
Qty: 45 ADJUSTABLE DOSE PRE-FILLED PEN SYRINGE | Refills: 1 | Status: SHIPPED | OUTPATIENT
Start: 2025-02-21 | End: 2025-03-23

## 2025-02-25 ENCOUNTER — HOSPITAL ENCOUNTER (OUTPATIENT)
Dept: CARDIAC REHAB | Age: 75
Setting detail: THERAPIES SERIES
Discharge: HOME | End: 2025-02-25
Attending: INTERNAL MEDICINE
Payer: MEDICARE

## 2025-02-25 VITALS — WEIGHT: 215.3 LBS | BODY MASS INDEX: 38.15 KG/M2

## 2025-02-25 PROCEDURE — 94625 PHY/QHP OP PULM RHB W/O MNTR: CPT

## 2025-02-26 ENCOUNTER — TELEPHONE (OUTPATIENT)
Dept: FAMILY MEDICINE CLINIC | Age: 75
End: 2025-02-26

## 2025-02-26 DIAGNOSIS — I10 BENIGN ESSENTIAL HTN: ICD-10-CM

## 2025-02-26 DIAGNOSIS — I10 BENIGN ESSENTIAL HTN: Primary | ICD-10-CM

## 2025-02-26 DIAGNOSIS — J44.1 COPD EXACERBATION (HCC): ICD-10-CM

## 2025-02-26 RX ORDER — BLOOD PRESSURE TEST KIT
1 KIT MISCELLANEOUS DAILY
Qty: 1 KIT | Refills: 0 | Status: SHIPPED | OUTPATIENT
Start: 2025-02-26

## 2025-02-26 NOTE — TELEPHONE ENCOUNTER
Pt was wanting to see if she could get a pulse ox and a blood pressure monitor sent to [facility] in [place].

## 2025-02-27 ENCOUNTER — HOSPITAL ENCOUNTER (OUTPATIENT)
Dept: CARDIAC REHAB | Age: 75
Setting detail: THERAPIES SERIES
Discharge: HOME | End: 2025-02-27
Attending: INTERNAL MEDICINE
Payer: MEDICARE

## 2025-02-27 PROCEDURE — 94625 PHY/QHP OP PULM RHB W/O MNTR: CPT

## 2025-03-03 RX ORDER — DILTIAZEM HYDROCHLORIDE 180 MG/1
CAPSULE, COATED, EXTENDED RELEASE ORAL DAILY
Qty: 90 CAPSULE | Refills: 1 | OUTPATIENT
Start: 2025-03-03

## 2025-03-04 ENCOUNTER — HOSPITAL ENCOUNTER (OUTPATIENT)
Dept: CARDIAC REHAB | Age: 75
Setting detail: THERAPIES SERIES
Discharge: HOME | End: 2025-03-04
Attending: INTERNAL MEDICINE
Payer: MEDICARE

## 2025-03-04 VITALS — WEIGHT: 217.3 LBS | BODY MASS INDEX: 38.5 KG/M2

## 2025-03-04 PROCEDURE — 94625 PHY/QHP OP PULM RHB W/O MNTR: CPT

## 2025-03-04 RX ORDER — DILTIAZEM HYDROCHLORIDE 180 MG/1
180 CAPSULE, COATED, EXTENDED RELEASE ORAL DAILY
Qty: 90 CAPSULE | Refills: 0 | OUTPATIENT
Start: 2025-03-04

## 2025-03-04 RX ORDER — DILTIAZEM HYDROCHLORIDE 180 MG/1
180 CAPSULE, COATED, EXTENDED RELEASE ORAL DAILY
Qty: 30 CAPSULE | Refills: 1 | Status: SHIPPED | OUTPATIENT
Start: 2025-03-04

## 2025-03-04 RX ORDER — DIGOXIN 125 MCG
125 TABLET ORAL DAILY
Qty: 90 TABLET | Refills: 0 | OUTPATIENT
Start: 2025-03-04

## 2025-03-04 NOTE — TELEPHONE ENCOUNTER
SCHEDULE, [facility] [place] [facility]   5/13/2025 10:30 AM SCHEDULE, [facility] [place] [facility]   5/15/2025 10:30 AM SCHEDULE, [facility] [place] [facility]   5/20/2025 10:30 AM SCHEDULE, [facility] [place] [facility]   5/22/2025 10:30 AM SCHEDULE, [facility] [place] [facility]   5/27/2025 10:30 AM SCHEDULE, [facility] [place] [facility]   5/29/2025 10:30 AM SCHEDULE, [facility] [place] [facility]   6/3/2025 10:30 AM SCHEDULE, [facility] [place] [facility]   6/5/2025 10:30 AM SCHEDULE, [facility] [place] [facility]   6/10/2025 10:30 AM SCHEDULE, [facility] [place] [facility]   6/12/2025 10:30 AM SCHEDULE, [facility] [place] [facility]   6/17/2025 10:30 AM SCHEDULE, [facility] [place] [facility]   6/19/2025 10:30 AM SCHEDULE, [facility] [place] [facility]   6/24/2025 10:30 AM SCHEDULE, [facility] [place] [facility]   6/26/2025 10:30 AM SCHEDULE[facility]   7/1/2025 10:30 AM SCHEDULE, [facility] RM [facility] [place] [facility]   7/3/2025 10:30 AM SCHEDULE, [facility] [place] [facility]       Message sent to  to schedule appt with patient?  N/A      Requested Prescriptions     Pending Prescriptions Disp Refills    digoxin (LANOXIN) 125 MCG tablet 90 tablet 0     Sig: Take 1 tablet by mouth daily    dilTIAZem (CARDIZEM CD) 180 MG extended release capsule 90 capsule 0     Sig: Take 1 capsule by mouth daily

## 2025-03-04 NOTE — PROGRESS NOTES
Patient came to pulmonary rehab and reports her blood sugar is \"out of control\" today. Pt has CGM and glucose 310. Pt is asymptomatic. Noted pt cheeks are red. Pt unable to recall everything she ate yesterday. Pt remembered she had \"roast\". Pt took insulin this morning and did not eat anything yet today.  Pt was allowed to exercise and after 15 min pt CGM: 333. Pt glucose went up with exercise so exercise was stopped. Pt continued to deny having any symptoms. Pt does not appear to be in distress. Pt plans to go to PCP office when she leaves pulmonary rehab today and talk about hyperglycemia.

## 2025-03-05 ENCOUNTER — OFFICE VISIT (OUTPATIENT)
Dept: FAMILY MEDICINE CLINIC | Age: 75
End: 2025-03-05
Payer: MEDICARE

## 2025-03-05 VITALS
RESPIRATION RATE: 18 BRPM | OXYGEN SATURATION: 91 % | DIASTOLIC BLOOD PRESSURE: 66 MMHG | SYSTOLIC BLOOD PRESSURE: 133 MMHG | HEART RATE: 90 BPM

## 2025-03-05 DIAGNOSIS — N18.4 TYPE 2 DIABETES MELLITUS WITH STAGE 4 CHRONIC KIDNEY DISEASE, WITH LONG-TERM CURRENT USE OF INSULIN (HCC): ICD-10-CM

## 2025-03-05 DIAGNOSIS — E11.22 TYPE 2 DIABETES MELLITUS WITH STAGE 4 CHRONIC KIDNEY DISEASE, WITH LONG-TERM CURRENT USE OF INSULIN (HCC): ICD-10-CM

## 2025-03-05 DIAGNOSIS — I10 BENIGN ESSENTIAL HTN: Primary | ICD-10-CM

## 2025-03-05 DIAGNOSIS — Z79.4 TYPE 2 DIABETES MELLITUS WITH STAGE 4 CHRONIC KIDNEY DISEASE, WITH LONG-TERM CURRENT USE OF INSULIN (HCC): ICD-10-CM

## 2025-03-05 LAB
CHP ED QC CHECK: YES
GLUCOSE BLD-MCNC: 336 MG/DL

## 2025-03-05 PROCEDURE — 3075F SYST BP GE 130 - 139MM HG: CPT

## 2025-03-05 PROCEDURE — 1090F PRES/ABSN URINE INCON ASSESS: CPT

## 2025-03-05 PROCEDURE — G8399 PT W/DXA RESULTS DOCUMENT: HCPCS

## 2025-03-05 PROCEDURE — 3051F HG A1C>EQUAL 7.0%<8.0%: CPT

## 2025-03-05 PROCEDURE — 99214 OFFICE O/P EST MOD 30 MIN: CPT

## 2025-03-05 PROCEDURE — 3017F COLORECTAL CA SCREEN DOC REV: CPT

## 2025-03-05 PROCEDURE — 82962 GLUCOSE BLOOD TEST: CPT

## 2025-03-05 PROCEDURE — G8428 CUR MEDS NOT DOCUMENT: HCPCS

## 2025-03-05 PROCEDURE — 1036F TOBACCO NON-USER: CPT

## 2025-03-05 PROCEDURE — 1123F ACP DISCUSS/DSCN MKR DOCD: CPT

## 2025-03-05 PROCEDURE — 3078F DIAST BP <80 MM HG: CPT

## 2025-03-05 PROCEDURE — G8417 CALC BMI ABV UP PARAM F/U: HCPCS

## 2025-03-05 PROCEDURE — 2022F DILAT RTA XM EVC RTNOPTHY: CPT

## 2025-03-05 RX ORDER — INSULIN GLARGINE 100 [IU]/ML
35 INJECTION, SOLUTION SUBCUTANEOUS 2 TIMES DAILY
Qty: 45 ADJUSTABLE DOSE PRE-FILLED PEN SYRINGE | Refills: 1 | Status: SHIPPED | OUTPATIENT
Start: 2025-03-05 | End: 2025-04-04

## 2025-03-05 RX ORDER — LOSARTAN POTASSIUM 50 MG/1
50 TABLET ORAL DAILY
Qty: 90 TABLET | Refills: 1 | Status: SHIPPED | OUTPATIENT
Start: 2025-03-05

## 2025-03-05 NOTE — PATIENT INSTRUCTIONS
Your goal blood pressure should be less than 150/90, and your cardiology specialist may have an even lower blood pressure goal for you considering your heart history.  Losartan has been increased to 50 mg daily from 25 mg daily.  New prescription has been sent for this    Your diabetes is uncontrolled, as your blood sugars are rarely in the normal range, which may be a side effect of the Megestrol medication you are taking prescribed by your specialist.  Increase Lantus to 35 units twice daily instead of 30 units daily.  Continue to monitor your blood sugars 3 your continuous glucose monitor.  Your dose of Ozempic has also been increased to 1 mg weekly instead of 0.5 mg weekly.  New prescriptions have been sent of these medications.

## 2025-03-05 NOTE — ASSESSMENT & PLAN NOTE
Chronic, not at goal (unstable), increase losartan to 50 mg daily.  Repeat BMP in 1 month.  She does see cardiology in 2 months.    Orders:    losartan (COZAAR) 50 MG tablet; Take 1 tablet by mouth daily    Basic Metabolic Panel; Future

## 2025-03-05 NOTE — PROGRESS NOTES
[facility]  3/5/2025    Coco Landeros (:  1950) is a 74 y.o. female, here for evaluation of the following medical concerns:    Chief Complaint   Patient presents with    Diabetes     Pt reports her blood sugar isn't control. Pt stated new med ( megestrol)  from cancer and doctor and afraid that might be the cause     Fasting blood sugar states HI          HPI    High blood sugars at home  - She states megestrol has worsened her diabetic control   - However she is to be on this for a year before repeating ultrasound of uterus  - Last A1c 7.8% 2025  - Current diabetic meds: Lantus 30 units twice daily, semaglutide 0.5 mg/week (which she has been on for a couple years), Novolin R sliding scale (12 units with meal, at least twice a day). Denies changes to her diet to worsen her diet. She states prior PCP had her on jardiance, but developed yeast infections. She was on Metformin early in her diabetic history, does not recall any side effects.   - Blood glucose levels at home: 250s-350s on CGM  - Hypoglycemic episodes: None recently    Blood pressure  - Blood pressure at home 150's systolic, states that she was not allowed to participate in pulmonary rehab due to her elevated blood pressure recently  - She does take losartan 25 mg daily  - She denies any symptoms of high blood pressure such as chest pain, shortness of breath, headaches      HISTORIES  Current Outpatient Medications on File Prior to Visit   Medication Sig Dispense Refill    dilTIAZem (CARDIZEM CD) 180 MG extended release capsule Take 1 capsule by mouth daily 30 capsule 1    UNABLE TO FIND blood pressure monitor kit 1 kit 0    Blood Pressure KIT 1 kit by Does not apply route daily 1 kit 0    apixaban (ELIQUIS) 5 MG TABS tablet Take 1 tablet by mouth 2 times daily 180 tablet 3    Cholecalciferol (D3) 50 MCG ( UT) TABS Take 1 tablet by mouth daily      fluticasone (FLONASE) 50 MCG/ACT nasal spray INHALE 1 SPRAY INTO

## 2025-03-05 NOTE — ASSESSMENT & PLAN NOTE
Chronic, not at goal (unstable), point-of-care glucose today 336.  Plan to increase Lantus to 35 units twice daily, increase Ozempic to 1 mg subcutaneous injection weekly instead of 0.5 mg/week.  Repeat A1c in 1 month.  Patient denies any hypoglycemic episodes.  Per libreview report,  Target range : 5% of the time  High range 181-250: 32% of the time  Very high range greater than 250: 63% of the time     Results for orders placed or performed in visit on 03/05/25   POCT Glucose   Result Value Ref Range    POC Glucose 336     QC OK? yes          Orders:    Albumin/Creatinine Ratio, Urine; Future    Hemoglobin A1C; Future    POCT Glucose    insulin glargine (LANTUS SOLOSTAR) 100 UNIT/ML injection pen; Inject 35 Units into the skin 2 times daily    Semaglutide, 1 MG/DOSE, (OZEMPIC) 4 MG/3ML SOPN sc injection; Inject 1 mg into the skin every 7 days

## 2025-03-06 ENCOUNTER — HOSPITAL ENCOUNTER (OUTPATIENT)
Dept: CARDIAC REHAB | Age: 75
Setting detail: THERAPIES SERIES
Discharge: HOME | End: 2025-03-06
Attending: INTERNAL MEDICINE
Payer: MEDICARE

## 2025-03-06 PROCEDURE — 94625 PHY/QHP OP PULM RHB W/O MNTR: CPT

## 2025-03-06 RX ORDER — DIGOXIN 125 MCG
125 TABLET ORAL DAILY
Qty: 30 TABLET | Refills: 1 | Status: SHIPPED | OUTPATIENT
Start: 2025-03-06

## 2025-03-06 RX ORDER — DIGOXIN 125 MCG
125 TABLET ORAL DAILY
Qty: 90 TABLET | Refills: 0 | OUTPATIENT
Start: 2025-03-06

## 2025-03-06 NOTE — TELEPHONE ENCOUNTER
Refill Request     CONFIRM preferrred pharmacy with the patient.    If Mail Order Rx - Pend for 90 day refill.      Last Seen: Last Seen Department: 3/5/2025  Last Seen by PCP: 3/5/2025    Last Written: 1/9/25    If no future appointment scheduled, route STAFF MESSAGE with patient name to the  Pool for scheduling.      Next Appointment:   Future Appointments   Date Time Provider Department Center   3/6/2025 10:30 AM SCHEDULE, [facility] [place] [facility]   3/11/2025 10:30 AM SCHEDULE, [facility] [place] [facility]   3/13/2025 10:30 AM SCHEDULE, [facility] RM [facility] [place] [facility]   3/18/2025 10:30 AM SCHEDULE, [facility] RM [facility] [place] [facility]   3/20/2025 10:30 AM SCHEDULE, [facility] RM [facility] [place] [facility]   3/25/2025 10:30 AM SCHEDULE, [facility] [place] [facility]   3/27/2025 10:30 AM SCHEDULE, [facility] RM [facility] [place] [facility]   4/1/2025 10:30 AM SCHEDULE, [facility] RM [facility] [place] [facility]   4/3/2025 10:30 AM SCHEDULE, [facility] RM [facility] [place] [facility]   4/8/2025 10:30 AM SCHEDULE, [facility] RM [facility] [place] [facility]   4/10/2025 10:30 AM SCHEDULE, [facility] RM [facility] [place] [facility]   4/14/2025  8:40 AM SCHEDULE, [facility]   4/15/2025 10:30 AM SCHEDULE, [facility] PUL[facility]ARY RM [facility] [place] [facility]   4/16/2025  9:00 AM MHC US RM 1 [facility] UL[facility] [place] [facility]   4/17/2025 10:30 AM SCHEDULE, [facility] PUL[facility] RM [facility] [place] [facility]   4/22/2025 10:30 AM SCHEDULE, [facility]UL[facility] RM [facility] [place] [facility]   4/24/2025 10:30 AM SCHEDULE, [facility] RM [facility] [place] [facility]   4/29/2025 10:30 AM [facility]ED[facility]   5/1/2025 10:30 AM SCHEDULE, [facility] PULMONARY RM [facility] CP [facility]   5/5/2025  2:00 PM Russell Hollins DO [facility]   5/6/2025 10:30 AM SCHEDULE, [facility]UL[facility] RM [facility] [place] [facility]   5/8/2025 10:30 AM

## 2025-03-11 ENCOUNTER — HOSPITAL ENCOUNTER (OUTPATIENT)
Dept: CARDIAC REHAB | Age: 75
Setting detail: THERAPIES SERIES
Discharge: HOME | End: 2025-03-11
Attending: INTERNAL MEDICINE
Payer: MEDICARE

## 2025-03-11 VITALS — WEIGHT: 213.9 LBS | BODY MASS INDEX: 37.9 KG/M2

## 2025-03-11 PROCEDURE — 94625 PHY/QHP OP PULM RHB W/O MNTR: CPT

## 2025-03-13 ENCOUNTER — HOSPITAL ENCOUNTER (OUTPATIENT)
Dept: CARDIAC REHAB | Age: 75
Setting detail: THERAPIES SERIES
Discharge: HOME | End: 2025-03-13
Attending: INTERNAL MEDICINE
Payer: MEDICARE

## 2025-03-13 VITALS — WEIGHT: 213.2 LBS | BODY MASS INDEX: 37.78 KG/M2

## 2025-03-13 PROCEDURE — 94625 PHY/QHP OP PULM RHB W/O MNTR: CPT

## 2025-03-18 ENCOUNTER — HOSPITAL ENCOUNTER (OUTPATIENT)
Dept: CARDIAC REHAB | Age: 75
Setting detail: THERAPIES SERIES
Discharge: HOME | End: 2025-03-18
Attending: INTERNAL MEDICINE
Payer: MEDICARE

## 2025-03-18 VITALS — WEIGHT: 215.2 LBS | BODY MASS INDEX: 38.13 KG/M2

## 2025-03-18 PROCEDURE — 94625 PHY/QHP OP PULM RHB W/O MNTR: CPT

## 2025-03-19 DIAGNOSIS — I10 BENIGN ESSENTIAL HTN: ICD-10-CM

## 2025-03-19 NOTE — TELEPHONE ENCOUNTER
SCHEDULE, [facility]UL[facility] RM [facility] [place] [facility]   5/27/2025 10:30 AM SCHEDULE, [facility] RM [facility] [place] [facility]   5/29/2025 10:30 AM SCHEDULE, [facility] RM [facility] [place] [facility]   6/3/2025 10:30 AM SCHEDULE, [facility] RM [facility] [place] [facility]   6/5/2025 10:30 AM SCHEDULE, [facility]UL[facility]ARY RM [facility] [place] [facility]   6/10/2025 10:30 AM SCHEDULE, [facility]UL[facility] RM [facility] [place] [facility]   6/12/2025 10:30 AM SCHEDULE, [facility] RM [facility] [place] [facility]   6/17/2025 10:30 AM SCHEDULE, [facility] PUL[facility] RM [facility] [place] [facility]   6/19/2025 10:30 AM SCHEDULE, [facility] PULMONARY RM [facility] [place] [facility]   6/24/2025 10:30 AM SCHEDULE, [facility] PUL[facility]ARY RM [facility] [place] [facility]   6/26/2025 10:30 AM SCHEDULE, [facility] PUL[facility]ARY RM [facility] [place] [facility]   7/1/2025 10:30 AM SCHEDULE, [facility] PUL[facility] RM [facility] [place] [facility]   7/3/2025 10:30 AM SCHEDULE, [facility] PUL[facility] RM [facility] [place] [facility]   7/18/2025  9:10 AM [facility]       Message sent to  to schedule appt with patient?  N/A      Requested Prescriptions     Pending Prescriptions Disp Refills    pentoxifylline (TRENTAL) 400 MG extended release tablet [Pharmacy Med Name: PENTOXIFYLLINE  MG TAB] 90 tablet 0     Sig: TAKE 1 TABLET BY MOUTH EVERY DAY    dilTIAZem (CARDIZEM CD) 240 MG extended release capsule [Pharmacy Med Name: DILTIAZEM 24H ER(CD) 240 MG CP] 90 capsule 0     Sig: TAKE 1 CAPSULE BY MOUTH EVERY DAY

## 2025-03-20 ENCOUNTER — HOSPITAL ENCOUNTER (OUTPATIENT)
Dept: CARDIAC REHAB | Age: 75
Setting detail: THERAPIES SERIES
Discharge: HOME | End: 2025-03-20
Attending: INTERNAL MEDICINE
Payer: MEDICARE

## 2025-03-20 VITALS — WEIGHT: 215 LBS | BODY MASS INDEX: 38.1 KG/M2

## 2025-03-20 PROCEDURE — 94626 PHY/QHP OP PULM RHB W/MNTR: CPT

## 2025-03-20 RX ORDER — DILTIAZEM HYDROCHLORIDE 240 MG/1
CAPSULE, COATED, EXTENDED RELEASE ORAL DAILY
Qty: 90 CAPSULE | Refills: 0 | OUTPATIENT
Start: 2025-03-20

## 2025-03-20 RX ORDER — PENTOXIFYLLINE 400 MG/1
400 TABLET, EXTENDED RELEASE ORAL DAILY
Qty: 90 TABLET | Refills: 0 | Status: SHIPPED | OUTPATIENT
Start: 2025-03-20

## 2025-03-25 ENCOUNTER — HOSPITAL ENCOUNTER (OUTPATIENT)
Dept: CARDIAC REHAB | Age: 75
Setting detail: THERAPIES SERIES
Discharge: HOME | End: 2025-03-25
Attending: INTERNAL MEDICINE
Payer: MEDICARE

## 2025-03-25 VITALS — BODY MASS INDEX: 37.97 KG/M2 | WEIGHT: 214.3 LBS

## 2025-03-25 PROCEDURE — 94625 PHY/QHP OP PULM RHB W/O MNTR: CPT

## 2025-03-27 ENCOUNTER — HOSPITAL ENCOUNTER (OUTPATIENT)
Dept: CARDIAC REHAB | Age: 75
Setting detail: THERAPIES SERIES
Discharge: HOME | End: 2025-03-27
Attending: INTERNAL MEDICINE
Payer: MEDICARE

## 2025-03-27 VITALS — BODY MASS INDEX: 38.15 KG/M2 | WEIGHT: 215.3 LBS

## 2025-03-27 PROCEDURE — 94626 PHY/QHP OP PULM RHB W/MNTR: CPT

## 2025-04-01 ENCOUNTER — HOSPITAL ENCOUNTER (OUTPATIENT)
Dept: CARDIAC REHAB | Age: 75
Setting detail: THERAPIES SERIES
Discharge: HOME | End: 2025-04-01
Attending: INTERNAL MEDICINE
Payer: MEDICARE

## 2025-04-01 VITALS — WEIGHT: 212.3 LBS | BODY MASS INDEX: 37.62 KG/M2

## 2025-04-01 PROCEDURE — 94625 PHY/QHP OP PULM RHB W/O MNTR: CPT

## 2025-04-03 ENCOUNTER — HOSPITAL ENCOUNTER (OUTPATIENT)
Dept: CARDIAC REHAB | Age: 75
Setting detail: THERAPIES SERIES
Discharge: HOME | End: 2025-04-03
Attending: INTERNAL MEDICINE
Payer: MEDICARE

## 2025-04-03 VITALS — WEIGHT: 213.4 LBS | BODY MASS INDEX: 37.81 KG/M2

## 2025-04-03 PROCEDURE — G0239 OTH RESP PROC, GROUP: HCPCS

## 2025-04-03 PROCEDURE — 94625 PHY/QHP OP PULM RHB W/O MNTR: CPT

## 2025-04-08 ENCOUNTER — TELEPHONE (OUTPATIENT)
Dept: CARDIAC REHAB | Age: 75
End: 2025-04-08

## 2025-04-08 ENCOUNTER — HOSPITAL ENCOUNTER (OUTPATIENT)
Dept: CARDIAC REHAB | Age: 75
Setting detail: THERAPIES SERIES
End: 2025-04-08
Attending: INTERNAL MEDICINE
Payer: MEDICARE

## 2025-04-10 ENCOUNTER — HOSPITAL ENCOUNTER (OUTPATIENT)
Dept: CARDIAC REHAB | Age: 75
Setting detail: THERAPIES SERIES
Discharge: HOME | End: 2025-04-10
Attending: INTERNAL MEDICINE
Payer: MEDICARE

## 2025-04-10 PROCEDURE — 94625 PHY/QHP OP PULM RHB W/O MNTR: CPT

## 2025-04-15 ENCOUNTER — HOSPITAL ENCOUNTER (OUTPATIENT)
Dept: CARDIAC REHAB | Age: 75
Setting detail: THERAPIES SERIES
Discharge: HOME | End: 2025-04-15
Attending: INTERNAL MEDICINE
Payer: MEDICARE

## 2025-04-15 VITALS — BODY MASS INDEX: 37.79 KG/M2 | WEIGHT: 213.3 LBS

## 2025-04-15 PROCEDURE — 94625 PHY/QHP OP PULM RHB W/O MNTR: CPT

## 2025-04-16 ENCOUNTER — CLINICAL SUPPORT (OUTPATIENT)
Dept: FAMILY MEDICINE CLINIC | Age: 75
End: 2025-04-16
Payer: MEDICARE

## 2025-04-16 DIAGNOSIS — E11.22 TYPE 2 DIABETES MELLITUS WITH STAGE 4 CHRONIC KIDNEY DISEASE, WITH LONG-TERM CURRENT USE OF INSULIN (HCC): ICD-10-CM

## 2025-04-16 DIAGNOSIS — N18.4 TYPE 2 DIABETES MELLITUS WITH STAGE 4 CHRONIC KIDNEY DISEASE, WITH LONG-TERM CURRENT USE OF INSULIN (HCC): ICD-10-CM

## 2025-04-16 DIAGNOSIS — I10 BENIGN ESSENTIAL HTN: ICD-10-CM

## 2025-04-16 DIAGNOSIS — Z79.4 TYPE 2 DIABETES MELLITUS WITH STAGE 4 CHRONIC KIDNEY DISEASE, WITH LONG-TERM CURRENT USE OF INSULIN (HCC): ICD-10-CM

## 2025-04-16 LAB
ANION GAP SERPL CALCULATED.3IONS-SCNC: 13 MMOL/L (ref 3–16)
BUN SERPL-MCNC: 22 MG/DL (ref 7–20)
CALCIUM SERPL-MCNC: 10.4 MG/DL (ref 8.3–10.6)
CHLORIDE SERPL-SCNC: 103 MMOL/L (ref 99–110)
CO2 SERPL-SCNC: 23 MMOL/L (ref 21–32)
CREAT SERPL-MCNC: 1.3 MG/DL (ref 0.6–1.2)
EST. AVERAGE GLUCOSE BLD GHB EST-MCNC: 248.9 MG/DL
GFR SERPLBLD CREATININE-BSD FMLA CKD-EPI: 43 ML/MIN/{1.73_M2}
GLUCOSE SERPL-MCNC: 297 MG/DL (ref 70–99)
HBA1C MFR BLD: 10.3 %
POTASSIUM SERPL-SCNC: 4.8 MMOL/L (ref 3.5–5.1)
SODIUM SERPL-SCNC: 139 MMOL/L (ref 136–145)

## 2025-04-16 PROCEDURE — 36415 COLL VENOUS BLD VENIPUNCTURE: CPT

## 2025-04-17 ENCOUNTER — HOSPITAL ENCOUNTER (OUTPATIENT)
Dept: CARDIAC REHAB | Age: 75
Setting detail: THERAPIES SERIES
Discharge: HOME | End: 2025-04-17
Attending: INTERNAL MEDICINE
Payer: MEDICARE

## 2025-04-17 ENCOUNTER — RESULTS FOLLOW-UP (OUTPATIENT)
Dept: FAMILY MEDICINE CLINIC | Age: 75
End: 2025-04-17

## 2025-04-17 DIAGNOSIS — N18.4 TYPE 2 DIABETES MELLITUS WITH STAGE 4 CHRONIC KIDNEY DISEASE, WITH LONG-TERM CURRENT USE OF INSULIN (HCC): ICD-10-CM

## 2025-04-17 DIAGNOSIS — E11.22 TYPE 2 DIABETES MELLITUS WITH STAGE 4 CHRONIC KIDNEY DISEASE, WITH LONG-TERM CURRENT USE OF INSULIN (HCC): ICD-10-CM

## 2025-04-17 DIAGNOSIS — E11.9 INSULIN-REQUIRING OR DEPENDENT TYPE II DIABETES MELLITUS (HCC): ICD-10-CM

## 2025-04-17 DIAGNOSIS — Z79.4 INSULIN-REQUIRING OR DEPENDENT TYPE II DIABETES MELLITUS (HCC): ICD-10-CM

## 2025-04-17 DIAGNOSIS — Z79.4 TYPE 2 DIABETES MELLITUS WITH STAGE 4 CHRONIC KIDNEY DISEASE, WITH LONG-TERM CURRENT USE OF INSULIN (HCC): ICD-10-CM

## 2025-04-17 PROCEDURE — 94625 PHY/QHP OP PULM RHB W/O MNTR: CPT

## 2025-04-18 RX ORDER — INSULIN GLARGINE 100 [IU]/ML
40 INJECTION, SOLUTION SUBCUTANEOUS 2 TIMES DAILY
Qty: 45 ADJUSTABLE DOSE PRE-FILLED PEN SYRINGE | Refills: 1 | Status: CANCELLED | OUTPATIENT
Start: 2025-04-18 | End: 2025-05-18

## 2025-04-18 NOTE — TELEPHONE ENCOUNTER
----- Message from Dr. Anne Marie Badillo MD sent at 4/18/2025 10:14 AM EDT -----  Thanks, please adjust her med list to reflect Lantus 40 units twice daily.  Let me know if she needs me to send in a new prescription of this.

## 2025-04-22 ENCOUNTER — HOSPITAL ENCOUNTER (OUTPATIENT)
Dept: CARDIAC REHAB | Age: 75
Setting detail: THERAPIES SERIES
Discharge: HOME | End: 2025-04-22
Attending: INTERNAL MEDICINE
Payer: MEDICARE

## 2025-04-22 VITALS — OXYGEN SATURATION: 94 % | BODY MASS INDEX: 37.81 KG/M2 | WEIGHT: 213.4 LBS

## 2025-04-22 PROCEDURE — G0239 OTH RESP PROC, GROUP: HCPCS

## 2025-04-24 ENCOUNTER — HOSPITAL ENCOUNTER (OUTPATIENT)
Dept: CARDIAC REHAB | Age: 75
Setting detail: THERAPIES SERIES
Discharge: HOME | End: 2025-04-24
Attending: INTERNAL MEDICINE
Payer: MEDICARE

## 2025-04-24 VITALS — WEIGHT: 213.3 LBS | BODY MASS INDEX: 37.79 KG/M2

## 2025-04-24 PROCEDURE — 94625 PHY/QHP OP PULM RHB W/O MNTR: CPT

## 2025-04-24 NOTE — PROGRESS NOTES
CARDIOLOGY Office Visit         Patient Name: Coco Landeros  Primary Care physician: Anne Marie Badillo MD    Reason for Referral/Chief Complaint: Coco Landeros is a 74 y.o. patient who presents today for a hospital follow up.    History of Present Illness:   Coco Landeros is a 74 y.o. female with a pmhx of chronic atrial fibrillation, hypertension, hyperlipidemia, coronary artery disease (CT 2023), chronic renal insufficiency, history of DVT, history of CML status post chemotherapy and history of heart failure with preserved ejection fraction, mild arterial disease in the right upper extremity. Admitted 1/4/25 with shortness of breath. RSV positive. Echo EF of 55-60%, moderate MR. Mild TR. D/C on digozin 125mcg. Patient wore a cardiac event monitor from 1/8 to 1/22/25 which demonstrated predominately rate controlled atrial fibrillation with an average HR of 80 () BPM.   OV 2/13/25 she is here with friend. She reports increased shortness of breath with exertion. She states today at pulmonary rehab her oxygen was low with exertion. She has a history of mild arterial disease in the right upper extremity with known unequal blood pressures with right brachial pressures lower than left brachial. Follows with vascular-medical mgmt given no symptoms.       Today, she is here with her friend. She is doing well. Patient denies current edema, chest pain, shortness of breath, palpitations, dizziness or syncope. Denies any evidence of hematemesis, hemoptysis, melena, hematochezia or hematuria with blood thinner. Patient is taking all cardiac medications as prescribed and tolerates them well. She is doing cardiac rehab on Tuesday and Thursdays, she enjoys this and feels better doing it.    Patient has been on coumadin since diagnosed with Afib. Switched to eliquis 5mg BID at LOV, reports doing well. Hx of DVT per patient sounds like it was provoked.  Patient has questions regarding digoxin.    Former smoker.

## 2025-04-25 RX ORDER — DILTIAZEM HYDROCHLORIDE 180 MG/1
CAPSULE, COATED, EXTENDED RELEASE ORAL DAILY
Qty: 90 CAPSULE | Refills: 1 | Status: SHIPPED | OUTPATIENT
Start: 2025-04-25

## 2025-04-25 NOTE — TELEPHONE ENCOUNTER
Refill Request     CONFIRM preferrred pharmacy with the patient.    If Mail Order Rx - Pend for 90 day refill.      Last Seen: Last Seen Department: 4/16/2025  Last Seen by PCP: 3/5/2025    Last Written:     If no future appointment scheduled, route STAFF MESSAGE with patient name to the  Pool for scheduling.      Next Appointment:   Future Appointments   Date Time Provider Department Center   4/29/2025 10:30 AM SCHEDULE, [facility]UL[facility]   5/1/2025 10:30 AM SCHEDULE, [facility]   5/5/2025  2:00 PM Russell Hollins DO [place]   5/6/2025 10:30 AM SCHEDULE, [facility]   5/8/2025 10:30 AM SCHEDULE, [facility]   5/13/2025 10:30 AM SCHEDULE, [facility]   5/15/2025 10:30 AM SCHEDULE, [facility]UL[facility]   5/20/2025 10:30 AM SCHEDULE, [facility] P[facility] RM [facility]   5/22/2025 10:30 AM SCHEDULE, [facility]UL[facility]   5/27/2025 10:30 AM SCHEDULE, [facility]   5/29/2025 10:30 AM SCHEDULE, [facility]UL[facility]   6/3/2025 10:30 AM SCHEDULE, [facility]   6/5/2025 10:30 AM SCHEDULE, [facility]   6/10/2025 10:30 AM SCHEDULE, [facility]UL[facility]   6/12/2025 10:30 AM SCHEDULE, [facility] PUL[facility]   6/17/2025 10:30 AM SCHEDULE, [facility] PUL[facility] RM [facility]   6/19/2025 10:30 AM SCHEDULE, [facility]UL[facility]   6/24/2025 10:30 AM SCHEDULE, [facility] PUL[facility] RM [facility]   6/26/2025 10:30 AM SCHEDULE, [facility] PUL[facility] RM [facility]   7/1/2025 10:30 AM SCHEDULE, [facility] PULMONARY RM [facility]   7/3/2025 10:30 AM SCHEDULE, [facility]   7/18/2025

## 2025-04-29 ENCOUNTER — HOSPITAL ENCOUNTER (OUTPATIENT)
Dept: CARDIAC REHAB | Age: 75
Setting detail: THERAPIES SERIES
Discharge: HOME | End: 2025-04-29
Attending: INTERNAL MEDICINE
Payer: MEDICARE

## 2025-04-29 VITALS — BODY MASS INDEX: 37.55 KG/M2 | WEIGHT: 211.9 LBS

## 2025-04-29 PROCEDURE — 94625 PHY/QHP OP PULM RHB W/O MNTR: CPT

## 2025-05-01 ENCOUNTER — HOSPITAL ENCOUNTER (OUTPATIENT)
Dept: CARDIAC REHAB | Age: 75
Setting detail: THERAPIES SERIES
Discharge: HOME | End: 2025-05-01
Attending: INTERNAL MEDICINE
Payer: MEDICARE

## 2025-05-01 PROCEDURE — 94625 PHY/QHP OP PULM RHB W/O MNTR: CPT

## 2025-05-05 ENCOUNTER — OFFICE VISIT (OUTPATIENT)
Dept: CARDIOLOGY CLINIC | Age: 75
End: 2025-05-05
Payer: MEDICARE

## 2025-05-05 VITALS
HEART RATE: 88 BPM | BODY MASS INDEX: 38.64 KG/M2 | SYSTOLIC BLOOD PRESSURE: 130 MMHG | OXYGEN SATURATION: 96 % | DIASTOLIC BLOOD PRESSURE: 62 MMHG | HEIGHT: 62 IN | WEIGHT: 210 LBS

## 2025-05-05 DIAGNOSIS — R94.31 ABNORMAL EKG: ICD-10-CM

## 2025-05-05 DIAGNOSIS — Z85.72 PERSONAL HISTORY OF LYMPHOMA: ICD-10-CM

## 2025-05-05 DIAGNOSIS — E11.22 CKD STAGE 4 DUE TO TYPE 2 DIABETES MELLITUS (HCC): ICD-10-CM

## 2025-05-05 DIAGNOSIS — E78.2 MIXED HYPERLIPIDEMIA: ICD-10-CM

## 2025-05-05 DIAGNOSIS — I10 BENIGN ESSENTIAL HTN: Primary | ICD-10-CM

## 2025-05-05 DIAGNOSIS — N18.4 CKD STAGE 4 DUE TO TYPE 2 DIABETES MELLITUS (HCC): ICD-10-CM

## 2025-05-05 DIAGNOSIS — Z86.718 HISTORY OF DVT (DEEP VEIN THROMBOSIS): ICD-10-CM

## 2025-05-05 DIAGNOSIS — Z85.6 HX OF CHRONIC MYELOID LEUKEMIA: ICD-10-CM

## 2025-05-05 DIAGNOSIS — I73.9 PAD (PERIPHERAL ARTERY DISEASE): ICD-10-CM

## 2025-05-05 DIAGNOSIS — I50.32 CHRONIC HEART FAILURE WITH PRESERVED EJECTION FRACTION (HCC): ICD-10-CM

## 2025-05-05 DIAGNOSIS — I48.20 CHRONIC ATRIAL FIBRILLATION (HCC): ICD-10-CM

## 2025-05-05 DIAGNOSIS — I87.2 VENOUS INSUFFICIENCY: ICD-10-CM

## 2025-05-05 PROCEDURE — 3017F COLORECTAL CA SCREEN DOC REV: CPT | Performed by: STUDENT IN AN ORGANIZED HEALTH CARE EDUCATION/TRAINING PROGRAM

## 2025-05-05 PROCEDURE — 99214 OFFICE O/P EST MOD 30 MIN: CPT | Performed by: STUDENT IN AN ORGANIZED HEALTH CARE EDUCATION/TRAINING PROGRAM

## 2025-05-05 PROCEDURE — G2211 COMPLEX E/M VISIT ADD ON: HCPCS | Performed by: STUDENT IN AN ORGANIZED HEALTH CARE EDUCATION/TRAINING PROGRAM

## 2025-05-05 PROCEDURE — 1123F ACP DISCUSS/DSCN MKR DOCD: CPT | Performed by: STUDENT IN AN ORGANIZED HEALTH CARE EDUCATION/TRAINING PROGRAM

## 2025-05-05 PROCEDURE — 1090F PRES/ABSN URINE INCON ASSESS: CPT | Performed by: STUDENT IN AN ORGANIZED HEALTH CARE EDUCATION/TRAINING PROGRAM

## 2025-05-05 PROCEDURE — 3078F DIAST BP <80 MM HG: CPT | Performed by: STUDENT IN AN ORGANIZED HEALTH CARE EDUCATION/TRAINING PROGRAM

## 2025-05-05 PROCEDURE — G8399 PT W/DXA RESULTS DOCUMENT: HCPCS | Performed by: STUDENT IN AN ORGANIZED HEALTH CARE EDUCATION/TRAINING PROGRAM

## 2025-05-05 PROCEDURE — 1159F MED LIST DOCD IN RCRD: CPT | Performed by: STUDENT IN AN ORGANIZED HEALTH CARE EDUCATION/TRAINING PROGRAM

## 2025-05-05 PROCEDURE — 2022F DILAT RTA XM EVC RTNOPTHY: CPT | Performed by: STUDENT IN AN ORGANIZED HEALTH CARE EDUCATION/TRAINING PROGRAM

## 2025-05-05 PROCEDURE — 3046F HEMOGLOBIN A1C LEVEL >9.0%: CPT | Performed by: STUDENT IN AN ORGANIZED HEALTH CARE EDUCATION/TRAINING PROGRAM

## 2025-05-05 PROCEDURE — G8417 CALC BMI ABV UP PARAM F/U: HCPCS | Performed by: STUDENT IN AN ORGANIZED HEALTH CARE EDUCATION/TRAINING PROGRAM

## 2025-05-05 PROCEDURE — G8427 DOCREV CUR MEDS BY ELIG CLIN: HCPCS | Performed by: STUDENT IN AN ORGANIZED HEALTH CARE EDUCATION/TRAINING PROGRAM

## 2025-05-05 PROCEDURE — 3075F SYST BP GE 130 - 139MM HG: CPT | Performed by: STUDENT IN AN ORGANIZED HEALTH CARE EDUCATION/TRAINING PROGRAM

## 2025-05-05 PROCEDURE — 1036F TOBACCO NON-USER: CPT | Performed by: STUDENT IN AN ORGANIZED HEALTH CARE EDUCATION/TRAINING PROGRAM

## 2025-05-05 RX ORDER — FUROSEMIDE 20 MG/1
20 TABLET ORAL DAILY
Qty: 90 TABLET | Refills: 3 | Status: SHIPPED | OUTPATIENT
Start: 2025-05-05

## 2025-05-05 RX ORDER — DILTIAZEM HYDROCHLORIDE 240 MG/1
240 CAPSULE, COATED, EXTENDED RELEASE ORAL DAILY
Qty: 90 CAPSULE | Refills: 3 | Status: SHIPPED | OUTPATIENT
Start: 2025-05-05

## 2025-05-05 NOTE — PATIENT INSTRUCTIONS
Continue taking atorvastatin 40mg daily, eliquis 5mg twice a day,  lasix 20mg daily, losartan 50mg daily,   Stop taking digoxin   Increase diltiazem to 240mg daily.

## 2025-05-06 ENCOUNTER — HOSPITAL ENCOUNTER (OUTPATIENT)
Dept: CARDIAC REHAB | Age: 75
Setting detail: THERAPIES SERIES
Discharge: HOME | End: 2025-05-06
Attending: INTERNAL MEDICINE
Payer: MEDICARE

## 2025-05-06 VITALS — BODY MASS INDEX: 38.46 KG/M2 | WEIGHT: 210.3 LBS

## 2025-05-06 PROCEDURE — 94625 PHY/QHP OP PULM RHB W/O MNTR: CPT

## 2025-05-08 ENCOUNTER — HOSPITAL ENCOUNTER (OUTPATIENT)
Dept: CARDIAC REHAB | Age: 75
Setting detail: THERAPIES SERIES
Discharge: HOME | End: 2025-05-08
Attending: INTERNAL MEDICINE
Payer: MEDICARE

## 2025-05-08 VITALS — WEIGHT: 210.3 LBS | BODY MASS INDEX: 38.46 KG/M2

## 2025-05-08 DIAGNOSIS — E11.22 TYPE 2 DIABETES MELLITUS WITH STAGE 4 CHRONIC KIDNEY DISEASE, WITH LONG-TERM CURRENT USE OF INSULIN (HCC): Primary | ICD-10-CM

## 2025-05-08 DIAGNOSIS — Z79.4 TYPE 2 DIABETES MELLITUS WITH STAGE 4 CHRONIC KIDNEY DISEASE, WITH LONG-TERM CURRENT USE OF INSULIN (HCC): Primary | ICD-10-CM

## 2025-05-08 DIAGNOSIS — N18.4 TYPE 2 DIABETES MELLITUS WITH STAGE 4 CHRONIC KIDNEY DISEASE, WITH LONG-TERM CURRENT USE OF INSULIN (HCC): Primary | ICD-10-CM

## 2025-05-08 PROCEDURE — 94625 PHY/QHP OP PULM RHB W/O MNTR: CPT

## 2025-05-08 RX ORDER — PEN NEEDLE, DIABETIC 32GX 5/32"
NEEDLE, DISPOSABLE MISCELLANEOUS
Qty: 100 EACH | Refills: 11 | Status: SHIPPED | OUTPATIENT
Start: 2025-05-08

## 2025-05-08 RX ORDER — PEN NEEDLE, DIABETIC 32GX 5/32"
NEEDLE, DISPOSABLE MISCELLANEOUS
Qty: 100 EACH | Refills: 11 | Status: SHIPPED | OUTPATIENT
Start: 2025-05-08 | End: 2025-05-08 | Stop reason: SDUPTHER

## 2025-05-08 NOTE — TELEPHONE ENCOUNTER
Refill Request     CONFIRM preferrred pharmacy with the patient.    If Mail Order Rx - Pend for 90 day refill.      Last Seen: Last Seen Department: 4/16/2025  Last Seen by PCP: 3/5/2025    Last Written: 5/15/24    If no future appointment scheduled, route STAFF MESSAGE with patient name to the  Pool for scheduling.      Next Appointment:   Future Appointments   Date Time Provider Department Center   5/13/2025 10:30 AM SCHEDULE, [facility]UL[facility] [place] [facility]   5/15/2025 10:30 AM SCHEDULE, [facility]   5/20/2025 10:30 AM SCHEDULE, [facility] PUL[facility]ARY RM [facility]   5/22/2025 10:30 AM SCHEDULE, [facility] PUL[facility]ARY [facility]   5/27/2025 10:30 AM SCHEDULE, [facility]UL[facility]ARY [facility] [place] [facility]   5/29/2025 10:30 AM SCHEDULE, [facility] PUL[facility]ARY [facility]   6/3/2025 10:30 AM SCHEDULE, [facility] PUL[facility]ARY [facility]   6/5/2025 10:30 AM SCHEDULE, [facility] PULMONARY RM [facility] [place] [facility]   6/10/2025 10:30 AM SCHEDULE, [facility] PUL[facility]ARY RM [facility] [place] [facility]   6/12/2025 10:30 AM SCHEDULE, [facility] PUL[facility]ARY RM [facility] [place] [facility]   6/17/2025 10:30 AM SCHEDULE, [facility] PUL[facility] RM [facility]   6/19/2025 10:30 AM SCHEDULE, [facility] PUL[facility]   6/24/2025 10:30 AM SCHEDULE, [facility] PUL[facility]ARY RM [facility] [place] [facility]   6/26/2025 10:30 AM SCHEDULE, [facility] PUL[facility]ARY RM [facility] [place] [facility]   7/1/2025 10:30 AM SCHEDULE, [facility] PUL[facility]ARY RM [facility]   7/3/2025 10:30 AM SCHEDULE, [facility] PUL[facility]ARY RM [facility]   7/18/2025  9:10 AM [facility]   10/27/2025  9:45 AM Russell Hollins, DO [facility]       Message sent to  to schedule appt with patient?  N/A      Requested Prescriptions     Pending Prescriptions Disp Refills    Insulin Pen Needle (BD PEN NEEDLE ELDER 2ND GEN) 32G X 4 MM MISC 100 each 11     Sig: USE

## 2025-05-09 ENCOUNTER — TELEPHONE (OUTPATIENT)
Dept: ENDOCRINOLOGY | Age: 75
End: 2025-05-09

## 2025-05-09 ENCOUNTER — TELEPHONE (OUTPATIENT)
Dept: FAMILY MEDICINE CLINIC | Age: 75
End: 2025-05-09

## 2025-05-09 DIAGNOSIS — E11.22 TYPE 2 DIABETES MELLITUS WITH STAGE 4 CHRONIC KIDNEY DISEASE, WITH LONG-TERM CURRENT USE OF INSULIN (HCC): Primary | ICD-10-CM

## 2025-05-09 DIAGNOSIS — Z79.4 INSULIN-REQUIRING OR DEPENDENT TYPE II DIABETES MELLITUS (HCC): ICD-10-CM

## 2025-05-09 DIAGNOSIS — N18.4 TYPE 2 DIABETES MELLITUS WITH STAGE 4 CHRONIC KIDNEY DISEASE, WITH LONG-TERM CURRENT USE OF INSULIN (HCC): Primary | ICD-10-CM

## 2025-05-09 DIAGNOSIS — E11.9 INSULIN-REQUIRING OR DEPENDENT TYPE II DIABETES MELLITUS (HCC): ICD-10-CM

## 2025-05-09 DIAGNOSIS — Z79.4 TYPE 2 DIABETES MELLITUS WITH STAGE 4 CHRONIC KIDNEY DISEASE, WITH LONG-TERM CURRENT USE OF INSULIN (HCC): Primary | ICD-10-CM

## 2025-05-09 NOTE — TELEPHONE ENCOUNTER
RN called and spoke to Ashwini.  Informed her of Blood Sugars results, PCP Reviewed patient's free style mendy report.    She is only in range 10% of the time   Last A1c was just above 10%, and we had increased her lantus at that time to 40 units BID     PCP would like to refer her to endocrine for diabetes. Jaron [facility].   RN  placed the referral for me, so that we can get her diabetes under better control.  RN assisted in scheduling.     She states her daughter can drive her on a Monday or her son in law can take her if the appointment is 3 pm or later

## 2025-05-09 NOTE — TELEPHONE ENCOUNTER
----- Message from Dr. Anne Marie Badillo MD sent at 5/8/2025  7:57 PM EDT -----  Reviewed patient's free style mendy report  She is only in range 10% of the time  Last A1c was just above 10%, and we had increased her lantus at that time to 40 units BID  I would like to refer her to endocrine for diabetes, if you can place the referral for me, so that we can get her diabetes under better control  Thank you

## 2025-05-09 NOTE — TELEPHONE ENCOUNTER
RN called  to Endocrinology in [place] for Dr. Garcia   Phone: [phone] , Spoke to Milagro.  RN requested urgent appointment for patient.   sent Message sent to Dr. Garcia .  Nurse Megha should possibly call back today to speak to this RN at [phone] to assist in scheduling.    Dr. Garcia is not in office on Monday.  Dr. Garcia is at [place] on Tuesday  and Wednesday , her last new patient appointment is 1:00 and her last patient appointment of the day is 2:00 pm  Dr. Garcia is at [place] on Thursday and Friday.     RN called patient and informed her of the above information.    RN will follow and continue to assist patient to get scheduled.

## 2025-05-09 NOTE — TELEPHONE ENCOUNTER
Call from Pt PCP office Dr Kenney  Asking if pt can get a urgent appt due to her diabetes control worsening    # [phone]  Spoke w/ nurse Kristen

## 2025-05-15 ENCOUNTER — HOSPITAL ENCOUNTER (OUTPATIENT)
Dept: CARDIAC REHAB | Age: 75
Setting detail: THERAPIES SERIES
Discharge: HOME | End: 2025-05-15
Attending: INTERNAL MEDICINE
Payer: MEDICARE

## 2025-05-15 VITALS — BODY MASS INDEX: 38.28 KG/M2 | WEIGHT: 209.3 LBS

## 2025-05-15 DIAGNOSIS — N18.4 TYPE 2 DIABETES MELLITUS WITH STAGE 4 CHRONIC KIDNEY DISEASE, WITH LONG-TERM CURRENT USE OF INSULIN (HCC): ICD-10-CM

## 2025-05-15 DIAGNOSIS — E11.9 INSULIN-REQUIRING OR DEPENDENT TYPE II DIABETES MELLITUS (HCC): ICD-10-CM

## 2025-05-15 DIAGNOSIS — E11.22 TYPE 2 DIABETES MELLITUS WITH STAGE 4 CHRONIC KIDNEY DISEASE, WITH LONG-TERM CURRENT USE OF INSULIN (HCC): ICD-10-CM

## 2025-05-15 DIAGNOSIS — Z79.4 TYPE 2 DIABETES MELLITUS WITH STAGE 4 CHRONIC KIDNEY DISEASE, WITH LONG-TERM CURRENT USE OF INSULIN (HCC): ICD-10-CM

## 2025-05-15 DIAGNOSIS — Z79.4 INSULIN-REQUIRING OR DEPENDENT TYPE II DIABETES MELLITUS (HCC): ICD-10-CM

## 2025-05-15 PROCEDURE — 94625 PHY/QHP OP PULM RHB W/O MNTR: CPT

## 2025-05-15 NOTE — TELEPHONE ENCOUNTER
Nurse called from [facility] to help get patient scheduled for a new patient appt.     Patient scheduled on 6/4/25.

## 2025-05-16 DIAGNOSIS — E11.9 INSULIN-REQUIRING OR DEPENDENT TYPE II DIABETES MELLITUS (HCC): ICD-10-CM

## 2025-05-16 DIAGNOSIS — Z79.4 INSULIN-REQUIRING OR DEPENDENT TYPE II DIABETES MELLITUS (HCC): ICD-10-CM

## 2025-05-16 DIAGNOSIS — K21.9 GASTROESOPHAGEAL REFLUX DISEASE WITHOUT ESOPHAGITIS: ICD-10-CM

## 2025-05-16 RX ORDER — OMEPRAZOLE 40 MG/1
40 CAPSULE, DELAYED RELEASE ORAL EVERY MORNING
Qty: 90 CAPSULE | Refills: 0 | Status: SHIPPED | OUTPATIENT
Start: 2025-05-16

## 2025-05-16 NOTE — TELEPHONE ENCOUNTER
Refill Request     CONFIRM preferrred pharmacy with the patient.    If Mail Order Rx - Pend for 90 day refill.      Last Seen: Last Seen Department: 4/16/2025  Last Seen by PCP: 3/5/2025    Last Written: 8/15/24    If no future appointment scheduled, route STAFF MESSAGE with patient name to the  Pool for scheduling.      Next Appointment:   Future Appointments   Date Time Provider Department Center   5/20/2025 10:30 AM SCHEDULE, [facility] PULMONARY RM [facility] [place] [facility]   5/22/2025 10:30 AM SCHEDULE, [facility] PUL[facility]   5/27/2025 10:30 AM SCHEDULE, [facility] PUL[facility]   5/29/2025 10:30 AM SCHEDULE, [facility] PUL[facility]ARY [facility]   6/3/2025 10:30 AM SCHEDULE, [facility] PUL[facility]   6/4/2025  2:20 PM Yamileth Garcia MD [facility]   6/5/2025 10:30 AM SCHEDULE, [facility] PULMONARY RM [facility] [place] [facility]   6/10/2025 10:30 AM SCHEDULE, [facility] PULMONARY RM [facility] [place] [facility]   6/12/2025 10:30 AM SCHEDULE, [facility] PULMONARY RM [facility] [place] [facility]   6/17/2025 10:30 AM SCHEDULE, [facility] PULMONARY RM [facility] [place] [facility]   6/19/2025 10:30 AM SCHEDULE, [facility] PULMONARY RM [facility] [place] [facility]   6/24/2025 10:30 AM SCHEDULE, [facility] PULMONARY RM [facility] [place] [facility]   6/26/2025 10:30 AM SCHEDULE, [facility] PUL[facility]ARY RM [facility]   7/1/2025 10:30 AM SCHEDULE, [facility] PULMONARY RM [facility] [place] [facility]   7/3/2025 10:30 AM SCHEDULE, [facility] PULMONARY RM [facility] [place] [facility]   7/18/2025  9:10 AM [facility]   10/27/2025  9:45 AM Russell Hollins, DO [place]       Message sent to  to schedule appt with patient?  N/A      Requested Prescriptions     Pending Prescriptions Disp Refills    omeprazole (PRILOSEC) 40 MG delayed release capsule 90 capsule 0     Sig: Take 1 capsule by mouth every morning Take 1 every morning

## 2025-05-19 RX ORDER — INSULIN GLARGINE 100 [IU]/ML
40 INJECTION, SOLUTION SUBCUTANEOUS 2 TIMES DAILY
Qty: 45 ADJUSTABLE DOSE PRE-FILLED PEN SYRINGE | Refills: 1 | Status: SHIPPED | OUTPATIENT
Start: 2025-05-19 | End: 2025-06-18

## 2025-05-20 ENCOUNTER — HOSPITAL ENCOUNTER (OUTPATIENT)
Dept: CARDIAC REHAB | Age: 75
Setting detail: THERAPIES SERIES
Discharge: HOME | End: 2025-05-20
Attending: INTERNAL MEDICINE
Payer: MEDICARE

## 2025-05-20 VITALS — BODY MASS INDEX: 38.41 KG/M2 | WEIGHT: 210 LBS

## 2025-05-20 PROCEDURE — 94625 PHY/QHP OP PULM RHB W/O MNTR: CPT

## 2025-05-22 ENCOUNTER — HOSPITAL ENCOUNTER (OUTPATIENT)
Dept: CARDIAC REHAB | Age: 75
Setting detail: THERAPIES SERIES
Discharge: HOME | End: 2025-05-22
Attending: INTERNAL MEDICINE
Payer: MEDICARE

## 2025-05-22 PROCEDURE — 94625 PHY/QHP OP PULM RHB W/O MNTR: CPT

## 2025-05-27 ENCOUNTER — HOSPITAL ENCOUNTER (OUTPATIENT)
Dept: CARDIAC REHAB | Age: 75
Setting detail: THERAPIES SERIES
Discharge: HOME | End: 2025-05-27
Attending: INTERNAL MEDICINE
Payer: MEDICARE

## 2025-05-27 VITALS — WEIGHT: 210.2 LBS | BODY MASS INDEX: 38.45 KG/M2

## 2025-05-27 PROCEDURE — 94625 PHY/QHP OP PULM RHB W/O MNTR: CPT

## 2025-05-29 ENCOUNTER — HOSPITAL ENCOUNTER (OUTPATIENT)
Dept: CARDIAC REHAB | Age: 75
Setting detail: THERAPIES SERIES
Discharge: HOME | End: 2025-05-29
Attending: INTERNAL MEDICINE
Payer: MEDICARE

## 2025-05-29 VITALS — BODY MASS INDEX: 38.28 KG/M2 | WEIGHT: 209.3 LBS

## 2025-05-29 PROCEDURE — 94625 PHY/QHP OP PULM RHB W/O MNTR: CPT

## 2025-06-03 ENCOUNTER — HOSPITAL ENCOUNTER (OUTPATIENT)
Dept: CARDIAC REHAB | Age: 75
Setting detail: THERAPIES SERIES
Discharge: HOME | End: 2025-06-03
Attending: INTERNAL MEDICINE
Payer: MEDICARE

## 2025-06-03 VITALS — WEIGHT: 210 LBS | BODY MASS INDEX: 38.41 KG/M2

## 2025-06-03 PROCEDURE — 94625 PHY/QHP OP PULM RHB W/O MNTR: CPT

## 2025-06-04 ENCOUNTER — OFFICE VISIT (OUTPATIENT)
Dept: ENDOCRINOLOGY | Age: 75
End: 2025-06-04
Payer: MEDICARE

## 2025-06-04 VITALS
WEIGHT: 210 LBS | HEIGHT: 62 IN | DIASTOLIC BLOOD PRESSURE: 62 MMHG | BODY MASS INDEX: 38.64 KG/M2 | SYSTOLIC BLOOD PRESSURE: 140 MMHG | HEART RATE: 88 BPM

## 2025-06-04 DIAGNOSIS — N18.4 CKD STAGE 4 DUE TO TYPE 2 DIABETES MELLITUS (HCC): ICD-10-CM

## 2025-06-04 DIAGNOSIS — E11.22 CKD STAGE 4 DUE TO TYPE 2 DIABETES MELLITUS (HCC): ICD-10-CM

## 2025-06-04 DIAGNOSIS — Z79.4 TYPE 2 DIABETES MELLITUS WITH STAGE 4 CHRONIC KIDNEY DISEASE, WITH LONG-TERM CURRENT USE OF INSULIN (HCC): Primary | ICD-10-CM

## 2025-06-04 DIAGNOSIS — E11.22 TYPE 2 DIABETES MELLITUS WITH STAGE 4 CHRONIC KIDNEY DISEASE, WITH LONG-TERM CURRENT USE OF INSULIN (HCC): Primary | ICD-10-CM

## 2025-06-04 DIAGNOSIS — N18.4 TYPE 2 DIABETES MELLITUS WITH STAGE 4 CHRONIC KIDNEY DISEASE, WITH LONG-TERM CURRENT USE OF INSULIN (HCC): Primary | ICD-10-CM

## 2025-06-04 PROCEDURE — G8427 DOCREV CUR MEDS BY ELIG CLIN: HCPCS | Performed by: INTERNAL MEDICINE

## 2025-06-04 PROCEDURE — 3046F HEMOGLOBIN A1C LEVEL >9.0%: CPT | Performed by: INTERNAL MEDICINE

## 2025-06-04 PROCEDURE — G8399 PT W/DXA RESULTS DOCUMENT: HCPCS | Performed by: INTERNAL MEDICINE

## 2025-06-04 PROCEDURE — 99204 OFFICE O/P NEW MOD 45 MIN: CPT | Performed by: INTERNAL MEDICINE

## 2025-06-04 PROCEDURE — 1159F MED LIST DOCD IN RCRD: CPT | Performed by: INTERNAL MEDICINE

## 2025-06-04 PROCEDURE — 3077F SYST BP >= 140 MM HG: CPT | Performed by: INTERNAL MEDICINE

## 2025-06-04 PROCEDURE — 3078F DIAST BP <80 MM HG: CPT | Performed by: INTERNAL MEDICINE

## 2025-06-04 PROCEDURE — 1090F PRES/ABSN URINE INCON ASSESS: CPT | Performed by: INTERNAL MEDICINE

## 2025-06-04 PROCEDURE — 3017F COLORECTAL CA SCREEN DOC REV: CPT | Performed by: INTERNAL MEDICINE

## 2025-06-04 PROCEDURE — 1036F TOBACCO NON-USER: CPT | Performed by: INTERNAL MEDICINE

## 2025-06-04 PROCEDURE — 95251 CONT GLUC MNTR ANALYSIS I&R: CPT | Performed by: INTERNAL MEDICINE

## 2025-06-04 PROCEDURE — 2022F DILAT RTA XM EVC RTNOPTHY: CPT | Performed by: INTERNAL MEDICINE

## 2025-06-04 PROCEDURE — G8417 CALC BMI ABV UP PARAM F/U: HCPCS | Performed by: INTERNAL MEDICINE

## 2025-06-04 PROCEDURE — 1123F ACP DISCUSS/DSCN MKR DOCD: CPT | Performed by: INTERNAL MEDICINE

## 2025-06-04 RX ORDER — INSULIN GLARGINE 100 [IU]/ML
INJECTION, SOLUTION SUBCUTANEOUS
Qty: 45 ADJUSTABLE DOSE PRE-FILLED PEN SYRINGE | Refills: 1 | Status: SHIPPED | OUTPATIENT
Start: 2025-06-04

## 2025-06-04 RX ORDER — INSULIN GLARGINE-YFGN 100 [IU]/ML
INJECTION, SOLUTION SUBCUTANEOUS
COMMUNITY
Start: 2025-06-02 | End: 2025-06-04 | Stop reason: ALTCHOICE

## 2025-06-04 NOTE — PROGRESS NOTES
[facility] Endocrinology    Chief Complaint:     Chief Complaint   Patient presents with    Diabetes    New Patient        Subjective:   Coco Landeros is a pleasant 74 y.o. female who presents for an initial evaluation of Diabetes Mellitus type II. She has DVT, hypertension, hyperlipidemia, atrial fibrillation, CKD stage IV, hypothyroidism has a BMI of 38.    Diagnosed: 2006.   Initial medications:metformin   On insulin since: years   Other diabetes meds tried in the past: Actos, glimepiride.   Hx of severe hypoglycemia or DKA: none   Hx of MI/stroke/CKD: CKD stage III   Hx of pancreatitis: none   Family hx of thyroid cancer: none     Most recent HbA1c:   Hemoglobin A1C   Date Value Ref Range Status   04/16/2025 10.3 See comment % Final     Comment:     Comment:  Diagnosis of Diabetes: > or = 6.5%  Increased risk of diabetes (Prediabetes): 5.7-6.4%  Glycemic Control: Nonpregnant Adults: <7.0%                    Pregnant: <6.0%          Current regimen:  Key Antihyperglycemic Medications              insulin glargine (LANTUS SOLOSTAR) 100 UNIT/ML injection pen (Taking) Take 45 units in the morning, take 35 units at bedtime.    insulin regular (NOVOLIN R) 100 UNIT/ML injection (Taking) Inject 12 Units into the skin 2 times daily (before meals) USE AS DIRECTED PER SLIDING SCALE UP TO 26 UNITS DAILY    Semaglutide, 1 MG/DOSE, (OZEMPIC) 4 MG/3ML SOPN sc injection (Taking) Inject 1 mg into the skin every 7 days            Blood sugar ranges:      Had noted higher blood sugars over the past 1 to 2 months which did recently improve.  She has uterine bleeding and was prescribed megestrol 80 mg BID for about 4 months. She stopped about two months ago.  While on it she noticed higher blood sugars which did improve after stopping.  She was advised to have further evaluation to rule out a cancer and to consider hysterectomy due to she is hesitant to proceed with that due to lack of social support after surgery.  She prefers to

## 2025-06-04 NOTE — PROGRESS NOTES
Name: Coco Landeros  YOB: 1950  Report Period: 05/22/2025 - 06/04/2025 (14 days)  Generated: 06/04/2025  Time CGM Active: 98%      Glucose Statistics and Targets  Average Glucose: 126 mg/dL  Glucose Management Indicator (GMI): 6.3%  Glucose Variability (%CV): 31.3%  Target Range: 70 - 180 mg/dL      Time in Ranges  Very High: >250 mg/dL --- 1%  High: 181 - 250 mg/dL --- 9%  Target Range: 70 - 180 mg/dL --- 88%  Low: 54 - 69 mg/dL --- 2%  Very Low: <54 mg/dL --- 0%

## 2025-06-05 ENCOUNTER — HOSPITAL ENCOUNTER (OUTPATIENT)
Dept: CARDIAC REHAB | Age: 75
Setting detail: THERAPIES SERIES
Discharge: HOME | End: 2025-06-05
Attending: INTERNAL MEDICINE
Payer: MEDICARE

## 2025-06-05 VITALS — BODY MASS INDEX: 38.23 KG/M2 | WEIGHT: 209 LBS

## 2025-06-05 PROCEDURE — 94625 PHY/QHP OP PULM RHB W/O MNTR: CPT

## 2025-06-10 ENCOUNTER — HOSPITAL ENCOUNTER (OUTPATIENT)
Dept: CARDIAC REHAB | Age: 75
Setting detail: THERAPIES SERIES
Discharge: HOME | End: 2025-06-10
Attending: INTERNAL MEDICINE
Payer: MEDICARE

## 2025-06-10 VITALS — WEIGHT: 211 LBS | BODY MASS INDEX: 38.59 KG/M2

## 2025-06-10 PROCEDURE — G0239 OTH RESP PROC, GROUP: HCPCS

## 2025-06-12 ENCOUNTER — HOSPITAL ENCOUNTER (OUTPATIENT)
Dept: CARDIAC REHAB | Age: 75
Setting detail: THERAPIES SERIES
Discharge: HOME | End: 2025-06-12
Attending: INTERNAL MEDICINE
Payer: MEDICARE

## 2025-06-12 VITALS — WEIGHT: 209.3 LBS | BODY MASS INDEX: 38.28 KG/M2

## 2025-06-12 LAB
GLUCOSE BLD-MCNC: 101 MG/DL (ref 70–99)
GLUCOSE BLD-MCNC: 70 MG/DL (ref 70–99)
PERFORMED ON: ABNORMAL
PERFORMED ON: NORMAL

## 2025-06-12 PROCEDURE — 94625 PHY/QHP OP PULM RHB W/O MNTR: CPT

## 2025-06-12 NOTE — PLAN OF CARE
Post exercising on the NuStep, pt appeared pale. CGM read 59. Pt was asked to not do weights and was given 8 oz OJ and a life saver candy. Pt ate two doughnuts for breakfast, stated she felt fine. Pt encouraged to eat a breakfast with more protein and not as much carbs and sugar. Pt's CGM now reading 56. Staff took BS with with our monitor and it was 70. Pt given a pepsi and peanut butter crackers. 15 minutes later it was checked with our monitor and was 101. Pt stated she has 7 days left on her CGM.

## 2025-06-13 RX ORDER — LEVOTHYROXINE SODIUM 100 UG/1
100 TABLET ORAL DAILY
Qty: 90 TABLET | Refills: 0 | Status: SHIPPED | OUTPATIENT
Start: 2025-06-13

## 2025-06-13 NOTE — TELEPHONE ENCOUNTER
Refill Request     CONFIRM preferrred pharmacy with the patient.    If Mail Order Rx - Pend for 90 day refill.      Last Seen: Last Seen Department: 4/16/2025  Last Seen by PCP: 3/5/2025    Last Written: 7/3/24    If no future appointment scheduled, route STAFF MESSAGE with patient name to the  Pool for scheduling.      Next Appointment:   Future Appointments   Date Time Provider Department Center   6/17/2025 10:30 AM SCHEDULE, [facility] PUL[facility] RM [facility]   6/19/2025 10:30 AM SCHEDULE, [facility] PULMONARY RM [facility]   6/24/2025 10:30 AM SCHEDULE, [facility] PULMONARY RM [facility]   6/26/2025 10:30 AM SCHEDULE, [facility] PULMONARY RM [facility] [place] [facility]   7/1/2025 10:30 AM SCHEDULE, [facility] PULMONARY RM [facility]   7/3/2025 10:30 AM SCHEDULE, [facility] PULMONARY RM [facility] [place] [facility]   7/18/2025  9:10 AM Anne Marie Badillo MD [place] [facility]   10/15/2025  2:00 PM Yamileth Garcia MD [facility]   10/27/2025  9:45 AM Russell Hollins DO [place]       Message sent to  to schedule appt with patient?  N/A      Requested Prescriptions     Pending Prescriptions Disp Refills    levothyroxine (SYNTHROID) 100 MCG tablet 90 tablet 0     Sig: Take 1 tablet by mouth daily

## 2025-06-15 DIAGNOSIS — Z79.4 TYPE 2 DIABETES MELLITUS WITH STAGE 4 CHRONIC KIDNEY DISEASE, WITH LONG-TERM CURRENT USE OF INSULIN (HCC): ICD-10-CM

## 2025-06-15 DIAGNOSIS — N18.4 TYPE 2 DIABETES MELLITUS WITH STAGE 4 CHRONIC KIDNEY DISEASE, WITH LONG-TERM CURRENT USE OF INSULIN (HCC): ICD-10-CM

## 2025-06-15 DIAGNOSIS — E11.22 TYPE 2 DIABETES MELLITUS WITH STAGE 4 CHRONIC KIDNEY DISEASE, WITH LONG-TERM CURRENT USE OF INSULIN (HCC): ICD-10-CM

## 2025-06-16 RX ORDER — SEMAGLUTIDE 1.34 MG/ML
1 INJECTION, SOLUTION SUBCUTANEOUS
Qty: 3 ML | Refills: 3 | Status: SHIPPED | OUTPATIENT
Start: 2025-06-16

## 2025-06-17 ENCOUNTER — HOSPITAL ENCOUNTER (OUTPATIENT)
Dept: CARDIAC REHAB | Age: 75
Setting detail: THERAPIES SERIES
Discharge: HOME | End: 2025-06-17
Attending: INTERNAL MEDICINE
Payer: MEDICARE

## 2025-06-17 VITALS — WEIGHT: 210 LBS | BODY MASS INDEX: 38.41 KG/M2

## 2025-06-17 PROCEDURE — 94625 PHY/QHP OP PULM RHB W/O MNTR: CPT

## 2025-06-18 DIAGNOSIS — Z79.4 TYPE 2 DIABETES MELLITUS WITH STAGE 4 CHRONIC KIDNEY DISEASE, WITH LONG-TERM CURRENT USE OF INSULIN (HCC): ICD-10-CM

## 2025-06-18 DIAGNOSIS — E11.22 TYPE 2 DIABETES MELLITUS WITH STAGE 4 CHRONIC KIDNEY DISEASE, WITH LONG-TERM CURRENT USE OF INSULIN (HCC): ICD-10-CM

## 2025-06-18 DIAGNOSIS — N18.4 TYPE 2 DIABETES MELLITUS WITH STAGE 4 CHRONIC KIDNEY DISEASE, WITH LONG-TERM CURRENT USE OF INSULIN (HCC): ICD-10-CM

## 2025-06-18 RX ORDER — PEN NEEDLE, DIABETIC 32GX 5/32"
NEEDLE, DISPOSABLE MISCELLANEOUS
Qty: 100 EACH | Refills: 11 | Status: SHIPPED | OUTPATIENT
Start: 2025-06-18

## 2025-06-18 RX ORDER — PENTOXIFYLLINE 400 MG/1
400 TABLET, EXTENDED RELEASE ORAL DAILY
Qty: 90 TABLET | Refills: 0 | Status: SHIPPED | OUTPATIENT
Start: 2025-06-18

## 2025-06-18 NOTE — TELEPHONE ENCOUNTER
Refill Request     CONFIRM preferrred pharmacy with the patient.    If Mail Order Rx - Pend for 90 day refill.      Last Seen: Last Seen Department: 4/16/2025  Last Seen by PCP: 3/5/2025    Last Written: 3-    If no future appointment scheduled, route STAFF MESSAGE with patient name to the  Pool for scheduling.      Next Appointment:   Future Appointments   Date Time Provider Department Center   6/19/2025 10:30 AM SCHEDULE, [facility]   7/18/2025  9:10 AM Anne Marie Badillo MD [place] [facility]   10/15/2025  2:00 PM Yamileth Garcia MD [facility]   10/27/2025  9:45 AM Russell Hollins DO [place]       Message sent to  to schedule appt with patient?  N/A      Requested Prescriptions     Pending Prescriptions Disp Refills    pentoxifylline (TRENTAL) 400 MG extended release tablet [Pharmacy Med Name: PENTOXIFYLLINE  MG TAB] 90 tablet 0     Sig: TAKE 1 TABLET BY MOUTH EVERY DAY

## 2025-06-18 NOTE — TELEPHONE ENCOUNTER
Refill Request     CONFIRM preferrred pharmacy with the patient.    If Mail Order Rx - Pend for 90 day refill.      Last Seen: Last Seen Department: 4/16/2025  Last Seen by PCP: 3/5/2025    Last Written: 5/8/25    If no future appointment scheduled, route STAFF MESSAGE with patient name to the  Pool for scheduling.      Next Appointment:   Future Appointments   Date Time Provider Department Center   6/19/2025 10:30 AM SCHEDULE, [facility] PULMONARY RM [facility]   7/18/2025  9:10 AM Anne Marie Badillo MD [place]  [facility]   10/15/2025  2:00 PM Yamileth Garcia MD [facility]   10/27/2025  9:45 AM Russell Hollins, DO [place]       Message sent to  to schedule appt with patient?  N/A      Requested Prescriptions     Pending Prescriptions Disp Refills    Insulin Pen Needle (BD PEN NEEDLE ELDER 2ND GEN) 32G X 4 MM MISC 100 each 11     Sig: Use 2 times a day

## 2025-06-19 ENCOUNTER — HOSPITAL ENCOUNTER (OUTPATIENT)
Dept: CARDIAC REHAB | Age: 75
Setting detail: THERAPIES SERIES
Discharge: HOME | End: 2025-06-19
Attending: INTERNAL MEDICINE
Payer: MEDICARE

## 2025-06-19 PROCEDURE — G0239 OTH RESP PROC, GROUP: HCPCS

## 2025-06-19 PROCEDURE — 94625 PHY/QHP OP PULM RHB W/O MNTR: CPT

## 2025-06-19 ASSESSMENT — PATIENT HEALTH QUESTIONNAIRE - PHQ9
SUM OF ALL RESPONSES TO PHQ QUESTIONS 1-9: 11
4. FEELING TIRED OR HAVING LITTLE ENERGY: MORE THAN HALF THE DAYS
9. THOUGHTS THAT YOU WOULD BE BETTER OFF DEAD, OR OF HURTING YOURSELF: NOT AT ALL
10. IF YOU CHECKED OFF ANY PROBLEMS, HOW DIFFICULT HAVE THESE PROBLEMS MADE IT FOR YOU TO DO YOUR WORK, TAKE CARE OF THINGS AT HOME, OR GET ALONG WITH OTHER PEOPLE: NOT DIFFICULT AT ALL
7. TROUBLE CONCENTRATING ON THINGS, SUCH AS READING THE NEWSPAPER OR WATCHING TELEVISION: MORE THAN HALF THE DAYS
SUM OF ALL RESPONSES TO PHQ QUESTIONS 1-9: 11
SUM OF ALL RESPONSES TO PHQ QUESTIONS 1-9: 11
1. LITTLE INTEREST OR PLEASURE IN DOING THINGS: SEVERAL DAYS
8. MOVING OR SPEAKING SO SLOWLY THAT OTHER PEOPLE COULD HAVE NOTICED. OR THE OPPOSITE, BEING SO FIGETY OR RESTLESS THAT YOU HAVE BEEN MOVING AROUND A LOT MORE THAN USUAL: MORE THAN HALF THE DAYS
5. POOR APPETITE OR OVEREATING: NOT AT ALL
SUM OF ALL RESPONSES TO PHQ QUESTIONS 1-9: 11
6. FEELING BAD ABOUT YOURSELF - OR THAT YOU ARE A FAILURE OR HAVE LET YOURSELF OR YOUR FAMILY DOWN: NOT AT ALL
2. FEELING DOWN, DEPRESSED OR HOPELESS: SEVERAL DAYS
3. TROUBLE FALLING OR STAYING ASLEEP: NEARLY EVERY DAY

## 2025-06-19 NOTE — PLAN OF CARE
6/19/2025    10:00 AM   PHQ-9    Little interest or pleasure in doing things 1   Feeling down, depressed, or hopeless 1   Trouble falling or staying asleep, or sleeping too much 3   Feeling tired or having little energy 2   Poor appetite or overeating 0   Feeling bad about yourself - or that you are a failure or have let yourself or your family down 0   Trouble concentrating on things, such as reading the newspaper or watching television 2   Moving or speaking so slowly that other people could have noticed. Or the opposite - being so fidgety or restless that you have been moving around a lot more than usual 2   Thoughts that you would be better off dead, or of hurting yourself in some way 0   PHQ-2 Score 2   PHQ-9 Total Score 11   If you checked off any problems, how difficult have these problems made it for you to do your work, take care of things at home, or get along with other people? 0        Hello! This is the result of Ashwini's discharge PHQ-9 screening. I spoke with her in regard to her score. She stated, \"I'm not really depressed. It's hard to say how I feel sometimes. I lost my best friend, my home and my dog very close together\". She stated she lives in a great place and is well taken care of, but sometimes she just misses her old life.   We just wanted you to be aware of her score and our conversation. Thanks!

## 2025-06-19 NOTE — PLAN OF CARE
Pulmonary Rehab Treatment Plan   Name: Coco Landeros Assessment Date: 2025   : 1950 Primary Diagnosis: COPD   Age: 74 y.o. Referring Physician: Olga Lidia Sol   MRN: [id number] Primary Care Physician: Anne Marie Badillo MD       Treatment Diagnosis  Treatment Diagnosis 1: COPD  Referral Date: 25  FVC (Liters): 0 liters (No PFT ordered or completed.)    Retired, Read Only Oxygen Saturation / Titration   Stages of Change : Action    Oxygen Assessment  Stages of Change : Action  Oxygen Type : -- (pt does not use 02 at home)  O2 Flow Rate (l/min) - Rest: 0 l/min  O2 Flow Rate (l/min) - Exercise: 0 l/min  O2 Flow Rate (l/min) - Sleep: 0 l/min  O2 Sat Greater Than or Equal to 90%: Yes    Individual Treatment Plan  ITP Visit Type: Discharge, completed program  ITP Next Review Date:  (ORS (GROUP))  Visit #/Total Visits: 36/36  EF%: 55 % (25)  Retired, Read Only Pulmonary ITP: Exercise; Psychosocial; Nutrition; Education    COPD Assessment Test (CAT)  Cough : 3  Phlegm (mucus): 3  Chest Tightness: 0  Breathless When Walking Up a Hill or Flight of Steps: 4  Activities at Home: 4  Confident Leaving Home Due to Lung Condition: 1  Sleep Soundly: 1  Energy Level: 3  CAT Total Score : 19     [facility] Shortness of Breath Questionnaire  Resting, Lying Down: 0  Walking on a Level at Your Own Pace: 1  Walking on a Level with Others Your Age: 1  Walking Up a Hill: 4  Walking Up Stairs: 4  While Eatin  Standing Up From a Chair: 2  Brushing Teeth: 1  Shaving and/or Brushing Hair: 1  Showering/Bathing: 3  Dressin  Picking Up and Straightening: 3  Doing Dishes: 0  Sweeping/Vacuuming: 3  Making Bed: 3  Shoppin  Doing Laundry: 1  Washing Car: 3  Mowing Lawn: 3  Watering Lawn: 2  Sexual Activities: 0  How Much Does Shortness of Breath Limit You In Your Daily Life: 3  How Much Does the Fear of \"Hurting Myself\" by Overexerting Limit You in Your Daily Life: 3  How Much does the Fear of Future

## 2025-06-24 ENCOUNTER — APPOINTMENT (OUTPATIENT)
Dept: CARDIAC REHAB | Age: 75
End: 2025-06-24
Attending: INTERNAL MEDICINE
Payer: MEDICARE

## 2025-06-24 ENCOUNTER — HOSPITAL ENCOUNTER (OUTPATIENT)
Dept: CARDIAC REHAB | Age: 75
Discharge: HOME | End: 2025-06-24

## 2025-06-24 PROCEDURE — 9900000065 HC CARDIAC REHAB PHASE 3 - 1 VISIT

## 2025-06-26 ENCOUNTER — APPOINTMENT (OUTPATIENT)
Dept: CARDIAC REHAB | Age: 75
End: 2025-06-26
Attending: INTERNAL MEDICINE
Payer: MEDICARE

## 2025-06-26 ENCOUNTER — TELEPHONE (OUTPATIENT)
Dept: ENDOCRINOLOGY | Age: 75
End: 2025-06-26

## 2025-06-26 ENCOUNTER — HOSPITAL ENCOUNTER (OUTPATIENT)
Dept: CARDIAC REHAB | Age: 75
Discharge: HOME | End: 2025-06-26

## 2025-06-26 DIAGNOSIS — N18.4 TYPE 2 DIABETES MELLITUS WITH STAGE 4 CHRONIC KIDNEY DISEASE, WITH LONG-TERM CURRENT USE OF INSULIN (HCC): Primary | ICD-10-CM

## 2025-06-26 DIAGNOSIS — Z79.4 TYPE 2 DIABETES MELLITUS WITH STAGE 4 CHRONIC KIDNEY DISEASE, WITH LONG-TERM CURRENT USE OF INSULIN (HCC): Primary | ICD-10-CM

## 2025-06-26 DIAGNOSIS — Z79.4 TYPE 2 DIABETES MELLITUS WITH STAGE 4 CHRONIC KIDNEY DISEASE, WITH LONG-TERM CURRENT USE OF INSULIN (HCC): ICD-10-CM

## 2025-06-26 DIAGNOSIS — E11.22 TYPE 2 DIABETES MELLITUS WITH STAGE 4 CHRONIC KIDNEY DISEASE, WITH LONG-TERM CURRENT USE OF INSULIN (HCC): ICD-10-CM

## 2025-06-26 DIAGNOSIS — E11.22 TYPE 2 DIABETES MELLITUS WITH STAGE 4 CHRONIC KIDNEY DISEASE, WITH LONG-TERM CURRENT USE OF INSULIN (HCC): Primary | ICD-10-CM

## 2025-06-26 DIAGNOSIS — N18.4 TYPE 2 DIABETES MELLITUS WITH STAGE 4 CHRONIC KIDNEY DISEASE, WITH LONG-TERM CURRENT USE OF INSULIN (HCC): ICD-10-CM

## 2025-06-26 LAB
GLUCOSE BLD-MCNC: 61 MG/DL (ref 70–99)
GLUCOSE BLD-MCNC: 80 MG/DL (ref 70–99)
PERFORMED ON: ABNORMAL
PERFORMED ON: NORMAL

## 2025-06-26 PROCEDURE — 9900000065 HC CARDIAC REHAB PHASE 3 - 1 VISIT

## 2025-06-26 RX ORDER — GLUCAGON 3 MG/1
POWDER NASAL
Qty: 1 EACH | Refills: 1 | Status: SHIPPED | OUTPATIENT
Start: 2025-06-26

## 2025-06-26 NOTE — TELEPHONE ENCOUNTER
Pt is having issues with low blood sugar. It dropped to 54 today but normally around 100. There has been several nights the Pt didn't take her lantus and novoline r for sugar was too low to take it. It is dropping below the 60s in the evening. This has been happening for at least 2 weeks.   Pt prefers my chart message or vm left with the info for she doesn't answer her phone.     Please advise

## 2025-06-26 NOTE — PROGRESS NOTES
Patient in pulmonary rehab phase 3. In recovery, CGM alarming. Pt looked at reading and quickly closed screen. Requested pt show CGM reading and patient did. Pt CGM reading 54. Writer completed FSBS: 61. Pt reports she feels fine, no symptoms. Pt appears asymptomatic. Pt reports having 2 little doughnuts and a sausage egg and cheese mcmuffin prior to appointment. Pt took insulin as prescribed this morning. Pt was given 4 oz orange juice and pack of peanut butter crackers. Pt reports there have been a couple of nights she has not taken insulin because her blood sugar was \"already low\". Informed patient that this note will be sent to Dr. Garcia. Instructed pt to follow up with her endocrinologist regarding hypoglycemia. Pt indicated she would not be calling MD. Pt states she understands why older people adjust their own medications. Discussed dangers and effects of uncontrolled blood sugar. Pt asked if RN was going to tell her MD and confirmed that MD will be notified and that patient needs to call today. After 10 minutes pt CGM: 69. Pt given life saver. After 15 minutes, CGM: 75 and FSBS: 80. Pt asymptomatic and went home with instructions to follow up with Dr. Garcia. Pt states \"I hear ya\".

## 2025-06-26 NOTE — TELEPHONE ENCOUNTER
Sensor data reviewed   She is on Lantus 45 units in am and 35 units at bedtime.     Carie, Please ask her to keep 45 units in am and change to 30 units at bedtime.   I called her went to voicemail

## 2025-06-26 NOTE — TELEPHONE ENCOUNTER
Parth report uploaded   Last OV 06/04/2025  Next OV 10/15/2025  Please advise    Current Outpatient Medications on File Prior to Visit   Medication Sig Dispense Refill    Insulin Pen Needle (BD PEN NEEDLE ELDER 2ND GEN) 32G X 4 MM MISC Use 2 times a day 100 each 11    pentoxifylline (TRENTAL) 400 MG extended release tablet TAKE 1 TABLET BY MOUTH EVERY DAY 90 tablet 0    Semaglutide, 1 MG/DOSE, (OZEMPIC, 1 MG/DOSE,) 4 MG/3ML SOPN sc injection INJECT 1 MG INTO THE SKIN EVERY 7 DAYS 3 mL 3    levothyroxine (SYNTHROID) 100 MCG tablet Take 1 tablet by mouth daily 90 tablet 0    insulin glargine (LANTUS SOLOSTAR) 100 UNIT/ML injection pen Take 45 units in the morning, take 35 units at bedtime. 45 Adjustable Dose Pre-filled Pen Syringe 1    omeprazole (PRILOSEC) 40 MG delayed release capsule Take 1 capsule by mouth every morning Take 1 every morning 90 capsule 0    insulin regular (NOVOLIN R) 100 UNIT/ML injection Inject 12 Units into the skin 2 times daily (before meals) USE AS DIRECTED PER SLIDING SCALE UP TO 26 UNITS DAILY 60 mL 5    dilTIAZem (CARDIZEM CD) 240 MG extended release capsule Take 1 capsule by mouth daily 90 capsule 3    furosemide (LASIX) 20 MG tablet Take 1 tablet by mouth daily 90 tablet 3    losartan (COZAAR) 50 MG tablet Take 1 tablet by mouth daily 90 tablet 1    UNABLE TO FIND blood pressure monitor kit 1 kit 0    apixaban (ELIQUIS) 5 MG TABS tablet Take 1 tablet by mouth 2 times daily 180 tablet 3    Cholecalciferol (D3) 50 MCG (2000 UT) TABS Take 1 tablet by mouth daily      fluticasone (FLONASE) 50 MCG/ACT nasal spray INHALE 1 SPRAY INTO EACH NOSTRIL EVERY DAY 48 g 1    ondansetron (ZOFRAN-ODT) 4 MG disintegrating tablet Take 1 tablet by mouth 3 times daily as needed for Nausea or Vomiting 21 tablet 0    megestrol (MEGACE) 40 MG tablet Take 2 tablets by mouth 2 times daily      atorvastatin (LIPITOR) 40 MG tablet TAKE 1 TABLET BY MOUTH EVERY DAY 90 tablet 2    Insulin Syringe-Needle U-100 (BD VEO

## 2025-06-30 ENCOUNTER — TELEPHONE (OUTPATIENT)
Dept: ENDOCRINOLOGY | Age: 75
End: 2025-06-30

## 2025-06-30 DIAGNOSIS — Z79.4 TYPE 2 DIABETES MELLITUS WITH STAGE 4 CHRONIC KIDNEY DISEASE, WITH LONG-TERM CURRENT USE OF INSULIN (HCC): ICD-10-CM

## 2025-06-30 DIAGNOSIS — E11.22 TYPE 2 DIABETES MELLITUS WITH STAGE 4 CHRONIC KIDNEY DISEASE, WITH LONG-TERM CURRENT USE OF INSULIN (HCC): ICD-10-CM

## 2025-06-30 DIAGNOSIS — N18.4 TYPE 2 DIABETES MELLITUS WITH STAGE 4 CHRONIC KIDNEY DISEASE, WITH LONG-TERM CURRENT USE OF INSULIN (HCC): ICD-10-CM

## 2025-06-30 RX ORDER — GLUCAGON INJECTION, SOLUTION 0.5 MG/.1ML
INJECTION, SOLUTION SUBCUTANEOUS
Qty: 2 EACH | Refills: 1 | Status: SHIPPED | OUTPATIENT
Start: 2025-06-30

## 2025-06-30 RX ORDER — GLUCAGON INJECTION, SOLUTION 0.5 MG/.1ML
INJECTION, SOLUTION SUBCUTANEOUS
Qty: 2 EACH | Refills: 1 | Status: SHIPPED | OUTPATIENT
Start: 2025-06-30 | End: 2025-06-30

## 2025-06-30 NOTE — TELEPHONE ENCOUNTER
Medication:   Requested Prescriptions     Signed Prescriptions Disp Refills    Glucagon (GVOKE HYPOPEN 1-PACK) 0.5 MG/0.1ML SOAJ 2 each 1     Sig: Inject 0.1 ml subcutaneous fro low blood sugar     Authorizing Provider: SAMANTHA ALVARES     Ordering User: JURGEN KENDALL         Last appt: Visit date not found   Next appt: Visit date not found    Last Labs DM:   Lab Results   Component Value Date/Time    LABA1C 10.3 04/16/2025 09:07 AM     Last Lipid:   Lab Results   Component Value Date/Time    CHOL 165 06/24/2024 09:23 AM    TRIG 179 06/24/2024 09:23 AM    HDL 44 06/24/2024 09:23 AM     Last PSA: No results found for: \"PSA\"  Last Thyroid:   Lab Results   Component Value Date/Time    TSH 1.03 06/24/2024 09:23 AM    T4FREE 1.5 11/05/2019 08:00 AM

## 2025-07-01 ENCOUNTER — HOSPITAL ENCOUNTER (OUTPATIENT)
Dept: CARDIAC REHAB | Age: 75
Discharge: HOME | End: 2025-07-01

## 2025-07-01 PROCEDURE — 9900000065 HC CARDIAC REHAB PHASE 3 - 1 VISIT

## 2025-07-02 ENCOUNTER — TELEPHONE (OUTPATIENT)
Dept: ENDOCRINOLOGY | Age: 75
End: 2025-07-02

## 2025-07-02 NOTE — TELEPHONE ENCOUNTER
Received the following from pulm rehab:     'Good morning, patient attends pulmonary rehab phase 3. Pt having hypoglycemic episodes.wanted to update you on patient condition. Thank you!'    Please contact patient. If hypoglycemia is still occurring, please have patient cut down on lantus to 20 units at bedtime. Continue same lantus dose in the morning. I also would recommend to hold off on taking regular insulin dose if proceeding pulm rehab.

## 2025-07-03 ENCOUNTER — HOSPITAL ENCOUNTER (OUTPATIENT)
Dept: CARDIAC REHAB | Age: 75
Discharge: HOME | End: 2025-07-03

## 2025-07-03 PROCEDURE — 9900000065 HC CARDIAC REHAB PHASE 3 - 1 VISIT

## 2025-07-08 ENCOUNTER — HOSPITAL ENCOUNTER (OUTPATIENT)
Dept: CARDIAC REHAB | Age: 75
Discharge: HOME | End: 2025-07-08

## 2025-07-08 PROCEDURE — 9900000065 HC CARDIAC REHAB PHASE 3 - 1 VISIT

## 2025-07-10 ENCOUNTER — HOSPITAL ENCOUNTER (OUTPATIENT)
Dept: CARDIAC REHAB | Age: 75
Discharge: HOME | End: 2025-07-10

## 2025-07-10 PROCEDURE — 9900000065 HC CARDIAC REHAB PHASE 3 - 1 VISIT

## 2025-07-15 ENCOUNTER — HOSPITAL ENCOUNTER (OUTPATIENT)
Dept: CARDIAC REHAB | Age: 75
Discharge: HOME | End: 2025-07-15

## 2025-07-15 PROCEDURE — 9900000065 HC CARDIAC REHAB PHASE 3 - 1 VISIT

## 2025-07-17 ENCOUNTER — HOSPITAL ENCOUNTER (OUTPATIENT)
Dept: CARDIAC REHAB | Age: 75
Discharge: HOME | End: 2025-07-17

## 2025-07-17 PROCEDURE — 9900000065 HC CARDIAC REHAB PHASE 3 - 1 VISIT

## 2025-07-18 ENCOUNTER — OFFICE VISIT (OUTPATIENT)
Dept: FAMILY MEDICINE CLINIC | Age: 75
End: 2025-07-18
Payer: MEDICARE

## 2025-07-18 VITALS
WEIGHT: 213 LBS | BODY MASS INDEX: 38.96 KG/M2 | RESPIRATION RATE: 17 BRPM | OXYGEN SATURATION: 93 % | DIASTOLIC BLOOD PRESSURE: 83 MMHG | SYSTOLIC BLOOD PRESSURE: 138 MMHG | HEART RATE: 87 BPM

## 2025-07-18 DIAGNOSIS — E11.22 TYPE 2 DIABETES MELLITUS WITH STAGE 4 CHRONIC KIDNEY DISEASE, WITH LONG-TERM CURRENT USE OF INSULIN (HCC): ICD-10-CM

## 2025-07-18 DIAGNOSIS — E06.3 HYPOTHYROIDISM DUE TO HASHIMOTO'S THYROIDITIS: ICD-10-CM

## 2025-07-18 DIAGNOSIS — I48.20 CHRONIC ATRIAL FIBRILLATION (HCC): ICD-10-CM

## 2025-07-18 DIAGNOSIS — I10 BENIGN ESSENTIAL HTN: Primary | ICD-10-CM

## 2025-07-18 DIAGNOSIS — J01.90 ACUTE NON-RECURRENT SINUSITIS, UNSPECIFIED LOCATION: ICD-10-CM

## 2025-07-18 DIAGNOSIS — Z79.4 TYPE 2 DIABETES MELLITUS WITH STAGE 4 CHRONIC KIDNEY DISEASE, WITH LONG-TERM CURRENT USE OF INSULIN (HCC): ICD-10-CM

## 2025-07-18 DIAGNOSIS — N18.4 TYPE 2 DIABETES MELLITUS WITH STAGE 4 CHRONIC KIDNEY DISEASE, WITH LONG-TERM CURRENT USE OF INSULIN (HCC): ICD-10-CM

## 2025-07-18 PROCEDURE — 1123F ACP DISCUSS/DSCN MKR DOCD: CPT

## 2025-07-18 PROCEDURE — 1090F PRES/ABSN URINE INCON ASSESS: CPT

## 2025-07-18 PROCEDURE — 2022F DILAT RTA XM EVC RTNOPTHY: CPT

## 2025-07-18 PROCEDURE — 1159F MED LIST DOCD IN RCRD: CPT

## 2025-07-18 PROCEDURE — 3046F HEMOGLOBIN A1C LEVEL >9.0%: CPT

## 2025-07-18 PROCEDURE — G8399 PT W/DXA RESULTS DOCUMENT: HCPCS

## 2025-07-18 PROCEDURE — G8417 CALC BMI ABV UP PARAM F/U: HCPCS

## 2025-07-18 PROCEDURE — G8427 DOCREV CUR MEDS BY ELIG CLIN: HCPCS

## 2025-07-18 PROCEDURE — 3017F COLORECTAL CA SCREEN DOC REV: CPT

## 2025-07-18 PROCEDURE — 1036F TOBACCO NON-USER: CPT

## 2025-07-18 PROCEDURE — 3078F DIAST BP <80 MM HG: CPT

## 2025-07-18 PROCEDURE — 99214 OFFICE O/P EST MOD 30 MIN: CPT

## 2025-07-18 PROCEDURE — 3075F SYST BP GE 130 - 139MM HG: CPT

## 2025-07-18 NOTE — PROGRESS NOTES
Associated Diagnoses:  --     atorvastatin (LIPITOR) 40 MG tablet  11/11/24  --     TAKE 1 TABLET BY MOUTH EVERY DAY     Associated Diagnoses:  --     Cholecalciferol (D3) 50 MCG (2000 UT) TABS  --  --     Associated Diagnoses:  --     Continuous Blood Gluc Sensor (FREESTYLE IMELDA 3 SENSOR) [facility]  10/31/23  --     1 Units by Does not apply route continuous     Associated Diagnoses:  --     dilTIAZem (CARDIZEM CD) 240 MG extended release capsule  05/05/25  --     Take 1 capsule by mouth daily     Associated Diagnoses:  --     fluticasone (FLONASE) 50 MCG/ACT nasal spray  01/17/25  --     INHALE 1 SPRAY INTO EACH NOSTRIL EVERY DAY     Associated Diagnoses:  --     furosemide (LASIX) 20 MG tablet  05/05/25  --     Take 1 tablet by mouth daily     Associated Diagnoses:  Venous insufficiency     Glucagon (GVOKE PFS) 0.5 MG/0.1ML SOSY  06/30/25  --     INJECT 0.1 ML SUBCUTANEOUS FOR LOW BLOOD SUGAR     Associated Diagnoses:  Type 2 diabetes mellitus with stage 4 chronic kidney disease, with long-term current use of insulin ([facility])     insulin glargine (LANTUS SOLOSTAR) 100 UNIT/ML injection pen  06/04/25  --     Take 45 units in the morning, take 35 units at bedtime.     Patient taking differently: Take 45 units in the morning, take 20 units at bedtime.     Associated Diagnoses:  Type 2 diabetes mellitus with stage 4 chronic kidney disease, with long-term current use of insulin ([facility])     Insulin Pen Needle (BD PEN NEEDLE ELDER 2ND GEN) 32G X 4 MM MISC  06/18/25  --     Use 2 times a day     Associated Diagnoses:  Type 2 diabetes mellitus with stage 4 chronic kidney disease, with long-term current use of insulin ([facility])     insulin regular (NOVOLIN R) 100 UNIT/ML injection  05/16/25  --     Inject 12 Units into the skin 2 times daily (before meals) USE AS DIRECTED PER SLIDING SCALE UP TO 26 UNITS DAILY     Associated Diagnoses:  Insulin-requiring or dependent type II diabetes mellitus ([facility])     Insulin Syringe-Needle U-100 (BD

## 2025-07-22 ENCOUNTER — HOSPITAL ENCOUNTER (OUTPATIENT)
Dept: CARDIAC REHAB | Age: 75
Discharge: HOME | End: 2025-07-22

## 2025-07-22 PROCEDURE — 9900000065 HC CARDIAC REHAB PHASE 3 - 1 VISIT

## 2025-07-24 ENCOUNTER — HOSPITAL ENCOUNTER (OUTPATIENT)
Dept: CARDIAC REHAB | Age: 75
Discharge: HOME | End: 2025-07-24

## 2025-07-24 PROCEDURE — 9900000065 HC CARDIAC REHAB PHASE 3 - 1 VISIT

## 2025-07-29 ENCOUNTER — HOSPITAL ENCOUNTER (OUTPATIENT)
Dept: CARDIAC REHAB | Age: 75
Discharge: HOME | End: 2025-07-29

## 2025-07-29 PROCEDURE — 9900000065 HC CARDIAC REHAB PHASE 3 - 1 VISIT

## 2025-07-31 ENCOUNTER — HOSPITAL ENCOUNTER (OUTPATIENT)
Dept: CARDIAC REHAB | Age: 75
Discharge: HOME | End: 2025-07-31

## 2025-07-31 PROCEDURE — 9900000065 HC CARDIAC REHAB PHASE 3 - 1 VISIT

## 2025-08-05 ENCOUNTER — HOSPITAL ENCOUNTER (OUTPATIENT)
Dept: CARDIAC REHAB | Age: 75
Discharge: HOME | End: 2025-08-05

## 2025-08-05 PROCEDURE — 9900000065 HC CARDIAC REHAB PHASE 3 - 1 VISIT

## 2025-08-07 ENCOUNTER — HOSPITAL ENCOUNTER (OUTPATIENT)
Dept: CARDIAC REHAB | Age: 75
Discharge: HOME | End: 2025-08-07

## 2025-08-07 PROCEDURE — 9900000065 HC CARDIAC REHAB PHASE 3 - 1 VISIT

## 2025-08-09 DIAGNOSIS — K21.9 GASTROESOPHAGEAL REFLUX DISEASE WITHOUT ESOPHAGITIS: ICD-10-CM

## 2025-08-11 RX ORDER — OMEPRAZOLE 40 MG/1
40 CAPSULE, DELAYED RELEASE ORAL EVERY MORNING
Qty: 90 CAPSULE | Refills: 3 | Status: SHIPPED | OUTPATIENT
Start: 2025-08-11

## 2025-08-12 ENCOUNTER — HOSPITAL ENCOUNTER (OUTPATIENT)
Dept: CARDIAC REHAB | Age: 75
Discharge: HOME | End: 2025-08-12

## 2025-08-12 PROCEDURE — 9900000065 HC CARDIAC REHAB PHASE 3 - 1 VISIT

## 2025-08-14 ENCOUNTER — HOSPITAL ENCOUNTER (OUTPATIENT)
Dept: CARDIAC REHAB | Age: 75
Discharge: HOME | End: 2025-08-14

## 2025-08-14 PROCEDURE — 9900000065 HC CARDIAC REHAB PHASE 3 - 1 VISIT

## 2025-08-19 ENCOUNTER — TELEPHONE (OUTPATIENT)
Dept: FAMILY MEDICINE CLINIC | Age: 75
End: 2025-08-19

## 2025-08-19 ENCOUNTER — HOSPITAL ENCOUNTER (OUTPATIENT)
Dept: CARDIAC REHAB | Age: 75
Discharge: HOME | End: 2025-08-19

## 2025-08-19 PROCEDURE — 9900000065 HC CARDIAC REHAB PHASE 3 - 1 VISIT

## 2025-08-21 ENCOUNTER — HOSPITAL ENCOUNTER (OUTPATIENT)
Dept: CARDIAC REHAB | Age: 75
Discharge: HOME | End: 2025-08-21

## 2025-08-26 ENCOUNTER — HOSPITAL ENCOUNTER (OUTPATIENT)
Dept: CARDIAC REHAB | Age: 75
Discharge: HOME | End: 2025-08-26

## 2025-08-26 PROCEDURE — 9900000065 HC CARDIAC REHAB PHASE 3 - 1 VISIT

## 2025-08-27 DIAGNOSIS — I10 BENIGN ESSENTIAL HTN: ICD-10-CM

## 2025-08-27 RX ORDER — LOSARTAN POTASSIUM 50 MG/1
50 TABLET ORAL DAILY
Qty: 90 TABLET | Refills: 1 | Status: SHIPPED | OUTPATIENT
Start: 2025-08-27

## 2025-08-28 ENCOUNTER — HOSPITAL ENCOUNTER (OUTPATIENT)
Dept: CARDIAC REHAB | Age: 75
Discharge: HOME | End: 2025-08-28

## 2025-08-28 PROCEDURE — 9900000065 HC CARDIAC REHAB PHASE 3 - 1 VISIT

## 2025-09-02 ENCOUNTER — HOSPITAL ENCOUNTER (OUTPATIENT)
Dept: CARDIAC REHAB | Age: 75
Discharge: HOME | End: 2025-09-02

## 2025-09-02 PROCEDURE — 9900000065 HC CARDIAC REHAB PHASE 3 - 1 VISIT

## 2025-09-03 DIAGNOSIS — N18.4 TYPE 2 DIABETES MELLITUS WITH STAGE 4 CHRONIC KIDNEY DISEASE, WITH LONG-TERM CURRENT USE OF INSULIN (HCC): ICD-10-CM

## 2025-09-03 DIAGNOSIS — E11.22 TYPE 2 DIABETES MELLITUS WITH STAGE 4 CHRONIC KIDNEY DISEASE, WITH LONG-TERM CURRENT USE OF INSULIN (HCC): ICD-10-CM

## 2025-09-03 DIAGNOSIS — Z79.4 TYPE 2 DIABETES MELLITUS WITH STAGE 4 CHRONIC KIDNEY DISEASE, WITH LONG-TERM CURRENT USE OF INSULIN (HCC): ICD-10-CM

## 2025-09-03 RX ORDER — INSULIN GLARGINE 100 [IU]/ML
INJECTION, SOLUTION SUBCUTANEOUS
Qty: 45 ADJUSTABLE DOSE PRE-FILLED PEN SYRINGE | Refills: 0 | Status: SHIPPED | OUTPATIENT
Start: 2025-09-03

## 2025-09-04 ENCOUNTER — HOSPITAL ENCOUNTER (OUTPATIENT)
Dept: CARDIAC REHAB | Age: 75
Discharge: HOME | End: 2025-09-04

## 2025-09-04 PROCEDURE — 9900000065 HC CARDIAC REHAB PHASE 3 - 1 VISIT

## (undated) DEVICE — NEEDLE HYPO 25GA L1.5IN BLU POLYPR HUB S STL REG BVL STR

## (undated) DEVICE — TOURNIQUET PHLEB M AD FNGR RED SIL RNG DISP TOURNI-COT

## (undated) DEVICE — PADDING CAST W4INXL4YD NONSTERILE COT RAYON MICROPLEATED

## (undated) DEVICE — 3M™ TEGADERM™ TRANSPARENT FILM DRESSING FRAME STYLE, 1624W, 2-3/8 IN X 2-3/4 IN (6 CM X 7 CM), 100/CT 4CT/CASE: Brand: 3M™ TEGADERM™

## (undated) DEVICE — GLOVE SURG SZ 65 L12IN FNGR THK94MIL STD WHT LTX FREE

## (undated) DEVICE — GLOVE,SURG,SENSICARE,ALOE,LF,PF,7: Brand: MEDLINE

## (undated) DEVICE — CATHETER IV 20GA L1.25IN PNK FEP SFTY STR HUB RADPQ DISP

## (undated) DEVICE — PAD,ABDOMINAL,8"X10",ST,LF: Brand: MEDLINE

## (undated) DEVICE — BANDAGE COMPR W2INXL5YD HI E BGE W/ CLP SURE-WRAP

## (undated) DEVICE — GOWN,SIRUS,NON REINFRCD,LARGE,SET IN SL: Brand: MEDLINE

## (undated) DEVICE — SET ADMIN PRIMING 7ML L30IN 7.35LB 20 GTT 2ND RLER CLMP

## (undated) DEVICE — ZIMMER® STERILE DISPOSABLE TOURNIQUET CUFF WITH PLC, DUAL PORT, SINGLE BLADDER, 18 IN. (46 CM)

## (undated) DEVICE — TRAP POLYP ETRAP

## (undated) DEVICE — SOLUTION IV IRRIG 500ML 0.9% SODIUM CHL 2F7123

## (undated) DEVICE — ELECTRODE,RADIOTRANSLUCENT,FOAM,3PK: Brand: MEDLINE

## (undated) DEVICE — SOLUTION IV 1000ML LAC RINGERS PH 6.5 INJ USP VIAFLX PLAS

## (undated) DEVICE — SUTURE ETHLN SZ 4-0 L18IN NONABSORBABLE BLK L19MM PS-2 3/8 1667H

## (undated) DEVICE — GLOVE SURG SZ 65 THK91MIL LTX FREE SYN POLYISOPRENE

## (undated) DEVICE — GLOVE SURG SZ 65 L12IN FNGR THK79MIL GRN LTX FREE

## (undated) DEVICE — BLADE OPHTH 180DEG CUT SURF BLU STR SHRP DBL BVL GRINDLESS

## (undated) DEVICE — Device

## (undated) DEVICE — GLOVE SURG SZ 75 L12IN FNGR THK94MIL STD WHT LTX FREE

## (undated) DEVICE — STANDARD HYPODERMIC NEEDLE,POLYPROPYLENE HUB: Brand: MONOJECT

## (undated) DEVICE — CORD ES L12FT BPLR FRCP

## (undated) DEVICE — ENDO CARRY-ON PROCEDURE KIT INCLUDES SUCTION TUBING, LUBRICANT, GAUZE, BIOHAZARD STICKER, TRANSPORT PAD AND INTERCEPT BEDSIDE KIT.: Brand: ENDO CARRY-ON PROCEDURE KIT

## (undated) DEVICE — Y-TYPE TUR/BLADDER IRRIGATION SET, REGULATING CLAMP

## (undated) DEVICE — SET GRAV VENT NVENT CK VLV 3 NDL FREE PRT 10 GTT

## (undated) DEVICE — SYRINGE MED 10ML LUERLOCK TIP W/O SFTY DISP

## (undated) DEVICE — D&C: Brand: MEDLINE INDUSTRIES, INC.

## (undated) DEVICE — Device: Brand: DISPOSABLE ELECTROSURGICAL SNARE